# Patient Record
Sex: MALE | Race: BLACK OR AFRICAN AMERICAN | Employment: OTHER | URBAN - METROPOLITAN AREA
[De-identification: names, ages, dates, MRNs, and addresses within clinical notes are randomized per-mention and may not be internally consistent; named-entity substitution may affect disease eponyms.]

---

## 2017-04-24 ENCOUNTER — HOSPITAL ENCOUNTER (OUTPATIENT)
Dept: NON INVASIVE DIAGNOSTICS | Age: 77
Discharge: HOME | End: 2017-04-24
Attending: INTERNAL MEDICINE | Admitting: INTERNAL MEDICINE

## 2017-04-24 DIAGNOSIS — R00.0 TACHYCARDIA: ICD-10-CM

## 2017-04-26 LAB
ACQUISITION DURATION: NORMAL S
AVERAGE HEART RATE: 75 BPM
EKG DIAGNOSIS: NORMAL
FASTEST SUPRAVENTRICULAR RATE: 135 BPM
HOLTER MAX HEART RATE: 122 BPM
HOOKUP DATE: NORMAL
HOOKUP TIME: NORMAL
LONGEST SUPRAVENTRICULAR RATE: 75 BPM
Lab: NORMAL
MAX HEART RATE TIME/DATE: NORMAL
MIN HEART RATE TIME/DATE: NORMAL
MIN HEART RATE: 46 BPM
NUMBER OF FASTEST SUPRAVENTRICULAR BEATS: 3
NUMBER OF LONGEST SUPRAVENTRICULAR BEATS: 19
NUMBER OF QRS COMPLEXES: NORMAL
NUMBER OF SUPRAVENTRICULAR BEATS IN RUNS: 244
NUMBER OF SUPRAVENTRICULAR COUPLETS: 80
NUMBER OF SUPRAVENTRICULAR ECTOPICS: 2868
NUMBER OF SUPRAVENTRICULAR ISOLATED BEATS: 2464
NUMBER OF SUPRAVENTRICULAR RUNS: 51
NUMBER OF VENTRICULAR BEATS IN RUNS: 0
NUMBER OF VENTRICULAR BIGEMINAL CYCLES: 0
NUMBER OF VENTRICULAR COUPLETS: 2
NUMBER OF VENTRICULAR ECTOPICS: 40
NUMBER OF VENTRICULAR ISOLATED BEATS: 36
NUMBER OF VENTRICULAR RUNS: 0

## 2019-10-11 ENCOUNTER — TELEPHONE (OUTPATIENT)
Dept: WOUND CARE | Age: 79
End: 2019-10-11

## 2019-10-17 ENCOUNTER — HOSPITAL ENCOUNTER (OUTPATIENT)
Dept: WOUND CARE | Age: 79
Discharge: HOME | End: 2019-10-17
Payer: MEDICARE

## 2019-10-17 VITALS
DIASTOLIC BLOOD PRESSURE: 69 MMHG | HEART RATE: 71 BPM | SYSTOLIC BLOOD PRESSURE: 105 MMHG | TEMPERATURE: 97.7 F | RESPIRATION RATE: 16 BRPM

## 2019-10-17 DIAGNOSIS — I87.2 VENOUS ULCER OF RIGHT LOWER EXTREMITY WITHOUT VARICOSE VEINS (HCC): ICD-10-CM

## 2019-10-17 DIAGNOSIS — L97.919 VENOUS ULCER OF RIGHT LOWER EXTREMITY WITHOUT VARICOSE VEINS (HCC): ICD-10-CM

## 2019-10-17 DIAGNOSIS — I87.2 VENOUS INSUFFICIENCY OF BOTH LOWER EXTREMITIES: ICD-10-CM

## 2019-10-17 PROCEDURE — 11042 DBRDMT SUBQ TIS 1ST 20SQCM/<: CPT

## 2019-10-17 PROCEDURE — 99203 OFFICE O/P NEW LOW 30 MIN: CPT | Performed by: INTERNAL MEDICINE

## 2019-10-17 PROCEDURE — 99212 OFFICE O/P EST SF 10 MIN: CPT

## 2019-10-17 PROCEDURE — 11042 DBRDMT SUBQ TIS 1ST 20SQCM/<: CPT | Performed by: INTERNAL MEDICINE

## 2019-10-17 RX ORDER — LIDOCAINE 40 MG/G
CREAM TOPICAL ONCE
Status: DISCONTINUED | OUTPATIENT
Start: 2019-10-17 | End: 2019-10-18 | Stop reason: HOSPADM

## 2019-10-24 ENCOUNTER — HOSPITAL ENCOUNTER (OUTPATIENT)
Dept: WOUND CARE | Age: 79
Discharge: HOME | End: 2019-10-24
Payer: MEDICARE

## 2019-10-24 VITALS
SYSTOLIC BLOOD PRESSURE: 98 MMHG | RESPIRATION RATE: 18 BRPM | HEART RATE: 69 BPM | TEMPERATURE: 98 F | DIASTOLIC BLOOD PRESSURE: 67 MMHG

## 2019-10-24 PROCEDURE — 29580 STRAPPING UNNA BOOT: CPT

## 2019-10-24 PROCEDURE — 99213 OFFICE O/P EST LOW 20 MIN: CPT | Performed by: INTERNAL MEDICINE

## 2019-10-24 RX ORDER — LIDOCAINE 40 MG/G
CREAM TOPICAL ONCE
Status: DISCONTINUED | OUTPATIENT
Start: 2019-10-24 | End: 2019-10-25 | Stop reason: HOSPADM

## 2019-10-31 ENCOUNTER — HOSPITAL ENCOUNTER (OUTPATIENT)
Dept: WOUND CARE | Age: 79
Discharge: HOME | End: 2019-10-31
Payer: MEDICARE

## 2019-10-31 VITALS
RESPIRATION RATE: 18 BRPM | SYSTOLIC BLOOD PRESSURE: 108 MMHG | HEART RATE: 76 BPM | TEMPERATURE: 97.3 F | DIASTOLIC BLOOD PRESSURE: 70 MMHG

## 2019-10-31 DIAGNOSIS — I87.2 VENOUS INSUFFICIENCY OF BOTH LOWER EXTREMITIES: Primary | ICD-10-CM

## 2019-10-31 DIAGNOSIS — L97.911 NON-PRESSURE CHRONIC ULCER OF LOWER LEG, LIMITED TO BREAKDOWN OF SKIN, RIGHT (HCC): ICD-10-CM

## 2019-10-31 PROCEDURE — 29580 STRAPPING UNNA BOOT: CPT

## 2019-10-31 PROCEDURE — 99212 OFFICE O/P EST SF 10 MIN: CPT | Performed by: NURSE PRACTITIONER

## 2019-11-07 ENCOUNTER — HOSPITAL ENCOUNTER (OUTPATIENT)
Dept: WOUND CARE | Age: 79
Discharge: HOME | End: 2019-11-07
Payer: MEDICARE

## 2019-11-07 VITALS
RESPIRATION RATE: 18 BRPM | HEART RATE: 71 BPM | TEMPERATURE: 97.3 F | SYSTOLIC BLOOD PRESSURE: 111 MMHG | DIASTOLIC BLOOD PRESSURE: 66 MMHG

## 2019-11-07 PROCEDURE — 99212 OFFICE O/P EST SF 10 MIN: CPT

## 2019-11-07 PROCEDURE — 99212 OFFICE O/P EST SF 10 MIN: CPT | Performed by: INTERNAL MEDICINE

## 2021-10-27 ENCOUNTER — HOSPITAL ENCOUNTER (OUTPATIENT)
Dept: MAMMOGRAPHY | Age: 81
Discharge: HOME | End: 2021-10-27
Payer: MEDICARE

## 2021-10-27 ENCOUNTER — HOSPITAL ENCOUNTER (OUTPATIENT)
Dept: ULTRASOUND IMAGING | Age: 81
Discharge: HOME | End: 2021-10-27
Payer: MEDICARE

## 2021-10-27 DIAGNOSIS — N64.4 MASTODYNIA: ICD-10-CM

## 2021-10-27 DIAGNOSIS — R92.8 ABNORMAL MAMMOGRAM: ICD-10-CM

## 2021-10-27 PROCEDURE — 76642 ULTRASOUND BREAST LIMITED: CPT

## 2021-10-27 PROCEDURE — 77066 DX MAMMO INCL CAD BI: CPT

## 2022-06-23 ENCOUNTER — HOSPITAL ENCOUNTER (OUTPATIENT)
Dept: WOUND CARE | Age: 82
Discharge: HOME | End: 2022-06-23
Payer: MEDICARE

## 2022-06-23 VITALS
DIASTOLIC BLOOD PRESSURE: 67 MMHG | HEART RATE: 67 BPM | SYSTOLIC BLOOD PRESSURE: 98 MMHG | RESPIRATION RATE: 16 BRPM | TEMPERATURE: 96.8 F

## 2022-06-23 DIAGNOSIS — L97.911 NON-PRESSURE CHRONIC ULCER OF LOWER LEG, LIMITED TO BREAKDOWN OF SKIN, RIGHT (HCC): ICD-10-CM

## 2022-06-23 DIAGNOSIS — S81.811A LACERATION OF SKIN OF LOWER LEG, RIGHT, INITIAL ENCOUNTER: ICD-10-CM

## 2022-06-23 DIAGNOSIS — S81.812A: ICD-10-CM

## 2022-06-23 DIAGNOSIS — I87.2 VENOUS INSUFFICIENCY OF BOTH LOWER EXTREMITIES: Primary | ICD-10-CM

## 2022-06-23 PROCEDURE — 11042 DBRDMT SUBQ TIS 1ST 20SQCM/<: CPT | Performed by: NURSE PRACTITIONER

## 2022-06-23 PROCEDURE — 99213 OFFICE O/P EST LOW 20 MIN: CPT

## 2022-06-23 PROCEDURE — 99212 OFFICE O/P EST SF 10 MIN: CPT | Performed by: NURSE PRACTITIONER

## 2022-06-23 PROCEDURE — 11042 DBRDMT SUBQ TIS 1ST 20SQCM/<: CPT

## 2022-06-23 RX ORDER — GINSENG 100 MG
CAPSULE ORAL ONCE
Status: CANCELLED | OUTPATIENT
Start: 2022-06-23 | End: 2022-06-23

## 2022-06-23 RX ORDER — ALLOPURINOL 100 MG/1
100 TABLET ORAL DAILY
COMMUNITY

## 2022-06-23 RX ORDER — LIDOCAINE HYDROCHLORIDE 20 MG/ML
JELLY TOPICAL ONCE
Status: CANCELLED | OUTPATIENT
Start: 2022-06-23 | End: 2022-06-23

## 2022-06-23 RX ORDER — LEVOTHYROXINE SODIUM 0.12 MG/1
125 TABLET ORAL DAILY
COMMUNITY

## 2022-06-23 RX ORDER — BACITRACIN, NEOMYCIN, POLYMYXIN B 400; 3.5; 5 [USP'U]/G; MG/G; [USP'U]/G
OINTMENT TOPICAL ONCE
Status: CANCELLED | OUTPATIENT
Start: 2022-06-23 | End: 2022-06-23

## 2022-06-23 RX ORDER — CLOBETASOL PROPIONATE 0.5 MG/G
OINTMENT TOPICAL ONCE
Status: CANCELLED | OUTPATIENT
Start: 2022-06-23 | End: 2022-06-23

## 2022-06-23 RX ORDER — LIDOCAINE HYDROCHLORIDE 40 MG/ML
SOLUTION TOPICAL ONCE
Status: CANCELLED | OUTPATIENT
Start: 2022-06-23 | End: 2022-06-23

## 2022-06-23 RX ORDER — MIDODRINE HYDROCHLORIDE 2.5 MG/1
2.5 TABLET ORAL 3 TIMES DAILY
COMMUNITY

## 2022-06-23 RX ORDER — CHOLECALCIFEROL (VITAMIN D3) 10 MCG
1 TABLET ORAL DAILY
COMMUNITY

## 2022-06-23 RX ORDER — BETAMETHASONE DIPROPIONATE 0.05 %
OINTMENT (GRAM) TOPICAL ONCE
Status: CANCELLED | OUTPATIENT
Start: 2022-06-23 | End: 2022-06-23

## 2022-06-23 RX ORDER — GENTAMICIN SULFATE 1 MG/G
OINTMENT TOPICAL ONCE
Status: CANCELLED | OUTPATIENT
Start: 2022-06-23 | End: 2022-06-23

## 2022-06-23 RX ORDER — LIDOCAINE 40 MG/G
CREAM TOPICAL ONCE
Status: CANCELLED | OUTPATIENT
Start: 2022-06-23 | End: 2022-06-23

## 2022-06-23 RX ORDER — BACITRACIN ZINC AND POLYMYXIN B SULFATE 500; 1000 [USP'U]/G; [USP'U]/G
OINTMENT TOPICAL ONCE
Status: CANCELLED | OUTPATIENT
Start: 2022-06-23 | End: 2022-06-23

## 2022-06-23 RX ORDER — LIDOCAINE 50 MG/G
OINTMENT TOPICAL ONCE
Status: CANCELLED | OUTPATIENT
Start: 2022-06-23 | End: 2022-06-23

## 2022-06-23 NOTE — PLAN OF CARE
Discharge instructions given. Patient verbalized understanding. Return to [address] [place] [address],[address] in 1 week(s).   Called/faxed orders to  [facility]

## 2022-06-23 NOTE — PROGRESS NOTES
[address] [facility],[address]:      [address], [address] [place] [address] f: [phone] f: [phone]: [phone] [email or web address]     Ordering Center: Juan Antonio Kirby [address] [facility] [address]  [phone]  Dept: [phone]   Fax# [phone]    Patient Information:       Minor  [address] [facility]  [place] [address] [facility]   [phone]   : 1940  AGE: 79 y.o. GENDER: male   TODAYS DATE:  2022    Insurance:      PRIMARY INSURANCE:  Plan: MEDICARE PART A AND B  Coverage: MEDICARE  Effective Date: 2015  Group Number: [unfilled]  Subscriber Number: [id number] - (Medicare)    Payor/Plan Subscr  Sex Relation Sub. Ins. ID Effective Group Num   1. MEDICARE - MEMariella Suazo 1940 Male Self [id number] 1/1/15                                    [address] [address]   2.   - TRIMariella Suazo 1940 Male Self [id number] 1/1/15                                    [address]         Patient Wound Information:     Additional ICD-10 Codes: I87.2, Venous ulcers bilateral legs    Patient Active Problem List   Diagnosis Code    Hyperlipidemia E78.5    Hypertension I10    Type II or unspecified type diabetes mellitus without mention of complication, not stated as uncontrolled E11.9    Fracture of sternum S22.20XA    Fractured rib S22.39XA    Prostate cancer ([facility] [address] C61    Thrombus I82.90    Chronic renal disease N18.9    Trigger finger M65.30    CTS (carpal tunnel syndrome) G56.00    Venous insufficiency of both lower extremities I87.2    Venous ulcer of right lower extremity without varicose veins (HCC) I87.2, L97.919    Non-pressure chronic ulcer of lower leg, limited to breakdown of skin, right ([facility] [address] L97.911       WOUNDS REQUIRING DRESSING SUPPLIES:     Wound 22 Leg Left;Lateral #2 (Active)   Wound Image   22 1523   Wound Etiology Traumatic 22 1523   Wound Cleansed Cleansed with saline 22 4014 Wound Length (cm) 0.7 cm 06/23/22 1523   Wound Width (cm) 1.9 cm 06/23/22 1523   Wound Depth (cm) 0.1 cm 06/23/22 1523   Wound Surface Area (cm^2) 1.33 cm^2 06/23/22 1523   Wound Volume (cm^3) 0.133 cm^3 06/23/22 1523   Post-Procedure Length (cm) 0.8 cm 06/23/22 1602   Post-Procedure Width (cm) 2 cm 06/23/22 1602   Post-Procedure Depth (cm) 0.15 cm 06/23/22 1602   Post-Procedure Surface Area (cm^2) 1.6 cm^2 06/23/22 1602   Post-Procedure Volume (cm^3) 0.24 cm^3 06/23/22 1602   Wound Assessment Bleeding 06/23/22 1602   Drainage Amount Moderate 06/23/22 1602   Drainage Description Serosanguinous 06/23/22 1602   Odor None 06/23/22 1523   Angela-wound Assessment Intact 06/23/22 1523   Margins Attached edges; Defined edges 06/23/22 1523   Number of days: 0       Wound 06/23/22 Leg Lateral;Right #1 (Active)   Wound Image   06/23/22 1523   Wound Etiology Traumatic 06/23/22 1523   Wound Cleansed Cleansed with saline 06/23/22 1523   Wound Length (cm) 0.9 cm 06/23/22 1523   Wound Width (cm) 3 cm 06/23/22 1523   Wound Depth (cm) 0.1 cm 06/23/22 1523   Wound Surface Area (cm^2) 2.7 cm^2 06/23/22 1523   Wound Volume (cm^3) 0.27 cm^3 06/23/22 1523   Post-Procedure Length (cm) 1 cm 06/23/22 1602   Post-Procedure Width (cm) 3.1 cm 06/23/22 1602   Post-Procedure Depth (cm) 0.15 cm 06/23/22 1602   Post-Procedure Surface Area (cm^2) 3.1 cm^2 06/23/22 1602   Post-Procedure Volume (cm^3) 0.465 cm^3 06/23/22 1602   Wound Assessment Bleeding 06/23/22 1602   Drainage Amount Moderate 06/23/22 1602   Drainage Description Serosanguinous 06/23/22 1602   Odor None 06/23/22 1523   Angela-wound Assessment Intact 06/23/22 1523   Margins Attached edges; Defined edges 06/23/22 1523   Number of days: 0          Supplies Requested :      WOUND #: 1 and 2   PRIMARY DRESSING:    Other: Medi-Honey Gel   Cover and Secure with: 4X4 gauze pad  Bulky roll gauze     FREQUENCY OF DRESSING CHANGES:  Daily    Wound Thickness [x] Full   []Partial Patient Wound(s) Debrided: [x] Yes   [] No    Debridement Date: 6/23/2022    Debribement Type: Excisional/Sharp    ADDITIONAL ITEMS:  [] Gloves Small  [] Gloves Medium [x] Gloves Large [] Gloves Nichelle Swanson  [] Paper Tape 1\" [] Paper Tape 2\" [] Paper Tape 3\"  [x] Medipore Tape 3\"  [x] Saline  [] Skin Prep   [] Adhesive Remover   [] Cotton Tip Applicators  [] Tubular Stocking   [] Size E  [] Size G  [] Other:    Patient currently being seen by Home Health: [] Yes   [x] No    Duration for needed supplies:  [x]15  []30  []60  []90 Days    Provider Information:      PROVIDER'S NAME/NPI  Stalin Smart  [id number]    I give permission to coordinate the care for this patient
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
## 2022-07-01 ENCOUNTER — HOSPITAL ENCOUNTER (OUTPATIENT)
Dept: WOUND CARE | Age: 82
Discharge: HOME | End: 2022-07-01
Payer: MEDICARE

## 2022-07-01 DIAGNOSIS — S81.812A: ICD-10-CM

## 2022-07-01 DIAGNOSIS — S81.811A LACERATION OF SKIN OF LOWER LEG, RIGHT, INITIAL ENCOUNTER: ICD-10-CM

## 2022-07-01 DIAGNOSIS — L97.911 NON-PRESSURE CHRONIC ULCER OF LOWER LEG, LIMITED TO BREAKDOWN OF SKIN, RIGHT (HCC): ICD-10-CM

## 2022-07-01 PROCEDURE — 11042 DBRDMT SUBQ TIS 1ST 20SQCM/<: CPT

## 2022-07-01 PROCEDURE — 29581 APPL MULTLAYER CMPRN SYS LEG: CPT

## 2022-07-01 PROCEDURE — 97597 DBRDMT OPN WND 1ST 20 CM/<: CPT

## 2022-07-01 NOTE — PLAN OF CARE
Discharge instructions given. Patient verbalized understanding. Return to [facility] in 1 week(s).
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
## 2022-07-06 ENCOUNTER — HOSPITAL ENCOUNTER (OUTPATIENT)
Dept: WOUND CARE | Age: 82
Discharge: HOME | End: 2022-07-06

## 2022-07-08 ENCOUNTER — HOSPITAL ENCOUNTER (OUTPATIENT)
Dept: WOUND CARE | Age: 82
Discharge: HOME | End: 2022-07-08
Payer: MEDICARE

## 2022-07-08 VITALS — SYSTOLIC BLOOD PRESSURE: 106 MMHG | TEMPERATURE: 96.8 F | DIASTOLIC BLOOD PRESSURE: 73 MMHG | HEART RATE: 67 BPM

## 2022-07-08 DIAGNOSIS — I73.9 PAD (PERIPHERAL ARTERY DISEASE) (HCC): ICD-10-CM

## 2022-07-08 DIAGNOSIS — S81.811A LACERATION OF SKIN OF LOWER LEG, RIGHT, INITIAL ENCOUNTER: ICD-10-CM

## 2022-07-08 DIAGNOSIS — L97.911 NON-PRESSURE CHRONIC ULCER OF LOWER LEG, LIMITED TO BREAKDOWN OF SKIN, RIGHT (HCC): ICD-10-CM

## 2022-07-08 DIAGNOSIS — S81.812A: Primary | ICD-10-CM

## 2022-07-08 LAB
GLUCOSE BLD-MCNC: 113 MG/DL (ref 70–99)
PERFORMED ON: ABNORMAL

## 2022-07-08 PROCEDURE — 29581 APPL MULTLAYER CMPRN SYS LEG: CPT

## 2022-07-08 PROCEDURE — 97597 DBRDMT OPN WND 1ST 20 CM/<: CPT

## 2022-07-08 PROCEDURE — 11042 DBRDMT SUBQ TIS 1ST 20SQCM/<: CPT

## 2022-07-08 RX ORDER — BACITRACIN ZINC AND POLYMYXIN B SULFATE 500; 1000 [USP'U]/G; [USP'U]/G
OINTMENT TOPICAL ONCE
Status: CANCELLED | OUTPATIENT
Start: 2022-07-08 | End: 2022-07-08

## 2022-07-08 RX ORDER — GINSENG 100 MG
CAPSULE ORAL ONCE
Status: CANCELLED | OUTPATIENT
Start: 2022-07-08 | End: 2022-07-08

## 2022-07-08 RX ORDER — LIDOCAINE 40 MG/G
CREAM TOPICAL ONCE
Status: DISCONTINUED | OUTPATIENT
Start: 2022-07-08 | End: 2022-07-09 | Stop reason: HOSPADM

## 2022-07-08 RX ORDER — BETAMETHASONE DIPROPIONATE 0.05 %
OINTMENT (GRAM) TOPICAL ONCE
Status: CANCELLED | OUTPATIENT
Start: 2022-07-08 | End: 2022-07-08

## 2022-07-08 RX ORDER — LIDOCAINE 40 MG/G
CREAM TOPICAL ONCE
Status: CANCELLED | OUTPATIENT
Start: 2022-07-08 | End: 2022-07-08

## 2022-07-08 RX ORDER — BACITRACIN, NEOMYCIN, POLYMYXIN B 400; 3.5; 5 [USP'U]/G; MG/G; [USP'U]/G
OINTMENT TOPICAL ONCE
Status: CANCELLED | OUTPATIENT
Start: 2022-07-08 | End: 2022-07-08

## 2022-07-08 RX ORDER — LIDOCAINE 50 MG/G
OINTMENT TOPICAL ONCE
Status: CANCELLED | OUTPATIENT
Start: 2022-07-08 | End: 2022-07-08

## 2022-07-08 RX ORDER — CLOBETASOL PROPIONATE 0.5 MG/G
OINTMENT TOPICAL ONCE
Status: CANCELLED | OUTPATIENT
Start: 2022-07-08 | End: 2022-07-08

## 2022-07-08 RX ORDER — LIDOCAINE HYDROCHLORIDE 20 MG/ML
JELLY TOPICAL ONCE
Status: CANCELLED | OUTPATIENT
Start: 2022-07-08 | End: 2022-07-08

## 2022-07-08 RX ORDER — LIDOCAINE HYDROCHLORIDE 40 MG/ML
SOLUTION TOPICAL ONCE
Status: CANCELLED | OUTPATIENT
Start: 2022-07-08 | End: 2022-07-08

## 2022-07-08 RX ORDER — GENTAMICIN SULFATE 1 MG/G
OINTMENT TOPICAL ONCE
Status: CANCELLED | OUTPATIENT
Start: 2022-07-08 | End: 2022-07-08

## 2022-07-08 NOTE — PLAN OF CARE
Discharge instructions given. Patient verbalized understanding. Return to [address] [place] [address],[address] in 1 week(s).

## 2022-07-08 NOTE — PROGRESS NOTES
Multilayer Compression Wrap   Below the Knee    NAME:  Sean Gan  YOB: 1940  MEDICAL RECORD NUMBER:  [id number]  DATE:  7/8/2022    Multilayer compression wrap: Applied primary and secondary dressing as ordered. Applied multilayered dressing below the knee to right lower leg. Applied multilayered dressing below the knee to left lower leg. Instructed patient/caregiver not to remove dressing and to keep it clean and dry. Instructed patient/caregiver on complications to report to provider, such as pain, numbness in toes, heavy drainage, and slippage of dressing. Instructed patient on purpose of compression dressing and on activity and exercise recommendations.      Applied  3 layer wrap from toes to knee overlapping each time    Electronically signed by Rangel Ramirez RN on 7/8/2022 at 12:21 PM
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
## 2022-07-13 ENCOUNTER — HOSPITAL ENCOUNTER (OUTPATIENT)
Dept: WOUND CARE | Age: 82
Discharge: HOME | End: 2022-07-13
Payer: MEDICARE

## 2022-07-13 VITALS
TEMPERATURE: 96.6 F | HEART RATE: 73 BPM | DIASTOLIC BLOOD PRESSURE: 66 MMHG | SYSTOLIC BLOOD PRESSURE: 100 MMHG | RESPIRATION RATE: 16 BRPM

## 2022-07-13 DIAGNOSIS — L97.911 NON-PRESSURE CHRONIC ULCER OF LOWER LEG, LIMITED TO BREAKDOWN OF SKIN, RIGHT (HCC): ICD-10-CM

## 2022-07-13 DIAGNOSIS — S81.812A: Primary | ICD-10-CM

## 2022-07-13 DIAGNOSIS — S81.811A LACERATION OF SKIN OF LOWER LEG, RIGHT, INITIAL ENCOUNTER: ICD-10-CM

## 2022-07-13 PROCEDURE — 29581 APPL MULTLAYER CMPRN SYS LEG: CPT

## 2022-07-13 PROCEDURE — 11042 DBRDMT SUBQ TIS 1ST 20SQCM/<: CPT

## 2022-07-13 PROCEDURE — 97597 DBRDMT OPN WND 1ST 20 CM/<: CPT

## 2022-07-13 RX ORDER — LIDOCAINE 40 MG/G
CREAM TOPICAL ONCE
Status: CANCELLED | OUTPATIENT
Start: 2022-07-13 | End: 2022-07-13

## 2022-07-13 RX ORDER — LIDOCAINE 50 MG/G
OINTMENT TOPICAL ONCE
Status: CANCELLED | OUTPATIENT
Start: 2022-07-13 | End: 2022-07-13

## 2022-07-13 RX ORDER — LIDOCAINE HYDROCHLORIDE 20 MG/ML
JELLY TOPICAL ONCE
Status: CANCELLED | OUTPATIENT
Start: 2022-07-13 | End: 2022-07-13

## 2022-07-13 RX ORDER — BACITRACIN, NEOMYCIN, POLYMYXIN B 400; 3.5; 5 [USP'U]/G; MG/G; [USP'U]/G
OINTMENT TOPICAL ONCE
Status: CANCELLED | OUTPATIENT
Start: 2022-07-13 | End: 2022-07-13

## 2022-07-13 RX ORDER — GINSENG 100 MG
CAPSULE ORAL ONCE
Status: CANCELLED | OUTPATIENT
Start: 2022-07-13 | End: 2022-07-13

## 2022-07-13 RX ORDER — CLOBETASOL PROPIONATE 0.5 MG/G
OINTMENT TOPICAL ONCE
Status: CANCELLED | OUTPATIENT
Start: 2022-07-13 | End: 2022-07-13

## 2022-07-13 RX ORDER — LIDOCAINE 40 MG/G
CREAM TOPICAL ONCE
Status: DISCONTINUED | OUTPATIENT
Start: 2022-07-13 | End: 2022-07-14 | Stop reason: HOSPADM

## 2022-07-13 RX ORDER — LIDOCAINE HYDROCHLORIDE 40 MG/ML
SOLUTION TOPICAL ONCE
Status: CANCELLED | OUTPATIENT
Start: 2022-07-13 | End: 2022-07-13

## 2022-07-13 RX ORDER — BACITRACIN ZINC AND POLYMYXIN B SULFATE 500; 1000 [USP'U]/G; [USP'U]/G
OINTMENT TOPICAL ONCE
Status: CANCELLED | OUTPATIENT
Start: 2022-07-13 | End: 2022-07-13

## 2022-07-13 RX ORDER — BETAMETHASONE DIPROPIONATE 0.05 %
OINTMENT (GRAM) TOPICAL ONCE
Status: CANCELLED | OUTPATIENT
Start: 2022-07-13 | End: 2022-07-13

## 2022-07-13 RX ORDER — GENTAMICIN SULFATE 1 MG/G
OINTMENT TOPICAL ONCE
Status: CANCELLED | OUTPATIENT
Start: 2022-07-13 | End: 2022-07-13

## 2022-07-13 NOTE — PROGRESS NOTES
Multilayer Compression Wrap   Below the Knee    NAME:  Maryann Hutson  YOB: 1940  MEDICAL RECORD NUMBER:  [id number]  DATE:  7/13/2022    Multilayer compression wrap: Removed old Multilayer wrap if indicated and wash leg with mild soap/water. Applied moisturizing agent to dry skin as needed. Applied primary and secondary dressing as ordered. Applied multilayered dressing below the knee to right lower leg. Applied multilayered dressing below the knee to left lower leg. Instructed patient/caregiver not to remove dressing and to keep it clean and dry. Instructed patient/caregiver on complications to report to provider, such as pain, numbness in toes, heavy drainage, and slippage of dressing. Instructed patient on purpose of compression dressing and on activity and exercise recommendations.      Applied  3 layer wrap from toes to knee overlapping each time  Right leg- Gentamicin and collagen to wound, Left Leg- Triad and Gentamicin to wound  Electronically signed by Daniel Reveles RN on 7/13/2022 at 5:02 PM

## 2022-07-13 NOTE — DISCHARGE INSTRUCTIONS
[address], [address]  Telephone: [phone]    Discharge Instructions    Important reminders:    **If you have any signs and symptoms of illness (Cough, fever, congestion, nausea, vomiting, diarrhea, etc.) please call the wound care center prior to your appointment. 1. Increase Protein intake for optimal wound healing  2. No added salt to reduce any swelling  3. If diabetic, maintain good glucose control  4. If you smoke, smoking prohibits wound healing, we ask that you refrain from smoking. 5. When taking antibiotics take the entire prescription as ordered. Do not stop taking until medication is all gone unless otherwise instructed. 6. Exercise as tolerated. 7. Keep weight off wounds and reposition every 2 hours if applicable. 8. If wound(s) is on your lower extremity, elevate legs to the level of the heart or above for 30 minutes 4-5 times a day and/or when sitting. Avoid standing for long periods of time. 9. Do not get wounds wet in bath or shower unless otherwise instructed by your physician. If your wound is on your foot or leg, you may purchase a cast bag. Please ask at the pharmacy. If Vascular testing is ordered, please call [address] [place] [address] [phone]) to schedule. Vascular tests ordered by Wound Care Physicians may take up to 2 hours to complete. Please keep that in mind when scheduling. If Vascular testing is scheduled, please bring supplies to replace your dressing after testing is done. The vascular department does not stock supplies. Wound: Bilateral Legs    With each dressing change, rinse wounds with 0.9% Saline. (May use wound wash or soft contact solution. Both can be purchased at a local drug store). If unable to obtain saline, may use a gentle soap and water. Dressing - .  Keep legs elevated when sitting. Gentamicin cream, Triad, Mepitel, 4x4, 3 layer wraps. Please leave these on for the week.  If companies have financial hardship programs for patients who qualify, so please ask about that if you might need a hand. If you have any questions about your supplies or your potential out-of-pocket costs, or if you need to place an order for a refill of supplies (typically monthly), please call the company directly. Your  is Jeyson Hernandez    Follow up with Verito Parmar or Dr Asha Glass In 1 week(s) in the wound care center. Wound Care Center Information: Should you experience any significant changes in your wound(s) or have questions about your wound care, please contact the [place] [address] at [phone] Monday  - Thursday 8:00 am - 4:00 pm and Friday 8:00 am - 1:00pm. If you need help with your wound outside these hours and cannot wait until we are again available, contact your PCP or go to the hospital emergency room. PLEASE NOTE: IF YOU ARE UNABLE TO OBTAIN WOUND SUPPLIES, CONTINUE TO USE THE SUPPLIES YOU HAVE AVAILABLE UNTIL YOU ARE ABLE TO REACH US. IT IS MOST IMPORTANT TO KEEP THE WOUND COVERED AT ALL TIMES. Patient Experience    Thank you for trusting us with your care. You may receive a survey from a company called [employer] asking for your feedback. We would appreciate it if you took a few minutes to share your experience. Your input is very valuable to us.

## 2022-07-13 NOTE — PLAN OF CARE
Discharge instructions given. Patient verbalized understanding. Return to [facility] in 1 week(s).
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
## 2022-07-18 NOTE — DISCHARGE INSTRUCTIONS
[address], [address]  Telephone: [phone]    Discharge Instructions    Important reminders:    **If you have any signs and symptoms of illness (Cough, fever, congestion, nausea, vomiting, diarrhea, etc.) please call the wound care center prior to your appointment. 1. Increase Protein intake for optimal wound healing  2. No added salt to reduce any swelling  3. If diabetic, maintain good glucose control  4. If you smoke, smoking prohibits wound healing, we ask that you refrain from smoking. 5. When taking antibiotics take the entire prescription as ordered. Do not stop taking until medication is all gone unless otherwise instructed. 6. Exercise as tolerated. 7. Keep weight off wounds and reposition every 2 hours if applicable. 8. If wound(s) is on your lower extremity, elevate legs to the level of the heart or above for 30 minutes 4-5 times a day and/or when sitting. Avoid standing for long periods of time. 9. Do not get wounds wet in bath or shower unless otherwise instructed by your physician. If your wound is on your foot or leg, you may purchase a cast bag. Please ask at the pharmacy. If Vascular testing is ordered, please call [employer] ([phone]) to schedule. Vascular tests ordered by Wound Care Physicians may take up to 2 hours to complete. Please keep that in mind when scheduling. If Vascular testing is scheduled, please bring supplies to replace your dressing after testing is done. The vascular department does not stock supplies. Wound: Bilateral Legs    With each dressing change, rinse wounds with 0.9% Saline. (May use wound wash or soft contact solution. Both can be purchased at a local drug store). If unable to obtain saline, may use a gentle soap and water. Dressing - .  Keep legs elevated when sitting. Gentamicin cream, Triad, Mepitel, 4x4, 3 layer wraps. Please leave these on for the week.  If they become uncomfortable or slide down, Remove and call the wound center    Call to have vascular studies scheduled. Try [facility]    Compression Wraps  Location: Both legs  Type: 3 layer    Your doctor has ordered compression therapy for your wound. Compression bandages reduce the swelling, or edema, in your legs and prevent it from returning. The wound care staff will apply your compression wrap. It must be removed and re-applied at least weekly. As the swelling decreases, the boot no longer provides adequate compression and you need a new one. Once applied, you need to know how to take care of your compression wrap. The boot must stay dry. Do not get it wet in the shower or tub. You may do a partial bath, or you can cover the boot with a large plastic bag, secured at the top, so that no water can get in. Avoid standing in one place for long periods of time. If you must  one place, shift your weight and change positions often. If you have CHF, consult your doctor before following the next two recommendations for leg elevation. When sitting, elevate your legs on pillows, or put blocks under the foot of your bed. Your legs should be higher than your heart. If your boot becomes painful, or you notice an increase in swelling in your toes, numbness or tingling, or purple color to your toes, remove the wrap and call the [facility] [address]. If it is after hours, call your doctor for instructions or go to the nearest emergency room. Home Care Agency/Facility:     Your wound-care supplies will be provided by: [employer] -   phone #: [phone]      Please note, depending on your insurance coverage, you may have out-of-pocket expenses for these supplies. Someone from the company should call you to confirm your order and discuss those potential costs before they ship your products -- please anticipate that call.  If your out-of-pocket cost could be substantial, Many companies have financial hardship programs for patients who qualify, so please ask about that if you might need a hand. If you have any questions about your supplies or your potential out-of-pocket costs, or if you need to place an order for a refill of supplies (typically monthly), please call the company directly. Your  is Sonny Wilder    Follow up with Angus Yo or Dr Rosalia Malik In 1 week(s) in the wound care center. Wound Care Center Information: Should you experience any significant changes in your wound(s) or have questions about your wound care, please contact the [place] [address] at [phone] Monday  - Thursday 8:00 am - 4:00 pm and Friday 8:00 am - 1:00pm. If you need help with your wound outside these hours and cannot wait until we are again available, contact your PCP or go to the hospital emergency room. PLEASE NOTE: IF YOU ARE UNABLE TO OBTAIN WOUND SUPPLIES, CONTINUE TO USE THE SUPPLIES YOU HAVE AVAILABLE UNTIL YOU ARE ABLE TO REACH US. IT IS MOST IMPORTANT TO KEEP THE WOUND COVERED AT ALL TIMES. Patient Experience    Thank you for trusting us with your care. You may receive a survey from a company called [employer] asking for your feedback. We would appreciate it if you took a few minutes to share your experience. Your input is very valuable to us.

## 2022-07-20 ENCOUNTER — HOSPITAL ENCOUNTER (OUTPATIENT)
Dept: WOUND CARE | Age: 82
Discharge: HOME | End: 2022-07-20

## 2022-08-23 ENCOUNTER — HOSPITAL ENCOUNTER (OUTPATIENT)
Dept: WOUND CARE | Age: 82
Discharge: HOME | End: 2022-08-23
Payer: MEDICARE

## 2022-08-23 DIAGNOSIS — I87.2 VENOUS ULCER OF RIGHT LOWER EXTREMITY WITHOUT VARICOSE VEINS (HCC): ICD-10-CM

## 2022-08-23 DIAGNOSIS — S81.811A LACERATION OF SKIN OF LOWER LEG, RIGHT, INITIAL ENCOUNTER: ICD-10-CM

## 2022-08-23 DIAGNOSIS — L97.912 NON-PRESSURE CHRONIC ULCER OF RIGHT LOWER LEG WITH FAT LAYER EXPOSED (HCC): ICD-10-CM

## 2022-08-23 DIAGNOSIS — I87.2 VENOUS INSUFFICIENCY OF BOTH LOWER EXTREMITIES: ICD-10-CM

## 2022-08-23 DIAGNOSIS — L97.919 VENOUS ULCER OF RIGHT LOWER EXTREMITY WITHOUT VARICOSE VEINS (HCC): ICD-10-CM

## 2022-08-23 DIAGNOSIS — L97.922 NON-PRESSURE CHRONIC ULCER OF LEFT LOWER LEG WITH FAT LAYER EXPOSED (HCC): ICD-10-CM

## 2022-08-23 DIAGNOSIS — L97.911 NON-PRESSURE CHRONIC ULCER OF LOWER LEG, LIMITED TO BREAKDOWN OF SKIN, RIGHT (HCC): ICD-10-CM

## 2022-08-23 DIAGNOSIS — S81.812A: Primary | ICD-10-CM

## 2022-08-23 PROCEDURE — 11042 DBRDMT SUBQ TIS 1ST 20SQCM/<: CPT

## 2022-08-23 PROCEDURE — 99213 OFFICE O/P EST LOW 20 MIN: CPT | Performed by: EMERGENCY MEDICINE

## 2022-08-23 PROCEDURE — 11042 DBRDMT SUBQ TIS 1ST 20SQCM/<: CPT | Performed by: EMERGENCY MEDICINE

## 2022-08-23 PROCEDURE — 11045 DBRDMT SUBQ TISS EACH ADDL: CPT | Performed by: EMERGENCY MEDICINE

## 2022-08-23 PROCEDURE — 11045 DBRDMT SUBQ TISS EACH ADDL: CPT

## 2022-08-23 PROCEDURE — 99214 OFFICE O/P EST MOD 30 MIN: CPT

## 2022-08-23 RX ORDER — CLOBETASOL PROPIONATE 0.5 MG/G
OINTMENT TOPICAL ONCE
Status: CANCELLED | OUTPATIENT
Start: 2022-08-23 | End: 2022-08-23

## 2022-08-23 RX ORDER — LIDOCAINE HYDROCHLORIDE 20 MG/ML
JELLY TOPICAL ONCE
Status: CANCELLED | OUTPATIENT
Start: 2022-08-23 | End: 2022-08-23

## 2022-08-23 RX ORDER — GENTAMICIN SULFATE 1 MG/G
OINTMENT TOPICAL ONCE
Status: CANCELLED | OUTPATIENT
Start: 2022-08-23 | End: 2022-08-23

## 2022-08-23 RX ORDER — GINSENG 100 MG
CAPSULE ORAL ONCE
Status: CANCELLED | OUTPATIENT
Start: 2022-08-23 | End: 2022-08-23

## 2022-08-23 RX ORDER — LIDOCAINE HYDROCHLORIDE 40 MG/ML
SOLUTION TOPICAL ONCE
Status: CANCELLED | OUTPATIENT
Start: 2022-08-23 | End: 2022-08-23

## 2022-08-23 RX ORDER — LIDOCAINE 40 MG/G
CREAM TOPICAL ONCE
Status: CANCELLED | OUTPATIENT
Start: 2022-08-23 | End: 2022-08-23

## 2022-08-23 RX ORDER — LIDOCAINE 40 MG/G
CREAM TOPICAL ONCE
Status: DISCONTINUED | OUTPATIENT
Start: 2022-08-23 | End: 2022-08-24 | Stop reason: HOSPADM

## 2022-08-23 RX ORDER — BACITRACIN ZINC AND POLYMYXIN B SULFATE 500; 1000 [USP'U]/G; [USP'U]/G
OINTMENT TOPICAL ONCE
Status: CANCELLED | OUTPATIENT
Start: 2022-08-23 | End: 2022-08-23

## 2022-08-23 RX ORDER — BETAMETHASONE DIPROPIONATE 0.05 %
OINTMENT (GRAM) TOPICAL ONCE
Status: CANCELLED | OUTPATIENT
Start: 2022-08-23 | End: 2022-08-23

## 2022-08-23 RX ORDER — BACITRACIN, NEOMYCIN, POLYMYXIN B 400; 3.5; 5 [USP'U]/G; MG/G; [USP'U]/G
OINTMENT TOPICAL ONCE
Status: CANCELLED | OUTPATIENT
Start: 2022-08-23 | End: 2022-08-23

## 2022-08-23 RX ORDER — LIDOCAINE 50 MG/G
OINTMENT TOPICAL ONCE
Status: CANCELLED | OUTPATIENT
Start: 2022-08-23 | End: 2022-08-23

## 2022-08-23 NOTE — DISCHARGE INSTRUCTIONS
[address], [address] [place] [address]  Telephone: [phone]    Discharge Instructions    Important reminders:    **If you have any signs and symptoms of illness (Cough, fever, congestion, nausea, vomiting, diarrhea, etc.) please call the wound care center prior to your appointment. 1. Increase Protein intake for optimal wound healing  2. No added salt to reduce any swelling  3. If diabetic, maintain good glucose control  4. If you smoke, smoking prohibits wound healing, we ask that you refrain from smoking. 5. When taking antibiotics take the entire prescription as ordered. Do not stop taking until medication is all gone unless otherwise instructed. 6. Exercise as tolerated. 7. Keep weight off wounds and reposition every 2 hours if applicable. 8. If wound(s) is on your lower extremity, elevate legs to the level of the heart or above for 30 minutes 4-5 times a day and/or when sitting. Avoid standing for long periods of time. 9. Do not get wounds wet in bath or shower unless otherwise instructed by your physician. If your wound is on your foot or leg, you may purchase a cast bag. Please ask at the pharmacy. If Vascular testing is ordered, please call [address] [place] [address] ([phone]) to schedule. Vascular tests ordered by Wound Care Physicians may take up to 2 hours to complete. Please keep that in mind when scheduling. If Vascular testing is scheduled, please bring supplies to replace your dressing after testing is done. The vascular department does not stock supplies. Wound: Legs     With each dressing change, rinse wounds with 0.9% Saline. (May use wound wash or soft contact solution. Both can be purchased at a local drug store). If unable to obtain saline, may use a gentle soap and water. Dressing care:    Keep legs elevated when sitting. Silver alginate to all wounds to right leg daily and as needed with dry dressing.  Bilateral legs- 6\" ace wrap from toes to below the knees. Call to have Arterial and Venous Vascular studies scheduled. Home Care Agency/Facility: [facility]    Your wound-care supplies will be provided by: [employer] -   phone #: [phone]    Please note, depending on your insurance coverage, you may have out-of-pocket expenses for these supplies. Someone from the company should call you to confirm your order and discuss those potential costs before they ship your products -- please anticipate that call. If your out-of-pocket cost could be substantial, Many companies have financial hardship programs for patients who qualify, so please ask about that if you might need a hand. If you have any questions about your supplies or your potential out-of-pocket costs, or if you need to place an order for a refill of supplies (typically monthly), please call the company directly. Your  is Fiona Reina up with Dr Nicky Rhodes In 1 week(s) in the wound care center. Wound Care Center Information: Should you experience any significant changes in your wound(s) or have questions about your wound care, please [facility] at [phone] Monday  - Thursday 8:00 am - 4:00 pm and Friday 8:00 am - 1:00pm. If you need help with your wound outside these hours and cannot wait until we are again available, contact your PCP or go to the hospital emergency room. PLEASE NOTE: IF YOU ARE UNABLE TO OBTAIN WOUND SUPPLIES, CONTINUE TO USE THE SUPPLIES YOU HAVE AVAILABLE UNTIL YOU ARE ABLE TO REACH US. IT IS MOST IMPORTANT TO KEEP THE WOUND COVERED AT ALL TIMES. Patient Experience    Thank you for trusting us with your care. You may receive a survey from a company called [employer] asking for your feedback. We would appreciate it if you took a few minutes to share your experience. Your input is very valuable to us.
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
## 2022-08-24 NOTE — PLAN OF CARE
[facility]  [address], [address]  Telephone: [phone]        [facility] Fax # [phone]        Discharge Instructions           [facility]  [address], [address]  Telephone: [phone]    Discharge Instructions    Important reminders:    **If you have any signs and symptoms of illness (Cough, fever, congestion, nausea, vomiting, diarrhea, etc.) please call the wound care center prior to your appointment. 1. Increase Protein intake for optimal wound healing  2. No added salt to reduce any swelling  3. If diabetic, maintain good glucose control  4. If you smoke, smoking prohibits wound healing, we ask that you refrain from smoking. 5. When taking antibiotics take the entire prescription as ordered. Do not stop taking until medication is all gone unless otherwise instructed. 6. Exercise as tolerated. 7. Keep weight off wounds and reposition every 2 hours if applicable. 8. If wound(s) is on your lower extremity, elevate legs to the level of the heart or above for 30 minutes 4-5 times a day and/or when sitting. Avoid standing for long periods of time. 9. Do not get wounds wet in bath or shower unless otherwise instructed by your physician. If your wound is on your foot or leg, you may purchase a cast bag. Please ask at the pharmacy. If Vascular testing is ordered, please call [address] [place] [address] [phone]) to schedule. Vascular tests ordered by Wound Care Physicians may take up to 2 hours to complete. Please keep that in mind when scheduling. If Vascular testing is scheduled, please bring supplies to replace your dressing after testing is done. The vascular department does not stock supplies. Wound: Legs     With each dressing change, rinse wounds with 0.9% Saline. (May use wound wash or soft contact solution. Both can be purchased at a local drug store).  If unable to obtain saline, may use a gentle soap and water. Dressing care:    Keep legs elevated when sitting. Silver alginate to all wounds to right leg daily and as needed with dry dressing. Bilateral legs- 6\" ace wrap from toes to below the knees. Call to have Arterial and Venous Vascular studies scheduled. Home Care Agency/Facility: [facility]    Your wound-care supplies will be provided by: [employer] -   phone #: [phone]    Please note, depending on your insurance coverage, you may have out-of-pocket expenses for these supplies. Someone from the company should call you to confirm your order and discuss those potential costs before they ship your products -- please anticipate that call. If your out-of-pocket cost could be substantial, Many companies have financial hardship programs for patients who qualify, so please ask about that if you might need a hand. If you have any questions about your supplies or your potential out-of-pocket costs, or if you need to place an order for a refill of supplies (typically monthly), please call the company directly. Your  is Fiona Reina up with Dr Erich Agrawal In 1 week(s) in the wound care center. Wound Care Center Information: Should you experience any significant changes in your wound(s) or have questions about your wound care, please [facility]  at [phone] Monday  - Thursday 8:00 am - 4:00 pm and Friday 8:00 am - 1:00pm. If you need help with your wound outside these hours and cannot wait until we are again available, contact your PCP or go to the hospital emergency room. PLEASE NOTE: IF YOU ARE UNABLE TO OBTAIN WOUND SUPPLIES, CONTINUE TO USE THE SUPPLIES YOU HAVE AVAILABLE UNTIL YOU ARE ABLE TO REACH US. IT IS MOST IMPORTANT TO KEEP THE WOUND COVERED AT ALL TIMES. Patient Experience    Thank you for trusting us with your care.   You may receive a survey from a company called [employer] asking for your feedback. We would appreciate it if you took a few minutes to share your experience. Your input is very valuable to us. Skilled nurse to evaluate and treat for wound care. Change dressing as ordered  once a day on Monday, Tuesday, Wednesday, Thursday, Friday, Saturday, and Sunday using clean technique. Patient/Family/caregiver may change dressings as needed as instructed when Home Care unavailable. WOUNDS REQUIRING DRESSING Changes:     Wound 08/23/22 Leg Right; Lower; Anterior;Proximal #1 (Active)   Wound Image   08/23/22 1544   Wound Etiology Venous 08/23/22 1544   Wound Cleansed Cleansed with saline 08/23/22 1544   Dressing/Treatment Alginate with Ag; Ace wrap;Dry dressing 08/23/22 1633   Wound Length (cm) 6.2 cm 08/23/22 1544   Wound Width (cm) 3.2 cm 08/23/22 1544   Wound Depth (cm) 0.1 cm 08/23/22 1544   Wound Surface Area (cm^2) 19.84 cm^2 08/23/22 1544   Wound Volume (cm^3) 1.984 cm^3 08/23/22 1544   Post-Procedure Length (cm) 6.2 cm 08/23/22 1606   Post-Procedure Width (cm) 3.2 cm 08/23/22 1606   Post-Procedure Depth (cm) 0.1 cm 08/23/22 1606   Post-Procedure Surface Area (cm^2) 19.84 cm^2 08/23/22 1606   Post-Procedure Volume (cm^3) 1.984 cm^3 08/23/22 1606   Wound Assessment Bleeding 08/23/22 1606   Drainage Amount Moderate 08/23/22 1544   Drainage Description Serous 08/23/22 1544   Odor None 08/23/22 1544   Angela-wound Assessment Fragile 08/23/22 1544   Margins Defined edges 08/23/22 1544   Number of days: 0       Wound 08/23/22 Leg Right; Lower; Anterior;Distal #2 (Active)   Wound Image   08/23/22 1544   Wound Etiology Venous 08/23/22 1544   Wound Cleansed Cleansed with saline 08/23/22 1544   Dressing/Treatment Ace wrap;Alginate with Ag;Dry dressing 08/23/22 1633   Wound Length (cm) 4.2 cm 08/23/22 1544   Wound Width (cm) 3.7 cm 08/23/22 1544   Wound Depth (cm) 0.1 cm 08/23/22 1544   Wound Surface Area (cm^2) 15.54 cm^2 08/23/22 1544   Wound Volume (cm^3) 1.554 cm^3 08/23/22 1544   Post-Procedure Length (cm) 4.3 cm 08/23/22 1606   Post-Procedure Width (cm) 3.8 cm 08/23/22 1606   Post-Procedure Depth (cm) 0.1 cm 08/23/22 1606   Post-Procedure Surface Area (cm^2) 16.34 cm^2 08/23/22 1606   Post-Procedure Volume (cm^3) 1.634 cm^3 08/23/22 1606   Wound Assessment Bleeding 08/23/22 1606   Drainage Amount Moderate 08/23/22 1544   Drainage Description Serous 08/23/22 1544   Odor None 08/23/22 1544   Angela-wound Assessment Fragile 08/23/22 1544   Margins Defined edges 08/23/22 1544   Number of days: 0       Wound 08/23/22 Knee Right #3 (Active)   Wound Image   08/23/22 1544   Wound Etiology Venous 08/23/22 1544   Wound Cleansed Cleansed with saline 08/23/22 1544   Dressing/Treatment Alginate with Ag; Ace wrap;Dry dressing 08/23/22 1633   Wound Length (cm) 0.5 cm 08/23/22 1544   Wound Width (cm) 0.5 cm 08/23/22 1544   Wound Depth (cm) 0.1 cm 08/23/22 1544   Wound Surface Area (cm^2) 0.25 cm^2 08/23/22 1544   Wound Volume (cm^3) 0.025 cm^3 08/23/22 1544   Post-Procedure Length (cm) 0.5 cm 08/23/22 1606   Post-Procedure Width (cm) 0.5 cm 08/23/22 1606   Post-Procedure Depth (cm) 0.1 cm 08/23/22 1606   Post-Procedure Surface Area (cm^2) 0.25 cm^2 08/23/22 1606   Post-Procedure Volume (cm^3) 0.025 cm^3 08/23/22 1606   Wound Assessment Bleeding 08/23/22 1606   Drainage Amount Moderate 08/23/22 1544   Drainage Description Serous 08/23/22 1544   Odor None 08/23/22 1544   Angela-wound Assessment Fragile 08/23/22 1544   Margins Defined edges 08/23/22 1544   Number of days: 0       Wound 08/23/22 Toe (Comment  which one) Right Great #4 (Active)   Wound Image   08/23/22 1544   Wound Etiology Diabetic 08/23/22 1544   Wound Cleansed Cleansed with saline 08/23/22 1544   Dressing/Treatment Ace wrap;Alginate with Ag;Dry dressing 08/23/22 1633   Offloading for Diabetic Foot Ulcers Offloading not required 08/23/22 1544   Wound Length (cm) 0.2 cm 08/23/22 1544   Wound Width (cm) 0.4 cm 08/23/22 1544   Wound Depth (cm) 0.1 cm 08/23/22 1544   Wound Surface Area (cm^2) 0.08 cm^2 08/23/22 1544   Wound Volume (cm^3) 0.008 cm^3 08/23/22 1544   Wound Assessment Devitalized tissue 08/23/22 1544   Drainage Amount Moderate 08/23/22 1544   Drainage Description Serous 08/23/22 1544   Odor None 08/23/22 1544   Angela-wound Assessment Fragile 08/23/22 1544   Margins Defined edges 08/23/22 1544   Number of days: 0       Wound 08/23/22 Leg Right; Lower; Lateral #5 (Active)   Wound Image   08/23/22 1544   Wound Etiology Venous 08/23/22 1544   Wound Cleansed Cleansed with saline 08/23/22 1544   Dressing/Treatment Ace wrap;Alginate with Ag;Dry dressing 08/23/22 1633   Wound Length (cm) 6.7 cm 08/23/22 1544   Wound Width (cm) 3.6 cm 08/23/22 1544   Wound Depth (cm) 0.1 cm 08/23/22 1544   Wound Surface Area (cm^2) 24.12 cm^2 08/23/22 1544   Wound Volume (cm^3) 2.412 cm^3 08/23/22 1544   Post-Procedure Length (cm) 6.7 cm 08/23/22 1606   Post-Procedure Width (cm) 3.6 cm 08/23/22 1606   Post-Procedure Depth (cm) 0.1 cm 08/23/22 1606   Post-Procedure Surface Area (cm^2) 24.12 cm^2 08/23/22 1606   Post-Procedure Volume (cm^3) 2.412 cm^3 08/23/22 1606   Wound Assessment Bleeding 08/23/22 1606   Drainage Amount Moderate 08/23/22 1544   Drainage Description Serous 08/23/22 1544   Odor None 08/23/22 1544   Angela-wound Assessment Fragile 08/23/22 1544   Number of days: 0       Wound 08/23/22 Toe (Comment  which one) Left Great #6 (Active)   Wound Image   08/23/22 1544   Wound Etiology Diabetic 08/23/22 1544   Wound Cleansed Cleansed with saline 08/23/22 1544   Dressing/Treatment Alginate with Ag; Ace wrap;Dry dressing 08/23/22 1633   Wound Length (cm) 0.8 cm 08/23/22 1544   Wound Width (cm) 1.1 cm 08/23/22 1544   Wound Depth (cm) 0.1 cm 08/23/22 1544   Wound Surface Area (cm^2) 0.88 cm^2 08/23/22 1544   Wound Volume (cm^3) 0.088 cm^3 08/23/22 1544   Wound Assessment Pink/red 08/23/22 1544   Drainage Amount Small 08/23/22 1544   Drainage Description Serous 08/23/22 1544   Odor None 08/23/22 1544   Angela-wound Assessment Intact 08/23/22 1544   Margins Defined edges 08/23/22 1544   Number of days: 0          Patient seen and treated on 8/24/2022    By Dr Christine Chaves, NPI: [id number]   (provider/NPI)

## 2022-08-24 NOTE — PLAN OF CARE
Discharge instructions given. Patient verbalized understanding. Patient to schedule vascular studies. Return to [address] [place] [address],[address] in 1 week(s). Called/faxed orders to [facility].

## 2022-08-26 NOTE — PLAN OF CARE
[address] [facility],[address] Floor:      [address], [address] [place] [address] f: [phone] f: [phone] p: [phone] [email or web address]     Ordering Center: [facility] [address] [place] [address]  [phone]  Dept: [phone]   Fax# [phone]    Patient Information:      Monster Manjarrez  [address] [facility]  [address]   [phone]   : 1940  AGE: 79 y.o. GENDER: male   TODAYS DATE:  2022    Insurance:      PRIMARY INSURANCE:  Plan: MEDICARE PART A AND B  Coverage: MEDICARE  Effective Date: 2015  Group Number: [unfilled]  Subscriber Number: [id number] - (Medicare)    Payer/Plan Subscr  Sex Relation Sub. Ins. ID Effective Group Num   1. MEDICARE - MEFinas Harsh 1940 Male Self [id number] 1/1/15                                    [address] [address]   2.   - TRIFinas Harsh 1940 Male Self [id number] 1/1/15                                    [address]         Patient Wound Information:     Additional ICD-10 Codes:     Patient Active Problem List   Diagnosis Code    Hyperlipidemia E78.5    Hypertension I10    Type II or unspecified type diabetes mellitus without mention of complication, not stated as uncontrolled E11.9    Fracture of sternum S22.20XA    Fractured rib S22.39XA    Prostate cancer ([address] C61    Thrombus I82.90    Chronic renal disease N18.9    Trigger finger M65.30    CTS (carpal tunnel syndrome) G56.00    Venous insufficiency of both lower extremities I87.2    Venous ulcer of right lower extremity without varicose veins (HCC) I87.2, L97.919    Non-pressure chronic ulcer of lower leg, limited to breakdown of skin, right ([address] L97.911    Laceration of skin of lower leg, left, initial encounter P02.935D    Laceration of skin of lower leg, right, initial encounter S81.811A    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 with fat layer exposed ([address] L97.912    Non-pressure chronic ulcer of left lower leg with [facility] layer [facility] L97.922       WOUNDS REQUIRING DRESSING SUPPLIES:     Wound 08/23/22 Leg Right; Lower; Anterior;Proximal #1 (Active)   Wound Image   08/23/22 1544   Wound Etiology Venous 08/23/22 1544   Wound Cleansed Cleansed with saline 08/23/22 1544   Dressing/Treatment Alginate with Ag; Ace wrap;Dry dressing 08/23/22 1633   Wound Length (cm) 6.2 cm 08/23/22 1544   Wound Width (cm) 3.2 cm 08/23/22 1544   Wound Depth (cm) 0.1 cm 08/23/22 1544   Wound Surface Area (cm^2) 19.84 cm^2 08/23/22 1544   Wound Volume (cm^3) 1.984 cm^3 08/23/22 1544   Post-Procedure Length (cm) 6.2 cm 08/23/22 1606   Post-Procedure Width (cm) 3.2 cm 08/23/22 1606   Post-Procedure Depth (cm) 0.1 cm 08/23/22 1606   Post-Procedure Surface Area (cm^2) 19.84 cm^2 08/23/22 1606   Post-Procedure Volume (cm^3) 1.984 cm^3 08/23/22 1606   Wound Assessment Bleeding 08/23/22 1606   Drainage Amount Moderate 08/23/22 1544   Drainage Description Serous 08/23/22 1544   Odor None 08/23/22 1544   Angela-wound Assessment Fragile 08/23/22 1544   Margins Defined edges 08/23/22 1544   Number of days: 2       Wound 08/23/22 Leg Right; Lower; Anterior;Distal #2 (Active)   Wound Image   08/23/22 1544   Wound Etiology Venous 08/23/22 1544   Wound Cleansed Cleansed with saline 08/23/22 1544   Dressing/Treatment Ace wrap;Alginate with Ag;Dry dressing 08/23/22 1633   Wound Length (cm) 4.2 cm 08/23/22 1544   Wound Width (cm) 3.7 cm 08/23/22 1544   Wound Depth (cm) 0.1 cm 08/23/22 1544   Wound Surface Area (cm^2) 15.54 cm^2 08/23/22 1544   Wound Volume (cm^3) 1.554 cm^3 08/23/22 1544   Post-Procedure Length (cm) 4.3 cm 08/23/22 1606   Post-Procedure Width (cm) 3.8 cm 08/23/22 1606   Post-Procedure Depth (cm) 0.1 cm 08/23/22 1606   Post-Procedure Surface Area (cm^2) 16.34 cm^2 08/23/22 1606   Post-Procedure Volume (cm^3) 1.634 cm^3 08/23/22 1606   Wound Assessment Bleeding 08/23/22 1606   Drainage Amount Moderate 08/23/22 1544   Drainage Description Serous 08/23/22 1544   Odor None 08/23/22 1544   Angela-wound Assessment Fragile 08/23/22 1544   Margins Defined edges 08/23/22 1544   Number of days: 2       Wound 08/23/22 Knee Right #3 (Active)   Wound Image   08/23/22 1544   Wound Etiology Venous 08/23/22 1544   Wound Cleansed Cleansed with saline 08/23/22 1544   Dressing/Treatment Alginate with Ag; Ace wrap;Dry dressing 08/23/22 1633   Wound Length (cm) 0.5 cm 08/23/22 1544   Wound Width (cm) 0.5 cm 08/23/22 1544   Wound Depth (cm) 0.1 cm 08/23/22 1544   Wound Surface Area (cm^2) 0.25 cm^2 08/23/22 1544   Wound Volume (cm^3) 0.025 cm^3 08/23/22 1544   Post-Procedure Length (cm) 0.5 cm 08/23/22 1606   Post-Procedure Width (cm) 0.5 cm 08/23/22 1606   Post-Procedure Depth (cm) 0.1 cm 08/23/22 1606   Post-Procedure Surface Area (cm^2) 0.25 cm^2 08/23/22 1606   Post-Procedure Volume (cm^3) 0.025 cm^3 08/23/22 1606   Wound Assessment Bleeding 08/23/22 1606   Drainage Amount Moderate 08/23/22 1544   Drainage Description Serous 08/23/22 1544   Odor None 08/23/22 1544   Angela-wound Assessment Fragile 08/23/22 1544   Margins Defined edges 08/23/22 1544   Number of days: 2       Wound 08/23/22 Toe (Comment  which one) Right Great #4 (Active)   Wound Image   08/23/22 1544   Wound Etiology Diabetic 08/23/22 1544   Wound Cleansed Cleansed with saline 08/23/22 1544   Dressing/Treatment Ace wrap;Alginate with Ag;Dry dressing 08/23/22 1633   Offloading for Diabetic Foot Ulcers Offloading not required 08/23/22 1544   Wound Length (cm) 0.2 cm 08/23/22 1544   Wound Width (cm) 0.4 cm 08/23/22 1544   Wound Depth (cm) 0.1 cm 08/23/22 1544   Wound Surface Area (cm^2) 0.08 cm^2 08/23/22 1544   Wound Volume (cm^3) 0.008 cm^3 08/23/22 1544   Wound Assessment Devitalized tissue 08/23/22 1544   Drainage Amount Moderate 08/23/22 1544   Drainage Description Serous 08/23/22 1544   Odor None 08/23/22 1544   Angela-wound Assessment Fragile 08/23/22 1544   Margins Defined edges 08/23/22 1544   Number of days: 2       Wound 08/23/22 Leg Right; Lower; Lateral #5 (Active)   Wound Image   08/23/22 1544   Wound Etiology Venous 08/23/22 1544   Wound Cleansed Cleansed with saline 08/23/22 1544   Dressing/Treatment Ace wrap;Alginate with Ag;Dry dressing 08/23/22 1633   Wound Length (cm) 6.7 cm 08/23/22 1544   Wound Width (cm) 3.6 cm 08/23/22 1544   Wound Depth (cm) 0.1 cm 08/23/22 1544   Wound Surface Area (cm^2) 24.12 cm^2 08/23/22 1544   Wound Volume (cm^3) 2.412 cm^3 08/23/22 1544   Post-Procedure Length (cm) 6.7 cm 08/23/22 1606   Post-Procedure Width (cm) 3.6 cm 08/23/22 1606   Post-Procedure Depth (cm) 0.1 cm 08/23/22 1606   Post-Procedure Surface Area (cm^2) 24.12 cm^2 08/23/22 1606   Post-Procedure Volume (cm^3) 2.412 cm^3 08/23/22 1606   Wound Assessment Bleeding 08/23/22 1606   Drainage Amount Moderate 08/23/22 1544   Drainage Description Serous 08/23/22 1544   Odor None 08/23/22 1544   Angela-wound Assessment Fragile 08/23/22 1544   Number of days: 2       Wound 08/23/22 Toe (Comment  which one) Left Great #6 (Active)   Wound Image   08/23/22 1544   Wound Etiology Diabetic 08/23/22 1544   Wound Cleansed Cleansed with saline 08/23/22 1544   Dressing/Treatment Alginate with Ag; Ace wrap;Dry dressing 08/23/22 1633   Wound Length (cm) 0.8 cm 08/23/22 1544   Wound Width (cm) 1.1 cm 08/23/22 1544   Wound Depth (cm) 0.1 cm 08/23/22 1544   Wound Surface Area (cm^2) 0.88 cm^2 08/23/22 1544   Wound Volume (cm^3) 0.088 cm^3 08/23/22 1544   Wound Assessment Pink/red 08/23/22 1544   Drainage Amount Small 08/23/22 1544   Drainage Description Serous 08/23/22 1544   Odor None 08/23/22 1544   Angela-wound Assessment Intact 08/23/22 1544   Margins Defined edges 08/23/22 1544   Number of days: 2          Supplies Requested :      WOUND #: 1, 2, 3, 4, 5, and 6   PRIMARY DRESSING:    Alginate with silver pad   Cover and Secure with: 4X4 gauze pad  Bulky roll gauze     FREQUENCY OF DRESSING CHANGES:  Daily    Wound Thickness [x] Full

## 2022-08-29 NOTE — DISCHARGE INSTRUCTIONS
[facility], [address] [place] [address] Road  Telephone: [phone]     Discharge Instructions     Important reminders:     **If you have any signs and symptoms of illness (Cough, fever, congestion, nausea, vomiting, diarrhea, etc.) please call the wound care center prior to your appointment. 1. Increase Protein intake for optimal wound healing  2. No added salt to reduce any swelling  3. If diabetic, maintain good glucose control  4. If you smoke, smoking prohibits wound healing, we ask that you refrain from smoking. 5. When taking antibiotics take the entire prescription as ordered. Do not stop taking until medication is all gone unless otherwise instructed. 6. Exercise as tolerated. 7. Keep weight off wounds and reposition every 2 hours if applicable. 8. If wound(s) is on your lower extremity, elevate legs to the level of the heart or above for 30 minutes 4-5 times a day and/or when sitting. Avoid standing for long periods of time. 9. Do not get wounds wet in bath or shower unless otherwise instructed by your physician. If your wound is on your foot or leg, you may purchase a cast bag. Please ask at the pharmacy. If Vascular testing is ordered, please call [address] [place] [address] [phone]) to schedule. Vascular tests ordered by Wound Care Physicians may take up to 2 hours to complete. Please keep that in mind when scheduling. If Vascular testing is scheduled, please bring supplies to replace your dressing after testing is done. The vascular department does not stock supplies. Wound: Legs      With each dressing change, rinse wounds with 0.9% Saline. (May use wound wash or soft contact solution. Both can be purchased at a local drug store). If unable to obtain saline, may use a gentle soap and water. Dressing care:    Keep legs elevated when sitting. Silver alginate to all wounds to right leg daily and as needed with dry dressing. Bilateral legs- 6\" ace wrap from toes to below the knees. Call to have Arterial and Venous Vascular studies scheduled. Request Home nursing care to start asap for wound care at home. Home Care Agency/Facility: [facility]     Your wound-care supplies will be provided by: [employer] -   phone #: [phone]     Please note, depending on your insurance coverage, you may have out-of-pocket expenses for these supplies. Someone from the company should call you to confirm your order and discuss those potential costs before they ship your products -- please anticipate that call. If your out-of-pocket cost could be substantial, Many companies have financial hardship programs for patients who qualify, so please ask about that if you might need a hand. If you have any questions about your supplies or your potential out-of-pocket costs, or if you need to place an order for a refill of supplies (typically monthly), please call the company directly. Your  is Fiona Reina up with Dr Cesario Rivera In 1 week(s) in the wound care center. Wound Care Center Information: Should you experience any significant changes in your wound(s) or have questions about your wound care, [facility] at [phone] Monday  - Thursday 8:00 am - 4:00 pm and Friday 8:00 am - 1:00pm. If you need help with your wound outside these hours and cannot wait until we are again available, contact your PCP or go to the hospital emergency room. PLEASE NOTE: IF YOU ARE UNABLE TO OBTAIN WOUND SUPPLIES, CONTINUE TO USE THE SUPPLIES YOU HAVE AVAILABLE UNTIL YOU ARE ABLE TO REACH US. IT IS MOST IMPORTANT TO KEEP THE WOUND COVERED AT ALL TIMES. Patient Experience     Thank you for trusting us with your care. You may receive a survey from a company called [employer] asking for your feedback. We would appreciate it if you took a few minutes to share your experience.   Your input is very valuable to us.

## 2022-08-30 ENCOUNTER — HOSPITAL ENCOUNTER (OUTPATIENT)
Dept: WOUND CARE | Age: 82
Discharge: HOME | End: 2022-08-30
Payer: MEDICARE

## 2022-08-30 VITALS
DIASTOLIC BLOOD PRESSURE: 57 MMHG | RESPIRATION RATE: 17 BRPM | SYSTOLIC BLOOD PRESSURE: 93 MMHG | TEMPERATURE: 96.2 F | HEART RATE: 82 BPM

## 2022-08-30 DIAGNOSIS — L97.922 NON-PRESSURE CHRONIC ULCER OF LEFT LOWER LEG WITH FAT LAYER EXPOSED (HCC): ICD-10-CM

## 2022-08-30 DIAGNOSIS — L97.911 NON-PRESSURE CHRONIC ULCER OF LOWER LEG, LIMITED TO BREAKDOWN OF SKIN, RIGHT (HCC): ICD-10-CM

## 2022-08-30 DIAGNOSIS — L97.919 VENOUS ULCER OF RIGHT LOWER EXTREMITY WITHOUT VARICOSE VEINS (HCC): ICD-10-CM

## 2022-08-30 DIAGNOSIS — L97.912 NON-PRESSURE CHRONIC ULCER OF RIGHT LOWER LEG WITH FAT LAYER EXPOSED (HCC): ICD-10-CM

## 2022-08-30 DIAGNOSIS — I87.2 VENOUS ULCER OF RIGHT LOWER EXTREMITY WITHOUT VARICOSE VEINS (HCC): ICD-10-CM

## 2022-08-30 DIAGNOSIS — S81.812A: Primary | ICD-10-CM

## 2022-08-30 DIAGNOSIS — S81.811A LACERATION OF SKIN OF LOWER LEG, RIGHT, INITIAL ENCOUNTER: ICD-10-CM

## 2022-08-30 DIAGNOSIS — I87.2 VENOUS INSUFFICIENCY OF BOTH LOWER EXTREMITIES: ICD-10-CM

## 2022-08-30 PROCEDURE — 11045 DBRDMT SUBQ TISS EACH ADDL: CPT

## 2022-08-30 PROCEDURE — 11045 DBRDMT SUBQ TISS EACH ADDL: CPT | Performed by: EMERGENCY MEDICINE

## 2022-08-30 PROCEDURE — 11042 DBRDMT SUBQ TIS 1ST 20SQCM/<: CPT | Performed by: EMERGENCY MEDICINE

## 2022-08-30 PROCEDURE — 11042 DBRDMT SUBQ TIS 1ST 20SQCM/<: CPT

## 2022-08-30 RX ORDER — LIDOCAINE 50 MG/G
OINTMENT TOPICAL ONCE
Status: CANCELLED | OUTPATIENT
Start: 2022-08-30 | End: 2022-08-30

## 2022-08-30 RX ORDER — LIDOCAINE HYDROCHLORIDE 20 MG/ML
JELLY TOPICAL ONCE
Status: CANCELLED | OUTPATIENT
Start: 2022-08-30 | End: 2022-08-30

## 2022-08-30 RX ORDER — BACITRACIN, NEOMYCIN, POLYMYXIN B 400; 3.5; 5 [USP'U]/G; MG/G; [USP'U]/G
OINTMENT TOPICAL ONCE
Status: CANCELLED | OUTPATIENT
Start: 2022-08-30 | End: 2022-08-30

## 2022-08-30 RX ORDER — LIDOCAINE 40 MG/G
CREAM TOPICAL ONCE
Status: CANCELLED | OUTPATIENT
Start: 2022-08-30 | End: 2022-08-30

## 2022-08-30 RX ORDER — GINSENG 100 MG
CAPSULE ORAL ONCE
Status: CANCELLED | OUTPATIENT
Start: 2022-08-30 | End: 2022-08-30

## 2022-08-30 RX ORDER — BACITRACIN ZINC AND POLYMYXIN B SULFATE 500; 1000 [USP'U]/G; [USP'U]/G
OINTMENT TOPICAL ONCE
Status: CANCELLED | OUTPATIENT
Start: 2022-08-30 | End: 2022-08-30

## 2022-08-30 RX ORDER — BETAMETHASONE DIPROPIONATE 0.05 %
OINTMENT (GRAM) TOPICAL ONCE
Status: CANCELLED | OUTPATIENT
Start: 2022-08-30 | End: 2022-08-30

## 2022-08-30 RX ORDER — GENTAMICIN SULFATE 1 MG/G
OINTMENT TOPICAL ONCE
Status: CANCELLED | OUTPATIENT
Start: 2022-08-30 | End: 2022-08-30

## 2022-08-30 RX ORDER — LIDOCAINE 40 MG/G
CREAM TOPICAL ONCE
Status: DISCONTINUED | OUTPATIENT
Start: 2022-08-30 | End: 2022-08-31 | Stop reason: HOSPADM

## 2022-08-30 RX ORDER — LIDOCAINE HYDROCHLORIDE 40 MG/ML
SOLUTION TOPICAL ONCE
Status: CANCELLED | OUTPATIENT
Start: 2022-08-30 | End: 2022-08-30

## 2022-08-30 RX ORDER — CLOBETASOL PROPIONATE 0.5 MG/G
OINTMENT TOPICAL ONCE
Status: CANCELLED | OUTPATIENT
Start: 2022-08-30 | End: 2022-08-30

## 2022-08-30 NOTE — PROGRESS NOTES
[facility]   Medical Staff Progress Note     Marcus Palacio  MEDICAL RECORD NUMBER:  [id number]  AGE: 79 y.o. GENDER: male  : 1940  EPISODE DATE:  2022    Chief complaint and reason for visit:     Chief Complaint   Patient presents with    Wound Check     Right lower leg, Left lower leg      Patient presenting for follow up evaluation of wound(s) per chief complaint. Subjective and ROS: Symptoms, wound related issues, or other pertinent wound history since last visit: Multiple new wounds noted after recent fall on 2022. No systemic complaints above baseline. No supplies until today so dressings were not being done correctly    History of Wound Context: Per original history and physical on this patient. Changes in history since last evaluation: Since last office visit with Dr. Serenity Sharpe, the patient has been admitted to the hospital and required a stay at [facility] rehabilitation. He is now returning after sustaining another fall on 2022 with multiple new wounds to bilateral lower extremity. Has underlying known venous stasis.     Medical Decision Making:     Problem List Items Addressed This Visit          Circulatory    Venous insufficiency of both lower extremities       Other    Laceration of skin of lower leg, left, initial encounter - Primary               Relevant Medications    lidocaine (LMX) 4 % cream    Other Relevant Orders    Initiate Outpatient Wound Care Protocol    Laceration of skin of lower leg, right, initial encounter    Relevant Medications    lidocaine (LMX) 4 % cream    Other Relevant Orders    Initiate Outpatient Wound Care Protocol    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 with fat layer exposed ([address]    Non-pressure chronic ulcer of left 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 Ulcers with more necrotic nonviable tissue. Patient had refused home health agency and has not really been doing dressings correctly at home. Nonpressure ulcers right lower extremity. Severity of fat layers exposed as well as skin layers involved. Some with overlying necrotic tissue nonviable tissue. Debridement done revealing granular tissue. Silver alginate, gauze, Kerlix, Ace  Nonpressure ulcers left lower extremity. Severity of fat layers exposed. Fairly clean with pink granular tissue. Silver alginate, gauze, Kerlix and Ace    Other associated diagnoses or problems addressed:  Venous hypertension, insufficiency. Venous reflux study ordered. Gentle compression initiated. Decreased pulses. This is especially true on the right lower extremity. Patient has arterial studies ordered but still pending. Encouraged him to get these done. Impaired mobility. Elevation stressed. Gentle compression. Stressed patient to have assistance whenever he is transferring or moving about in his home since he does have a high risk for falls. Nutritional support. Education provided extensively. Encourage protein emphasis with meals, low-sodium diet. Pertinent imaging reviewed including independent interpretation include:  None    Pertinent labs reviewed. Medical records and review of external note (s) from other providers done as well. New lab or imaging orders placed:  VL arterial doppler  VL venous reflux ultrasound    Prescription drug management: N/A     Discussion of management or test interpretation with other qualified health care professional and other external source. Comorbid conditions affecting wound healing:  As per PMH which was reviewed. Risk of complications and/or mortality of patient management: This patient has a high risk of morbidity and mortality from additional diagnostic testing or treatment. This is due to the above conditions affecting wound healing as well as patient and procedure risk factors. Education and discussion held with patient regarding these disease processes pertinent to wound(s). Other pertinent decisions include: minor surgery or procedures as below. The patient's diagnosis or treatment is not significantly limited by social determinants of health as noted by: N/A . Wound 08/23/22 Leg Right; Lower; Anterior;Proximal #1 (Active)   Wound Image   08/23/22 1544   Wound Etiology Venous 08/30/22 1543   Wound Cleansed Cleansed with saline 08/30/22 1543   Dressing/Treatment Alginate with Ag; Ace wrap;Dry dressing 08/23/22 1633   Wound Length (cm) 3 cm 08/30/22 1543   Wound Width (cm) 1 cm 08/30/22 1543   Wound Depth (cm) 0.1 cm 08/30/22 1543   Wound Surface Area (cm^2) 3 cm^2 08/30/22 1543   Change in Wound Size % (l*w) 84.88 08/30/22 1543   Wound Volume (cm^3) 0.3 cm^3 08/30/22 1543   Wound Healing % 85 08/30/22 1543   Post-Procedure Length (cm) 6.2 cm 08/23/22 1606   Post-Procedure Width (cm) 3.2 cm 08/23/22 1606   Post-Procedure Depth (cm) 0.1 cm 08/23/22 1606   Post-Procedure Surface Area (cm^2) 19.84 cm^2 08/23/22 1606   Post-Procedure Volume (cm^3) 1.984 cm^3 08/23/22 1606   Wound Assessment Slough 08/30/22 1543   Drainage Amount Moderate 08/30/22 1543   Drainage Description Yellow 08/30/22 1543   Odor None 08/30/22 1543   Angela-wound Assessment Edematous 08/30/22 1543   Margins Defined edges 08/30/22 1543   Number of days: 7       Wound 08/23/22 Leg Right; Lower; Anterior;Distal #2 (Active)   Wound Image   08/23/22 1544   Wound Etiology Venous 08/30/22 1543   Wound Cleansed Cleansed with saline 08/30/22 1543   Dressing/Treatment Ace wrap;Alginate with Ag;Dry dressing 08/23/22 1633   Wound Length (cm) 4.7 cm 08/30/22 1543   Wound Width (cm) 8.5 cm 08/30/22 1543   Wound Depth (cm) 0.1 cm 08/30/22 1543   Wound Surface Area (cm^2) 39.95 cm^2 08/30/22 1543   Change in Wound Size % (l*w) -157.08 08/30/22 1543   Wound Volume (cm^3) 3.995 cm^3 08/30/22 1543   Wound Healing % -157 08/30/22 1543   Post-Procedure Length (cm) 4.3 cm 08/23/22 1606   Post-Procedure Width (cm) 3.8 cm 08/23/22 1606   Post-Procedure Depth (cm) 0.1 cm 08/23/22 1606   Post-Procedure Surface Area (cm^2) 16.34 cm^2 08/23/22 1606   Post-Procedure Volume (cm^3) 1.634 cm^3 08/23/22 1606   Wound Assessment Slough 08/30/22 1543   Drainage Amount Moderate 08/30/22 1543   Drainage Description Yellow 08/30/22 1543   Odor None 08/30/22 1543   Angela-wound Assessment Edematous 08/30/22 1543   Margins Defined edges 08/30/22 1543   Number of days: 7       Wound 08/23/22 Knee Right #3 (Active)   Wound Image   08/23/22 1544   Wound Etiology Venous 08/30/22 1543   Wound Cleansed Cleansed with saline 08/30/22 1543   Dressing/Treatment Alginate with Ag; Ace wrap;Dry dressing 08/23/22 1633   Wound Length (cm) 0 cm 08/30/22 1543   Wound Width (cm) 0 cm 08/30/22 1543   Wound Depth (cm) 0.1 cm 08/23/22 1544   Wound Surface Area (cm^2) 0 cm^2 08/30/22 1543   Change in Wound Size % (l*w) 100 08/30/22 1543   Wound Volume (cm^3) 0.025 cm^3 08/23/22 1544   Post-Procedure Length (cm) 0.5 cm 08/23/22 1606   Post-Procedure Width (cm) 0.5 cm 08/23/22 1606   Post-Procedure Depth (cm) 0.1 cm 08/23/22 1606   Post-Procedure Surface Area (cm^2) 0.25 cm^2 08/23/22 1606   Post-Procedure Volume (cm^3) 0.025 cm^3 08/23/22 1606   Wound Assessment Dry; Other (Comment) 08/30/22 1543   Drainage Amount None 08/30/22 1543   Drainage Description Serous 08/23/22 1544   Odor None 08/30/22 1543   Angela-wound Assessment Intact 08/30/22 1543   Margins Attached edges 08/30/22 1543   Number of days: 7       Wound 08/23/22 Toe (Comment  which one) Right Great #4 (Active)   Wound Image   08/23/22 1544 Wound Etiology Diabetic 08/30/22 1543   Wound Cleansed Cleansed with saline 08/30/22 1543   Dressing/Treatment Ace wrap;Alginate with Ag;Dry dressing 08/23/22 1633   Offloading for Diabetic Foot Ulcers Offloading not required 08/23/22 1544   Wound Length (cm) 0 cm 08/30/22 1543   Wound Width (cm) 0 cm 08/30/22 1543   Wound Depth (cm) 0 cm 08/30/22 1543   Wound Surface Area (cm^2) 0 cm^2 08/30/22 1543   Change in Wound Size % (l*w) 100 08/30/22 1543   Wound Volume (cm^3) 0 cm^3 08/30/22 1543   Wound Healing % 100 08/30/22 1543   Wound Assessment Epithelialization 08/30/22 1543   Drainage Amount None 08/30/22 1543   Drainage Description Serous 08/23/22 1544   Odor None 08/30/22 1543   Angela-wound Assessment Intact 08/30/22 1543   Margins Attached edges 08/30/22 1543   Number of days: 7       Wound 08/23/22 Leg Right; Lower; Lateral #5 (Active)   Wound Image   08/23/22 1544   Wound Etiology Venous 08/30/22 1543   Wound Cleansed Cleansed with saline 08/30/22 1543   Dressing/Treatment Ace wrap;Alginate with Ag;Dry dressing 08/23/22 1633   Wound Length (cm) 7 cm 08/30/22 1543   Wound Width (cm) 4.5 cm 08/30/22 1543   Wound Depth (cm) 0.1 cm 08/30/22 1543   Wound Surface Area (cm^2) 31.5 cm^2 08/30/22 1543   Change in Wound Size % (l*w) -30.6 08/30/22 1543   Wound Volume (cm^3) 3.15 cm^3 08/30/22 1543   Wound Healing % -31 08/30/22 1543   Post-Procedure Length (cm) 6.7 cm 08/23/22 1606   Post-Procedure Width (cm) 3.6 cm 08/23/22 1606   Post-Procedure Depth (cm) 0.1 cm 08/23/22 1606   Post-Procedure Surface Area (cm^2) 24.12 cm^2 08/23/22 1606   Post-Procedure Volume (cm^3) 2.412 cm^3 08/23/22 1606   Wound Assessment Slough 08/30/22 1543   Drainage Amount Moderate 08/30/22 1543   Drainage Description Yellow 08/30/22 1543   Odor None 08/30/22 1543   Angela-wound Assessment Edematous 08/30/22 1543   Margins Defined edges 08/30/22 1543   Number of days: 7       Wound 08/23/22 Toe (Comment  which one) Left Great #6 (Active) Wound Image   08/23/22 1544   Wound Etiology Diabetic 08/30/22 1543   Wound Cleansed Cleansed with saline 08/30/22 1543   Dressing/Treatment Alginate with Ag; Ace wrap;Dry dressing 08/23/22 1633   Wound Length (cm) 0.5 cm 08/30/22 1543   Wound Width (cm) 0.8 cm 08/30/22 1543   Wound Depth (cm) 0.1 cm 08/30/22 1543   Wound Surface Area (cm^2) 0.4 cm^2 08/30/22 1543   Change in Wound Size % (l*w) 54.55 08/30/22 1543   Wound Volume (cm^3) 0.04 cm^3 08/30/22 1543   Wound Healing % 55 08/30/22 1543   Wound Assessment Slough;[place]/red 08/30/22 1543   Drainage Amount Small 08/30/22 1543   Drainage Description Serous 08/30/22 1543   Odor None 08/30/22 1543   Angela-wound Assessment Intact 08/30/22 1543   Margins Defined edges 08/30/22 1543   Number of days: 7       Wound 08/30/22 Foot Right;Medial #7 (Active)   Wound Etiology Venous 08/30/22 1543   Wound Cleansed Cleansed with saline 08/30/22 1543   Wound Length (cm) 3.7 cm 08/30/22 1543   Wound Width (cm) 4.8 cm 08/30/22 1543   Wound Depth (cm) 0.1 cm 08/30/22 1543   Wound Surface Area (cm^2) 17.76 cm^2 08/30/22 1543   Wound Volume (cm^3) 1.776 cm^3 08/30/22 1543   Wound Assessment Devitalized tissue;Pink/red 08/30/22 1543   Drainage Amount Moderate 08/30/22 1543   Drainage Description Serous 08/30/22 1543   Odor None 08/30/22 1543   Angela-wound Assessment Blisters;Edematous 08/30/22 1543   Margins Attached edges 08/30/22 1543   Number of days: 0          Procedures done during this encounter:   Debridement: Excisional Debridement  Indications:  Based on my examination of this patient's wound(s)/ulcer(s) today, debridement is required to promote healing and evaluate the wound base. Risks and benefits discussed with patient who has agreed to proceed.    Performed by: Cabrera Christie MD  Consent obtained:  Yes  Time out taken:  Yes  Pain Control: Anesthetic  Anesthetic: 4% Lidocaine Cream   Using curette the wound(s)/ulcer(s) was/were debrided down through and including the removal (SYNTHROID) 125 MCG tablet Take 125 mcg by mouth Daily      warfarin (COUMADIN) 5 MG tablet Take one tab by mouth once daily. 30 tablet 0    ezetimibe (ZETIA) 10 MG tablet Take 1 tablet by mouth daily. 90 tablet 3    ONE TOUCH ULTRASOFT LANCETS MISC TEST once daily 100 each PRN    Blood Glucose Monitoring Suppl (ONE TOUCH ULTRA SYSTEM KIT) W/DEVICE KIT TEST TWICE daily BLOOD SUGAR 1 kit 0     No current facility-administered medications on file prior to encounter. Written patient dismissal instructions given to patient and signed by patient or POA.          Electronically signed by Kamila Callahan MD on 8/30/2022 at 4:00 PM

## 2022-08-31 NOTE — PLAN OF CARE
Discharge instructions given. Patient verbalized understanding. Agreed to start home care for wound management. Return to [facility] in 1 week(s). Called/faxed orders to [facility].

## 2022-09-01 NOTE — DISCHARGE INSTRUCTIONS
[facility], [address] [place] [address] Road  Telephone: [phone]     Discharge Instructions     Important reminders:     **If you have any signs and symptoms of illness (Cough, fever, congestion, nausea, vomiting, diarrhea, etc.) please call the wound care center prior to your appointment. 1. Increase Protein intake for optimal wound healing  2. No added salt to reduce any swelling  3. If diabetic, maintain good glucose control  4. If you smoke, smoking prohibits wound healing, we ask that you refrain from smoking. 5. When taking antibiotics take the entire prescription as ordered. Do not stop taking until medication is all gone unless otherwise instructed. 6. Exercise as tolerated. 7. Keep weight off wounds and reposition every 2 hours if applicable. 8. If wound(s) is on your lower extremity, elevate legs to the level of the heart or above for 30 minutes 4-5 times a day and/or when sitting. Avoid standing for long periods of time. 9. Do not get wounds wet in bath or shower unless otherwise instructed by your physician. If your wound is on your foot or leg, you may purchase a cast bag. Please ask at the pharmacy. If Vascular testing is ordered, please call [address] [place] [address] [phone]) to schedule. Vascular tests ordered by Wound Care Physicians may take up to 2 hours to complete. Please keep that in mind when scheduling. If Vascular testing is scheduled, please bring supplies to replace your dressing after testing is done. The vascular department does not stock supplies. Wound: Legs      With each dressing change, rinse wounds with 0.9% Saline. (May use wound wash or soft contact solution. Both can be purchased at a local drug store). If unable to obtain saline, may use a gentle soap and water. Dressing care:    Keep legs elevated when sitting. Silver alginate to all wounds to right leg daily and as needed with dry dressing. Bilateral legs- 6\" ace wrap from toes to below the knees. Call to have Arterial and Venous Vascular studies scheduled. Request Home nursing care to start asap for wound care at home. Home Care Agency/Facility: [facility]     Your wound-care supplies will be provided by: [employer] -   phone #: [phone]     Please note, depending on your insurance coverage, you may have out-of-pocket expenses for these supplies. Someone from the company should call you to confirm your order and discuss those potential costs before they ship your products -- please anticipate that call. If your out-of-pocket cost could be substantial, Many companies have financial hardship programs for patients who qualify, so please ask about that if you might need a hand. If you have any questions about your supplies or your potential out-of-pocket costs, or if you need to place an order for a refill of supplies (typically monthly), please call the company directly. Your  is Fiona Reina up with Dr Maykel Putnam In 1 week(s) in the wound care center. Wound Care Center Information: Should you experience any significant changes in your wound(s) or have questions about your wound care, please contact the [facility] at [phone] Monday  - Thursday 8:00 am - 4:00 pm and Friday 8:00 am - 1:00pm. If you need help with your wound outside these hours and cannot wait until we are again available, contact your PCP or go to the hospital emergency room. PLEASE NOTE: IF YOU ARE UNABLE TO OBTAIN WOUND SUPPLIES, CONTINUE TO USE THE SUPPLIES YOU HAVE AVAILABLE UNTIL YOU ARE ABLE TO REACH US. IT IS MOST IMPORTANT TO KEEP THE WOUND COVERED AT ALL TIMES. Patient Experience     Thank you for trusting us with your care. You may receive a survey from a company called [employer] asking for your feedback. We would appreciate it if you took a few minutes to share your experience.   Your input is very valuable to us.

## 2022-09-06 ENCOUNTER — HOSPITAL ENCOUNTER (OUTPATIENT)
Dept: WOUND CARE | Age: 82
Discharge: HOME | End: 2022-09-06

## 2022-10-10 NOTE — DISCHARGE INSTRUCTIONS
[facility], [address] [place] [address] Road  Telephone: [phone]     Discharge Instructions     Important reminders:     **If you have any signs and symptoms of illness (Cough, fever, congestion, nausea, vomiting, diarrhea, etc.) please call the wound care center prior to your appointment. 1. Increase Protein intake for optimal wound healing  2. No added salt to reduce any swelling  3. If diabetic, maintain good glucose control  4. If you smoke, smoking prohibits wound healing, we ask that you refrain from smoking. 5. When taking antibiotics take the entire prescription as ordered. Do not stop taking until medication is all gone unless otherwise instructed. 6. Exercise as tolerated. 7. Keep weight off wounds and reposition every 2 hours if applicable. 8. If wound(s) is on your lower extremity, elevate legs to the level of the heart or above for 30 minutes 4-5 times a day and/or when sitting. Avoid standing for long periods of time. 9. Do not get wounds wet in bath or shower unless otherwise instructed by your physician. If your wound is on your foot or leg, you may purchase a cast bag. Please ask at the pharmacy. If Vascular testing is ordered, please call [address] [place] [address] [phone]) to schedule. Vascular tests ordered by Wound Care Physicians may take up to 2 hours to complete. Please keep that in mind when scheduling. If Vascular testing is scheduled, please bring supplies to replace your dressing after testing is done. The vascular department does not stock supplies. Wound: Right Leg      With each dressing change, rinse wounds with 0.9% Saline. (May use wound wash or soft contact solution. Both can be purchased at a local drug store). If unable to obtain saline, may use a gentle soap and water. Dressing care:  Right Leg-  Apply silvadene cream to all wounds daily and as needed with dry dressing.  Left foot anterior toes- betadine paint daily. Bilateral legs- 6\" ace wrap from toes to below the knees. Home care nurse to visit [facility]. Schedule Vascular Studies [facility] Scheduling [phone])     Home Care Agency/Facility: [facility]     Your wound-care supplies will be provided by: [facility] -   phone #: [phone]     Please note, depending on your insurance coverage, you may have out-of-pocket expenses for these supplies. Someone from the company should call you to confirm your order and discuss those potential costs before they ship your products -- please anticipate that call. If your out-of-pocket cost could be substantial, Many companies have financial hardship programs for patients who qualify, so please ask about that if you might need a hand. If you have any questions about your supplies or your potential out-of-pocket costs, or if you need to place an order for a refill of supplies (typically monthly), please call the company directly. Your  is Fiona Reina up with Dr Yola Stubbs In 1 week(s) in the wound care center. Wound Care Center Information: Should you experience any significant changes in your wound(s) or have questions about your wound care, please [facility] the [facility] at [phone] Monday  - Thursday 8:00 am - 4:00 pm and Friday 8:00 am - 1:00pm. If you need help with your wound outside these hours and cannot wait until we are again available, contact your PCP or go to the hospital emergency room. PLEASE NOTE: IF YOU ARE UNABLE TO OBTAIN WOUND SUPPLIES, CONTINUE TO USE THE SUPPLIES YOU HAVE AVAILABLE UNTIL YOU ARE ABLE TO REACH US. IT IS MOST IMPORTANT TO KEEP THE WOUND COVERED AT ALL TIMES. Patient Experience     Thank you for trusting us with your care. You may receive a survey from a company called [employer] asking for your feedback. We would appreciate it if you took a few minutes to share your experience.   Your input is very valuable to us.

## 2022-10-11 ENCOUNTER — HOSPITAL ENCOUNTER (OUTPATIENT)
Dept: WOUND CARE | Age: 82
Discharge: HOME | End: 2022-10-11
Payer: MEDICARE

## 2022-10-11 VITALS
DIASTOLIC BLOOD PRESSURE: 71 MMHG | HEART RATE: 67 BPM | SYSTOLIC BLOOD PRESSURE: 114 MMHG | RESPIRATION RATE: 18 BRPM | TEMPERATURE: 97 F

## 2022-10-11 DIAGNOSIS — S81.812A: Primary | ICD-10-CM

## 2022-10-11 DIAGNOSIS — S81.811A LACERATION OF SKIN OF LOWER LEG, RIGHT, INITIAL ENCOUNTER: ICD-10-CM

## 2022-10-11 DIAGNOSIS — L97.911 NON-PRESSURE CHRONIC ULCER OF LOWER LEG, LIMITED TO BREAKDOWN OF SKIN, RIGHT (HCC): ICD-10-CM

## 2022-10-11 PROCEDURE — 11045 DBRDMT SUBQ TISS EACH ADDL: CPT | Performed by: EMERGENCY MEDICINE

## 2022-10-11 PROCEDURE — 11042 DBRDMT SUBQ TIS 1ST 20SQCM/<: CPT | Performed by: EMERGENCY MEDICINE

## 2022-10-11 PROCEDURE — 11045 DBRDMT SUBQ TISS EACH ADDL: CPT

## 2022-10-11 PROCEDURE — 11042 DBRDMT SUBQ TIS 1ST 20SQCM/<: CPT

## 2022-10-11 RX ORDER — LIDOCAINE HYDROCHLORIDE 20 MG/ML
JELLY TOPICAL ONCE
OUTPATIENT
Start: 2022-10-11 | End: 2022-10-11

## 2022-10-11 RX ORDER — CLOBETASOL PROPIONATE 0.5 MG/G
OINTMENT TOPICAL ONCE
OUTPATIENT
Start: 2022-10-11 | End: 2022-10-11

## 2022-10-11 RX ORDER — GENTAMICIN SULFATE 1 MG/G
OINTMENT TOPICAL ONCE
OUTPATIENT
Start: 2022-10-11 | End: 2022-10-11

## 2022-10-11 RX ORDER — BETAMETHASONE DIPROPIONATE 0.05 %
OINTMENT (GRAM) TOPICAL ONCE
OUTPATIENT
Start: 2022-10-11 | End: 2022-10-11

## 2022-10-11 RX ORDER — GINSENG 100 MG
CAPSULE ORAL ONCE
OUTPATIENT
Start: 2022-10-11 | End: 2022-10-11

## 2022-10-11 RX ORDER — LIDOCAINE 40 MG/G
CREAM TOPICAL ONCE
Status: CANCELLED | OUTPATIENT
Start: 2022-10-11 | End: 2022-10-11

## 2022-10-11 RX ORDER — LIDOCAINE 40 MG/G
CREAM TOPICAL ONCE
Status: DISCONTINUED | OUTPATIENT
Start: 2022-10-11 | End: 2022-10-12 | Stop reason: HOSPADM

## 2022-10-11 RX ORDER — BACITRACIN, NEOMYCIN, POLYMYXIN B 400; 3.5; 5 [USP'U]/G; MG/G; [USP'U]/G
OINTMENT TOPICAL ONCE
OUTPATIENT
Start: 2022-10-11 | End: 2022-10-11

## 2022-10-11 RX ORDER — BACITRACIN ZINC AND POLYMYXIN B SULFATE 500; 1000 [USP'U]/G; [USP'U]/G
OINTMENT TOPICAL ONCE
OUTPATIENT
Start: 2022-10-11 | End: 2022-10-11

## 2022-10-11 RX ORDER — LIDOCAINE HYDROCHLORIDE 40 MG/ML
SOLUTION TOPICAL ONCE
OUTPATIENT
Start: 2022-10-11 | End: 2022-10-11

## 2022-10-11 RX ORDER — LIDOCAINE 50 MG/G
OINTMENT TOPICAL ONCE
OUTPATIENT
Start: 2022-10-11 | End: 2022-10-11
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
## 2022-10-11 NOTE — PLAN OF CARE
Discharge instructions given. Patient verbalized understanding. Return to [facility] in 1 week(s). Called/faxed orders to [facility].

## 2022-10-11 NOTE — PROGRESS NOTES
[address], [address] [place] [address] Road  Telephone: [phone]        [facility] Fax # [phone]        Discharge Instructions         [address], [address] [place] [address] Road  Telephone: [phone]     Discharge Instructions     Important reminders:     **If you have any signs and symptoms of illness (Cough, fever, congestion, nausea, vomiting, diarrhea, etc.) please call the wound care center prior to your appointment. 1. Increase Protein intake for optimal wound healing  2. No added salt to reduce any swelling  3. If diabetic, maintain good glucose control  4. If you smoke, smoking prohibits wound healing, we ask that you refrain from smoking. 5. When taking antibiotics take the entire prescription as ordered. Do not stop taking until medication is all gone unless otherwise instructed. 6. Exercise as tolerated. 7. Keep weight off wounds and reposition every 2 hours if applicable. 8. If wound(s) is on your lower extremity, elevate legs to the level of the heart or above for 30 minutes 4-5 times a day and/or when sitting. Avoid standing for long periods of time. 9. Do not get wounds wet in bath or shower unless otherwise instructed by your physician. If your wound is on your foot or leg, you may purchase a cast bag. Please ask at the pharmacy. If Vascular testing is ordered, please call [address] [place] [address] [phone]) to schedule. Vascular tests ordered by Wound Care Physicians may take up to 2 hours to complete. Please keep that in mind when scheduling. If Vascular testing is scheduled, please bring supplies to replace your dressing after testing is done. The vascular department does not stock supplies. Wound: Right Leg      With each dressing change, rinse wounds with 0.9% Saline. (May use wound wash or soft contact solution.  Both can be purchased at a local drug store). If unable to obtain saline, may use a gentle soap and water. Dressing care:  Right Leg-  Apply silvadene cream to all wounds daily and as needed with dry dressing. Left foot anterior toes- betadine paint daily. Bilateral legs- 6\" ace wrap from toes to below the knees. Home care nurse to visit [facility]. Schedule Vascular Studies [facility] Scheduling [phone])     Home Care Agency/Facility: [facility]     Your wound-care supplies will be provided by: [facility] -   phone #: [phone]     Please note, depending on your insurance coverage, you may have out-of-pocket expenses for these supplies. Someone from the company should call you to confirm your order and discuss those potential costs before they ship your products -- please anticipate that call. If your out-of-pocket cost could be substantial, Many companies have financial hardship programs for patients who qualify, so please ask about that if you might need a hand. If you have any questions about your supplies or your potential out-of-pocket costs, or if you need to place an order for a refill of supplies (typically monthly), please call the company directly. Your  is Fiona Reina up with Dr Ena Felix In 1 week(s) in the wound care center. Wound Care Center Information: Should you experience any significant changes in your wound(s) or have questions about your wound care, please [facility] [phone] Monday  - Thursday 8:00 am - 4:00 pm and Friday 8:00 am - 1:00pm. If you need help with your wound outside these hours and cannot wait until we are again available, contact your PCP or go to the hospital emergency room. PLEASE NOTE: IF YOU ARE UNABLE TO OBTAIN WOUND SUPPLIES, CONTINUE TO USE THE SUPPLIES YOU HAVE AVAILABLE UNTIL YOU ARE ABLE TO REACH US. IT IS MOST IMPORTANT TO KEEP THE WOUND COVERED AT ALL TIMES.      Patient Experience     Thank you for trusting us with your care. You may receive a survey from a company called [employer] asking for your feedback. We would appreciate it if you took a few minutes to share your experience. Your input is very valuable to us. Skilled nurse to evaluate and treat for wound care. Change dressing as ordered  once a day on Monday, Wednesday, and Friday using clean technique. Patient/Family/caregiver may change dressings as needed as instructed when Home Care unavailable. WOUNDS REQUIRING DRESSING Changes:     Wound 08/23/22 Leg Right; Lower; Anterior;Proximal #1 (Active)   Wound Image   10/11/22 1442   Wound Etiology Venous 10/11/22 1442   Wound Cleansed Cleansed with saline 10/11/22 1442   Dressing/Treatment Alginate with Ag; Ace wrap;Dry dressing 08/23/22 1633   Wound Length (cm) 4 cm 10/11/22 1442   Wound Width (cm) 3.5 cm 10/11/22 1442   Wound Depth (cm) 0.1 cm 10/11/22 1442   Wound Surface Area (cm^2) 14 cm^2 10/11/22 1442   Change in Wound Size % (l*w) 29.44 10/11/22 1442   Wound Volume (cm^3) 1.4 cm^3 10/11/22 1442   Wound Healing % 29 10/11/22 1442   Post-Procedure Length (cm) 4.1 cm 10/11/22 1517   Post-Procedure Width (cm) 3.6 cm 10/11/22 1517   Post-Procedure Depth (cm) 0.1 cm 10/11/22 1517   Post-Procedure Surface Area (cm^2) 14.76 cm^2 10/11/22 1517   Post-Procedure Volume (cm^3) 1.476 cm^3 10/11/22 1517   Wound Assessment Bleeding 10/11/22 1517   Drainage Amount Moderate 10/11/22 1442   Drainage Description Yellow 10/11/22 1442   Odor None 10/11/22 1442   Angela-wound Assessment Hyperpigmented 10/11/22 1442   Margins Defined edges 08/30/22 1543   Number of days: 49       Wound 08/23/22 Leg Right; Lower; Anterior;Distal #2 (Active)   Wound Image   10/11/22 1442   Wound Etiology Venous 10/11/22 1442   Wound Cleansed Cleansed with saline 10/11/22 1442   Dressing/Treatment Ace wrap;Alginate with Ag;Dry dressing 08/23/22 1633   Wound Length (cm) 0 cm 10/11/22 1442   Wound Width (cm) 0 cm 10/11/22 1442   Wound Depth (cm) 0 cm 10/11/22 1442   Wound Surface Area (cm^2) 0 cm^2 10/11/22 1442   Change in Wound Size % (l*w) 100 10/11/22 1442   Wound Volume (cm^3) 0 cm^3 10/11/22 1442   Wound Healing % 100 10/11/22 1442   Post-Procedure Length (cm) 4.8 cm 08/30/22 1603   Post-Procedure Width (cm) 8.6 cm 08/30/22 1603   Post-Procedure Depth (cm) 0.15 cm 08/30/22 1603   Post-Procedure Surface Area (cm^2) 41.28 cm^2 08/30/22 1603   Post-Procedure Volume (cm^3) 6.192 cm^3 08/30/22 1603   Wound Assessment Epithelialization 10/11/22 1442   Drainage Amount Moderate 08/30/22 1543   Drainage Description Yellow 08/30/22 1543   Odor None 08/30/22 1543   Angela-wound Assessment Edematous 08/30/22 1543   Margins Defined edges 08/30/22 1543   Number of days: 49       Wound 08/23/22 Leg Right; Lower; Lateral #5 (Active)   Wound Image   10/11/22 1442   Wound Etiology Venous 10/11/22 1442   Wound Cleansed Cleansed with saline 10/11/22 1442   Dressing/Treatment Ace wrap;Alginate with Ag;Dry dressing 08/23/22 1633   Wound Length (cm) 0 cm 10/11/22 1442   Wound Width (cm) 0 cm 10/11/22 1442   Wound Depth (cm) 0 cm 10/11/22 1442   Wound Surface Area (cm^2) 0 cm^2 10/11/22 1442   Change in Wound Size % (l*w) 100 10/11/22 1442   Wound Volume (cm^3) 0 cm^3 10/11/22 1442   Wound Healing % 100 10/11/22 1442   Post-Procedure Length (cm) 7.1 cm 08/30/22 1603   Post-Procedure Width (cm) 4.6 cm 08/30/22 1603   Post-Procedure Depth (cm) 0.15 cm 08/30/22 1603   Post-Procedure Surface Area (cm^2) 32.66 cm^2 08/30/22 1603   Post-Procedure Volume (cm^3) 4.899 cm^3 08/30/22 1603   Wound Assessment Epithelialization 10/11/22 1442   Drainage Amount Moderate 08/30/22 1543   Drainage Description Yellow 08/30/22 1543   Odor None 08/30/22 1543   Angela-wound Assessment Edematous 08/30/22 1543   Margins Defined edges 08/30/22 1543   Number of days: 49       Wound 08/23/22 Toe (Comment  which one) Left Great #6 (Active)   Wound Image   10/11/22 1442   Wound Etiology Diabetic 10/11/22 1442   Wound Cleansed Cleansed with saline 10/11/22 1442   Dressing/Treatment Alginate with Ag; Ace wrap;Dry dressing 08/23/22 1633   Wound Length (cm) 0 cm 10/11/22 1442   Wound Width (cm) 0 cm 10/11/22 1442   Wound Depth (cm) 0 cm 10/11/22 1442   Wound Surface Area (cm^2) 0 cm^2 10/11/22 1442   Change in Wound Size % (l*w) 100 10/11/22 1442   Wound Volume (cm^3) 0 cm^3 10/11/22 1442   Wound Healing % 100 10/11/22 1442   Wound Assessment Epithelialization 10/11/22 1442   Drainage Amount Small 08/30/22 1543   Drainage Description Serous 08/30/22 1543   Odor None 08/30/22 1543   Angela-wound Assessment Intact 08/30/22 1543   Margins Defined edges 08/30/22 1543   Number of days: 49       Wound 08/30/22 Foot Right;Medial #7 (Active)   Wound Image   10/11/22 1442   Wound Etiology Venous 10/11/22 1442   Wound Cleansed Cleansed with saline 10/11/22 1442   Wound Length (cm) 3 cm 10/11/22 1442   Wound Width (cm) 2.5 cm 10/11/22 1442   Wound Depth (cm) 0.1 cm 10/11/22 1442   Wound Surface Area (cm^2) 7.5 cm^2 10/11/22 1442   Change in Wound Size % (l*w) 57.77 10/11/22 1442   Wound Volume (cm^3) 0.75 cm^3 10/11/22 1442   Wound Healing % 58 10/11/22 1442   Wound Assessment Devitalized tissue 10/11/22 1442   Drainage Amount Moderate 10/11/22 1442   Drainage Description Serous 10/11/22 1442   Odor None 10/11/22 1442   Angela-wound Assessment Dry/flaky;Fragile; Cool 10/11/22 1442   Margins Attached edges 08/30/22 1543   Number of days: 42          Patient seen and treated on 10/11/2022    By Dr Bozena Trevino, NPI: [id number]   (provider/NPI)

## 2022-10-14 NOTE — DISCHARGE INSTRUCTIONS
[facility], [address] [place] [address] Road  Telephone: [phone]     Discharge Instructions     Important reminders:     **If you have any signs and symptoms of illness (Cough, fever, congestion, nausea, vomiting, diarrhea, etc.) please call the wound care center prior to your appointment. 1. Increase Protein intake for optimal wound healing  2. No added salt to reduce any swelling  3. If diabetic, maintain good glucose control  4. If you smoke, smoking prohibits wound healing, we ask that you refrain from smoking. 5. When taking antibiotics take the entire prescription as ordered. Do not stop taking until medication is all gone unless otherwise instructed. 6. Exercise as tolerated. 7. Keep weight off wounds and reposition every 2 hours if applicable. 8. If wound(s) is on your lower extremity, elevate legs to the level of the heart or above for 30 minutes 4-5 times a day and/or when sitting. Avoid standing for long periods of time. 9. Do not get wounds wet in bath or shower unless otherwise instructed by your physician. If your wound is on your foot or leg, you may purchase a cast bag. Please ask at the pharmacy. If Vascular testing is ordered, please call [address] [place] [address] [phone]) to schedule. Vascular tests ordered by Wound Care Physicians may take up to 2 hours to complete. Please keep that in mind when scheduling. If Vascular testing is scheduled, please bring supplies to replace your dressing after testing is done. The vascular department does not stock supplies. Wound: Right Leg      With each dressing change, rinse wounds with 0.9% Saline. (May use wound wash or soft contact solution. Both can be purchased at a local drug store). If unable to obtain saline, may use a gentle soap and water. Dressing care:  Right Leg-  Apply silvadene cream to all wounds daily and as needed with dry dressing.  Left foot anterior toes- betadine paint daily. Bilateral legs- 6\" ace wrap from toes to below the knees. Home care nurse to visit [id number]. Schedule Vascular Studies [facility] Scheduling [phone])     Home Care Agency/Facility: [facility]     Your wound-care supplies will be provided by: [employer] -   phone #: [phone]     Please note, depending on your insurance coverage, you may have out-of-pocket expenses for these supplies. Someone from the company should call you to confirm your order and discuss those potential costs before they ship your products -- please anticipate that call. If your out-of-pocket cost could be substantial, Many companies have financial hardship programs for patients who qualify, so please ask about that if you might need a hand. If you have any questions about your supplies or your potential out-of-pocket costs, or if you need to place an order for a refill of supplies (typically monthly), please call the company directly. Your  is Fiona     Follow up with Dr Diana Cutler In 1 week(s) in the wound care center. Wound Care Center Information: Should you experience any significant changes in your wound(s) or have questions about your wound care, please [facility] at [phone] Monday  - Thursday 8:00 am - 4:00 pm and Friday 8:00 am - 1:00pm. If you need help with your wound outside these hours and cannot wait until we are again available, contact your PCP or go to the hospital emergency room. PLEASE NOTE: IF YOU ARE UNABLE TO OBTAIN WOUND SUPPLIES, CONTINUE TO USE THE SUPPLIES YOU HAVE AVAILABLE UNTIL YOU ARE ABLE TO REACH US. IT IS MOST IMPORTANT TO KEEP THE WOUND COVERED AT ALL TIMES. Patient Experience     Thank you for trusting us with your care. You may receive a survey from a company called [employer] asking for your feedback. We would appreciate it if you took a few minutes to share your experience.   Your input is very valuable to us.

## 2022-10-18 ENCOUNTER — HOSPITAL ENCOUNTER (OUTPATIENT)
Dept: WOUND CARE | Age: 82
Discharge: HOME | End: 2022-10-18

## 2022-10-20 NOTE — DISCHARGE INSTRUCTIONS
[facility], [address] [place] [address] Road  Telephone: [phone]     Discharge Instructions     Important reminders:     **If you have any signs and symptoms of illness (Cough, fever, congestion, nausea, vomiting, diarrhea, etc.) please call the wound care center prior to your appointment. 1. Increase Protein intake for optimal wound healing  2. No added salt to reduce any swelling  3. If diabetic, maintain good glucose control  4. If you smoke, smoking prohibits wound healing, we ask that you refrain from smoking. 5. When taking antibiotics take the entire prescription as ordered. Do not stop taking until medication is all gone unless otherwise instructed. 6. Exercise as tolerated. 7. Keep weight off wounds and reposition every 2 hours if applicable. 8. If wound(s) is on your lower extremity, elevate legs to the level of the heart or above for 30 minutes 4-5 times a day and/or when sitting. Avoid standing for long periods of time. 9. Do not get wounds wet in bath or shower unless otherwise instructed by your physician. If your wound is on your foot or leg, you may purchase a cast bag. Please ask at the pharmacy. If Vascular testing is ordered, please call [address] [place] [address] [phone]) to schedule. Vascular tests ordered by Wound Care Physicians may take up to 2 hours to complete. Please keep that in mind when scheduling. If Vascular testing is scheduled, please bring supplies to replace your dressing after testing is done. The vascular department does not stock supplies. Wound: Right Leg      With each dressing change, rinse wounds with 0.9% Saline. (May use wound wash or soft contact solution. Both can be purchased at a local drug store). If unable to obtain saline, may use a gentle soap and water. Dressing care:  Right Leg-  Apply silvadene cream to all wounds daily and as needed with dry dressing.  Left foot anterior toes- betadine paint daily. Bilateral legs- 6\" ace wrap from toes to below the knees. Home care nurse to visit [id number]. Schedule Vascular Studies [facility] Scheduling [phone])     Home Care Agency/Facility: [facility]     Your wound-care supplies will be provided by: [facility] -   phone #: [phone]     Please note, depending on your insurance coverage, you may have out-of-pocket expenses for these supplies. Someone from the company should call you to confirm your order and discuss those potential costs before they ship your products -- please anticipate that call. If your out-of-pocket cost could be substantial, Many companies have financial hardship programs for patients who qualify, so please ask about that if you might need a hand. If you have any questions about your supplies or your potential out-of-pocket costs, or if you need to place an order for a refill of supplies (typically monthly), please call the company directly. Your  is Fiona Reina up with Dr Rosanne Arteaga In 1 week(s) in the wound care center. Wound Care Center Information: Should you experience any significant changes in your wound(s) or have questions about your wound care, please contact the [facility] at [phone] Monday  - Thursday 8:00 am - 4:00 pm and Friday 8:00 am - 1:00pm. If you need help with your wound outside these hours and cannot wait until we are again available, contact your PCP or go to the hospital emergency room. PLEASE NOTE: IF YOU ARE UNABLE TO OBTAIN WOUND SUPPLIES, CONTINUE TO USE THE SUPPLIES YOU HAVE AVAILABLE UNTIL YOU ARE ABLE TO REACH US. IT IS MOST IMPORTANT TO KEEP THE WOUND COVERED AT ALL TIMES. Patient Experience     Thank you for trusting us with your care. You may receive a survey from a company called [employer] asking for your feedback. We would appreciate it if you took a few minutes to share your experience.   Your input is very valuable to us.

## 2022-10-24 ENCOUNTER — HOSPITAL ENCOUNTER (OUTPATIENT)
Dept: WOUND CARE | Age: 82
Discharge: HOME | End: 2022-10-24

## 2022-10-28 NOTE — DISCHARGE INSTRUCTIONS
[facility], [address] [place] [address] Road  Telephone: [phone]     Discharge Instructions     Important reminders:     **If you have any signs and symptoms of illness (Cough, fever, congestion, nausea, vomiting, diarrhea, etc.) please call the wound care center prior to your appointment. 1. Increase Protein intake for optimal wound healing  2. No added salt to reduce any swelling  3. If diabetic, maintain good glucose control  4. If you smoke, smoking prohibits wound healing, we ask that you refrain from smoking. 5. When taking antibiotics take the entire prescription as ordered. Do not stop taking until medication is all gone unless otherwise instructed. 6. Exercise as tolerated. 7. Keep weight off wounds and reposition every 2 hours if applicable. 8. If wound(s) is on your lower extremity, elevate legs to the level of the heart or above for 30 minutes 4-5 times a day and/or when sitting. Avoid standing for long periods of time. 9. Do not get wounds wet in bath or shower unless otherwise instructed by your physician. If your wound is on your foot or leg, you may purchase a cast bag. Please ask at the pharmacy. If Vascular testing is ordered, please call [address] [place] [address] [phone]) to schedule. Vascular tests ordered by Wound Care Physicians may take up to 2 hours to complete. Please keep that in mind when scheduling. If Vascular testing is scheduled, please bring supplies to replace your dressing after testing is done. The vascular department does not stock supplies. Wound: Right Leg      With each dressing change, rinse wounds with 0.9% Saline. (May use wound wash or soft contact solution. Both can be purchased at a local drug store). If unable to obtain saline, may use a gentle soap and water. Dressing care:  Right Leg-  Apply silvadene cream to all wounds daily and as needed with dry dressing.  Left foot anterior toes- betadine paint daily. Bilateral legs- 6\" ace wrap from toes to below the knees. Home care nurse to visit [id number]. Schedule Vascular Studies [facility] Scheduling [phone])     Home Care Agency/Facility: [facility]     Your wound-care supplies will be provided by: [facility] -   phone #: [phone]     Please note, depending on your insurance coverage, you may have out-of-pocket expenses for these supplies. Someone from the company should call you to confirm your order and discuss those potential costs before they ship your products -- please anticipate that call. If your out-of-pocket cost could be substantial, Many companies have financial hardship programs for patients who qualify, so please ask about that if you might need a hand. If you have any questions about your supplies or your potential out-of-pocket costs, or if you need to place an order for a refill of supplies (typically monthly), please call the company directly. Your  is Fiona Reina up with Dr Guadalupe Borges In 1 week(s) in the wound care center. Wound Care Center Information: Should you experience any significant changes in your wound(s) or have questions about your wound care, please [facility] at [phone] Monday  - Thursday 8:00 am - 4:00 pm and Friday 8:00 am - 1:00pm. If you need help with your wound outside these hours and cannot wait until we are again available, contact your PCP or go to the hospital emergency room. PLEASE NOTE: IF YOU ARE UNABLE TO OBTAIN WOUND SUPPLIES, CONTINUE TO USE THE SUPPLIES YOU HAVE AVAILABLE UNTIL YOU ARE ABLE TO REACH US. IT IS MOST IMPORTANT TO KEEP THE WOUND COVERED AT ALL TIMES. Patient Experience     Thank you for trusting us with your care. You may receive a survey from a company called [employer] asking for your feedback. We would appreciate it if you took a few minutes to share your experience.   Your input is very valuable to us.

## 2022-11-01 ENCOUNTER — HOSPITAL ENCOUNTER (OUTPATIENT)
Dept: WOUND CARE | Age: 82
Discharge: HOME | End: 2022-11-01
Payer: MEDICARE

## 2022-11-01 VITALS
HEART RATE: 72 BPM | DIASTOLIC BLOOD PRESSURE: 74 MMHG | TEMPERATURE: 97.1 F | RESPIRATION RATE: 18 BRPM | SYSTOLIC BLOOD PRESSURE: 115 MMHG

## 2022-11-01 DIAGNOSIS — S81.812A: Primary | ICD-10-CM

## 2022-11-01 DIAGNOSIS — L97.911 NON-PRESSURE CHRONIC ULCER OF LOWER LEG, LIMITED TO BREAKDOWN OF SKIN, RIGHT (HCC): ICD-10-CM

## 2022-11-01 DIAGNOSIS — S81.811A LACERATION OF SKIN OF LOWER LEG, RIGHT, INITIAL ENCOUNTER: ICD-10-CM

## 2022-11-01 PROCEDURE — 11042 DBRDMT SUBQ TIS 1ST 20SQCM/<: CPT

## 2022-11-01 PROCEDURE — 11042 DBRDMT SUBQ TIS 1ST 20SQCM/<: CPT | Performed by: EMERGENCY MEDICINE

## 2022-11-01 RX ORDER — LIDOCAINE HYDROCHLORIDE 20 MG/ML
JELLY TOPICAL ONCE
Status: CANCELLED | OUTPATIENT
Start: 2022-11-01 | End: 2022-11-01

## 2022-11-01 RX ORDER — LIDOCAINE 40 MG/G
CREAM TOPICAL ONCE
Status: CANCELLED | OUTPATIENT
Start: 2022-11-01 | End: 2022-11-01

## 2022-11-01 RX ORDER — GENTAMICIN SULFATE 1 MG/G
OINTMENT TOPICAL ONCE
Status: CANCELLED | OUTPATIENT
Start: 2022-11-01 | End: 2022-11-01

## 2022-11-01 RX ORDER — BETAMETHASONE DIPROPIONATE 0.05 %
OINTMENT (GRAM) TOPICAL ONCE
Status: CANCELLED | OUTPATIENT
Start: 2022-11-01 | End: 2022-11-01

## 2022-11-01 RX ORDER — GINSENG 100 MG
CAPSULE ORAL ONCE
Status: CANCELLED | OUTPATIENT
Start: 2022-11-01 | End: 2022-11-01

## 2022-11-01 RX ORDER — LIDOCAINE HYDROCHLORIDE 40 MG/ML
SOLUTION TOPICAL ONCE
Status: CANCELLED | OUTPATIENT
Start: 2022-11-01 | End: 2022-11-01

## 2022-11-01 RX ORDER — LIDOCAINE 50 MG/G
OINTMENT TOPICAL ONCE
Status: CANCELLED | OUTPATIENT
Start: 2022-11-01 | End: 2022-11-01

## 2022-11-01 RX ORDER — BACITRACIN ZINC AND POLYMYXIN B SULFATE 500; 1000 [USP'U]/G; [USP'U]/G
OINTMENT TOPICAL ONCE
Status: CANCELLED | OUTPATIENT
Start: 2022-11-01 | End: 2022-11-01

## 2022-11-01 RX ORDER — BACITRACIN, NEOMYCIN, POLYMYXIN B 400; 3.5; 5 [USP'U]/G; MG/G; [USP'U]/G
OINTMENT TOPICAL ONCE
Status: CANCELLED | OUTPATIENT
Start: 2022-11-01 | End: 2022-11-01

## 2022-11-01 RX ORDER — LIDOCAINE 40 MG/G
CREAM TOPICAL ONCE
Status: DISCONTINUED | OUTPATIENT
Start: 2022-11-01 | End: 2022-11-02 | Stop reason: HOSPADM

## 2022-11-01 RX ORDER — CLOBETASOL PROPIONATE 0.5 MG/G
OINTMENT TOPICAL ONCE
Status: CANCELLED | OUTPATIENT
Start: 2022-11-01 | End: 2022-11-01
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
## 2022-11-02 NOTE — PROGRESS NOTES
Patient stated he does not want to resume home care at this time. Patient's wife is doing wound care at this time.

## 2022-11-04 NOTE — DISCHARGE INSTRUCTIONS
[facility], [address] [place] [address] Road  Telephone: [phone]     Discharge Instructions     Important reminders:     **If you have any signs and symptoms of illness (Cough, fever, congestion, nausea, vomiting, diarrhea, etc.) please call the wound care center prior to your appointment. 1. Increase Protein intake for optimal wound healing  2. No added salt to reduce any swelling  3. If diabetic, maintain good glucose control  4. If you smoke, smoking prohibits wound healing, we ask that you refrain from smoking. 5. When taking antibiotics take the entire prescription as ordered. Do not stop taking until medication is all gone unless otherwise instructed. 6. Exercise as tolerated. 7. Keep weight off wounds and reposition every 2 hours if applicable. 8. If wound(s) is on your lower extremity, elevate legs to the level of the heart or above for 30 minutes 4-5 times a day and/or when sitting. Avoid standing for long periods of time. 9. Do not get wounds wet in bath or shower unless otherwise instructed by your physician. If your wound is on your foot or leg, you may purchase a cast bag. Please ask at the pharmacy. If Vascular testing is ordered, please call [address] [place] [address] ([phone]) to schedule. Vascular tests ordered by Wound Care Physicians may take up to 2 hours to complete. Please keep that in mind when scheduling. If Vascular testing is scheduled, please bring supplies to replace your dressing after testing is done. The vascular department does not stock supplies. Wound: Right Leg      With each dressing change, rinse wounds with 0.9% Saline. (May use wound wash or soft contact solution. Both can be purchased at a local drug store). If unable to obtain saline, may use a gentle soap and water. Dressing care:  Right Leg-  Apply silvadene cream to all wounds daily and as needed with dry dressing.  Left foot anterior toes- betadine paint daily. Bilateral legs- 6\" ace wrap from toes to below the knees. Can clean with soapy warm water and apply Ceraveve cream to lower legs. Schedule Vascular Studies [facility] Scheduling [phone])     Home Care Agency/Facility: [facility]     Your wound-care supplies will be provided by: [facility] -   phone #: [phone]     Please note, depending on your insurance coverage, you may have out-of-pocket expenses for these supplies. Someone from the company should call you to confirm your order and discuss those potential costs before they ship your products -- please anticipate that call. If your out-of-pocket cost could be substantial, Many companies have financial hardship programs for patients who qualify, so please ask about that if you might need a hand. If you have any questions about your supplies or your potential out-of-pocket costs, or if you need to place an order for a refill of supplies (typically monthly), please call the company directly. Your  is Fiona Reina up with Dr Deena Vazquez In 1 week(s) in the wound care center. Wound Care Center Information: Should you experience any significant changes in your wound(s) or have questions about your wound care, please contact the [facility] at [phone] Monday  - Thursday 8:00 am - 4:00 pm and Friday 8:00 am - 1:00pm. If you need help with your wound outside these hours and cannot wait until we are again available, contact your PCP or go to the hospital emergency room. PLEASE NOTE: IF YOU ARE UNABLE TO OBTAIN WOUND SUPPLIES, CONTINUE TO USE THE SUPPLIES YOU HAVE AVAILABLE UNTIL YOU ARE ABLE TO REACH US. IT IS MOST IMPORTANT TO KEEP THE WOUND COVERED AT ALL TIMES. Patient Experience     Thank you for trusting us with your care. You may receive a survey from a company called [employer] asking for your feedback.   We would appreciate it if you took a few minutes to share your experience. Your input is very valuable to us.

## 2022-11-08 ENCOUNTER — HOSPITAL ENCOUNTER (OUTPATIENT)
Dept: WOUND CARE | Age: 82
Discharge: HOME | End: 2022-11-08
Payer: MEDICARE

## 2022-11-08 VITALS — DIASTOLIC BLOOD PRESSURE: 68 MMHG | TEMPERATURE: 96.8 F | HEART RATE: 78 BPM | SYSTOLIC BLOOD PRESSURE: 112 MMHG

## 2022-11-08 DIAGNOSIS — S81.811A LACERATION OF SKIN OF LOWER LEG, RIGHT, INITIAL ENCOUNTER: ICD-10-CM

## 2022-11-08 DIAGNOSIS — S81.812A: Primary | ICD-10-CM

## 2022-11-08 DIAGNOSIS — L97.911 NON-PRESSURE CHRONIC ULCER OF LOWER LEG, LIMITED TO BREAKDOWN OF SKIN, RIGHT (HCC): ICD-10-CM

## 2022-11-08 PROCEDURE — 11042 DBRDMT SUBQ TIS 1ST 20SQCM/<: CPT

## 2022-11-08 PROCEDURE — 11042 DBRDMT SUBQ TIS 1ST 20SQCM/<: CPT | Performed by: EMERGENCY MEDICINE

## 2022-11-08 NOTE — PLAN OF CARE
Discharge instructions given. Patient verbalized understanding. Return to [facility] in 1 week(s).
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
## 2022-11-14 NOTE — DISCHARGE INSTRUCTIONS
[facility], [address] [place] [address] Road  Telephone: [phone]     Discharge Instructions     Important reminders:     **If you have any signs and symptoms of illness (Cough, fever, congestion, nausea, vomiting, diarrhea, etc.) please call the wound care center prior to your appointment. 1. Increase Protein intake for optimal wound healing  2. No added salt to reduce any swelling  3. If diabetic, maintain good glucose control  4. If you smoke, smoking prohibits wound healing, we ask that you refrain from smoking. 5. When taking antibiotics take the entire prescription as ordered. Do not stop taking until medication is all gone unless otherwise instructed. 6. Exercise as tolerated. 7. Keep weight off wounds and reposition every 2 hours if applicable. 8. If wound(s) is on your lower extremity, elevate legs to the level of the heart or above for 30 minutes 4-5 times a day and/or when sitting. Avoid standing for long periods of time. 9. Do not get wounds wet in bath or shower unless otherwise instructed by your physician. If your wound is on your foot or leg, you may purchase a cast bag. Please ask at the pharmacy. If Vascular testing is ordered, please call [address] [place] [address] [phone]) to schedule. Vascular tests ordered by Wound Care Physicians may take up to 2 hours to complete. Please keep that in mind when scheduling. If Vascular testing is scheduled, please bring supplies to replace your dressing after testing is done. The vascular department does not stock supplies. Wound: Right Leg      With each dressing change, rinse wounds with 0.9% Saline. (May use wound wash or soft contact solution. Both can be purchased at a local drug store). If unable to obtain saline, may use a gentle soap and water.      Dressing care:  Right Leg-  (Please clean with soapy warm wash cloth prior to applying dressing) Apply silvadene cream to all wounds daily and as needed with dry dressing. Left foot anterior toes- betadine paint daily. Bilateral legs- 6\" ace wrap from toes to below the knees. Can clean with soapy warm water and apply Ceraveve cream to lower legs. Schedule Vascular Studies [facility] Scheduling [phone])     Home Care Agency/Facility: [facility]     Your wound-care supplies will be provided by: [facility] -   phone #: [phone]     Please note, depending on your insurance coverage, you may have out-of-pocket expenses for these supplies. Someone from the company should call you to confirm your order and discuss those potential costs before they ship your products -- please anticipate that call. If your out-of-pocket cost could be substantial, Many companies have financial hardship programs for patients who qualify, so please ask about that if you might need a hand. If you have any questions about your supplies or your potential out-of-pocket costs, or if you need to place an order for a refill of supplies (typically monthly), please call the company directly. Your  is Fiona Reina up with Dr Suzan Bowie In 2 week(s) in the wound care center. Wound Care Center Information: Should you experience any significant changes in your wound(s) or have questions about your wound care, please [facility]  at [phone] Monday  - Thursday 8:00 am - 4:00 pm and Friday 8:00 am - 1:00pm. If you need help with your wound outside these hours and cannot wait until we are again available, contact your PCP or go to the hospital emergency room. PLEASE NOTE: IF YOU ARE UNABLE TO OBTAIN WOUND SUPPLIES, CONTINUE TO USE THE SUPPLIES YOU HAVE AVAILABLE UNTIL YOU ARE ABLE TO REACH US. IT IS MOST IMPORTANT TO KEEP THE WOUND COVERED AT ALL TIMES. Patient Experience     Thank you for trusting us with your care.   You may receive a survey from a company called [employer] asking for your feedback. We would appreciate it if you took a few minutes to share your experience. Your input is very valuable to us.

## 2022-11-15 ENCOUNTER — HOSPITAL ENCOUNTER (OUTPATIENT)
Dept: WOUND CARE | Age: 82
Discharge: HOME | End: 2022-11-15
Payer: MEDICARE

## 2022-11-15 VITALS
SYSTOLIC BLOOD PRESSURE: 138 MMHG | TEMPERATURE: 97.1 F | HEART RATE: 83 BPM | DIASTOLIC BLOOD PRESSURE: 74 MMHG | RESPIRATION RATE: 18 BRPM

## 2022-11-15 DIAGNOSIS — S81.812A: Primary | ICD-10-CM

## 2022-11-15 DIAGNOSIS — L97.911 NON-PRESSURE CHRONIC ULCER OF LOWER LEG, LIMITED TO BREAKDOWN OF SKIN, RIGHT (HCC): ICD-10-CM

## 2022-11-15 DIAGNOSIS — I87.2 VENOUS INSUFFICIENCY OF BOTH LOWER EXTREMITIES: ICD-10-CM

## 2022-11-15 DIAGNOSIS — L97.912 NON-PRESSURE CHRONIC ULCER OF RIGHT LOWER LEG WITH FAT LAYER EXPOSED (HCC): ICD-10-CM

## 2022-11-15 DIAGNOSIS — S81.811A LACERATION OF SKIN OF LOWER LEG, RIGHT, INITIAL ENCOUNTER: ICD-10-CM

## 2022-11-15 PROCEDURE — 11042 DBRDMT SUBQ TIS 1ST 20SQCM/<: CPT | Performed by: EMERGENCY MEDICINE

## 2022-11-15 PROCEDURE — 11042 DBRDMT SUBQ TIS 1ST 20SQCM/<: CPT

## 2022-11-15 RX ORDER — CLOBETASOL PROPIONATE 0.5 MG/G
OINTMENT TOPICAL ONCE
OUTPATIENT
Start: 2022-11-15 | End: 2022-11-15

## 2022-11-15 RX ORDER — GENTAMICIN SULFATE 1 MG/G
OINTMENT TOPICAL ONCE
OUTPATIENT
Start: 2022-11-15 | End: 2022-11-15

## 2022-11-15 RX ORDER — LIDOCAINE HYDROCHLORIDE 20 MG/ML
JELLY TOPICAL ONCE
OUTPATIENT
Start: 2022-11-15 | End: 2022-11-15

## 2022-11-15 RX ORDER — BACITRACIN, NEOMYCIN, POLYMYXIN B 400; 3.5; 5 [USP'U]/G; MG/G; [USP'U]/G
OINTMENT TOPICAL ONCE
OUTPATIENT
Start: 2022-11-15 | End: 2022-11-15

## 2022-11-15 RX ORDER — BETAMETHASONE DIPROPIONATE 0.05 %
OINTMENT (GRAM) TOPICAL ONCE
OUTPATIENT
Start: 2022-11-15 | End: 2022-11-15

## 2022-11-15 RX ORDER — LIDOCAINE 50 MG/G
OINTMENT TOPICAL ONCE
OUTPATIENT
Start: 2022-11-15 | End: 2022-11-15

## 2022-11-15 RX ORDER — LIDOCAINE HYDROCHLORIDE 40 MG/ML
SOLUTION TOPICAL ONCE
OUTPATIENT
Start: 2022-11-15 | End: 2022-11-15

## 2022-11-15 RX ORDER — GINSENG 100 MG
CAPSULE ORAL ONCE
OUTPATIENT
Start: 2022-11-15 | End: 2022-11-15

## 2022-11-15 RX ORDER — LIDOCAINE 40 MG/G
CREAM TOPICAL ONCE
Status: CANCELLED | OUTPATIENT
Start: 2022-11-15 | End: 2022-11-15

## 2022-11-15 RX ORDER — LIDOCAINE 40 MG/G
CREAM TOPICAL ONCE
Status: DISCONTINUED | OUTPATIENT
Start: 2022-11-15 | End: 2022-11-16 | Stop reason: HOSPADM

## 2022-11-15 RX ORDER — BACITRACIN ZINC AND POLYMYXIN B SULFATE 500; 1000 [USP'U]/G; [USP'U]/G
OINTMENT TOPICAL ONCE
OUTPATIENT
Start: 2022-11-15 | End: 2022-11-15

## 2022-11-15 ASSESSMENT — PAIN DESCRIPTION - LOCATION: LOCATION: GENERALIZED

## 2022-11-15 ASSESSMENT — PAIN DESCRIPTION - DESCRIPTORS: DESCRIPTORS: ACHING

## 2022-11-15 ASSESSMENT — PAIN SCALES - GENERAL: PAINLEVEL_OUTOF10: 7

## 2022-11-15 ASSESSMENT — PAIN DESCRIPTION - PAIN TYPE: TYPE: CHRONIC PAIN
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
## 2022-11-15 NOTE — PLAN OF CARE
Discharge instructions given. Patient verbalized understanding. Return to [facility] in 2 week(s).

## 2022-11-23 NOTE — DISCHARGE INSTRUCTIONS
[facility], [address] [place] [address] Road  Telephone: [phone]     Discharge Instructions     Important reminders:     **If you have any signs and symptoms of illness (Cough, fever, congestion, nausea, vomiting, diarrhea, etc.) please call the wound care center prior to your appointment. 1. Increase Protein intake for optimal wound healing  2. No added salt to reduce any swelling  3. If diabetic, maintain good glucose control  4. If you smoke, smoking prohibits wound healing, we ask that you refrain from smoking. 5. When taking antibiotics take the entire prescription as ordered. Do not stop taking until medication is all gone unless otherwise instructed. 6. Exercise as tolerated. 7. Keep weight off wounds and reposition every 2 hours if applicable. 8. If wound(s) is on your lower extremity, elevate legs to the level of the heart or above for 30 minutes 4-5 times a day and/or when sitting. Avoid standing for long periods of time. 9. Do not get wounds wet in bath or shower unless otherwise instructed by your physician. If your wound is on your foot or leg, you may purchase a cast bag. Please ask at the pharmacy. If Vascular testing is ordered, please call [address] [place] [address] [phone]) to schedule. Vascular tests ordered by Wound Care Physicians may take up to 2 hours to complete. Please keep that in mind when scheduling. If Vascular testing is scheduled, please bring supplies to replace your dressing after testing is done. The vascular department does not stock supplies. Wound: Right Leg      With each dressing change, rinse wounds with 0.9% Saline. (May use wound wash or soft contact solution. Both can be purchased at a local drug store). If unable to obtain saline, may use a gentle soap and water.      Dressing care:  Right Leg & right anterior foot-  (Please clean with soapy warm wash cloth prior to applying dressing) Apply silvadene cream daily and as needed with dry dressing. Right foot 2nd toe- cutimed and dry dressing change every day. Left foot anterior toes- betadine paint daily. Bilateral legs- 6\" ace wrap from toes to below the knees. Can clean with soapy warm water and apply Ceraveve cream to lower legs. Schedule Vascular Studies [facility] Scheduling [phone])     Home Care Agency/Facility: [facility]     Your wound-care supplies will be provided by: [facility] -   phone #: [phone]     Please note, depending on your insurance coverage, you may have out-of-pocket expenses for these supplies. Someone from the company should call you to confirm your order and discuss those potential costs before they ship your products -- please anticipate that call. If your out-of-pocket cost could be substantial, Many companies have financial hardship programs for patients who qualify, so please ask about that if you might need a hand. If you have any questions about your supplies or your potential out-of-pocket costs, or if you need to place an order for a refill of supplies (typically monthly), please call the company directly. Your  is Fiona Reina up with Dr Thalia Castaneda In 2 week(s) in the wound care center. Wound Care Center Information: Should you experience any significant changes in your wound(s) or have questions about your wound care, please [facility] at [phone] Monday  - Thursday 8:00 am - 4:00 pm and Friday 8:00 am - 1:00pm. If you need help with your wound outside these hours and cannot wait until we are again available, contact your PCP or go to the hospital emergency room. PLEASE NOTE: IF YOU ARE UNABLE TO OBTAIN WOUND SUPPLIES, CONTINUE TO USE THE SUPPLIES YOU HAVE AVAILABLE UNTIL YOU ARE ABLE TO REACH US. IT IS MOST IMPORTANT TO KEEP THE WOUND COVERED AT ALL TIMES. Patient Experience     Thank you for trusting us with your care.   You may receive a survey from a company called [employer] asking for your feedback. We would appreciate it if you took a few minutes to share your experience. Your input is very valuable to us.

## 2022-11-29 ENCOUNTER — HOSPITAL ENCOUNTER (OUTPATIENT)
Dept: WOUND CARE | Age: 82
Discharge: HOME | End: 2022-11-29
Payer: MEDICARE

## 2022-11-29 VITALS
HEART RATE: 80 BPM | SYSTOLIC BLOOD PRESSURE: 118 MMHG | DIASTOLIC BLOOD PRESSURE: 78 MMHG | RESPIRATION RATE: 18 BRPM | TEMPERATURE: 97 F

## 2022-11-29 DIAGNOSIS — L97.911 NON-PRESSURE CHRONIC ULCER OF LOWER LEG, LIMITED TO BREAKDOWN OF SKIN, RIGHT (HCC): ICD-10-CM

## 2022-11-29 DIAGNOSIS — I87.2 VENOUS ULCER OF RIGHT LOWER EXTREMITY WITHOUT VARICOSE VEINS (HCC): ICD-10-CM

## 2022-11-29 DIAGNOSIS — I87.2 VENOUS INSUFFICIENCY OF BOTH LOWER EXTREMITIES: Primary | ICD-10-CM

## 2022-11-29 DIAGNOSIS — S81.811A LACERATION OF SKIN OF LOWER LEG, RIGHT, INITIAL ENCOUNTER: ICD-10-CM

## 2022-11-29 DIAGNOSIS — L97.512 RIGHT FOOT ULCER, WITH FAT LAYER EXPOSED (HCC): ICD-10-CM

## 2022-11-29 DIAGNOSIS — S81.812A: ICD-10-CM

## 2022-11-29 DIAGNOSIS — S91.201A OPEN WOUND OF RIGHT GREAT TOE WITH DAMAGE TO NAIL, INITIAL ENCOUNTER: ICD-10-CM

## 2022-11-29 DIAGNOSIS — L97.912 NON-PRESSURE CHRONIC ULCER OF RIGHT LOWER LEG WITH FAT LAYER EXPOSED (HCC): ICD-10-CM

## 2022-11-29 DIAGNOSIS — L97.922 NON-PRESSURE CHRONIC ULCER OF LEFT LOWER LEG WITH FAT LAYER EXPOSED (HCC): ICD-10-CM

## 2022-11-29 DIAGNOSIS — L97.919 VENOUS ULCER OF RIGHT LOWER EXTREMITY WITHOUT VARICOSE VEINS (HCC): ICD-10-CM

## 2022-11-29 PROCEDURE — 11042 DBRDMT SUBQ TIS 1ST 20SQCM/<: CPT | Performed by: EMERGENCY MEDICINE

## 2022-11-29 PROCEDURE — 99212 OFFICE O/P EST SF 10 MIN: CPT | Performed by: EMERGENCY MEDICINE

## 2022-11-29 PROCEDURE — 11043 DBRDMT MUSC&/FSCA 1ST 20/<: CPT

## 2022-11-29 RX ORDER — LIDOCAINE 40 MG/G
CREAM TOPICAL ONCE
OUTPATIENT
Start: 2022-11-29 | End: 2022-11-29

## 2022-11-29 RX ORDER — GENTAMICIN SULFATE 1 MG/G
OINTMENT TOPICAL ONCE
OUTPATIENT
Start: 2022-11-29 | End: 2022-11-29

## 2022-11-29 RX ORDER — BETAMETHASONE DIPROPIONATE 0.05 %
OINTMENT (GRAM) TOPICAL ONCE
OUTPATIENT
Start: 2022-11-29 | End: 2022-11-29

## 2022-11-29 RX ORDER — GINSENG 100 MG
CAPSULE ORAL ONCE
OUTPATIENT
Start: 2022-11-29 | End: 2022-11-29

## 2022-11-29 RX ORDER — CLOBETASOL PROPIONATE 0.5 MG/G
OINTMENT TOPICAL ONCE
OUTPATIENT
Start: 2022-11-29 | End: 2022-11-29

## 2022-11-29 RX ORDER — LIDOCAINE 40 MG/G
CREAM TOPICAL ONCE
Status: DISCONTINUED | OUTPATIENT
Start: 2022-11-29 | End: 2022-11-30 | Stop reason: HOSPADM

## 2022-11-29 RX ORDER — LIDOCAINE HYDROCHLORIDE 20 MG/ML
JELLY TOPICAL ONCE
OUTPATIENT
Start: 2022-11-29 | End: 2022-11-29

## 2022-11-29 RX ORDER — BACITRACIN, NEOMYCIN, POLYMYXIN B 400; 3.5; 5 [USP'U]/G; MG/G; [USP'U]/G
OINTMENT TOPICAL ONCE
OUTPATIENT
Start: 2022-11-29 | End: 2022-11-29

## 2022-11-29 RX ORDER — BACITRACIN ZINC AND POLYMYXIN B SULFATE 500; 1000 [USP'U]/G; [USP'U]/G
OINTMENT TOPICAL ONCE
OUTPATIENT
Start: 2022-11-29 | End: 2022-11-29

## 2022-11-29 RX ORDER — CEPHALEXIN 500 MG/1
500 CAPSULE ORAL 2 TIMES DAILY
COMMUNITY
Start: 2022-11-25 | End: 2022-12-09

## 2022-11-29 RX ORDER — LIDOCAINE HYDROCHLORIDE 40 MG/ML
SOLUTION TOPICAL ONCE
OUTPATIENT
Start: 2022-11-29 | End: 2022-11-29

## 2022-11-29 RX ORDER — LIDOCAINE 50 MG/G
OINTMENT TOPICAL ONCE
OUTPATIENT
Start: 2022-11-29 | End: 2022-11-29
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
## 2022-11-29 NOTE — PLAN OF CARE
Discharge instructions given. Patient verbalized understanding. Return to [address] [place] [address],[address] in 2 week(s).

## 2022-12-13 ENCOUNTER — HOSPITAL ENCOUNTER (OUTPATIENT)
Dept: WOUND CARE | Age: 82
Discharge: HOME | End: 2022-12-13
Payer: MEDICARE

## 2022-12-13 DIAGNOSIS — L97.912 NON-PRESSURE CHRONIC ULCER OF RIGHT LOWER LEG WITH FAT LAYER EXPOSED (HCC): ICD-10-CM

## 2022-12-13 DIAGNOSIS — S81.812A: ICD-10-CM

## 2022-12-13 DIAGNOSIS — L97.512 RIGHT FOOT ULCER, WITH FAT LAYER EXPOSED (HCC): ICD-10-CM

## 2022-12-13 DIAGNOSIS — S81.811A LACERATION OF SKIN OF LOWER LEG, RIGHT, INITIAL ENCOUNTER: ICD-10-CM

## 2022-12-13 DIAGNOSIS — L97.919 VENOUS ULCER OF RIGHT LOWER EXTREMITY WITHOUT VARICOSE VEINS (HCC): Primary | ICD-10-CM

## 2022-12-13 DIAGNOSIS — I87.2 VENOUS ULCER OF RIGHT LOWER EXTREMITY WITHOUT VARICOSE VEINS (HCC): Primary | ICD-10-CM

## 2022-12-13 DIAGNOSIS — I87.2 VENOUS INSUFFICIENCY OF BOTH LOWER EXTREMITIES: ICD-10-CM

## 2022-12-13 DIAGNOSIS — L97.911 NON-PRESSURE CHRONIC ULCER OF LOWER LEG, LIMITED TO BREAKDOWN OF SKIN, RIGHT (HCC): ICD-10-CM

## 2022-12-13 DIAGNOSIS — S91.201D OPEN WOUND OF RIGHT GREAT TOE WITH DAMAGE TO NAIL, SUBSEQUENT ENCOUNTER: ICD-10-CM

## 2022-12-13 DIAGNOSIS — L97.922 NON-PRESSURE CHRONIC ULCER OF LEFT LOWER LEG WITH FAT LAYER EXPOSED (HCC): ICD-10-CM

## 2022-12-13 PROCEDURE — 11042 DBRDMT SUBQ TIS 1ST 20SQCM/<: CPT

## 2022-12-13 PROCEDURE — 11042 DBRDMT SUBQ TIS 1ST 20SQCM/<: CPT | Performed by: EMERGENCY MEDICINE

## 2022-12-13 PROCEDURE — 11045 DBRDMT SUBQ TISS EACH ADDL: CPT

## 2022-12-13 PROCEDURE — 11045 DBRDMT SUBQ TISS EACH ADDL: CPT | Performed by: EMERGENCY MEDICINE

## 2022-12-13 RX ORDER — BACITRACIN, NEOMYCIN, POLYMYXIN B 400; 3.5; 5 [USP'U]/G; MG/G; [USP'U]/G
OINTMENT TOPICAL ONCE
OUTPATIENT
Start: 2022-12-13 | End: 2022-12-13

## 2022-12-13 RX ORDER — LIDOCAINE 40 MG/G
CREAM TOPICAL ONCE
OUTPATIENT
Start: 2022-12-13 | End: 2022-12-13

## 2022-12-13 RX ORDER — LIDOCAINE HYDROCHLORIDE 20 MG/ML
JELLY TOPICAL ONCE
OUTPATIENT
Start: 2022-12-13 | End: 2022-12-13

## 2022-12-13 RX ORDER — LIDOCAINE 40 MG/G
CREAM TOPICAL ONCE
Status: DISCONTINUED | OUTPATIENT
Start: 2022-12-13 | End: 2022-12-14 | Stop reason: HOSPADM

## 2022-12-13 RX ORDER — LIDOCAINE HYDROCHLORIDE 40 MG/ML
SOLUTION TOPICAL ONCE
OUTPATIENT
Start: 2022-12-13 | End: 2022-12-13

## 2022-12-13 RX ORDER — GINSENG 100 MG
CAPSULE ORAL ONCE
OUTPATIENT
Start: 2022-12-13 | End: 2022-12-13

## 2022-12-13 RX ORDER — GENTAMICIN SULFATE 1 MG/G
OINTMENT TOPICAL ONCE
OUTPATIENT
Start: 2022-12-13 | End: 2022-12-13

## 2022-12-13 RX ORDER — BETAMETHASONE DIPROPIONATE 0.05 %
OINTMENT (GRAM) TOPICAL ONCE
OUTPATIENT
Start: 2022-12-13 | End: 2022-12-13

## 2022-12-13 RX ORDER — BACITRACIN ZINC AND POLYMYXIN B SULFATE 500; 1000 [USP'U]/G; [USP'U]/G
OINTMENT TOPICAL ONCE
OUTPATIENT
Start: 2022-12-13 | End: 2022-12-13

## 2022-12-13 RX ORDER — CLOBETASOL PROPIONATE 0.5 MG/G
OINTMENT TOPICAL ONCE
OUTPATIENT
Start: 2022-12-13 | End: 2022-12-13

## 2022-12-13 RX ORDER — LIDOCAINE 50 MG/G
OINTMENT TOPICAL ONCE
OUTPATIENT
Start: 2022-12-13 | End: 2022-12-13
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
## 2022-12-13 NOTE — PLAN OF CARE
Discharge instructions given. Patient verbalized understanding. Return to [address] [place] [address],[address] in 3 week(s).

## 2022-12-13 NOTE — DISCHARGE INSTRUCTIONS
[address], [address]  Telephone: [phone]     Discharge Instructions     Important reminders:     **If you have any signs and symptoms of illness (Cough, fever, congestion, nausea, vomiting, diarrhea, etc.) please call the wound care center prior to your appointment. 1. Increase Protein intake for optimal wound healing  2. No added salt to reduce any swelling  3. If diabetic, maintain good glucose control  4. If you smoke, smoking prohibits wound healing, we ask that you refrain from smoking. 5. When taking antibiotics take the entire prescription as ordered. Do not stop taking until medication is all gone unless otherwise instructed. 6. Exercise as tolerated. 7. Keep weight off wounds and reposition every 2 hours if applicable. 8. If wound(s) is on your lower extremity, elevate legs to the level of the heart or above for 30 minutes 4-5 times a day and/or when sitting. Avoid standing for long periods of time. 9. Do not get wounds wet in bath or shower unless otherwise instructed by your physician. If your wound is on your foot or leg, you may purchase a cast bag. Please ask at the pharmacy. If Vascular testing is ordered, please call [address] [place] [address] [phone]) to schedule. Vascular tests ordered by Wound Care Physicians may take up to 2 hours to complete. Please keep that in mind when scheduling. If Vascular testing is scheduled, please bring supplies to replace your dressing after testing is done. The vascular department does not stock supplies. Wound: Right Leg      With each dressing change, rinse wounds with 0.9% Saline. (May use wound wash or soft contact solution. Both can be purchased at a local drug store). If unable to obtain saline, may use a gentle soap and water.      Dressing care:  Right Leg & right anterior foot-  (Please clean with soapy warm wash cloth prior to applying dressing) Apply Silver Alginate with dry dressing. Right foot 2nd toe- silver alginate and dry dressing change every day. Left foot anterior toes- betadine paint daily. Bilateral legs- 6\" ace wrap from toes to below the knees. Can clean with soapy warm water and apply Ceraveve cream to lower legs. Schedule Vascular Studies [facility] Scheduling [phone])     Home Care Agency/Facility: [facility]     Your wound-care supplies will be provided by: [facility] -   phone #: [phone]     Please note, depending on your insurance coverage, you may have out-of-pocket expenses for these supplies. Someone from the company should call you to confirm your order and discuss those potential costs before they ship your products -- please anticipate that call. If your out-of-pocket cost could be substantial, Many companies have financial hardship programs for patients who qualify, so please ask about that if you might need a hand. If you have any questions about your supplies or your potential out-of-pocket costs, or if you need to place an order for a refill of supplies (typically monthly), please call the company directly. Your  is Fiona Reina up with Dr Graciela Villanueva In 3 week(s) in the wound care center. Wound Care Center Information: Should you experience any significant changes in your wound(s) or have questions about your wound care, please contact the [facility] at [phone] Monday  - Thursday 8:00 am - 4:00 pm and Friday 8:00 am - 1:00pm. If you need help with your wound outside these hours and cannot wait until we are again available, contact your PCP or go to the hospital emergency room. PLEASE NOTE: IF YOU ARE UNABLE TO OBTAIN WOUND SUPPLIES, CONTINUE TO USE THE SUPPLIES YOU HAVE AVAILABLE UNTIL YOU ARE ABLE TO REACH US. IT IS MOST IMPORTANT TO KEEP THE WOUND COVERED AT ALL TIMES. Patient Experience     Thank you for trusting us with your care.   You may receive a survey from a company called [employer] asking for [employer]. We would appreciate it if you took a few minutes to share your experience. Your input is very valuable to us.

## 2023-01-05 ENCOUNTER — HOSPITAL ENCOUNTER (OUTPATIENT)
Dept: WOUND CARE | Age: 83
Discharge: HOME | End: 2023-01-05

## 2023-02-10 ENCOUNTER — HOSPITAL ENCOUNTER (OUTPATIENT)
Dept: VASCULAR LAB | Age: 83
Discharge: HOME | End: 2023-02-10
Payer: MEDICARE

## 2023-02-10 DIAGNOSIS — S91.201A OPEN WOUND OF RIGHT GREAT TOE WITH DAMAGE TO NAIL, INITIAL ENCOUNTER: Primary | ICD-10-CM

## 2023-02-10 DIAGNOSIS — I80.11 PHLEBITIS OF FEMORAL VEIN OF RIGHT LEG (HCC): ICD-10-CM

## 2023-02-10 PROCEDURE — 93971 EXTREMITY STUDY: CPT

## 2023-02-20 NOTE — DISCHARGE INSTRUCTIONS
[facility], [address] [place] [address] Road  Telephone: [phone]     Discharge Instructions     Important reminders:     **If you have any signs and symptoms of illness (Cough, fever, congestion, nausea, vomiting, diarrhea, etc.) please call the wound care center prior to your appointment. 1. Increase Protein intake for optimal wound healing  2. No added salt to reduce any swelling  3. If diabetic, maintain good glucose control  4. If you smoke, smoking prohibits wound healing, we ask that you refrain from smoking. 5. When taking antibiotics take the entire prescription as ordered. Do not stop taking until medication is all gone unless otherwise instructed. 6. Exercise as tolerated. 7. Keep weight off wounds and reposition every 2 hours if applicable. 8. If wound(s) is on your lower extremity, elevate legs to the level of the heart or above for 30 minutes 4-5 times a day and/or when sitting. Avoid standing for long periods of time. 9. Do not get wounds wet in bath or shower unless otherwise instructed by your physician. If your wound is on your foot or leg, you may purchase a cast bag. Please ask at the pharmacy. If Vascular testing is ordered, please call [address] [place] [address] [phone]) to schedule. Vascular tests ordered by Wound Care Physicians may take up to 2 hours to complete. Please keep that in mind when scheduling. If Vascular testing is scheduled, please bring supplies to replace your dressing after testing is done. The vascular department does not stock supplies. Wound: Right and left Leg, Left Hip      With each dressing change, rinse wounds with 0.9% Saline. (May use wound wash or soft contact solution. Both can be purchased at a local drug store). If unable to obtain saline, may use a gentle soap and water.      Dressing care:  Left Hip-  Apply    Right Leg & right anterior foot-  (Please clean with soapy warm wash cloth prior to applying dressing) Apply Silver Alginate with dry dressing. Right foot 2nd toe- silver alginate and dry dressing change every day. Right toe-   Left foot anterior toes- betadine paint daily. Bilateral legs- 6\" ace wrap from toes to below the knees. Can clean with soapy warm water and apply Ceraveve cream to lower legs. Schedule Vascular Studies [facility] Scheduling [phone])     Home Care Agency/Facility: [facility]     Your wound-care supplies will be provided by: [email or web address] -   phone #: [phone]     Please note, depending on your insurance coverage, you may have out-of-pocket expenses for these supplies. Someone from the company should call you to confirm your order and discuss those potential costs before they ship your products -- please anticipate that call. If your out-of-pocket cost could be substantial, Many companies have financial hardship programs for patients who qualify, so please ask about that if you might need a hand. If you have any questions about your supplies or your potential out-of-pocket costs, or if you need to place an order for a refill of supplies (typically monthly), please call the company directly. Your  is Fiona Reina up with Dr Lawyer Sanches In 3 week(s) in the wound care center. Wound Care Center Information: Should you experience any significant changes in your wound(s) or have questions about your wound care, please [facility] at [phone] Monday  - Thursday 8:00 am - 4:00 pm and Friday 8:00 am - 1:00pm. If you need help with your wound outside these hours and cannot wait until we are again available, contact your PCP or go to the hospital emergency room. PLEASE NOTE: IF YOU ARE UNABLE TO OBTAIN WOUND SUPPLIES, CONTINUE TO USE THE SUPPLIES YOU HAVE AVAILABLE UNTIL YOU ARE ABLE TO REACH US. IT IS MOST IMPORTANT TO KEEP THE WOUND COVERED AT ALL TIMES.      Patient Experience     Thank you for trusting us with your care. You may receive a survey from a company called [employer] asking for your feedback. We would appreciate it if you took a few minutes to share your experience. Your input is very valuable to us.

## 2023-02-21 ENCOUNTER — HOSPITAL ENCOUNTER (OUTPATIENT)
Dept: WOUND CARE | Age: 83
Discharge: HOME | End: 2023-02-21

## 2023-02-27 NOTE — DISCHARGE INSTRUCTIONS
[facility], [address] [place] [address] Road  Telephone: [phone]     Discharge Instructions     Important reminders:     **If you have any signs and symptoms of illness (Cough, fever, congestion, nausea, vomiting, diarrhea, etc.) please call the wound care center prior to your appointment. 1. Increase Protein intake for optimal wound healing  2. No added salt to reduce any swelling  3. If diabetic, maintain good glucose control  4. If you smoke, smoking prohibits wound healing, we ask that you refrain from smoking. 5. When taking antibiotics take the entire prescription as ordered. Do not stop taking until medication is all gone unless otherwise instructed. 6. Exercise as tolerated. 7. Keep weight off wounds and reposition every 2 hours if applicable. 8. If wound(s) is on your lower extremity, elevate legs to the level of the heart or above for 30 minutes 4-5 times a day and/or when sitting. Avoid standing for long periods of time. 9. Do not get wounds wet in bath or shower unless otherwise instructed by your physician. If your wound is on your foot or leg, you may purchase a cast bag. Please ask at the pharmacy. If Vascular testing is ordered, please call [address] [place] [address] [phone]) to schedule. Vascular tests ordered by Wound Care Physicians may take up to 2 hours to complete. Please keep that in mind when scheduling. If Vascular testing is scheduled, please bring supplies to replace your dressing after testing is done. The vascular department does not stock supplies. Wound: Right Leg      With each dressing change, rinse wounds with 0.9% Saline. (May use wound wash or soft contact solution. Both can be purchased at a local drug store). If unable to obtain saline, may use a gentle soap and water. Dressing care:  Right Leg wounds -apply Silver Alginate with dry dressing(kerlix).  Right foot great toe and 2nd toe- betadine and dry dressing(kerlix). Right foot 3rd toe- iodoform gauze and dry dressing(kerlix). Left hip- Silvadene cream and dry dressing. Change all dressings DAILY. Schedule Vascular Studies [facility] Scheduling [phone])     Home Care Agency/Facility: [facility]     Your wound-care supplies will be provided by: [facility] -   phone #: [phone]     Please note, depending on your insurance coverage, you may have out-of-pocket expenses for these supplies. Someone from the company should call you to confirm your order and discuss those potential costs before they ship your products -- please anticipate that call. If your out-of-pocket cost could be substantial, Many companies have financial hardship programs for patients who qualify, so please ask about that if you might need a hand. If you have any questions about your supplies or your potential out-of-pocket costs, or if you need to place an order for a refill of supplies (typically monthly), please call the company directly. Your  is Fiona Reina up with Dr Arnel James In 3 week(s) in the wound care center. Wound Care Center Information: Should you experience any significant changes in your wound(s) or have questions about your wound care, please contact the [employer] [facility] at [phone] Monday  - Thursday 8:00 am - 4:00 pm and Friday 8:00 am - 1:00pm. If you need help with your wound outside these hours and cannot wait until we are again available, contact your PCP or go to the hospital emergency room. PLEASE NOTE: IF YOU ARE UNABLE TO OBTAIN WOUND SUPPLIES, CONTINUE TO USE THE SUPPLIES YOU HAVE AVAILABLE UNTIL YOU ARE ABLE TO REACH US. IT IS MOST IMPORTANT TO KEEP THE WOUND COVERED AT ALL TIMES. Patient Experience     Thank you for trusting us with your care. You may receive a survey from a company called [employer] asking for your feedback.   We would appreciate it if you took a few minutes to share your experience. Your input is very valuable to us.

## 2023-02-28 ENCOUNTER — HOSPITAL ENCOUNTER (OUTPATIENT)
Dept: WOUND CARE | Age: 83
Discharge: HOME | End: 2023-02-28
Payer: MEDICARE

## 2023-02-28 VITALS — SYSTOLIC BLOOD PRESSURE: 121 MMHG | HEART RATE: 67 BPM | DIASTOLIC BLOOD PRESSURE: 75 MMHG | TEMPERATURE: 96.6 F

## 2023-02-28 DIAGNOSIS — I87.2 VENOUS INSUFFICIENCY OF BOTH LOWER EXTREMITIES: ICD-10-CM

## 2023-02-28 DIAGNOSIS — S81.811A LACERATION OF SKIN OF LOWER LEG, RIGHT, INITIAL ENCOUNTER: ICD-10-CM

## 2023-02-28 DIAGNOSIS — L89.220 PRESSURE ULCER OF LEFT HIP, UNSTAGEABLE (HCC): ICD-10-CM

## 2023-02-28 DIAGNOSIS — L97.919 VENOUS ULCER OF RIGHT LOWER EXTREMITY WITHOUT VARICOSE VEINS (HCC): ICD-10-CM

## 2023-02-28 DIAGNOSIS — L97.912 NON-PRESSURE CHRONIC ULCER OF RIGHT LOWER LEG WITH FAT LAYER EXPOSED (HCC): ICD-10-CM

## 2023-02-28 DIAGNOSIS — I87.2 VENOUS ULCER OF RIGHT LOWER EXTREMITY WITHOUT VARICOSE VEINS (HCC): ICD-10-CM

## 2023-02-28 DIAGNOSIS — L97.512 RIGHT FOOT ULCER, WITH FAT LAYER EXPOSED (HCC): ICD-10-CM

## 2023-02-28 DIAGNOSIS — S81.812A: Primary | ICD-10-CM

## 2023-02-28 DIAGNOSIS — L97.911 NON-PRESSURE CHRONIC ULCER OF LOWER LEG, LIMITED TO BREAKDOWN OF SKIN, RIGHT (HCC): ICD-10-CM

## 2023-02-28 DIAGNOSIS — L97.922 NON-PRESSURE CHRONIC ULCER OF LEFT LOWER LEG WITH FAT LAYER EXPOSED (HCC): ICD-10-CM

## 2023-02-28 DIAGNOSIS — L97.514 ULCER OF TOE OF RIGHT FOOT, WITH NECROSIS OF BONE (HCC): ICD-10-CM

## 2023-02-28 PROCEDURE — 87070 CULTURE OTHR SPECIMN AEROBIC: CPT

## 2023-02-28 PROCEDURE — 11045 DBRDMT SUBQ TISS EACH ADDL: CPT

## 2023-02-28 PROCEDURE — 11044 DBRDMT BONE 1ST 20 SQ CM/<: CPT

## 2023-02-28 PROCEDURE — 11045 DBRDMT SUBQ TISS EACH ADDL: CPT | Performed by: EMERGENCY MEDICINE

## 2023-02-28 PROCEDURE — 11042 DBRDMT SUBQ TIS 1ST 20SQCM/<: CPT

## 2023-02-28 PROCEDURE — 11042 DBRDMT SUBQ TIS 1ST 20SQCM/<: CPT | Performed by: EMERGENCY MEDICINE

## 2023-02-28 PROCEDURE — 87205 SMEAR GRAM STAIN: CPT

## 2023-02-28 PROCEDURE — 11044 DBRDMT BONE 1ST 20 SQ CM/<: CPT | Performed by: EMERGENCY MEDICINE

## 2023-02-28 PROCEDURE — 87077 CULTURE AEROBIC IDENTIFY: CPT

## 2023-02-28 PROCEDURE — 99214 OFFICE O/P EST MOD 30 MIN: CPT | Performed by: EMERGENCY MEDICINE

## 2023-02-28 PROCEDURE — 87186 SC STD MICRODIL/AGAR DIL: CPT

## 2023-02-28 RX ORDER — LIDOCAINE 50 MG/G
OINTMENT TOPICAL ONCE
OUTPATIENT
Start: 2023-02-28 | End: 2023-02-28

## 2023-02-28 RX ORDER — BETAMETHASONE DIPROPIONATE 0.05 %
OINTMENT (GRAM) TOPICAL ONCE
OUTPATIENT
Start: 2023-02-28 | End: 2023-02-28

## 2023-02-28 RX ORDER — LIDOCAINE HYDROCHLORIDE 40 MG/ML
SOLUTION TOPICAL ONCE
OUTPATIENT
Start: 2023-02-28 | End: 2023-02-28

## 2023-02-28 RX ORDER — BACITRACIN ZINC AND POLYMYXIN B SULFATE 500; 1000 [USP'U]/G; [USP'U]/G
OINTMENT TOPICAL ONCE
OUTPATIENT
Start: 2023-02-28 | End: 2023-02-28

## 2023-02-28 RX ORDER — GINSENG 100 MG
CAPSULE ORAL ONCE
OUTPATIENT
Start: 2023-02-28 | End: 2023-02-28

## 2023-02-28 RX ORDER — LIDOCAINE HYDROCHLORIDE 20 MG/ML
JELLY TOPICAL ONCE
OUTPATIENT
Start: 2023-02-28 | End: 2023-02-28

## 2023-02-28 RX ORDER — LIDOCAINE 40 MG/G
CREAM TOPICAL ONCE
OUTPATIENT
Start: 2023-02-28 | End: 2023-02-28

## 2023-02-28 RX ORDER — BACITRACIN, NEOMYCIN, POLYMYXIN B 400; 3.5; 5 [USP'U]/G; MG/G; [USP'U]/G
OINTMENT TOPICAL ONCE
OUTPATIENT
Start: 2023-02-28 | End: 2023-02-28

## 2023-02-28 RX ORDER — LIDOCAINE 40 MG/G
CREAM TOPICAL ONCE
Status: DISCONTINUED | OUTPATIENT
Start: 2023-02-28 | End: 2023-03-01 | Stop reason: HOSPADM

## 2023-02-28 RX ORDER — GENTAMICIN SULFATE 1 MG/G
OINTMENT TOPICAL ONCE
OUTPATIENT
Start: 2023-02-28 | End: 2023-02-28

## 2023-02-28 RX ORDER — CLOBETASOL PROPIONATE 0.5 MG/G
OINTMENT TOPICAL ONCE
OUTPATIENT
Start: 2023-02-28 | End: 2023-02-28
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
## 2023-02-28 NOTE — PROGRESS NOTES
[address] [facility],[address] Floor:      [address] f: [phone] f: [phone] p: [phone] [email or web address]     Ordering Center: [address]  [place] [address]  [phone]  Dept: [phone]   Fax# [phone]    Patient Information:      Kika Umaña  [address] [facility]  [place]   [phone]   : 1940  AGE: 79 y.o. GENDER: male   TODAYS DATE:  2023    Insurance:      PRIMARY INSURANCE:  Plan: MEDICARE PART A AND B  Coverage: MEDICARE  Effective Date: 2004  Group Number: [unfilled]  Subscriber Number: [id number] - (Medicare)    Payer/Plan Subscr  Sex Relation Sub. Ins. ID Effective Group Num   1. MEDICARE - Ryleel Donohue 1940 Male Self [id number]                                    [address]   - Clay Lopez W 1940 Male Self [id number] 1/1/15                                    [address]         Patient Wound Information:     Additional ICD-10 Codes: I87.2, I78.949, L97.514, L89.220    Patient Active Problem List   Diagnosis Code    Hyperlipidemia E78.5    Hypertension I10    Localized edema R60.0    Fracture of sternum S22.20XA    Fractured rib S22.39XA    Prostate cancer ([facility] C61    Thrombus I82.90    Chronic renal disease N18.9    Trigger finger M65.30    CTS (carpal tunnel syndrome) G56.00    Venous insufficiency of both lower extremities I87.2    Venous ulcer of right lower extremity without varicose veins (HCC) I87.2, L97.919    Non-pressure chronic ulcer of lower leg, limited to breakdown of skin, right ([facility] L97.911    Laceration of skin of lower leg, left, initial encounter D83.499Z    Laceration of skin of lower leg, right, initial encounter S81.811A    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 with fat layer exposed ([facility] L97.912    Non-pressure chronic ulcer of left lower leg with fat layer exposed ([address] R25.252    Open wound of right great toe with damage to nail S91.201A    Right foot ulcer, with fat layer exposed (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       WOUNDS REQUIRING DRESSING SUPPLIES:     Wound 02/28/23 Leg Left; Anterior;Proximal #1 (Active)   Wound Length (cm) 0.8 cm 02/28/23 1518   Wound Width (cm) 0.7 cm 02/28/23 1518   Wound Depth (cm) 0.1 cm 02/28/23 1518   Wound Surface Area (cm^2) 0.56 cm^2 02/28/23 1518   Wound Volume (cm^3) 0.056 cm^3 02/28/23 1518   Number of days: 0       Wound 02/28/23 Leg Left;Distal 2nd #2 (Active)   Number of days: 0       Wound 02/28/23 Toe (Comment  which one) Right #3 (3rd) (Active)   Wound Etiology Diabetic 02/28/23 1518   Wound Cleansed Cleansed with saline 02/28/23 1518   Wound Length (cm) 1.1 cm 02/28/23 1518   Wound Width (cm) 1 cm 02/28/23 1518   Wound Depth (cm) 1 cm 02/28/23 1518   Wound Surface Area (cm^2) 1.1 cm^2 02/28/23 1518   Wound Volume (cm^3) 1.1 cm^3 02/28/23 1518   Post-Procedure Length (cm) 1.2 cm 02/28/23 1541   Post-Procedure Width (cm) 1.1 cm 02/28/23 1541   Post-Procedure Depth (cm) 1 cm 02/28/23 1541   Post-Procedure Surface Area (cm^2) 1.32 cm^2 02/28/23 1541   Post-Procedure Volume (cm^3) 1.32 cm^3 02/28/23 1541   Wound Assessment Exposed structure bone 02/28/23 1541   Drainage Amount Small 02/28/23 1518   Drainage Description Brown 02/28/23 1518   Odor None 02/28/23 1518   Angela-wound Assessment Erosion 02/28/23 1518   Margins Undefined edges 02/28/23 1518   Number of days: 0       Wound 02/28/23 Toe (Comment  which one) Right #4 (2nd) (Active)   Wound Etiology Diabetic 02/28/23 1518   Wound Cleansed Cleansed with saline 02/28/23 1518   Wound Length (cm) 1.2 cm 02/28/23 1518   Wound Width (cm) 1.3 cm 02/28/23 1518   Wound Depth (cm) 0.1 cm 02/28/23 1518   Wound Surface Area (cm^2) 1.56 cm^2 02/28/23 1518   Wound Volume (cm^3) 0.156 cm^3 02/28/23 1518   Post-Procedure Length (cm) 1.2 cm 02/28/23 1541   Post-Procedure Width (cm) 1.3 cm 02/28/23 1541   Post-Procedure Depth (cm) 0.15 cm 02/28/23 1541   Post-Procedure Surface Area (cm^2) 1.56 cm^2 02/28/23 1541   Post-Procedure Volume (cm^3) 0.234 cm^3 02/28/23 1541   Wound Assessment Bleeding 02/28/23 1541   Drainage Amount None 02/28/23 1518   Odor None 02/28/23 1518   Angela-wound Assessment Dry/flaky 02/28/23 1518   Margins Attached edges 02/28/23 1518   Number of days: 0       Wound 02/28/23 Toe (Comment  which one) Right #5 (Great) (Active)   Wound Etiology Pressure Unstageable 02/28/23 1518   Wound Cleansed Cleansed with saline 02/28/23 1518   Wound Length (cm) 1.5 cm 02/28/23 1518   Wound Width (cm) 2.5 cm 02/28/23 1518   Wound Depth (cm) 0.1 cm 02/28/23 1518   Wound Surface Area (cm^2) 3.75 cm^2 02/28/23 1518   Wound Volume (cm^3) 0.375 cm^3 02/28/23 1518   Wound Assessment Eschar dry 02/28/23 1518   Drainage Amount None 02/28/23 1518   Odor None 02/28/23 1518   Angela-wound Assessment Dry/flaky;Fragile 02/28/23 1518   Number of days: 0       Wound 02/28/23 Leg Right;Medial #6 (Active)   Wound Etiology Venous 02/28/23 1518   Wound Cleansed Cleansed with saline 02/28/23 1518   Wound Length (cm) 2.8 cm 02/28/23 1518   Wound Width (cm) 2.5 cm 02/28/23 1518   Wound Depth (cm) 0.1 cm 02/28/23 1518   Wound Surface Area (cm^2) 7 cm^2 02/28/23 1518   Wound Volume (cm^3) 0.7 cm^3 02/28/23 1518   Wound Assessment Granulation tissue 02/28/23 1518   Drainage Amount Moderate 02/28/23 1518   Drainage Description Serosanguinous;Brown 02/28/23 1518   Odor None 02/28/23 1518   Angela-wound Assessment Dry/flaky;Fragile 02/28/23 1518   Margins Attached edges 02/28/23 1518   Number of days: 0       Wound 02/28/23 Leg Right;Lateral #7 Proximal (Active)   Wound Etiology Venous 02/28/23 1518   Wound Cleansed Cleansed with saline 02/28/23 1518   Post-Procedure Length (cm) 2.5 cm 02/28/23 1518   Post-Procedure Width (cm) 2 cm 02/28/23 1518 Post-Procedure Depth (cm) 0.1 cm 02/28/23 1518   Post-Procedure Surface Area (cm^2) 5 cm^2 02/28/23 1518   Post-Procedure Volume (cm^3) 0.5 cm^3 02/28/23 1518   Wound Assessment Pink/red 02/28/23 1518   Drainage Amount Small 02/28/23 1518   Drainage Description Serosanguinous 02/28/23 1518   Odor None 02/28/23 1518   Angela-wound Assessment Fragile 02/28/23 1518   Margins Defined edges 02/28/23 1518   Number of days: 0       Wound 02/28/23 Leg Posterior #8 (Active)   Wound Etiology Venous 02/28/23 1518   Wound Cleansed Cleansed with saline 02/28/23 1518   Wound Length (cm) 3.5 cm 02/28/23 1518   Wound Width (cm) 3 cm 02/28/23 1518   Wound Depth (cm) 0.1 cm 02/28/23 1518   Wound Surface Area (cm^2) 10.5 cm^2 02/28/23 1518   Wound Volume (cm^3) 1.05 cm^3 02/28/23 1518   Wound Assessment Pink/red 02/28/23 1518   Drainage Amount Small 02/28/23 1518   Drainage Description Serosanguinous 02/28/23 1518   Odor None 02/28/23 1518   Margins Defined edges 02/28/23 1518   Number of days: 0       Wound 02/28/23 Hip Left #9 (Active)   Wound Etiology Traumatic 02/28/23 1518   Wound Cleansed Cleansed with saline 02/28/23 1518   Wound Length (cm) 3 cm 02/28/23 1518   Wound Width (cm) 9 cm 02/28/23 1518   Wound Depth (cm) 0.1 cm 02/28/23 1518   Wound Surface Area (cm^2) 27 cm^2 02/28/23 1518   Wound Volume (cm^3) 2.7 cm^3 02/28/23 1518   Post-Procedure Length (cm) 3 cm 02/28/23 1541   Post-Procedure Width (cm) 9 cm 02/28/23 1541   Post-Procedure Depth (cm) 0.1 cm 02/28/23 1541   Post-Procedure Surface Area (cm^2) 27 cm^2 02/28/23 1541   Post-Procedure Volume (cm^3) 2.7 cm^3 02/28/23 1541   Wound Assessment Bleeding 02/28/23 1541   Drainage Amount Moderate 02/28/23 1518   Drainage Description Serosanguinous 02/28/23 1518   Odor None 02/28/23 1518   Angela-wound Assessment Edematous 02/28/23 1518   Margins Defined edges 02/28/23 1518   Number of days: 0          Supplies Requested :      DISPENSE AS WRITTEN        WOUND #: 1   PRIMARY DRESSING:    Iodoform packing strip    Cover and Secure with: 4X4 gauze pad  ABD pad  Conforming roll gauze     FREQUENCY OF DRESSING CHANGES:  Daily    Wound Thickness [x] Full   []Partial       WOUND #: 2   PRIMARY DRESSING:    Alginate with silver pad   Cover and Secure with: 4X4 gauze pad  Conforming roll gauze     FREQUENCY OF DRESSING CHANGES:  Daily    Wound Thickness [x] Full   []Partial     Patient Wound(s) Debrided: [x] Yes   [] No    Debridement Date: 2/28/2023    Debribement Type: Excisional/Sharp    ADDITIONAL ITEMS:  [] Gloves Small  [] Gloves Medium [x] Gloves Large [] Gloves XLarge [] Paper Tape 2\" [] Paper Tape 3\"  [x] Medipore Tape 3\" [] Medipore Tape 4\"    [] Hypofix skin sensitive tape 2\"  [] Hypofix skin sensitive tape 4\"  [x] Saline  [] Skin Prep   [] Adhesive Remover   [] Cotton Tip Applicators  [] Tubular Stocking   [] Size E  [] Size G  [x] Other: ABD pads extra    Patient currently being seen by Home Health: [] Yes   [x] No    Quantity of days dispensed:  []15  [x]30  []60  []90 Days    Order valid for 90 days    Provider Information:      PROVIDER'S NAME/NPI  Dr Shayne Menon, NPI: [id number]    I give permission to coordinate the care for this patient

## 2023-03-02 LAB
GRAM STAIN RESULT: ABNORMAL
ORGANISM: ABNORMAL
WOUND/ABSCESS: ABNORMAL

## 2023-03-04 ENCOUNTER — APPOINTMENT (OUTPATIENT)
Dept: GENERAL RADIOLOGY | Age: 83
DRG: 592 | End: 2023-03-04
Payer: MEDICARE

## 2023-03-04 ENCOUNTER — HOSPITAL ENCOUNTER (INPATIENT)
Age: 83
LOS: 8 days | Discharge: NURSING, REHAB OR LONG-TERM CARE | DRG: 592 | End: 2023-03-12
Attending: FAMILY MEDICINE | Admitting: FAMILY MEDICINE
Payer: MEDICARE

## 2023-03-04 DIAGNOSIS — N18.6 ESRD ON HEMODIALYSIS (HCC): ICD-10-CM

## 2023-03-04 DIAGNOSIS — R53.1 GENERAL WEAKNESS: ICD-10-CM

## 2023-03-04 DIAGNOSIS — I87.2 VENOUS INSUFFICIENCY OF BOTH LOWER EXTREMITIES: ICD-10-CM

## 2023-03-04 DIAGNOSIS — N50.819 CHRONIC PAIN IN TESTICLE: ICD-10-CM

## 2023-03-04 DIAGNOSIS — G89.29 CHRONIC PAIN IN TESTICLE: ICD-10-CM

## 2023-03-04 DIAGNOSIS — R29.6 FREQUENT FALLS: ICD-10-CM

## 2023-03-04 DIAGNOSIS — R10.30 INGUINAL PAIN, UNSPECIFIED LATERALITY: Primary | ICD-10-CM

## 2023-03-04 DIAGNOSIS — Z99.2 ESRD ON HEMODIALYSIS (HCC): ICD-10-CM

## 2023-03-04 DIAGNOSIS — R26.2 UNABLE TO AMBULATE: ICD-10-CM

## 2023-03-04 DIAGNOSIS — L98.499 SKIN ULCER OF MULTIPLE SITES, UNSPECIFIED ULCER STAGE (HCC): ICD-10-CM

## 2023-03-04 DIAGNOSIS — L89.229 PRESSURE INJURY OF SKIN OF LEFT HIP, UNSPECIFIED INJURY STAGE: ICD-10-CM

## 2023-03-04 LAB
A/G RATIO: 1.1 (ref 1.1–2.2)
ALBUMIN SERPL-MCNC: 3.9 G/DL (ref 3.4–5)
ALP BLD-CCNC: 68 U/L (ref 40–129)
ALT SERPL-CCNC: 12 U/L (ref 10–40)
ANION GAP SERPL CALCULATED.3IONS-SCNC: 14 MMOL/L (ref 3–16)
AST SERPL-CCNC: 17 U/L (ref 15–37)
BASOPHILS ABSOLUTE: 0.1 K/UL (ref 0–0.2)
BASOPHILS RELATIVE PERCENT: 0.7 %
BILIRUB SERPL-MCNC: 0.3 MG/DL (ref 0–1)
BUN BLDV-MCNC: 38 MG/DL (ref 7–20)
CALCIUM SERPL-MCNC: 10.8 MG/DL (ref 8.3–10.6)
CHLORIDE BLD-SCNC: 97 MMOL/L (ref 99–110)
CO2: 29 MMOL/L (ref 21–32)
CREAT SERPL-MCNC: 7.6 MG/DL (ref 0.8–1.3)
EOSINOPHILS ABSOLUTE: 0.1 K/UL (ref 0–0.6)
EOSINOPHILS RELATIVE PERCENT: 1.7 %
GFR SERPL CREATININE-BSD FRML MDRD: 7 ML/MIN/{1.73_M2}
GLUCOSE BLD-MCNC: 140 MG/DL (ref 70–99)
HCT VFR BLD CALC: 37.5 % (ref 40.5–52.5)
HEMOGLOBIN: 11.8 G/DL (ref 13.5–17.5)
LACTIC ACID, SEPSIS: 1.9 MMOL/L (ref 0.4–1.9)
LYMPHOCYTES ABSOLUTE: 1.5 K/UL (ref 1–5.1)
LYMPHOCYTES RELATIVE PERCENT: 17 %
MAGNESIUM: 2.7 MG/DL (ref 1.8–2.4)
MCH RBC QN AUTO: 33.3 PG (ref 26–34)
MCHC RBC AUTO-ENTMCNC: 31.4 G/DL (ref 31–36)
MCV RBC AUTO: 106 FL (ref 80–100)
MONOCYTES ABSOLUTE: 0.8 K/UL (ref 0–1.3)
MONOCYTES RELATIVE PERCENT: 9 %
NEUTROPHILS ABSOLUTE: 6.3 K/UL (ref 1.7–7.7)
NEUTROPHILS RELATIVE PERCENT: 71.6 %
PDW BLD-RTO: 16.8 % (ref 12.4–15.4)
PLATELET # BLD: 176 K/UL (ref 135–450)
PMV BLD AUTO: 8.2 FL (ref 5–10.5)
POTASSIUM SERPL-SCNC: 4.6 MMOL/L (ref 3.5–5.1)
PRO-BNP: ABNORMAL PG/ML (ref 0–449)
RBC # BLD: 3.54 M/UL (ref 4.2–5.9)
SODIUM BLD-SCNC: 140 MMOL/L (ref 136–145)
TOTAL PROTEIN: 7.4 G/DL (ref 6.4–8.2)
TROPONIN: 0.39 NG/ML
TROPONIN: 0.4 NG/ML
TROPONIN: 0.44 NG/ML
WBC # BLD: 8.8 K/UL (ref 4–11)

## 2023-03-04 PROCEDURE — 6370000000 HC RX 637 (ALT 250 FOR IP): Performed by: FAMILY MEDICINE

## 2023-03-04 PROCEDURE — 84443 ASSAY THYROID STIM HORMONE: CPT

## 2023-03-04 PROCEDURE — 93005 ELECTROCARDIOGRAM TRACING: CPT | Performed by: INTERNAL MEDICINE

## 2023-03-04 PROCEDURE — 87070 CULTURE OTHR SPECIMN AEROBIC: CPT

## 2023-03-04 PROCEDURE — 83735 ASSAY OF MAGNESIUM: CPT

## 2023-03-04 PROCEDURE — 87205 SMEAR GRAM STAIN: CPT

## 2023-03-04 PROCEDURE — 93005 ELECTROCARDIOGRAM TRACING: CPT | Performed by: PHYSICIAN ASSISTANT

## 2023-03-04 PROCEDURE — 6360000002 HC RX W HCPCS: Performed by: PHYSICIAN ASSISTANT

## 2023-03-04 PROCEDURE — 2580000003 HC RX 258: Performed by: FAMILY MEDICINE

## 2023-03-04 PROCEDURE — 84484 ASSAY OF TROPONIN QUANT: CPT

## 2023-03-04 PROCEDURE — 85025 COMPLETE CBC W/AUTO DIFF WBC: CPT

## 2023-03-04 PROCEDURE — 87077 CULTURE AEROBIC IDENTIFY: CPT

## 2023-03-04 PROCEDURE — 72170 X-RAY EXAM OF PELVIS: CPT

## 2023-03-04 PROCEDURE — 99285 EMERGENCY DEPT VISIT HI MDM: CPT

## 2023-03-04 PROCEDURE — 1200000000 HC SEMI PRIVATE

## 2023-03-04 PROCEDURE — 82306 VITAMIN D 25 HYDROXY: CPT

## 2023-03-04 PROCEDURE — 87040 BLOOD CULTURE FOR BACTERIA: CPT

## 2023-03-04 PROCEDURE — 82746 ASSAY OF FOLIC ACID SERUM: CPT

## 2023-03-04 PROCEDURE — 80053 COMPREHEN METABOLIC PANEL: CPT

## 2023-03-04 PROCEDURE — 83605 ASSAY OF LACTIC ACID: CPT

## 2023-03-04 PROCEDURE — 36415 COLL VENOUS BLD VENIPUNCTURE: CPT

## 2023-03-04 PROCEDURE — 82607 VITAMIN B-12: CPT

## 2023-03-04 PROCEDURE — 71045 X-RAY EXAM CHEST 1 VIEW: CPT

## 2023-03-04 PROCEDURE — 2500000003 HC RX 250 WO HCPCS: Performed by: FAMILY MEDICINE

## 2023-03-04 PROCEDURE — 83880 ASSAY OF NATRIURETIC PEPTIDE: CPT

## 2023-03-04 RX ORDER — POLYETHYLENE GLYCOL 3350 17 G/17G
17 POWDER, FOR SOLUTION ORAL DAILY PRN
Status: DISCONTINUED | OUTPATIENT
Start: 2023-03-04 | End: 2023-03-12 | Stop reason: HOSPADM

## 2023-03-04 RX ORDER — ACETAMINOPHEN 650 MG/1
650 SUPPOSITORY RECTAL EVERY 6 HOURS PRN
Status: DISCONTINUED | OUTPATIENT
Start: 2023-03-04 | End: 2023-03-12 | Stop reason: HOSPADM

## 2023-03-04 RX ORDER — SODIUM CHLORIDE 0.9 % (FLUSH) 0.9 %
5-40 SYRINGE (ML) INJECTION PRN
Status: DISCONTINUED | OUTPATIENT
Start: 2023-03-04 | End: 2023-03-12 | Stop reason: HOSPADM

## 2023-03-04 RX ORDER — SODIUM CHLORIDE 0.9 % (FLUSH) 0.9 %
5-40 SYRINGE (ML) INJECTION EVERY 12 HOURS SCHEDULED
Status: DISCONTINUED | OUTPATIENT
Start: 2023-03-04 | End: 2023-03-12 | Stop reason: HOSPADM

## 2023-03-04 RX ORDER — ONDANSETRON 4 MG/1
4 TABLET, ORALLY DISINTEGRATING ORAL EVERY 8 HOURS PRN
Status: DISCONTINUED | OUTPATIENT
Start: 2023-03-04 | End: 2023-03-12 | Stop reason: HOSPADM

## 2023-03-04 RX ORDER — FENTANYL CITRATE 50 UG/ML
25 INJECTION, SOLUTION INTRAMUSCULAR; INTRAVENOUS ONCE
Status: COMPLETED | OUTPATIENT
Start: 2023-03-04 | End: 2023-03-04

## 2023-03-04 RX ORDER — SODIUM CHLORIDE 9 MG/ML
INJECTION, SOLUTION INTRAVENOUS PRN
Status: DISCONTINUED | OUTPATIENT
Start: 2023-03-04 | End: 2023-03-12 | Stop reason: HOSPADM

## 2023-03-04 RX ORDER — TRAMADOL HYDROCHLORIDE 50 MG/1
50 TABLET ORAL EVERY 6 HOURS PRN
Status: DISCONTINUED | OUTPATIENT
Start: 2023-03-04 | End: 2023-03-12 | Stop reason: HOSPADM

## 2023-03-04 RX ORDER — LEVOTHYROXINE SODIUM 0.03 MG/1
50 TABLET ORAL
Status: DISCONTINUED | OUTPATIENT
Start: 2023-03-05 | End: 2023-03-12 | Stop reason: HOSPADM

## 2023-03-04 RX ORDER — HEPARIN SODIUM 5000 [USP'U]/ML
5000 INJECTION, SOLUTION INTRAVENOUS; SUBCUTANEOUS EVERY 8 HOURS SCHEDULED
Status: DISCONTINUED | OUTPATIENT
Start: 2023-03-04 | End: 2023-03-12 | Stop reason: HOSPADM

## 2023-03-04 RX ORDER — ACETAMINOPHEN 325 MG/1
650 TABLET ORAL EVERY 6 HOURS PRN
Status: DISCONTINUED | OUTPATIENT
Start: 2023-03-04 | End: 2023-03-12 | Stop reason: HOSPADM

## 2023-03-04 RX ORDER — EZETIMIBE 10 MG/1
10 TABLET ORAL DAILY
Status: DISCONTINUED | OUTPATIENT
Start: 2023-03-04 | End: 2023-03-04 | Stop reason: RX

## 2023-03-04 RX ORDER — ONDANSETRON 2 MG/ML
4 INJECTION INTRAMUSCULAR; INTRAVENOUS EVERY 6 HOURS PRN
Status: DISCONTINUED | OUTPATIENT
Start: 2023-03-04 | End: 2023-03-12 | Stop reason: HOSPADM

## 2023-03-04 RX ORDER — ALLOPURINOL 100 MG/1
100 TABLET ORAL DAILY
Status: DISCONTINUED | OUTPATIENT
Start: 2023-03-04 | End: 2023-03-12 | Stop reason: HOSPADM

## 2023-03-04 RX ORDER — MIDODRINE HYDROCHLORIDE 5 MG/1
2.5 TABLET ORAL 3 TIMES DAILY
Status: DISCONTINUED | OUTPATIENT
Start: 2023-03-04 | End: 2023-03-12 | Stop reason: HOSPADM

## 2023-03-04 RX ADMIN — METOPROLOL TARTRATE 12.5 MG: 25 TABLET, FILM COATED ORAL at 23:11

## 2023-03-04 RX ADMIN — MIDODRINE HYDROCHLORIDE 2.5 MG: 5 TABLET ORAL at 19:08

## 2023-03-04 RX ADMIN — TRAMADOL HYDROCHLORIDE 50 MG: 50 TABLET, COATED ORAL at 21:26

## 2023-03-04 RX ADMIN — FENTANYL CITRATE 25 MCG: 50 INJECTION, SOLUTION INTRAMUSCULAR; INTRAVENOUS at 18:01

## 2023-03-04 RX ADMIN — SILVER SULFADIAZINE: 10 CREAM TOPICAL at 23:11

## 2023-03-04 RX ADMIN — SODIUM CHLORIDE, PRESERVATIVE FREE 10 ML: 5 INJECTION INTRAVENOUS at 21:27

## 2023-03-04 ASSESSMENT — PAIN DESCRIPTION - LOCATION
LOCATION: GROIN
LOCATION: GROIN

## 2023-03-04 ASSESSMENT — ENCOUNTER SYMPTOMS
COLOR CHANGE: 0
COUGH: 0
VOMITING: 0
SHORTNESS OF BREATH: 0
ABDOMINAL PAIN: 0
DIARRHEA: 0
WHEEZING: 0
CONSTIPATION: 0
STRIDOR: 0
NAUSEA: 0
BACK PAIN: 0

## 2023-03-04 ASSESSMENT — PAIN DESCRIPTION - DESCRIPTORS
DESCRIPTORS: CRUSHING;JABBING
DESCRIPTORS: ACHING

## 2023-03-04 ASSESSMENT — PAIN DESCRIPTION - PAIN TYPE
TYPE: ACUTE PAIN
TYPE: ACUTE PAIN

## 2023-03-04 ASSESSMENT — PAIN DESCRIPTION - ORIENTATION
ORIENTATION: RIGHT
ORIENTATION: RIGHT

## 2023-03-04 ASSESSMENT — PAIN DESCRIPTION - ONSET
ONSET: ON-GOING
ONSET: ON-GOING

## 2023-03-04 ASSESSMENT — PAIN SCALES - GENERAL
PAINLEVEL_OUTOF10: 8
PAINLEVEL_OUTOF10: 0
PAINLEVEL_OUTOF10: 6
PAINLEVEL_OUTOF10: 7

## 2023-03-04 ASSESSMENT — PAIN - FUNCTIONAL ASSESSMENT
PAIN_FUNCTIONAL_ASSESSMENT: 0-10
PAIN_FUNCTIONAL_ASSESSMENT: PREVENTS OR INTERFERES SOME ACTIVE ACTIVITIES AND ADLS

## 2023-03-04 ASSESSMENT — PAIN DESCRIPTION - FREQUENCY
FREQUENCY: INTERMITTENT
FREQUENCY: CONTINUOUS

## 2023-03-04 NOTE — ED PROVIDER NOTES
Unless otherwise noted I was not involved in this patient's care. I have personally reviewed the EKG per protocol. EKG: left axis deviation, atrial flutter with variable block - no RVR. No acute ischemic changes. No old to compare to.         Flavio Mathias 8, DO  03/04/23 141

## 2023-03-04 NOTE — PROGRESS NOTES
Pharmacy Home Medication Reconciliation Note    A medication reconciliation has been completed for Leann Seth 1940    Pharmacy: [place] [address] [facility]  Information provided by: Patient     The patient's home medication list is as follows: No current facility-administered medications on file prior to encounter. Current Outpatient Medications on File Prior to Encounter   Medication Sig Dispense Refill    silver sulfADIAZINE (SILVADENE) 1 % cream Apply topically daily. 1000 g 1    metoprolol tartrate (LOPRESSOR) 25 MG tablet Take 25 mg by mouth 2 times daily      b complex-C-folic acid (NEPHROCAPS) 1 MG capsule Take 1 capsule by mouth daily      allopurinol (ZYLOPRIM) 100 MG tablet Take 100 mg by mouth daily      midodrine (PROAMATINE) 2.5 MG tablet Take 2.5 mg by mouth 3 times daily      levothyroxine (SYNTHROID) 125 MCG tablet Take 125 mcg by mouth Daily      ezetimibe (ZETIA) 10 MG tablet Take 1 tablet by mouth daily. 90 tablet 3    ONE TOUCH ULTRASOFT LANCETS MISC TEST once daily 100 each PRN    Blood Glucose Monitoring Suppl (ONE TOUCH ULTRA SYSTEM KIT) W/DEVICE KIT TEST TWICE daily BLOOD SUGAR 1 kit 0       Timing of last doses updated.     Thank you,  Peter Cates [facility]

## 2023-03-04 NOTE — ED PROVIDER NOTES
[address] [facility]        Pt Name: Raghavendra Dowd  MRN: [id number]  [place] 1940  Date of evaluation: 3/4/2023  Provider: Kimberly Rebollar PA-C  PCP: Macy Delgado MD  Note Started: 2:16 PM EST 3/4/23      RIKKI. I have evaluated this patient. My supervising physician was available for consultation. CHIEF COMPLAINT       Chief Complaint   Patient presents with    Testicle Pain     Bilat testicular pain x1 month    Fall     Pt reports fall on L hip a while ago, fell and bruised it. Then fell again and had an open wound from it, never evaluated for fall. Then today pt fell, did not hit head or have LOC and fell onto L side and reports bleeding from site and pain. HISTORY OF PRESENT ILLNESS: 1 or more Elements     History from : Patient    Limitations to history : None    Raghavendra Dowd is a 79 y.o. male who presents to the emergency department complaining that he has fallen 6 times over the past 6 weeks. He states that he is generally weak and his wife has to help him walk around the house. He states that when he fell initially 6 weeks ago, he injured his left hip and has a wound there. A few weeks later he reinjured that same hip and the wound opened. He has fallen several times since then. He does complain of bilateral hip discomfort. He rates his pain to be an 8 out of 10. Patient also states that for several years he has had bilateral testicular pain. He has addressed this with his PCP who placed him on antibiotics without relief. He is not currently on antibiotics. He denies any swelling to the genitalia, penile pain or discharge. He states that when he fell, he did not hit his head. He denies any loss of consciousness. Nursing Notes were all reviewed and agreed with or any disagreements were addressed in the HPI.     REVIEW OF SYSTEMS :      Review of Systems   Constitutional:  Negative for chills and fever.   HENT: Negative. Eyes:  Negative for visual disturbance. Respiratory:  Negative for cough, shortness of breath, wheezing and stridor. Cardiovascular:  Negative for chest pain, palpitations and leg swelling. Gastrointestinal:  Negative for abdominal pain, constipation, diarrhea, nausea and vomiting. Genitourinary:  Positive for testicular pain. Negative for decreased urine volume, dysuria, flank pain, frequency, genital sores, hematuria, penile discharge, penile pain, penile swelling, scrotal swelling and urgency. Musculoskeletal:  Positive for myalgias. Negative for back pain, neck pain and neck stiffness. Skin:  Positive for wound. Negative for color change, pallor and rash. Neurological:  Positive for weakness. Negative for dizziness, tremors, seizures, syncope, facial asymmetry, speech difficulty, light-headedness, numbness and headaches. Psychiatric/Behavioral:  Negative for confusion. All other systems reviewed and are negative. Positives and Pertinent negatives as per HPI. SURGICAL HISTORY     Past Surgical History:   Procedure Laterality Date    TURP  9/07       CURRENTMEDICATIONS       Previous Medications    ALLOPURINOL (ZYLOPRIM) 100 MG TABLET    Take 100 mg by mouth daily    B COMPLEX-C-FOLIC ACID (NEPHROCAPS) 1 MG CAPSULE    Take 1 capsule by mouth daily    BLOOD GLUCOSE MONITORING SUPPL (ONE TOUCH ULTRA SYSTEM KIT) W/DEVICE KIT    TEST TWICE daily BLOOD SUGAR    EZETIMIBE (ZETIA) 10 MG TABLET    Take 1 tablet by mouth daily. LEVOTHYROXINE (SYNTHROID) 125 MCG TABLET    Take 125 mcg by mouth Daily    METOPROLOL TARTRATE (LOPRESSOR) 25 MG TABLET    Take 25 mg by mouth 2 times daily    MIDODRINE (PROAMATINE) 2.5 MG TABLET    Take 2.5 mg by mouth 3 times daily    ONE TOUCH ULTRASOFT LANCETS MISC    TEST once daily    SILVER SULFADIAZINE (SILVADENE) 1 % CREAM    Apply topically daily.        ALLERGIES     Oxycodone    FAMILYHISTORY       Family History   Problem Relation Age of Onset    Heart Attack Father     Cancer Mother     High Blood Pressure Other     Stroke Other         SOCIAL HISTORY       Social History     Tobacco Use    Smoking status: Never    Smokeless tobacco: Never   Vaping Use    Vaping Use: Never used   Substance Use Topics    Alcohol use: No    Drug use: No       SCREENINGS        Lewellen Coma Scale  Eye Opening: Spontaneous  Best Verbal Response: Oriented  Best Motor Response: Obeys commands  Lewellen Coma Scale Score: 15                CIWA Assessment  BP: 129/80  Heart Rate: 72           PHYSICAL EXAM  1 or more Elements     ED Triage Vitals [03/04/23 1312]   BP Temp Temp Source Heart Rate Resp SpO2 Height Weight   (!) 99/52 97.9 °F (36.6 °C) Oral 68 16 94 % 5' 10\" (1.778 m) --       Physical Exam  Vitals and nursing note reviewed. Exam conducted with a chaperone present. Constitutional:       Appearance: Normal appearance. He is well-developed. He is not toxic-appearing or diaphoretic. HENT:      Head: Normocephalic and atraumatic. Right Ear: External ear normal.      Left Ear: External ear normal.      Nose: Nose normal.      Mouth/Throat:      Mouth: Mucous membranes are moist.      Pharynx: Oropharynx is clear. Eyes:      General: No scleral icterus. Right eye: No discharge. Left eye: No discharge. Extraocular Movements: Extraocular movements intact. Conjunctiva/sclera: Conjunctivae normal.      Pupils: Pupils are equal, round, and reactive to light. Cardiovascular:      Rate and Rhythm: Normal rate. Pulmonary:      Effort: Pulmonary effort is normal.      Breath sounds: Normal breath sounds. Abdominal:      General: Bowel sounds are normal.      Palpations: Abdomen is soft. Tenderness: There is no abdominal tenderness. There is no right CVA tenderness or left CVA tenderness. Genitourinary:     Penis: Normal.       Testes: Normal. Cremasteric reflex is present. Right: Tenderness not present. Left: Tenderness not present. Epididymis:      Right: Normal.      Left: Normal.   Musculoskeletal:         General: Normal range of motion. Cervical back: Normal range of motion. Right hip: Normal.      Left hip: Tenderness present. No deformity, lacerations, bony tenderness or crepitus. Normal range of motion. Normal strength. Comments: Wound on left hip and numerous areas on lower extremities. Darkened skin discoloration to the right proximal medial thigh with central ulcer. Skin:     General: Skin is warm and dry. Capillary Refill: Capillary refill takes less than 2 seconds. Coloration: Skin is not jaundiced or pale. Findings: No lesion or rash. Neurological:      General: No focal deficit present. Mental Status: He is alert and oriented to person, place, and time. Comments: No pronator drift, facial droop or slurred speech. Normal finger to nose coordination. Symmetrical strength in all 4 extremities without focal asymmetric weakness, paresthesia or radiculopathy.      Psychiatric:         Behavior: Behavior normal.                     DIAGNOSTIC RESULTS   LABS:    Labs Reviewed   MAGNESIUM - Abnormal; Notable for the following components:       Result Value    Magnesium 2.70 (*)     All other components within normal limits    Narrative:     CALL  [facility] tel. [phone],  Chemistry results called to and read back by Twin Parada, 03/04/2023  14:16, by JERSON   CBC WITH AUTO DIFFERENTIAL - Abnormal; Notable for the following components:    RBC 3.54 (*)     Hemoglobin 11.8 (*)     Hematocrit 37.5 (*)     .0 (*)     RDW 16.8 (*)     All other components within normal limits   COMPREHENSIVE METABOLIC PANEL - Abnormal; Notable for the following components:    Chloride 97 (*)     Glucose 140 (*)     BUN 38 (*)     Creatinine 7.6 (*)     Est, Glom Filt Rate 7 (*)     Calcium 10.8 (*)     All other components within normal limits    Narrative: CALL  [facility] tel. [phone],  Chemistry results called to and read back by Kwan Alvarado, 03/04/2023  14:16, by Jil Luis   TROPONIN - Abnormal; Notable for the following components:    Troponin 0.44 (*)     All other components within normal limits    Narrative:     German Candelario  [facility] tel. [phone],  Chemistry results called to and read back by Kwan Alvarado, 03/04/2023  14:16, by [address] - Abnormal; Notable for the following components:    Pro-BNP >70,000 (*)     All other components within normal limits    Narrative:     German Candelario  [facility] tel. [phone],  Chemistry results called to and read back by Kwan Alvarado, 03/04/2023  14:16, by [address] [facility], WOUND    Narrative:     ORDER#: [id number]                          ORDERED BY: Bella Mandujano  SOURCE: Skin                               COLLECTED:  03/04/23 15:05  ANTIBIOTICS AT CRIS.:                      RECEIVED :  03/04/23 15:32   CULTURE, BLOOD 2   CULTURE, BLOOD 1   LACTATE, SEPSIS   URINALYSIS WITH REFLEX TO CULTURE       When ordered only abnormal lab results are displayed. All other labs were within normal range or not returned as of this dictation. EKG: When ordered, EKG's are interpreted by the Emergency Department Physician in the absence of a cardiologist.  Please see their note for interpretation of EKG. RADIOLOGY:   Non-plain film images such as CT, Ultrasound and MRI are read by the radiologist. Plain radiographic images are visualized and preliminarily interpreted by the ED Provider with the below findings:    No overt acute osseous abnormality of the pelvis or chest.    Interpretation per the Radiologist below, if available at the time of this note:    XR PELVIS (1-2 VIEWS)   Preliminary Result   Osteopenia. No evident fracture. Given the degree of osteopenia, nondisplaced fractures may be   radiographically occult. If pain or concern for fracture persists, consider   MR imaging.          XR CHEST PORTABLE   Final Result   No acute process. Stable cardiomegaly      Bibasilar hypoaeration             PROCEDURES   Unless otherwise noted below, none     Procedures    CRITICAL CARE TIME (.cctime)   N/a    PAST MEDICAL HISTORY         Chronic Conditions affecting Care:  has a past medical history of Blood clot (11/07), Fracture of sternum, Fracture rib (11/07), Hyperlipidemia, Hypertension, Laceration of skin of lower leg, left, initial encounter (6/23/2022), Laceration of skin of lower leg, right, initial encounter (6/23/2022), Prostate cancer ([facility] [address] and Type II or unspecified type diabetes mellitus without mention of complication, not stated as uncontrolled. EMERGENCY DEPARTMENT COURSE and DIFFERENTIAL DIAGNOSIS/MDM:   Vitals:    Vitals:    03/04/23 1431 03/04/23 1445 03/04/23 1501 03/04/23 1515   BP: (!) 156/86 (!) 146/83 (!) 154/76 129/80   Pulse: 64 69 73 72   Resp: 26 21 19 16   Temp:       TempSrc:       SpO2: 90%      Height:           Patient was given the following medications:  Medications   fentaNYL (SUBLIMAZE) injection 25 mcg (has no administration in time range)             Is this patient to be included in the SEP-1 Core Measure due to severe sepsis or septic shock? No   Exclusion criteria - the patient is NOT to be included for SEP-1 Core Measure due to:  2+ SIRS criteria are not met    CONSULTS: (Who and What was discussed)  IP CONSULT TO HOSPITALIST  Discussion with Other Profesionals : Admitting Team hospitalist paged at [phone]    Social Determinants : poor historian    Records Reviewed : Outpatient Notes recent wound care visit    CC/HPI Summary, DDx, ED Course, and Reassessment: This patient presents to the emergency department with wife at bedside. They both report that patient has fallen frequently over the past 6 weeks. There was no reported head trauma. Patient reports increasing generalized weakness over the past few weeks.   He has history of end-stage renal disease on Tuesday Thursday and Saturday. He went on Tuesday and Thursday but has not gone today. He injured his left hip several weeks ago during a fall and this caused a wound to the left hip. Pelvis x-ray shows no acute osseous abnormality. I took pictures and documented the wounds of the lower extremities in his chart. His troponin elevation is likely linked to his kidney disease. He denies chest pain or shortness of breath at this time. He is neurologically intact at his baseline without acute neuro deficits. Therefore I do not feel emergent head imaging indicated at his time. He does have intact motor function of the extremities, although just appears to be generally weak. My suspicion is low for acute surgical abdomen, obstruction, perforation, abscess, mesenteric ischemia, AAA, dissection, cholecystitis, cholangitis, pancreatitis, appendicitis, C. diff colitis, diverticulitis, volvulus, incarcerated hernia, necrotizing fasciitis, testicular torsion, epididymitis, varicocele, hydrocele, orchitis, incarcerated hernia, Tricia gangrene, pyelonephritis, perinephric abscess, kidney stone, urosepsis, fistula, intussusception, pyloric stenosis, acute stroke, acute spine fracture or dislocation, epidural abscess or hematoma, discitis, meningitis, encephalitis, transverse myelitis, cauda quina,  pyelonephritis, perinephric abscess, urosepsis, kidney stone, AAA, dissection, shingles, cellulitis, or other concerning pathology. Disposition Considerations (include 1 Tests not done, Shared Decision Making, Pt Expectation of Test or Tx.): admit         I am the Primary Clinician of Record. FINAL IMPRESSION      1. Frequent falls    2. General weakness    3. Unable to ambulate    4. Pressure injury of skin of left hip, unspecified injury stage    5. Skin ulcer of multiple sites, unspecified ulcer stage ([facility] [address]    6. 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 [facility])          DISPOSITION/PLAN     DISPOSITION Decision To Admit 03/04/2023 03:57:04 PM      PATIENT REFERRED TO:  No follow-up provider specified.     DISCHARGE MEDICATIONS:  New Prescriptions    No medications on file       DISCONTINUED MEDICATIONS:  Discontinued Medications    No medications on file              (Please note that portions of this note were completed with a voice recognition program.  Efforts were made to edit the dictations but occasionally words are mis-transcribed.)    Sarah Carmichael PA-C (electronically signed)            Sarah Carmichael PA-C  03/04/23 3403

## 2023-03-05 ENCOUNTER — APPOINTMENT (OUTPATIENT)
Dept: CT IMAGING | Age: 83
DRG: 592 | End: 2023-03-05
Payer: MEDICARE

## 2023-03-05 LAB
ANION GAP SERPL CALCULATED.3IONS-SCNC: 17 MMOL/L (ref 3–16)
BUN BLDV-MCNC: 43 MG/DL (ref 7–20)
CALCIUM SERPL-MCNC: 10.2 MG/DL (ref 8.3–10.6)
CHLORIDE BLD-SCNC: 100 MMOL/L (ref 99–110)
CO2: 22 MMOL/L (ref 21–32)
CREAT SERPL-MCNC: 8.3 MG/DL (ref 0.8–1.3)
EKG ATRIAL RATE: 388 BPM
EKG ATRIAL RATE: 59 BPM
EKG DIAGNOSIS: NORMAL
EKG DIAGNOSIS: NORMAL
EKG Q-T INTERVAL: 454 MS
EKG Q-T INTERVAL: 470 MS
EKG QRS DURATION: 102 MS
EKG QRS DURATION: 104 MS
EKG QTC CALCULATION (BAZETT): 470 MS
EKG QTC CALCULATION (BAZETT): 472 MS
EKG R AXIS: -70 DEGREES
EKG R AXIS: 252 DEGREES
EKG T AXIS: 120 DEGREES
EKG T AXIS: 33 DEGREES
EKG VENTRICULAR RATE: 60 BPM
EKG VENTRICULAR RATE: 65 BPM
FOLATE: >20 NG/ML (ref 4.78–24.2)
GFR SERPL CREATININE-BSD FRML MDRD: 6 ML/MIN/{1.73_M2}
GLUCOSE BLD-MCNC: 84 MG/DL (ref 70–99)
HCT VFR BLD CALC: 33.8 % (ref 40.5–52.5)
HEMOGLOBIN: 10.8 G/DL (ref 13.5–17.5)
MCH RBC QN AUTO: 33.9 PG (ref 26–34)
MCHC RBC AUTO-ENTMCNC: 32.1 G/DL (ref 31–36)
MCV RBC AUTO: 105.5 FL (ref 80–100)
PDW BLD-RTO: 16.1 % (ref 12.4–15.4)
PLATELET # BLD: 174 K/UL (ref 135–450)
PMV BLD AUTO: 8.5 FL (ref 5–10.5)
POTASSIUM SERPL-SCNC: 5.1 MMOL/L (ref 3.5–5.1)
RBC # BLD: 3.2 M/UL (ref 4.2–5.9)
SODIUM BLD-SCNC: 139 MMOL/L (ref 136–145)
TSH REFLEX: 3.71 UIU/ML (ref 0.27–4.2)
VITAMIN B-12: 491 PG/ML (ref 211–911)
VITAMIN D 25-HYDROXY: 23.8 NG/ML
WBC # BLD: 9.5 K/UL (ref 4–11)

## 2023-03-05 PROCEDURE — 80048 BASIC METABOLIC PNL TOTAL CA: CPT

## 2023-03-05 PROCEDURE — 74176 CT ABD & PELVIS W/O CONTRAST: CPT

## 2023-03-05 PROCEDURE — 1200000000 HC SEMI PRIVATE

## 2023-03-05 PROCEDURE — 5A1D70Z PERFORMANCE OF URINARY FILTRATION, INTERMITTENT, LESS THAN 6 HOURS PER DAY: ICD-10-PCS | Performed by: INTERNAL MEDICINE

## 2023-03-05 PROCEDURE — 6370000000 HC RX 637 (ALT 250 FOR IP): Performed by: INTERNAL MEDICINE

## 2023-03-05 PROCEDURE — 2500000003 HC RX 250 WO HCPCS: Performed by: FAMILY MEDICINE

## 2023-03-05 PROCEDURE — 6360000002 HC RX W HCPCS: Performed by: FAMILY MEDICINE

## 2023-03-05 PROCEDURE — 93010 ELECTROCARDIOGRAM REPORT: CPT | Performed by: INTERNAL MEDICINE

## 2023-03-05 PROCEDURE — 6370000000 HC RX 637 (ALT 250 FOR IP): Performed by: FAMILY MEDICINE

## 2023-03-05 PROCEDURE — 6370000000 HC RX 637 (ALT 250 FOR IP): Performed by: PHYSICIAN ASSISTANT

## 2023-03-05 PROCEDURE — 85027 COMPLETE CBC AUTOMATED: CPT

## 2023-03-05 PROCEDURE — 2580000003 HC RX 258: Performed by: FAMILY MEDICINE

## 2023-03-05 RX ORDER — METHOCARBAMOL 500 MG/1
500 TABLET, FILM COATED ORAL 2 TIMES DAILY PRN
Status: DISCONTINUED | OUTPATIENT
Start: 2023-03-05 | End: 2023-03-12 | Stop reason: HOSPADM

## 2023-03-05 RX ORDER — CLINDAMYCIN PHOSPHATE 600 MG/50ML
600 INJECTION INTRAVENOUS EVERY 8 HOURS
Status: DISCONTINUED | OUTPATIENT
Start: 2023-03-05 | End: 2023-03-05

## 2023-03-05 RX ORDER — CHOLECALCIFEROL (VITAMIN D3) 10 MCG
1 TABLET ORAL DAILY
Status: DISCONTINUED | OUTPATIENT
Start: 2023-03-05 | End: 2023-03-12 | Stop reason: HOSPADM

## 2023-03-05 RX ADMIN — METOPROLOL TARTRATE 12.5 MG: 25 TABLET, FILM COATED ORAL at 20:24

## 2023-03-05 RX ADMIN — LEVOTHYROXINE SODIUM 50 MCG: 0.03 TABLET ORAL at 06:04

## 2023-03-05 RX ADMIN — TRAMADOL HYDROCHLORIDE 50 MG: 50 TABLET, COATED ORAL at 06:04

## 2023-03-05 RX ADMIN — ALLOPURINOL 100 MG: 100 TABLET ORAL at 08:31

## 2023-03-05 RX ADMIN — SILVER SULFADIAZINE: 10 CREAM TOPICAL at 08:34

## 2023-03-05 RX ADMIN — MIDODRINE HYDROCHLORIDE 2.5 MG: 5 TABLET ORAL at 17:34

## 2023-03-05 RX ADMIN — SODIUM CHLORIDE, PRESERVATIVE FREE 10 ML: 5 INJECTION INTRAVENOUS at 08:39

## 2023-03-05 RX ADMIN — CLINDAMYCIN PHOSPHATE 600 MG: 600 INJECTION, SOLUTION INTRAVENOUS at 08:28

## 2023-03-05 RX ADMIN — MIDODRINE HYDROCHLORIDE 2.5 MG: 5 TABLET ORAL at 14:57

## 2023-03-05 RX ADMIN — MIDODRINE HYDROCHLORIDE 2.5 MG: 5 TABLET ORAL at 08:31

## 2023-03-05 RX ADMIN — CEFTRIAXONE SODIUM 1000 MG: 1 INJECTION, POWDER, FOR SOLUTION INTRAMUSCULAR; INTRAVENOUS at 07:12

## 2023-03-05 RX ADMIN — TRAMADOL HYDROCHLORIDE 50 MG: 50 TABLET, COATED ORAL at 20:24

## 2023-03-05 RX ADMIN — METHOCARBAMOL 500 MG: 500 TABLET ORAL at 06:04

## 2023-03-05 RX ADMIN — NEPHROCAP 1 MG: 1 CAP ORAL at 14:58

## 2023-03-05 RX ADMIN — TRAMADOL HYDROCHLORIDE 50 MG: 50 TABLET, COATED ORAL at 12:27

## 2023-03-05 RX ADMIN — METOPROLOL TARTRATE 12.5 MG: 25 TABLET, FILM COATED ORAL at 08:31

## 2023-03-05 RX ADMIN — METHOCARBAMOL 500 MG: 500 TABLET ORAL at 20:23

## 2023-03-05 ASSESSMENT — PAIN SCALES - GENERAL
PAINLEVEL_OUTOF10: 7
PAINLEVEL_OUTOF10: 0
PAINLEVEL_OUTOF10: 5
PAINLEVEL_OUTOF10: 6
PAINLEVEL_OUTOF10: 10

## 2023-03-05 ASSESSMENT — PAIN SCALES - WONG BAKER: WONGBAKER_NUMERICALRESPONSE: 0

## 2023-03-05 ASSESSMENT — PAIN DESCRIPTION - LOCATION: LOCATION: GROIN

## 2023-03-05 ASSESSMENT — PAIN DESCRIPTION - ONSET
ONSET: ON-GOING
ONSET: ON-GOING

## 2023-03-05 ASSESSMENT — PAIN DESCRIPTION - DESCRIPTORS
DESCRIPTORS: ACHING
DESCRIPTORS: CRUSHING
DESCRIPTORS: ACHING

## 2023-03-05 ASSESSMENT — PAIN DESCRIPTION - ORIENTATION
ORIENTATION: RIGHT

## 2023-03-05 ASSESSMENT — PAIN DESCRIPTION - FREQUENCY
FREQUENCY: CONTINUOUS
FREQUENCY: CONTINUOUS

## 2023-03-05 ASSESSMENT — PAIN DESCRIPTION - PAIN TYPE
TYPE: ACUTE PAIN
TYPE: ACUTE PAIN

## 2023-03-05 ASSESSMENT — PAIN - FUNCTIONAL ASSESSMENT: PAIN_FUNCTIONAL_ASSESSMENT: ACTIVITIES ARE NOT PREVENTED

## 2023-03-05 NOTE — PROGRESS NOTES
Morning assessment completed. Pt comfortably resting in bed. VSS. Morning meds given per MAR. The care plan and education has been reviewed and mutually agreed upon with the patient. 0900 - placed rainbow pad underneath the pt.  Transfer placed for CT of abd

## 2023-03-05 NOTE — PLAN OF CARE
Problem: Pain  Goal: Verbalizes/displays adequate comfort level or baseline comfort level  Outcome: Progressing   Pt c/o testicle pain and left hip pain, medicated per mar with prn tramadol. Pt verbalized some relief and is now resting in bed. Will continue to assess pain level. Problem: Safety - Adult  Goal: Free from fall injury  Outcome: Progressing   Pt has been free from falls this shift, bed alarm on, bed in lowest position, 2/4 side rails up, nonskid socks on, wheels locked, bedside table and call light in reach. Encouraged pt to call out if needed anything.

## 2023-03-05 NOTE — PROGRESS NOTES
Pt alert and oriented x3 and forgetful at times, VSS, IV ns locked in right arm. Lungs are clear and diminished. Pt BLE have multiple wounds SABINO. Left hip wound oozing, applied silver sulfate and placed mepilex dressing over wound. Pt attempted to go oob on own, placed avasys monitor in room for pt safety. Bed alarm on, bedside table and call light in reach.

## 2023-03-05 NOTE — H&P
HOSPITALISTS HISTORY AND PHYSICAL    3/4/2023 9:30 PM    Patient Information:  Mable Mijares is a 79 y.o. male [id number]  PCP:  Daniela Garcia MD (Tel: [phone] )    Chief complaint:    Chief Complaint   Patient presents with    Testicle Pain     Bilat testicular pain x1 month    Fall     Pt reports fall on L hip a while ago, fell and bruised it. Then fell again and had an open wound from it, never evaluated for fall. Then today pt fell, did not hit head or have LOC and fell onto L side and reports bleeding from site and pain. History of Present Illness:  Delonte Gracia is a 79 y.o. male with h/o HTN, ESRD on HD ,PVD, chronic ulcer on Lower extremity, anemia  presented with c/o recurrent falls. Today he fell on his left side and c.o left hip pain . Denies hitting his head or LOC He has multiple recurrent ulcers on his lower extremities b.l . Also has pressure ulcer on hip. He follows up with Dr. Lizzette Alvarez for wound care. Last visit was on 02/28/23 . He underwent excision and debridement during that visit. Wound cultures were sent. Doppler neg for DVT . He has not been on any antibiotics. The pt is non compliant with dialysis and treatment. He is also c/o groin pain   ED worjk up today showed   Xray of pelvis neg for fracture or hemorrhage   Chest xray showed stable cardiomegaly  Troponin elevated 0.44    REVIEW OF SYSTEMS:   Constitutional: Negative for fever,chills or night sweats  ENT: Negative for rhinorrhea, epistaxis, hoarseness, sore throat.   Respiratory: Negative for shortness of breath,wheezing  Cardiovascular: Negative for chest pain, palpitations   Gastrointestinal: Negative for nausea, vomiting, diarrhea  Genitourinary: Negative for polyuria, dysuria   Hematologic/Lymphatic: Negative for bleeding tendency, easy bruising  Musculoskeletal: Negative for myalgias and arthralgias  Neurologic: Negative for confusion,dysarthria., recurrent falls  Skin: Negative for itching,rash  Psychiatric: Negative for depression,anxiety, agitation. Endocrine: Negative for polydipsia,polyuria,heat /cold intolerance. Past Medical History:   has a past medical history of Blood clot, Fracture of sternum, Fracture rib, Hyperlipidemia, Hypertension, Laceration of skin of lower leg, left, initial encounter, Laceration of skin of lower leg, right, initial encounter, Prostate cancer ([facility] [address] and Type II or unspecified type diabetes mellitus without mention of complication, not stated as uncontrolled. Past Surgical History:   has a past surgical history that includes TURP (9/07). Medications:  No current facility-administered medications on file prior to encounter. Current Outpatient Medications on File Prior to Encounter   Medication Sig Dispense Refill    silver sulfADIAZINE (SILVADENE) 1 % cream Apply topically daily. 1000 g 1    metoprolol tartrate (LOPRESSOR) 25 MG tablet Take 25 mg by mouth 2 times daily      b complex-C-folic acid (NEPHROCAPS) 1 MG capsule Take 1 capsule by mouth daily      allopurinol (ZYLOPRIM) 100 MG tablet Take 100 mg by mouth daily      midodrine (PROAMATINE) 2.5 MG tablet Take 2.5 mg by mouth 3 times daily      levothyroxine (SYNTHROID) 125 MCG tablet Take 125 mcg by mouth Daily      ezetimibe (ZETIA) 10 MG tablet Take 1 tablet by mouth daily.  90 tablet 3    ONE TOUCH ULTRASOFT LANCETS MISC TEST once daily 100 each PRN    Blood Glucose Monitoring Suppl (ONE TOUCH ULTRA SYSTEM KIT) W/DEVICE KIT TEST TWICE daily BLOOD SUGAR 1 kit 0     Current Facility-Administered Medications   Medication Dose Route Frequency Provider Last Rate Last Admin    methocarbamol (ROBAXIN) tablet 500 mg  500 mg Oral BID PRN Tye Fernandes PA-C        traMADol (ULTRAM) tablet 50 mg  50 mg Oral Q6H PRN Mikal Livingston MD   50 mg at 03/04/23 2126    levothyroxine (SYNTHROID) tablet 50 mcg 50 mcg Oral QAM AC Yudith Burton MD        midodrine (PROAMATINE) tablet 2.5 mg  2.5 mg Oral TID Yudith Burton MD   2.5 mg at 03/04/23 1908    silver sulfADIAZINE (SILVADENE) 1 % cream   Topical Daily Yudith Burton MD   Given at 03/04/23 2311    allopurinol (ZYLOPRIM) tablet 100 mg  100 mg Oral Daily Anita Grissom MD        sodium chloride flush 0.9 % injection 5-40 mL  5-40 mL IntraVENous 2 times per day Yudith Burton MD   10 mL at 03/04/23 2127    sodium chloride flush 0.9 % injection 5-40 mL  5-40 mL IntraVENous PRN Yudith Burton MD        0.9 % sodium chloride infusion   IntraVENous PRN Yudith Burton MD        ondansetron (ZOFRAN-ODT) disintegrating tablet 4 mg  4 mg Oral Q8H PRN Yudith Burton MD        Or    ondansetron (ZOFRAN) injection 4 mg  4 mg IntraVENous Q6H PRN Yudith Burton MD        polyethylene glycol (GLYCOLAX) packet 17 g  17 g Oral Daily PRN Yudith Burton MD        acetaminophen (TYLENOL) tablet 650 mg  650 mg Oral Q6H PRN Yudith Burton MD        Or    acetaminophen (TYLENOL) suppository 650 mg  650 mg Rectal Q6H PRN Yudith Burton MD        heparin (porcine) injection 5,000 Units  5,000 Units SubCUTAneous 3 times per day Yudith Burton MD        metoprolol tartrate (LOPRESSOR) tablet 12.5 mg  12.5 mg Oral BID Yudith Burton MD   12.5 mg at 03/04/23 2311      Allergies: Allergies   Allergen Reactions    Oxycodone      Agitation         Social History:  Patient Lives    reports that he has never smoked. He has never used smokeless tobacco. He reports that he does not drink alcohol and does not use drugs. Family History:  family history includes Cancer in his mother; Heart Attack in his father; High Blood Pressure in an other family member; Stroke in an other family member. ,     Physical Exam:  /78   Pulse 70   Temp 97.8 °F (36.6 °C) (Oral)   Resp 22   Ht 5' 10\" (1.778 m)   Wt 207 lb 9.6 oz (94.2 kg)   SpO2 96%   BMI 29.79 kg/m²     General appearance:  Appears comfortable.  Well nourished  Eyes: Sclera clear, pupils equal  ENT: Moist mucus membranes, no thrush. Trachea midline. Cardiovascular: Regular rhythm, normal S1, S2. No murmur, gallop, rub. No edema in lower extremities  Respiratory: Clear to auscultation bilaterally, no wheeze, good inspiratory effort  Gastrointestinal: Abdomen soft, non-tender, not distended, normal bowel sounds  Musculoskeletal: No cyanosis in digits, neck supple  Neurology: Cranial nerves grossly intact. Alert and oriented in time, place and person. No speech or motor deficits  Psychiatry: Appropriate affect. Not agitated  Skin: multiple wounds with drainage  with no cellulitis   Brisk capillary refill, peripheral pulses palpable   Labs:  CBC:   Lab Results   Component Value Date/Time    WBC 8.8 03/04/2023 01:44 PM    RBC 3.54 03/04/2023 01:44 PM    HGB 11.8 03/04/2023 01:44 PM    HCT 37.5 03/04/2023 01:44 PM    .0 03/04/2023 01:44 PM    MCH 33.3 03/04/2023 01:44 PM    MCHC 31.4 03/04/2023 01:44 PM    RDW 16.8 03/04/2023 01:44 PM     03/04/2023 01:44 PM    MPV 8.2 03/04/2023 01:44 PM     BMP:    Lab Results   Component Value Date/Time     03/04/2023 01:44 PM    K 4.6 03/04/2023 01:44 PM    CL 97 03/04/2023 01:44 PM    CO2 29 03/04/2023 01:44 PM    BUN 38 03/04/2023 01:44 PM    CREATININE 7.6 03/04/2023 01:44 PM    CALCIUM 10.8 03/04/2023 01:44 PM    GFRAA 34 10/14/2013 08:07 PM    GFRAA 43 08/05/2010 03:36 PM    LABGLOM 7 03/04/2023 01:44 PM    GLUCOSE 140 03/04/2023 01:44 PM     XR PELVIS (1-2 VIEWS)   Preliminary Result   Osteopenia. No evident fracture. Given the degree of osteopenia, nondisplaced fractures may be   radiographically occult. If pain or concern for fracture persists, consider   MR imaging. XR CHEST PORTABLE   Final Result   No acute process.       Stable cardiomegaly      Bibasilar hypoaeration         CT ABDOMEN PELVIS WO CONTRAST Additional Contrast? None    (Results Pending)     Chest Xray:   EKG:    I visualized CXR images and EKG strips    Discussed case  with     Problem List  Principal Problem:    Generalized weakness  Resolved Problems:    * No resolved hospital problems. *        Assessment/Plan:       Multiple  wounds of lower extremity b/l  Follows up with wound care dr. Huerta  Last visit 4 days ago , S/p debridement   Wound cultures and sensitivities on 02/28 noted   Grew MSSA and Enterobacter cloacea  Started on Ceftriaxone and Clindamycin   Cont wound care and Sulfadiazine application   Follow blood and wound cultures    Recurrent falls  Will check Vitamin D, TSH and B12 levels  Check orthostatic vitals   Will also check cardiac ECHO    HTN BP is labile ranging from 99/52 - 165/89  Metoprolol dose reduced to 12.5 mg BID    Groin/ testicular pain   UA and CT pelvis ordered     ESRD on HD   Nephrology consulted             DVT prophylaxis   Code status   Diet   IV access   Timmons Catheter    Admit as inpatient. I anticipate hospitalization spanning more than two midnights for investigation and treatment of the above medically necessary diagnoses.    Please note that some part of this chart was generated using Dragon dictation software. Although every effort was made to ensure the accuracy of this automated transcription, some errors in transcription may have occurred inadvertently. If you may need any clarification, please do not hesitate to contact me through EPIC.       Anita Grissom MD    3/4/2023 9:30 PM
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
## 2023-03-05 NOTE — PROGRESS NOTES
Pt has not stood up in 5 years, RN was unable to do the standing portion of orthostatics. Sitting and lying portion done.

## 2023-03-05 NOTE — PROGRESS NOTES
Hospitalist Progress Note      PCP: Sin Alves MD    Date of Admission: 3/4/2023    Chief Complaint: Right groin testicular pain    Hospital Course  Patient is having recurrent falls  Has had ulceration on multiple areas which arterial venous had a debridement  Most recent was a new ulceration that were identified on the toes  Cultures from that noted  Complete continues to have any pain in the right groin and the right testes  No fevers no sweats        Medications:  Reviewed      Exam:    /75   Pulse 63   Temp 98.3 °F (36.8 °C) (Oral)   Resp 16   Ht 5' 10\" (1.778 m)   Wt 207 lb 9.6 oz (94.2 kg)   SpO2 93%   BMI 29.79 kg/m²     General appearance: No apparent distress, appears stated age and cooperative. HEENT: Pupils equal, round, and reactive to light. Conjunctivae/corneas clear. Neck: Supple, with full range of motion. No jugular venous distention. Trachea midline. Respiratory:  Normal respiratory effort. Clear to auscultation, bilaterally without RALES/WHEEZES/Rhonchi. Cardiovascular: Regular rate and rhythm with normal S1/S2 without MURMURS, rubs or gallops. Abdomen: Soft, non-tender, non-distended with normal bowel sounds. Musculoskeletal: No clubbing, cyanosis or EDEMA bilaterally. Full range of motion without deformity. Skin: Right inner thigh thickening of skin eschar formation with some tenderness but no surrounding erythema  Some tenderness of the right testes but no obvious swelling erythema  Multiple superficial ulcerations on the right lower extremity and the right toes    Neurologic:  Neurovascularly intact without any focal sensory/motor deficits.  Cranial nerves: II-XII intact, grossly non-focal.      Labs:   Recent Labs     03/04/23  1344 03/05/23  0528   WBC 8.8 9.5   HGB 11.8* 10.8*   HCT 37.5* 33.8*    174     Recent Labs     03/04/23  1344 03/05/23  0528    139   K 4.6 5.1   CL 97* 100   CO2 29 22   BUN 38* 43*   CREATININE 7.6* 8.3*   CALCIUM 10.8* 10.2     Recent Labs     03/04/23  1344   AST 17   ALT 12   BILITOT 0.3   ALKPHOS 68     No results for input(s): INR in the last 72 hours. Recent Labs     03/04/23  1344 03/04/23  1916 03/04/23  2213   TROPONINI 0.44* 0.39* 0.40*       Urinalysis:    No results found for: Lynne Divine, BACTERIA, RBCUA, BLOODU, SPECGRAV, GLUCOSEU    Radiology:  CT ABDOMEN PELVIS WO CONTRAST Additional Contrast? None   Final Result   Large hiatal hernia with small right pleural effusion. XR PELVIS (1-2 VIEWS)   Preliminary Result   Osteopenia. No evident fracture. Given the degree of osteopenia, nondisplaced fractures may be   radiographically occult. If pain or concern for fracture persists, consider   MR imaging. XR CHEST PORTABLE   Final Result   No acute process.       Stable cardiomegaly      Bibasilar hypoaeration             Assessment/Plan:    Active Hospital Problems    Diagnosis Date Noted    Generalized weakness [R53.1] 03/04/2023     Priority: Medium       Acute Medical Issues Being Addressed:    79-year-old admitted to the hospital with multiple ulcerations and fall    Multiple lower extremity wounds s/p debridement  Wound cultures grew out MSSA and Enterobacter but currently bacterium  We will continue IV ceftriaxone although I am not clear that there is an infection  We will get ID to help with antibiotics  Continue local wound care    Stage II decubitus ulcer present on admission  Noted on recent wound care visit    Recurrent falls  No loss of consciousness  Overall debility    Subacute/chronic right groin testicular pain  He seems to have some skin eschar formation and thickening I am not sure if this is related to his underlying renal disease nothing to suggest an abscess or cellulitis  I suspect that is what is causing radiation of the pain to the testicles  CT abdomen and pelvis was essentially negative  I do not see any obvious testicular swelling  Urology have been consulted  Discussed with nephrology as well    End-stage renal disease on hemodialysis    Hypertension  On metoprolol 12.5 twice daily      DVT Prophylaxis: Subcu heparin  Diet: ADULT DIET;  Regular  Code Status: Full Code      Dispo - once acute medical processes have resolved    Kassidy Szymanski MD

## 2023-03-06 ENCOUNTER — APPOINTMENT (OUTPATIENT)
Dept: ULTRASOUND IMAGING | Age: 83
DRG: 592 | End: 2023-03-06
Payer: MEDICARE

## 2023-03-06 PROBLEM — L98.499 SKIN ULCER OF MULTIPLE SITES (HCC): Status: ACTIVE | Noted: 2023-03-06

## 2023-03-06 PROBLEM — E66.3 OVERWEIGHT (BMI 25.0-29.9): Status: ACTIVE | Noted: 2023-03-06

## 2023-03-06 PROBLEM — Z99.2 ESRD ON HEMODIALYSIS (HCC): Status: ACTIVE | Noted: 2023-03-06

## 2023-03-06 PROBLEM — Z85.46 HISTORY OF PROSTATE CANCER: Status: ACTIVE | Noted: 2023-03-06

## 2023-03-06 PROBLEM — G89.29 CHRONIC PAIN IN TESTICLE: Status: ACTIVE | Noted: 2023-03-06

## 2023-03-06 PROBLEM — R29.6 FREQUENT FALLS: Status: ACTIVE | Noted: 2023-03-06

## 2023-03-06 PROBLEM — R26.2 UNABLE TO AMBULATE: Status: ACTIVE | Noted: 2023-03-06

## 2023-03-06 PROBLEM — N18.6 ESRD ON HEMODIALYSIS (HCC): Status: ACTIVE | Noted: 2023-03-06

## 2023-03-06 PROBLEM — N50.819 CHRONIC PAIN IN TESTICLE: Status: ACTIVE | Noted: 2023-03-06

## 2023-03-06 PROBLEM — L89.229 PRESSURE INJURY OF SKIN OF LEFT HIP: Status: ACTIVE | Noted: 2023-03-06

## 2023-03-06 PROBLEM — E11.69 TYPE 2 DIABETES MELLITUS WITH OTHER SPECIFIED COMPLICATION (HCC): Status: ACTIVE | Noted: 2023-03-06

## 2023-03-06 LAB
ANION GAP SERPL CALCULATED.3IONS-SCNC: 19 MMOL/L (ref 3–16)
BASOPHILS ABSOLUTE: 0.1 K/UL (ref 0–0.2)
BASOPHILS RELATIVE PERCENT: 1 %
BUN BLDV-MCNC: 53 MG/DL (ref 7–20)
CALCIUM SERPL-MCNC: 10.2 MG/DL (ref 8.3–10.6)
CHLORIDE BLD-SCNC: 98 MMOL/L (ref 99–110)
CO2: 24 MMOL/L (ref 21–32)
CREAT SERPL-MCNC: 9.8 MG/DL (ref 0.8–1.3)
EOSINOPHILS ABSOLUTE: 0.1 K/UL (ref 0–0.6)
EOSINOPHILS RELATIVE PERCENT: 1.4 %
GFR SERPL CREATININE-BSD FRML MDRD: 5 ML/MIN/{1.73_M2}
GLUCOSE BLD-MCNC: 81 MG/DL (ref 70–99)
GRAM STAIN RESULT: ABNORMAL
HCT VFR BLD CALC: 35.4 % (ref 40.5–52.5)
HEMOGLOBIN: 11.6 G/DL (ref 13.5–17.5)
LYMPHOCYTES ABSOLUTE: 1.4 K/UL (ref 1–5.1)
LYMPHOCYTES RELATIVE PERCENT: 15.2 %
MCH RBC QN AUTO: 34.3 PG (ref 26–34)
MCHC RBC AUTO-ENTMCNC: 32.8 G/DL (ref 31–36)
MCV RBC AUTO: 104.6 FL (ref 80–100)
MONOCYTES ABSOLUTE: 0.9 K/UL (ref 0–1.3)
MONOCYTES RELATIVE PERCENT: 10.2 %
NEUTROPHILS ABSOLUTE: 6.6 K/UL (ref 1.7–7.7)
NEUTROPHILS RELATIVE PERCENT: 72.2 %
ORGANISM: ABNORMAL
PARATHYROID HORMONE INTACT: 71.6 PG/ML (ref 14–72)
PDW BLD-RTO: 16.1 % (ref 12.4–15.4)
PHOSPHORUS: 6.5 MG/DL (ref 2.5–4.9)
PLATELET # BLD: 212 K/UL (ref 135–450)
PMV BLD AUTO: 8.6 FL (ref 5–10.5)
POTASSIUM SERPL-SCNC: 5.8 MMOL/L (ref 3.5–5.1)
RBC # BLD: 3.39 M/UL (ref 4.2–5.9)
SODIUM BLD-SCNC: 141 MMOL/L (ref 136–145)
WBC # BLD: 9.1 K/UL (ref 4–11)
WOUND/ABSCESS: ABNORMAL

## 2023-03-06 PROCEDURE — 80048 BASIC METABOLIC PNL TOTAL CA: CPT

## 2023-03-06 PROCEDURE — 36415 COLL VENOUS BLD VENIPUNCTURE: CPT

## 2023-03-06 PROCEDURE — 84100 ASSAY OF PHOSPHORUS: CPT

## 2023-03-06 PROCEDURE — 99223 1ST HOSP IP/OBS HIGH 75: CPT | Performed by: INTERNAL MEDICINE

## 2023-03-06 PROCEDURE — 1200000000 HC SEMI PRIVATE

## 2023-03-06 PROCEDURE — 85025 COMPLETE CBC W/AUTO DIFF WBC: CPT

## 2023-03-06 PROCEDURE — 2580000003 HC RX 258: Performed by: FAMILY MEDICINE

## 2023-03-06 PROCEDURE — 76870 US EXAM SCROTUM: CPT

## 2023-03-06 PROCEDURE — 93975 VASCULAR STUDY: CPT

## 2023-03-06 PROCEDURE — 6370000000 HC RX 637 (ALT 250 FOR IP): Performed by: INTERNAL MEDICINE

## 2023-03-06 PROCEDURE — 6360000002 HC RX W HCPCS: Performed by: FAMILY MEDICINE

## 2023-03-06 PROCEDURE — 90935 HEMODIALYSIS ONE EVALUATION: CPT

## 2023-03-06 PROCEDURE — 6370000000 HC RX 637 (ALT 250 FOR IP): Performed by: FAMILY MEDICINE

## 2023-03-06 PROCEDURE — 83970 ASSAY OF PARATHORMONE: CPT

## 2023-03-06 RX ORDER — SEVELAMER CARBONATE 800 MG/1
800 TABLET, FILM COATED ORAL
Status: DISCONTINUED | OUTPATIENT
Start: 2023-03-06 | End: 2023-03-12 | Stop reason: HOSPADM

## 2023-03-06 RX ORDER — LINEZOLID 600 MG/1
600 TABLET, FILM COATED ORAL EVERY 12 HOURS SCHEDULED
Status: DISCONTINUED | OUTPATIENT
Start: 2023-03-06 | End: 2023-03-07

## 2023-03-06 RX ORDER — LIDOCAINE HYDROCHLORIDE 40 MG/ML
SOLUTION TOPICAL ONCE
Status: DISCONTINUED | OUTPATIENT
Start: 2023-03-07 | End: 2023-03-12 | Stop reason: HOSPADM

## 2023-03-06 RX ADMIN — TRAMADOL HYDROCHLORIDE 50 MG: 50 TABLET, COATED ORAL at 12:51

## 2023-03-06 RX ADMIN — MIDODRINE HYDROCHLORIDE 2.5 MG: 5 TABLET ORAL at 12:50

## 2023-03-06 RX ADMIN — SEVELAMER CARBONATE 800 MG: 800 TABLET, FILM COATED ORAL at 16:37

## 2023-03-06 RX ADMIN — METOPROLOL TARTRATE 12.5 MG: 25 TABLET, FILM COATED ORAL at 21:03

## 2023-03-06 RX ADMIN — TRAMADOL HYDROCHLORIDE 50 MG: 50 TABLET, COATED ORAL at 06:20

## 2023-03-06 RX ADMIN — SODIUM CHLORIDE, PRESERVATIVE FREE 10 ML: 5 INJECTION INTRAVENOUS at 21:03

## 2023-03-06 RX ADMIN — CEFTRIAXONE SODIUM 1000 MG: 1 INJECTION, POWDER, FOR SOLUTION INTRAMUSCULAR; INTRAVENOUS at 06:21

## 2023-03-06 RX ADMIN — LEVOTHYROXINE SODIUM 50 MCG: 0.03 TABLET ORAL at 06:20

## 2023-03-06 RX ADMIN — LINEZOLID 600 MG: 600 TABLET, FILM COATED ORAL at 21:03

## 2023-03-06 ASSESSMENT — ENCOUNTER SYMPTOMS
SORE THROAT: 0
WHEEZING: 0
DIARRHEA: 0
EYE REDNESS: 0
NAUSEA: 0
CONSTIPATION: 0
RHINORRHEA: 0
SINUS PRESSURE: 0
SHORTNESS OF BREATH: 0
BACK PAIN: 0
EYE DISCHARGE: 0
COUGH: 0
SINUS PAIN: 0
ABDOMINAL PAIN: 0

## 2023-03-06 ASSESSMENT — PAIN DESCRIPTION - PAIN TYPE
TYPE: ACUTE PAIN
TYPE: ACUTE PAIN

## 2023-03-06 ASSESSMENT — PAIN SCALES - GENERAL
PAINLEVEL_OUTOF10: 0
PAINLEVEL_OUTOF10: 3
PAINLEVEL_OUTOF10: 7
PAINLEVEL_OUTOF10: 6

## 2023-03-06 ASSESSMENT — PAIN DESCRIPTION - ORIENTATION
ORIENTATION: RIGHT
ORIENTATION: RIGHT

## 2023-03-06 ASSESSMENT — PAIN DESCRIPTION - LOCATION: LOCATION: SCROTUM

## 2023-03-06 ASSESSMENT — PAIN DESCRIPTION - ONSET
ONSET: ON-GOING
ONSET: ON-GOING

## 2023-03-06 ASSESSMENT — PAIN DESCRIPTION - DESCRIPTORS
DESCRIPTORS: ACHING
DESCRIPTORS: ACHING

## 2023-03-06 ASSESSMENT — PAIN DESCRIPTION - FREQUENCY
FREQUENCY: CONTINUOUS
FREQUENCY: CONTINUOUS

## 2023-03-06 NOTE — PROGRESS NOTES
Physician Progress Note      PATIENT:               Erik Marte  CSN #:                  [id number]  :                       1940  ADMIT DATE:       3/4/2023 1:06 PM  [address] [place] DATE:  RESPONDING  PROVIDER #:        Elton Larson MD          QUERY TEXT:    Pt admitted with falls and noted overall debility in 3/5 PN. If possible,   please document in the progress notes and discharge summary if you are   evaluating and / or treating any of the following: The medical record reflects the following:  Risk Factors: 79year old male, ESRD  Clinical Indicators: Per ED note-  has fallen 6 times over the past 6 weeks. He states that he is generally weak and his wife has to help him walk around   the house. Per 3/5 PN- Recurrent falls  No loss of consciousness  Overall debility  Per 3/5 nurses notes- Pt has not stood up in 5 years  Treatment: Labs, Imaging, [employer]  Options provided:  -- Age Related Physical Debility  -- Frailty  -- Weakness due to other, Please document other cause. -- Other - I will add my own diagnosis  -- Disagree - Not applicable / Not valid  -- Disagree - Clinically unable to determine / Unknown  -- Refer to Clinical Documentation Reviewer    PROVIDER RESPONSE TEXT:    This patient has age related physical debility.     Query created by: Meghan Chandler on 3/6/2023 10:19 AM      Electronically signed by:  Elton Larson MD 3/6/2023 11:02 AM

## 2023-03-06 NOTE — PLAN OF CARE
Problem: Pain  Goal: Verbalizes/displays adequate comfort level or baseline comfort level  Outcome: Progressing   Pt c/o right testicle and left hip pain, medicated per mar with prn tramadol and robaxin. Pt verbalized relief and is now resting in bed. Will continue to assess pain level. Problem: Safety - Adult  Goal: Free from fall injury  Outcome: Progressing   Pt has been free from falls this shift, bed alarm on, bed in lowest position, 2/4 side rails up, nonskid socks on, wheels locked, bedside table and call light in reach. Encouraged pt to call out if needed anything.

## 2023-03-06 NOTE — PROGRESS NOTES
Hospitalist Progress Note      PCP: Layla Alford MD    Date of Admission: 3/4/2023    Chief Complaint: Right groin testicular pain    Hospital Course  Patient is having recurrent falls  Still complaining of right inner thigh right groin and right testicular pain  No change in the pain  No fevers no sweats      Medications:  Reviewed      Exam:    BP (!) 142/75   Pulse 70   Temp 97.6 °F (36.4 °C)   Resp 16   Ht 5' 10\" (1.778 m)   Wt 203 lb 4.2 oz (92.2 kg)   SpO2 95%   BMI 29.17 kg/m²     General appearance: No apparent distress, appears stated age and cooperative. HEENT: Pupils equal, round, and reactive to light. Conjunctivae/corneas clear. Neck: Supple, with full range of motion. No jugular venous distention. Trachea midline. Respiratory:  Normal respiratory effort. Clear to auscultation, bilaterally without RALES/WHEEZES/Rhonchi. Cardiovascular: Regular rate and rhythm with normal S1/S2 without MURMURS, rubs or gallops. Abdomen: Soft, non-tender, non-distended with normal bowel sounds. Musculoskeletal: No clubbing, cyanosis or EDEMA bilaterally. Full range of motion without deformity. Skin: Right inner thigh thickening of skin eschar formation with some tenderness but no surrounding erythema  Some tenderness of the right testes but no obvious swelling erythema  Multiple superficial ulcerations on the right lower extremity and the right toes  Neurologic:  Neurovascularly intact without any focal sensory/motor deficits.  Cranial nerves: II-XII intact, grossly non-focal.  Physical exam remains unchanged from 3/5    Labs:   Recent Labs     03/04/23  1344 03/05/23  0528 03/06/23  0448   WBC 8.8 9.5 9.1   HGB 11.8* 10.8* 11.6*   HCT 37.5* 33.8* 35.4*    174 212       Recent Labs     03/04/23  1344 03/05/23  0528 03/06/23  0448    139 141   K 4.6 5.1 5.8*   CL 97* 100 98*   CO2 29 22 24   BUN 38* 43* 53*   CREATININE 7.6* 8.3* 9.8*   CALCIUM 10.8* 10.2 10.2   PHOS  --   --  6.5* Recent Labs     03/04/23  1344   AST 17   ALT 12   BILITOT 0.3   ALKPHOS 68       No results for input(s): INR in the last 72 hours. Recent Labs     03/04/23  1344 03/04/23  1916 03/04/23  2213   TROPONINI 0.44* 0.39* 0.40*         Urinalysis:    No results found for: Teola Farrell, BACTERIA, RBCUA, BLOODU, SPECGRAV, GLUCOSEU    Radiology:  CT ABDOMEN PELVIS WO CONTRAST Additional Contrast? None   Final Result   Large hiatal hernia with small right pleural effusion. XR PELVIS (1-2 VIEWS)   Final Result   Osteopenia. No evident fracture. Given the degree of osteopenia, nondisplaced fractures may be   radiographically occult. If pain or concern for fracture persists, consider   MR imaging. XR CHEST PORTABLE   Final Result   No acute process. Stable cardiomegaly      Bibasilar hypoaeration         US DUP ABD PEL RETRO SCROT COMPLETE    (Results Pending)   US SCROTUM AND TESTICLES    (Results Pending)   CT PELVIS WO CONTRAST Additional Contrast? None    (Results Pending)   CT FEMUR RIGHT W CONTRAST    (Results Pending)       Assessment/Plan:    Active Hospital Problems    Diagnosis Date Noted    Frequent falls [R29.6] 03/06/2023     Priority: Medium    Chronic pain in testicle [N50.819, G89.29] 03/06/2023     Priority: Medium    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 ([facility] [address] [N18.6, Z99.2] 03/06/2023     Priority: Medium    Pressure injury of skin of left hip [L89.229] 03/06/2023     Priority: Medium    Skin ulcer of multiple sites [facility]) [L98.499] 03/06/2023     Priority: Medium    Unable to ambulate [R26.2] 03/06/2023     Priority: Medium    Type 2 diabetes mellitus with other specified complication ([facility] [address] [W14.08] 03/06/2023     Priority: Medium    History of prostate cancer [Z85.46] 03/06/2023     Priority: Medium    Overweight (BMI 25.0-29. 9) [E66.3] 03/06/2023     Priority: Medium    Generalized weakness [R53.1] 03/04/2023     Priority: Medium    Essential hypertension [I10]        Acute Medical Issues Being Addressed:    65-year-old admitted to the hospital with multiple ulcerations and fall    Multiple lower extremity wounds s/p debridement  Wound cultures grew out MSSA and Enterobacter but currently bacterium  We will continue IV ceftriaxone although I am not clear that there is an infection  We will get ID to help with antibiotics  Continue local wound care    Stage II decubitus ulcer present on admission  Noted on recent wound care visit    Recurrent falls  No loss of consciousness  Overall debility    Subacute/chronic right groin testicular pain  He seems to have some skin eschar formation and thickening I am not sure if this is related to his underlying renal disease nothing to suggest an abscess or cellulitis  I suspect that is what is causing radiation of the pain to the testicles  CT abdomen and pelvis was essentially negative  I do not see any obvious testicular swelling  Urology have been consulted  Ultrasound of the right testicle ordered  Will get a CT of the pelvis and will get CT of the right femur and will get general surgery consult to see if there are any other etiologies    End-stage renal disease on hemodialysis    Hypertension  On metoprolol 12.5 twice daily      DVT Prophylaxis: Subcu heparin  Diet: ADULT DIET;  Regular  Code Status: Full Code      Dispo - once acute medical processes have resolved    Dariusz Wolfe MD

## 2023-03-06 NOTE — PROGRESS NOTES
Pt alert and oriented x4, VSS, IV ns locked in right arm. Left hip dressing intact. Lungs are clear. BLE have multiple ulcers. Bed alarm on, bedside table and call light in reach.
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
## 2023-03-06 NOTE — PROGRESS NOTES
[facility]   Nephrology progress Note  [phone]   [facility] [place]           Reason for Consult:  ESRD on HD   The patient is a 79 y.o. male with significant past medical history of 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, hypertension, peripheral vascular disease with a chronic ulcer of the lower leg, anemia, renal osteodystrophy, has had history of recurrent falls. 1 date of admission 3/4/2023 he fell on his left side and complained of left hip pain. There was no history of trauma to the head. X-ray pelvis did not show any fracture. For his lower extremity wounds he follows up with wound care and had undergone excision and debridement at that visit. Wound culture was sent a Doppler done was negative for DVT. He says he came to the hospital because he was having lower abdominal pain which is radiating to his left testicle  There was no fever or chills    He does dialysis at [facility] fair Tuesday Thursday Saturday. However he missed HD 3/4/23. Has been on dialysis for about 5 years and he says his kidneys failed because of diabetes mellitus        Interval  History:    3/6/23: pain back of Rt thigh today, got HD earlier     PHYSICAL EXAM:    Vitals:    /67   Pulse 62   Temp 97.6 °F (36.4 °C)   Resp 16   Ht 5' 10\" (1.778 m)   Wt 203 lb 4.2 oz (92.2 kg)   SpO2 93%   BMI 29.17 kg/m²   I/O last 3 completed shifts: In: 120 [P.O.:120]  Out: 0   I/O this shift: In: 400   Out: 2400     Physical Exam:  Gen:  alert, oriented x 3  PERRL , EOM +  Pallor +, No icterus  JVP not raised   CV: RRR no murmur or rub . Lungs:B/ L air entry, Normal breath sounds   Abd: soft, bowel sounds + , No organomegaly , Divarication of recti   Swelling left upper thigh, Umbilical hernia, tender epigatrium   Ext: No edema, no cyanosis, tender area Rt inner  thigh plaque like lesion , multiple erosions/ ulcers   Skin: Warm.   No rash  Neuro: nonfocal.  Rt IJ TDC      DATA:    CBC with Differential: Lab Results   Component Value Date/Time    WBC 9.1 03/06/2023 04:48 AM    RBC 3.39 03/06/2023 04:48 AM    HGB 11.6 03/06/2023 04:48 AM    HCT 35.4 03/06/2023 04:48 AM     03/06/2023 04:48 AM    .6 03/06/2023 04:48 AM    MCH 34.3 03/06/2023 04:48 AM    MCHC 32.8 03/06/2023 04:48 AM    RDW 16.1 03/06/2023 04:48 AM    SEGSPCT 64.9 08/05/2010 03:36 PM    LYMPHOPCT 15.2 03/06/2023 04:48 AM    MONOPCT 10.2 03/06/2023 04:48 AM    EOSPCT 0.9 08/05/2010 03:36 PM    BASOPCT 1.0 03/06/2023 04:48 AM    MONOSABS 0.9 03/06/2023 04:48 AM    LYMPHSABS 1.4 03/06/2023 04:48 AM    EOSABS 0.1 03/06/2023 04:48 AM    BASOSABS 0.1 03/06/2023 04:48 AM    DIFFTYPE Auto-K 08/05/2010 03:36 PM     CMP:    Lab Results   Component Value Date/Time     03/06/2023 04:48 AM    K 5.8 03/06/2023 04:48 AM    CL 98 03/06/2023 04:48 AM    CO2 24 03/06/2023 04:48 AM    BUN 53 03/06/2023 04:48 AM    CREATININE 9.8 03/06/2023 04:48 AM    GFRAA 34 10/14/2013 08:07 PM    GFRAA 43 08/05/2010 03:36 PM    AGRATIO 1.1 03/04/2023 01:44 PM    LABGLOM 5 03/06/2023 04:48 AM    GLUCOSE 81 03/06/2023 04:48 AM    PROT 7.4 03/04/2023 01:44 PM    PROT 7.6 08/05/2010 03:36 PM    LABALBU 3.9 03/04/2023 01:44 PM    CALCIUM 10.2 03/06/2023 04:48 AM    BILITOT 0.3 03/04/2023 01:44 PM    ALKPHOS 68 03/04/2023 01:44 PM    AST 17 03/04/2023 01:44 PM    ALT 12 03/04/2023 01:44 PM     Phosphorus:    Lab Results   Component Value Date/Time    PHOS 6.5 03/06/2023 04:48 AM     Uric Acid:  No results found for: LABURIC, URICACID  Last 3 Troponin:    Lab Results   Component Value Date/Time    TROPONINI 0.40 03/04/2023 10:13 PM    TROPONINI 0.39 03/04/2023 07:16 PM    TROPONINI 0.44 03/04/2023 01:44 PM     U/A:  No results found for: NITRITE, COLORU, PROTEINU, PHUR, LABCAST, [address], RBCUA, MUCUS, TRICHOMONAS, YEAST, BACTERIA, CLARITYU, SPECGRAV, LEUKOCYTESUR, UROBILINOGEN, BILIRUBINUR, BLOODU, GLUCOSEU, AMORPHOUS        IMPRESSION/RECOMMENDATIONS:      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:  [facility]  TTS. Make up HD today , then back on TTS    Anemia of chronic kidney disease  Target hemoglobin 9-11    Renal osteodystrophy  Monitor calcium phosphorus(10.2/6.5), iPTH -p  binder    Recurrent falls  Abdominal pain : CT scan abdomen showed a large hiatal hernia with a small right pleural effusion. .  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 I will continue to follow along. Please call with questions.     Radha Felix MD, MD  3/6/2023  [facility]

## 2023-03-07 ENCOUNTER — HOSPITAL ENCOUNTER (OUTPATIENT)
Dept: WOUND CARE | Age: 83
Discharge: HOME | End: 2023-03-07

## 2023-03-07 ENCOUNTER — APPOINTMENT (OUTPATIENT)
Dept: CT IMAGING | Age: 83
DRG: 592 | End: 2023-03-07
Payer: MEDICARE

## 2023-03-07 LAB
A/G RATIO: 1 (ref 1.1–2.2)
ALBUMIN SERPL-MCNC: 3.2 G/DL (ref 3.4–5)
ALP BLD-CCNC: 53 U/L (ref 40–129)
ALT SERPL-CCNC: 7 U/L (ref 10–40)
ANION GAP SERPL CALCULATED.3IONS-SCNC: 17 MMOL/L (ref 3–16)
APTT: 32.4 SEC (ref 23–34.3)
AST SERPL-CCNC: 14 U/L (ref 15–37)
BASOPHILS ABSOLUTE: 0.1 K/UL (ref 0–0.2)
BASOPHILS RELATIVE PERCENT: 0.7 %
BILIRUB SERPL-MCNC: 0.3 MG/DL (ref 0–1)
BUN BLDV-MCNC: 40 MG/DL (ref 7–20)
CALCIUM SERPL-MCNC: 9.6 MG/DL (ref 8.3–10.6)
CHLORIDE BLD-SCNC: 95 MMOL/L (ref 99–110)
CO2: 24 MMOL/L (ref 21–32)
CREAT SERPL-MCNC: 7.8 MG/DL (ref 0.8–1.3)
EOSINOPHILS ABSOLUTE: 0.1 K/UL (ref 0–0.6)
EOSINOPHILS RELATIVE PERCENT: 1.1 %
GFR SERPL CREATININE-BSD FRML MDRD: 6 ML/MIN/{1.73_M2}
GLUCOSE BLD-MCNC: 88 MG/DL (ref 70–99)
HCT VFR BLD CALC: 38.9 % (ref 40.5–52.5)
HEMOGLOBIN: 11.9 G/DL (ref 13.5–17.5)
INR BLD: 1.15 (ref 0.87–1.14)
LACTIC ACID: 2.1 MMOL/L (ref 0.4–2)
LV EF: 23 %
LVEF MODALITY: NORMAL
LYMPHOCYTES ABSOLUTE: 1.5 K/UL (ref 1–5.1)
LYMPHOCYTES RELATIVE PERCENT: 19.8 %
MAGNESIUM: 2.5 MG/DL (ref 1.8–2.4)
MCH RBC QN AUTO: 33.6 PG (ref 26–34)
MCHC RBC AUTO-ENTMCNC: 30.7 G/DL (ref 31–36)
MCV RBC AUTO: 109.2 FL (ref 80–100)
MONOCYTES ABSOLUTE: 1.1 K/UL (ref 0–1.3)
MONOCYTES RELATIVE PERCENT: 14.7 %
NEUTROPHILS ABSOLUTE: 4.9 K/UL (ref 1.7–7.7)
NEUTROPHILS RELATIVE PERCENT: 63.7 %
PDW BLD-RTO: 16.3 % (ref 12.4–15.4)
PHOSPHORUS: 6.3 MG/DL (ref 2.5–4.9)
PLATELET # BLD: 135 K/UL (ref 135–450)
PMV BLD AUTO: 8.1 FL (ref 5–10.5)
POTASSIUM SERPL-SCNC: 4.7 MMOL/L (ref 3.5–5.1)
PREALBUMIN: 12.6 MG/DL (ref 20–40)
PROTHROMBIN TIME: 14.6 SEC (ref 11.7–14.5)
RBC # BLD: 3.56 M/UL (ref 4.2–5.9)
REASON FOR REJECTION: NORMAL
REJECTED TEST: NORMAL
SODIUM BLD-SCNC: 136 MMOL/L (ref 136–145)
TOTAL CK: 74 U/L (ref 39–308)
TOTAL PROTEIN: 6.5 G/DL (ref 6.4–8.2)
TRANSFERRIN: 126 MG/DL (ref 200–360)
WBC # BLD: 7.7 K/UL (ref 4–11)

## 2023-03-07 PROCEDURE — 84100 ASSAY OF PHOSPHORUS: CPT

## 2023-03-07 PROCEDURE — 85025 COMPLETE CBC W/AUTO DIFF WBC: CPT

## 2023-03-07 PROCEDURE — 2580000003 HC RX 258: Performed by: FAMILY MEDICINE

## 2023-03-07 PROCEDURE — 6360000002 HC RX W HCPCS: Performed by: INTERNAL MEDICINE

## 2023-03-07 PROCEDURE — 85610 PROTHROMBIN TIME: CPT

## 2023-03-07 PROCEDURE — 83605 ASSAY OF LACTIC ACID: CPT

## 2023-03-07 PROCEDURE — 99222 1ST HOSP IP/OBS MODERATE 55: CPT | Performed by: SURGERY

## 2023-03-07 PROCEDURE — 80053 COMPREHEN METABOLIC PANEL: CPT

## 2023-03-07 PROCEDURE — 84466 ASSAY OF TRANSFERRIN: CPT

## 2023-03-07 PROCEDURE — 93306 TTE W/DOPPLER COMPLETE: CPT

## 2023-03-07 PROCEDURE — 99233 SBSQ HOSP IP/OBS HIGH 50: CPT | Performed by: INTERNAL MEDICINE

## 2023-03-07 PROCEDURE — 6370000000 HC RX 637 (ALT 250 FOR IP): Performed by: FAMILY MEDICINE

## 2023-03-07 PROCEDURE — 1200000000 HC SEMI PRIVATE

## 2023-03-07 PROCEDURE — 83735 ASSAY OF MAGNESIUM: CPT

## 2023-03-07 PROCEDURE — APPNB30 APP NON BILLABLE TIME 0-30 MINS: Performed by: NURSE PRACTITIONER

## 2023-03-07 PROCEDURE — 6360000002 HC RX W HCPCS: Performed by: FAMILY MEDICINE

## 2023-03-07 PROCEDURE — 73701 CT LOWER EXTREMITY W/DYE: CPT

## 2023-03-07 PROCEDURE — 36415 COLL VENOUS BLD VENIPUNCTURE: CPT

## 2023-03-07 PROCEDURE — 82550 ASSAY OF CK (CPK): CPT

## 2023-03-07 PROCEDURE — 6370000000 HC RX 637 (ALT 250 FOR IP): Performed by: INTERNAL MEDICINE

## 2023-03-07 PROCEDURE — 6360000004 HC RX CONTRAST MEDICATION: Performed by: INTERNAL MEDICINE

## 2023-03-07 PROCEDURE — 85730 THROMBOPLASTIN TIME PARTIAL: CPT

## 2023-03-07 PROCEDURE — 72192 CT PELVIS W/O DYE: CPT

## 2023-03-07 PROCEDURE — 2580000003 HC RX 258: Performed by: INTERNAL MEDICINE

## 2023-03-07 PROCEDURE — 84134 ASSAY OF PREALBUMIN: CPT

## 2023-03-07 RX ORDER — CIPROFLOXACIN 500 MG/1
500 TABLET, FILM COATED ORAL
Status: DISCONTINUED | OUTPATIENT
Start: 2023-03-07 | End: 2023-03-12 | Stop reason: HOSPADM

## 2023-03-07 RX ADMIN — SILVER SULFADIAZINE: 10 CREAM TOPICAL at 14:02

## 2023-03-07 RX ADMIN — ALLOPURINOL 100 MG: 100 TABLET ORAL at 08:51

## 2023-03-07 RX ADMIN — CIPROFLOXACIN 500 MG: 500 TABLET, FILM COATED ORAL at 16:11

## 2023-03-07 RX ADMIN — SEVELAMER CARBONATE 800 MG: 800 TABLET, FILM COATED ORAL at 08:48

## 2023-03-07 RX ADMIN — NEPHROCAP 1 MG: 1 CAP ORAL at 08:48

## 2023-03-07 RX ADMIN — IOPAMIDOL 75 ML: 755 INJECTION, SOLUTION INTRAVENOUS at 10:09

## 2023-03-07 RX ADMIN — MIDODRINE HYDROCHLORIDE 2.5 MG: 5 TABLET ORAL at 08:49

## 2023-03-07 RX ADMIN — SEVELAMER CARBONATE 800 MG: 800 TABLET, FILM COATED ORAL at 16:11

## 2023-03-07 RX ADMIN — METOPROLOL TARTRATE 12.5 MG: 25 TABLET, FILM COATED ORAL at 08:49

## 2023-03-07 RX ADMIN — VANCOMYCIN HYDROCHLORIDE 1750 MG: 10 INJECTION, POWDER, LYOPHILIZED, FOR SOLUTION INTRAVENOUS at 17:39

## 2023-03-07 RX ADMIN — HEPARIN SODIUM 5000 UNITS: 5000 INJECTION INTRAVENOUS; SUBCUTANEOUS at 16:11

## 2023-03-07 RX ADMIN — METOPROLOL TARTRATE 12.5 MG: 25 TABLET, FILM COATED ORAL at 21:29

## 2023-03-07 RX ADMIN — LEVOTHYROXINE SODIUM 50 MCG: 0.03 TABLET ORAL at 06:07

## 2023-03-07 RX ADMIN — LINEZOLID 600 MG: 600 TABLET, FILM COATED ORAL at 08:49

## 2023-03-07 RX ADMIN — TRAMADOL HYDROCHLORIDE 50 MG: 50 TABLET, COATED ORAL at 00:33

## 2023-03-07 RX ADMIN — MIDODRINE HYDROCHLORIDE 2.5 MG: 5 TABLET ORAL at 16:13

## 2023-03-07 RX ADMIN — CEFTRIAXONE SODIUM 1000 MG: 1 INJECTION, POWDER, FOR SOLUTION INTRAMUSCULAR; INTRAVENOUS at 06:06

## 2023-03-07 ASSESSMENT — PAIN SCALES - WONG BAKER
WONGBAKER_NUMERICALRESPONSE: 0

## 2023-03-07 ASSESSMENT — ENCOUNTER SYMPTOMS
EYE DISCHARGE: 0
SORE THROAT: 0
RHINORRHEA: 0
BACK PAIN: 0
EYE REDNESS: 0
DIARRHEA: 0
CONSTIPATION: 0
ABDOMINAL PAIN: 0
SINUS PRESSURE: 0
NAUSEA: 0
WHEEZING: 0
COUGH: 0
SINUS PAIN: 0
SHORTNESS OF BREATH: 0

## 2023-03-07 ASSESSMENT — PAIN DESCRIPTION - ORIENTATION: ORIENTATION: LEFT

## 2023-03-07 ASSESSMENT — PAIN SCALES - GENERAL
PAINLEVEL_OUTOF10: 0
PAINLEVEL_OUTOF10: 0
PAINLEVEL_OUTOF10: 7
PAINLEVEL_OUTOF10: 0

## 2023-03-07 ASSESSMENT — PAIN DESCRIPTION - DESCRIPTORS: DESCRIPTORS: ACHING;STABBING;THROBBING

## 2023-03-07 ASSESSMENT — PAIN DESCRIPTION - LOCATION: LOCATION: HIP

## 2023-03-07 NOTE — CONSULTS
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 +------------------------+----------+---------------+----------+ ! Peroneal                !Yes       ! Yes            ! None      ! +------------------------+----------+---------------+----------+ ! GSV Calf                ! Yes       ! Yes            ! None      ! +------------------------+----------+---------------+----------+ ! SSV                     ! Yes       ! Yes            ! None      ! +------------------------+----------+---------------+----------+ Right Doppler Measurements +------------------------+---------+------+------------+ ! Location                ! Signal   !Reflux! Reflux (sec)! +------------------------+---------+------+------------+ ! Sapheno Femoral Junction! Pulsatile! No    !            ! +------------------------+---------+------+------------+ ! Common Femoral          !Pulsatile! No    !            ! +------------------------+---------+------+------------+ ! Femoral                 !Pulsatile! No    !            ! +------------------------+---------+------+------------+ !Prox Femoral            !Pulsatile! No    !            ! +------------------------+---------+------+------------+ ! Mid Femoral             !Pulsatile! No    !            ! +------------------------+---------+------+------------+ ! Dist Femoral            !Pulsatile! No    !            ! +------------------------+---------+------+------------+ ! Deep Femoral            !Pulsatile! No    !            ! +------------------------+---------+------+------------+ ! Popliteal               !Pulsatile! No    !            ! +------------------------+---------+------+------------+ Left Lower Extremities DVT Study Measurements Left 2D Measurements +------------------------+----------+---------------+----------+ ! Location                ! Visualized! Compressibility! Thrombosis! +------------------------+----------+---------------+----------+ ! Sapheno Femoral Junction! Yes       ! Yes            ! None      ! +------------------------+----------+---------------+----------+ ! GSV Thigh               ! Yes       ! Yes            ! None      ! +------------------------+----------+---------------+----------+ ! Common Femoral          !Yes       ! Yes            ! None      ! +------------------------+----------+---------------+----------+ ! Femoral                 !Yes       ! Yes            ! None      ! +------------------------+----------+---------------+----------+ ! Prox Femoral            !Yes       ! Yes            ! None      ! +------------------------+----------+---------------+----------+ ! Mid Femoral             !Yes       ! Yes            ! None      ! +------------------------+----------+---------------+----------+ ! Dist Femoral            !Yes       ! Yes            ! None      ! +------------------------+----------+---------------+----------+ ! Deep Femoral            !Yes       ! Yes            ! None      ! +------------------------+----------+---------------+----------+ ! Popliteal               !Yes       ! Yes            ! None      ! +------------------------+----------+---------------+----------+ ! GSV Below Knee          ! Yes       ! Yes            ! None      ! +------------------------+----------+---------------+----------+ ! Gastroc                 ! Yes       ! Yes            ! None      ! +------------------------+----------+---------------+----------+ ! Soleal                  !Yes       ! Yes            ! None      ! +------------------------+----------+---------------+----------+ ! PTV                     ! Yes       ! Yes            ! None      ! +------------------------+----------+---------------+----------+ ! Peroneal                !Yes       ! Yes            ! None      ! +------------------------+----------+---------------+----------+ ! GSV Calf                ! Yes       ! Yes            ! None      ! +------------------------+----------+---------------+----------+ ! SSV                     ! Yes       ! Yes            ! None      ! +------------------------+----------+---------------+----------+ Left Doppler Measurements +--------------+---------+------+------------+ ! Location      ! Signal   !Reflux! Reflux (sec)! +--------------+---------+------+------------+ ! Common Femoral!Pulsatile! No    !            ! +--------------+---------+------+------------+    US DUP ABD PEL RETRO SCROT COMPLETE    Result Date: 3/6/2023  EXAMINATION: ULTRASOUND OF THE SCROTUM/TESTICLES TECHNIQUE: Duplex ultrasound using B-mode/gray scaled imaging, Doppler spectral analysis and color flow Doppler was obtained of the testicles. COMPARISON: Correlation with CT from yesterday HISTORY: Acute left scrotal pain. FINDINGS: Measurements: Right Testicle: 3.8 x 2.4 x 1.8 cm Left Testicle: 3.0 x 1.7 x 1.7 cm Right: Grey Scale: The right testicle demonstrates normal homogeneous echotexture without focal lesion. No testicular microlithiasis. Doppler Evaluation: Normal arterial and venous Doppler flow within the testicle. Scrotal Sac: No hydrocele. Epididymis: No acute abnormality.  Left: Grey Scale: The left testicle demonstrates normal homogeneous echotexture without focal lesion. No testicular microlithiasis. Doppler Evaluation: Normal arterial and venous Doppler flow within the testicle. Scrotal Sac: No hydrocele. Epididymis: No acute abnormality. Unremarkable testicular ultrasound with normal Doppler flow. Cultures:  Relevant cultures documented under results section     Assessment:  Patient Active Problem List   Diagnosis    Hyperlipidemia    Essential hypertension    Localized edema    Fracture of sternum    Fractured rib    Prostate cancer (HCC)    Thrombus    Chronic renal disease    Trigger finger    CTS (carpal tunnel syndrome)    Venous insufficiency of both lower extremities    Venous ulcer of right lower extremity without varicose veins (HCC)    Non-pressure chronic ulcer of lower leg, limited to breakdown of skin, right (HCC)    Laceration of skin of lower leg, left, initial encounter    Laceration of skin of lower leg, right, initial encounter    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 with fat layer exposed ([address]    Non-pressure chronic ulcer of left lower leg with fat layer exposed ([address]    Open wound of right great toe with damage to nail    Right foot ulcer, with fat layer exposed ([address]    Ulcer of toe of right foot, with necrosis of bone (HCC)    Pressure ulcer of left hip, unstageable (HCC)    Generalized weakness    Frequent falls    Chronic pain in testicle    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 ([address]    Pressure injury of skin of left hip    Skin ulcer of multiple sites ([address]    Unable to ambulate    Type 2 diabetes mellitus with other specified complication ([address]    History of prostate cancer    Overweight (BMI 25.0-29. 9)     Right thigh scab  Multiple lower leg wounds  Multiple recent falls  ESRD on HD  Osteoarthritis  Spinal abnormality, \"Moderate to severe disc space narrowing and vacuum disc phenomenon at L5-S1\"  Moderate acute and chronic malnutrition    Plan:  1. Right thigh does not have a wound, pain in this location is not related to the scab. Does not need intervention  2. Remainder of lower leg wounds evaluated in wound clinic are chronic, not causing symptoms and do not need acute intervention  3. Pain likely related to osteoarthritis of hip or spinal findings on recent CT, defer to ortho/spine as needed  4. Pressure relieving maneuvers, frequent turning, activity as able to minimize risk of decubitus ulcers  5. Antibiotics per ID  6. Encourage PO intake, evidence of malnutrition on labs today and nutrition is critical for wound healing  7. Defer management of remainder of medical comorbidities to primary and consulting teams    This plan was discussed at length with the patient. He was understanding and in agreement with the plan  Thank you for the consult and allowing me to participate in the care of this patient. I look forward to following him this admission    Chuy Roberson MD, FACS  3/7/2023  1:50 PM

## 2023-03-07 NOTE — CONSULTS
Clinical Pharmacy Note: Pharmacy to Dose Vancomycin    Mirlande Hamilton is a 79 y.o. male started on Vancomycin for Bone/Joint infection; consult received from Dr. Luis Lombardi to manage therapy. Also receiving the following antibiotics: cipro. Goal AUC: 400-600 mg/L*hr    Assessment/Plan:  A 1750 mg loading dose was given on 3/7 at 1700  HD patient: intermittent dosing. A vancomycin random level has been ordered for 3/8 at 0600. Changes in regimen will be determined based on culture results, renal function, and clinical response. Pharmacy will continue to monitor and adjust regimen as necessary. Allergies:  Oxycodone     Recent Labs     03/06/23  0448 03/07/23  0858   CREATININE 9.8* 7.8*       Recent Labs     03/06/23  0448 03/07/23  0551   WBC 9.1 7.7       Ht Readings from Last 1 Encounters:   03/04/23 5' 10\" (1.778 m)        Wt Readings from Last 1 Encounters:   03/06/23 203 lb 4.2 oz (92.2 kg)         Estimated Creatinine Clearance: 8 mL/min (A) (based on SCr of 7.8 mg/dL [facility])).       Thank you for the consult,    Jen Szymanski, [facility]

## 2023-03-07 NOTE — CARE COORDINATION
Discharge Planning Note:    Talked with Vazquez Bains, Spouse. Spouse requested a referral to be placed:    [address] - waiting on response    Will continue to follow.     ANGELA CejaN RN    [facility]  Phone: [phone]

## 2023-03-07 NOTE — PROGRESS NOTES
Patient refused dialysis today education provided on the effects of not going to dialysis,pt's wife notified   Dr Cecy Terrell notified via [employer].
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
## 2023-03-07 NOTE — PROGRESS NOTES
[facility]   Nephrology progress Note  [phone]   [facility]           Reason for Consult:  ESRD on HD   The patient is a 79 y.o. male with significant past medical history of 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, hypertension, peripheral vascular disease with a chronic ulcer of the lower leg, anemia, renal osteodystrophy, has had history of recurrent falls. 1 date of admission 3/4/2023 he fell on his left side and complained of left hip pain. There was no history of trauma to the head. X-ray pelvis did not show any fracture. For his lower extremity wounds he follows up with wound care and had undergone excision and debridement at that visit. Wound culture was sent a Doppler done was negative for DVT. He says he came to the hospital because he was having lower abdominal pain which is radiating to his left testicle  There was no fever or chills    He does dialysis at [facility] fair Tuesday Thursday Saturday. However he missed HD 3/4/23. Has been on dialysis for about 5 years and he says his kidneys failed because of diabetes mellitus        Interval  History:    3/6/23: pain back of Rt thigh today, got HD earlier   3/7/23: B/ L upper scrotum pain     PHYSICAL EXAM:    Vitals:    BP (!) 165/90   Pulse 75   Temp 98.7 °F (37.1 °C) (Oral)   Resp 18   Ht 5' 10\" (1.778 m)   Wt 203 lb 4.2 oz (92.2 kg)   SpO2 95%   BMI 29.17 kg/m²   I/O last 3 completed shifts: In: 1 [P.O.:420; I.V.:10]  Out: 2400   No intake/output data recorded. Physical Exam:  Gen:  alert, oriented x 3  PERRL , EOM +  Pallor +, No icterus  JVP not raised   CV: RRR no murmur or rub . Lungs:B/ L air entry, Normal breath sounds   Abd: soft, bowel sounds + , No organomegaly , Divarication of recti   Swelling left upper thigh, Umbilical hernia, tender epigatrium   ?  Epididymitis ( tender B/ L )   Ext: No edema, no cyanosis, tender area Rt inner  thigh plaque like lesion , multiple erosions/ ulcers Skin: Warm.   No rash  Neuro: nonfocal.  Rt IJ TDC      DATA:    CBC with Differential:    Lab Results   Component Value Date/Time    WBC 7.7 03/07/2023 05:51 AM    RBC 3.56 03/07/2023 05:51 AM    HGB 11.9 03/07/2023 05:51 AM    HCT 38.9 03/07/2023 05:51 AM     03/07/2023 05:51 AM    .2 03/07/2023 05:51 AM    MCH 33.6 03/07/2023 05:51 AM    MCHC 30.7 03/07/2023 05:51 AM    RDW 16.3 03/07/2023 05:51 AM    SEGSPCT 64.9 08/05/2010 03:36 PM    LYMPHOPCT 19.8 03/07/2023 05:51 AM    MONOPCT 14.7 03/07/2023 05:51 AM    EOSPCT 0.9 08/05/2010 03:36 PM    BASOPCT 0.7 03/07/2023 05:51 AM    MONOSABS 1.1 03/07/2023 05:51 AM    LYMPHSABS 1.5 03/07/2023 05:51 AM    EOSABS 0.1 03/07/2023 05:51 AM    BASOSABS 0.1 03/07/2023 05:51 AM    DIFFTYPE Auto-K 08/05/2010 03:36 PM     CMP:    Lab Results   Component Value Date/Time     03/07/2023 08:58 AM    K 4.7 03/07/2023 08:58 AM    CL 95 03/07/2023 08:58 AM    CO2 24 03/07/2023 08:58 AM    BUN 40 03/07/2023 08:58 AM    CREATININE 7.8 03/07/2023 08:58 AM    GFRAA 34 10/14/2013 08:07 PM    GFRAA 43 08/05/2010 03:36 PM    AGRATIO 1.0 03/07/2023 08:58 AM    LABGLOM 6 03/07/2023 08:58 AM    GLUCOSE 88 03/07/2023 08:58 AM    PROT 6.5 03/07/2023 08:58 AM    PROT 7.6 08/05/2010 03:36 PM    LABALBU 3.2 03/07/2023 08:58 AM    CALCIUM 9.6 03/07/2023 08:58 AM    BILITOT 0.3 03/07/2023 08:58 AM    ALKPHOS 53 03/07/2023 08:58 AM    AST 14 03/07/2023 08:58 AM    ALT 7 03/07/2023 08:58 AM     Phosphorus:    Lab Results   Component Value Date/Time    PHOS 6.3 03/07/2023 08:58 AM     Uric Acid:  No results found for: LABURIC, URICACID  Last 3 Troponin:    Lab Results   Component Value Date/Time    TROPONINI 0.40 03/04/2023 10:13 PM    TROPONINI 0.39 03/04/2023 07:16 PM    TROPONINI 0.44 03/04/2023 01:44 PM     U/A:  No results found for: NITRITE, COLORU, PROTEINU, PHUR, LABCAST, WBCUA, RBCUA, MUCUS, TRICHOMONAS, YEAST, BACTERIA, CLARITYU, SPECGRAV, LEUKOCYTESUR, UROBILINOGEN, Dianna Ontiveros GLUCOSEU, AMORPHOUS        IMPRESSION/RECOMMENDATIONS:      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:  [facility]  TTS    Anemia of chronic kidney disease  Target hemoglobin 9-11  On retacrit     Renal osteodystrophy  Monitor calcium phosphorus(9.66.3), iPTH -72  binder    Recurrent falls  Abdominal pain : CT scan abdomen showed a large hiatal hernia with a small right pleural effusion. .  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 I will continue to follow along. Please call with questions.     Daily Kaye MD, MD  3/7/2023  [facility]
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
## 2023-03-07 NOTE — PROGRESS NOTES
Hospitalist Progress Note      PCP: Ishaan Quigley MD    Date of Admission: 3/4/2023    Chief Complaint: Right groin testicular pain    Hospital Course  Still having some right groin pain no pain on movement of the hip bursa  No chest pain no fever    Medications:  Reviewed      Exam:    BP (!) 160/87   Pulse 78   Temp 98.7 °F (37.1 °C) (Oral)   Resp 18   Ht 5' 10\" (1.778 m)   Wt 203 lb 4.2 oz (92.2 kg)   SpO2 95%   BMI 29.17 kg/m²     General appearance: No apparent distress, appears stated age and cooperative. HEENT: Pupils equal, round, and reactive to light. Conjunctivae/corneas clear. Neck: Supple, with full range of motion. No jugular venous distention. Trachea midline. Respiratory:  Normal respiratory effort. Clear to auscultation, bilaterally without RALES/WHEEZES/Rhonchi. Cardiovascular: Regular rate and rhythm with normal S1/S2 without MURMURS, rubs or gallops. Abdomen: Soft, non-tender, non-distended with normal bowel sounds. Musculoskeletal: No clubbing, cyanosis or EDEMA bilaterally. Full range of motion without deformity. Skin: Right inner thigh thickening of skin eschar formation with some tenderness but no surrounding erythema  Some tenderness of the right testes but no obvious swelling erythema  Multiple superficial ulcerations on the right lower extremity and the right toes  Neurologic:  Neurovascularly intact without any focal sensory/motor deficits.  Cranial nerves: II-XII intact, grossly non-focal.  Physical exam remains unchanged from 3/6    Labs:   Recent Labs     03/05/23 0528 03/06/23 0448 03/07/23  0551   WBC 9.5 9.1 7.7   HGB 10.8* 11.6* 11.9*   HCT 33.8* 35.4* 38.9*    212 135       Recent Labs     03/05/23  0528 03/06/23  0448 03/07/23  0858    141 136   K 5.1 5.8* 4.7    98* 95*   CO2 22 24 24   BUN 43* 53* 40*   CREATININE 8.3* 9.8* 7.8*   CALCIUM 10.2 10.2 9.6   PHOS  --  6.5* 6.3*       Recent Labs     03/07/23  0858   AST 14*   ALT 7*   BILITOT 0.3   ALKPHOS 53       Recent Labs     03/07/23  0551   INR 1.15*     Recent Labs     03/04/23  1916 03/04/23  2213 03/07/23  0858   CKTOTAL  --   --  74   TROPONINI 0.39* 0.40*  --          Urinalysis:    No results found for: NITRU, WBCUA, BACTERIA, RBCUA, BLOODU, SPECGRAV, GLUCOSEU    Radiology:  CT FEMUR RIGHT W CONTRAST   Final Result   CT pelvis:      1. Wall thickening of the rectum and sigmoid colon which could represent   proctitis or colitis or sequela of radiation.  Underlying malignancy not   excluded.   2. Mild bilateral hip osteoarthrosis.   3. No acute osseous abnormality.   If pain persists, consider further evaluation with MRI assuming there are no   contraindications to MRI.   4. Fat stranding and irregularity of the collapsed urinary bladder.  This can   be seen with a cystitis.   5. Small fat containing bilateral inguinal hernias.   6. Radiation seeds in the prostate.   7. Atherosclerotic calcification of the aorta and iliac branch vasculature.   8. Moderate to severe disc space narrowing and vacuum disc phenomenon at   L5-S1.   CT right femur:      1. Wall thickening of the rectum and sigmoid colon which could represent a   proctitis/colitis or sequela of radiation.  Underlying malignancy not   excluded.   2. Fat stranding and irregularity of the urinary bladder which can be seen   with cystitis.   3. Mild edema in the subcutaneous fat about the right thigh.   4. No acute osseous abnormality.  If pain persists, consider further   evaluation with MRI assuming there are no contraindications to MRI.   5. Mild right hip osteoarthrosis.   6. No organized fluid collection.         CT PELVIS WO CONTRAST Additional Contrast? None   Final Result   CT pelvis:      1. Wall thickening of the rectum and sigmoid colon which could represent   proctitis or colitis or sequela of radiation.  Underlying malignancy not   excluded.   2. Mild bilateral hip osteoarthrosis.   3. No acute osseous abnormality.  If pain persists, consider further evaluation with MRI assuming there are no   contraindications to MRI. 4. Fat stranding and irregularity of the collapsed urinary bladder. This can   be seen with a cystitis. 5. Small fat containing bilateral inguinal hernias. 6. Radiation seeds in the prostate. 7. Atherosclerotic calcification of the aorta and iliac branch vasculature. 8. Moderate to severe disc space narrowing and vacuum disc phenomenon at   L5-S1. CT right femur:      1. Wall thickening of the rectum and sigmoid colon which could represent a   proctitis/colitis or sequela of radiation. Underlying malignancy not   excluded. 2. Fat stranding and irregularity of the urinary bladder which can be seen   with cystitis. 3. Mild edema in the subcutaneous fat about the right thigh. 4. No acute osseous abnormality. If pain persists, consider further   evaluation with MRI assuming there are no contraindications to MRI. 5. Mild right hip osteoarthrosis. 6. No organized fluid collection. US DUP ABD PEL RETRO SCROT COMPLETE   Final Result   Unremarkable testicular ultrasound with normal Doppler flow. US SCROTUM AND TESTICLES   Final Result   Unremarkable testicular ultrasound with normal Doppler flow. CT ABDOMEN PELVIS WO CONTRAST Additional Contrast? None   Final Result   Large hiatal hernia with small right pleural effusion. XR PELVIS (1-2 VIEWS)   Final Result   Osteopenia. No evident fracture. Given the degree of osteopenia, nondisplaced fractures may be   radiographically occult. If pain or concern for fracture persists, consider   MR imaging. XR CHEST PORTABLE   Final Result   No acute process.       Stable cardiomegaly      Bibasilar hypoaeration             Assessment/Plan:    Active Hospital Problems    Diagnosis Date Noted    Frequent falls [R29.6] 03/06/2023     Priority: Medium    Chronic pain in testicle [N50.819, G89.29] 03/06/2023 Priority: Medium    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 ([facility] [N18.6, Z99.2] 03/06/2023     Priority: Medium    Pressure injury of skin of left hip [L89.229] 03/06/2023     Priority: Medium    Skin ulcer of multiple sites [facility]) [L98.499] 03/06/2023     Priority: Medium    Unable to ambulate [R26.2] 03/06/2023     Priority: Medium    Type 2 diabetes mellitus with other specified complication ([facility] [Y86.04] 03/06/2023     Priority: Medium    History of prostate cancer [Z85.46] 03/06/2023     Priority: Medium    Overweight (BMI 25.0-29. 9) [E66.3] 03/06/2023     Priority: Medium    General weakness [R53.1] 03/04/2023     Priority: Medium    Essential hypertension [I10]        Acute Medical Issues Being Addressed:    79year-old admitted to the hospital with multiple ulcerations and fall    Multiple lower extremity wounds s/p debridement  Wound cultures grew out MSSA and Enterobacter but currently bacterium  We will continue IV ceftriaxone although I am not clear that there is an infection  We will get ID to help with antibiotics  Continue local wound care    Stage II decubitus ulcer present on admission  Noted on recent wound care visit    Recurrent falls  No loss of consciousness  Overall debility    Subacute/chronic right groin testicular pain  Unclear etiology of the pain  May be from the right hip or the back  So far urological work-up including ultrasound is all negative  CT of the pelvis CT of the right femur was also negative  Seen by general surgery and urology    6 some right other etiologies    End-stage renal disease on hemodialysis    Hypertension  On metoprolol 12.5 twice daily    Abnormal CT abdomen pelvis with thickening of the rectum and sigmoid  Seen by GI  C. difficile if that is negative then will need colonoscopy    Chronic severe systolic heart failure  EF around 25%  Last EF 2022 was around 35%  Currently seems euvolemic    DVT Prophylaxis: Subcu heparin  Diet: ADULT DIET;  Regular  Code Status: Full Code      Dispo - once acute medical processes have resolved    Raven Kendall MD

## 2023-03-07 NOTE — PROGRESS NOTES
Urology Progress Note  [facility]    Provider: Wesley Santa APRN - CNP  Patient ID:  Admission Date: 3/4/2023 Name: Nadir Brenner Date: 3/4/2023 MRN: [id number]   Patient Location: [id number] : [id number]  Attending: Radha Sequeira MD Date of Service: 3/7/2023  PCP: Crystal Johnson MD     Diagnoses:  1. Frequent falls    2. General weakness    3. Unable to ambulate    4. Pressure injury of skin of left hip, unspecified injury stage    5. Skin ulcer of multiple sites, unspecified ulcer stage ([facility] [address]    6. 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 (HCC)         Assessment/Plan:  Left>right  groin/testicle pain, stated left sided pain seems to be radiating from LEFT  shotty lymph nodes noted on LEFT thigh. Seen in clinic by Dr. Giulia Alfaro for LEFT testi pain 2023, unremarkable exam and workup. He does have RIGHT eschar and induration of the skin but no erythema, abscess,  or signs of infections. Point tenderness is noted at this location, non-radiating. Bilateral testicles are exquisitely tender without signs of infection. Scrotal US was negative. No signs of cellulitis or abscess   Would need treated with abx if this were to develop  Monitor for now, follows with Dr. Giulia Alfaro       The patient had a chance to ask questions which were answered. he understands the above plan. Subjective:   Tom Ugalde is a 79 y.o. male. He was seen and examined this morning. Today we discussed bl testicle pain. Objective:   Vitals:  Vitals:    23 0830   BP: (!) 165/90   Pulse: 75   Resp: 18   Temp: 98.7 °F (37.1 °C)   SpO2:        Intake/Output Summary (Last 24 hours) at 3/7/2023 0935  Last data filed at 3/7/2023 0602  Gross per 24 hour   Intake 770 ml   Output 2400 ml   Net -1630 ml     Physical Exam:  Gen: Alert and oriented x3, no acute distress  : Left>right  groin/testicle pain, stated left sided pain seems to be radiating from LEFT  shotty lymph nodes noted on thigh. Seen in clinic by Dr. Natalia Rendon for LEFT testi pain January 2023, unremarkable exam and workup. He does have RIGHT eschar and induration of the skin but no erythema, abscess,  or signs of infections. Point tenderness is noted at this location, non-radiating. Bilateral testicles are exquisitely tender without signs of infection. Scrotal US was negative. Skin: warmand well perfused, no rashes noted on the face, or arms.      Labs:  Lab Results   Component Value Date    WBC 7.7 03/07/2023    HGB 11.9 (L) 03/07/2023    HCT 38.9 (L) 03/07/2023    .2 (H) 03/07/2023     03/07/2023     Lab Results   Component Value Date    CREATININE 9.8 (HH) 03/06/2023    BUN 53 (H) 03/06/2023     03/06/2023    K 5.8 (H) 03/06/2023    CL 98 (L) 03/06/2023    CO2 24 03/06/2023       Ivan Nagel, APRN - CNP   3/7/2023

## 2023-03-08 PROBLEM — Z79.2 RECEIVING INTRAVENOUS ANTIBIOTIC TREATMENT AS OUTPATIENT: Status: ACTIVE | Noted: 2023-03-08

## 2023-03-08 PROBLEM — Z71.89 COMPLEX CARE COORDINATION: Status: ACTIVE | Noted: 2023-03-08

## 2023-03-08 LAB
ANION GAP SERPL CALCULATED.3IONS-SCNC: 22 MMOL/L (ref 3–16)
BASOPHILS ABSOLUTE: 0 K/UL (ref 0–0.2)
BASOPHILS RELATIVE PERCENT: 0.6 %
BLOOD CULTURE, ROUTINE: NORMAL
BUN BLDV-MCNC: 47 MG/DL (ref 7–20)
C DIFF TOXIN/ANTIGEN: NORMAL
CALCIUM SERPL-MCNC: 9.4 MG/DL (ref 8.3–10.6)
CHLORIDE BLD-SCNC: 94 MMOL/L (ref 99–110)
CO2: 17 MMOL/L (ref 21–32)
CREAT SERPL-MCNC: 9.7 MG/DL (ref 0.8–1.3)
CULTURE, BLOOD 2: NORMAL
EOSINOPHILS ABSOLUTE: 0.1 K/UL (ref 0–0.6)
EOSINOPHILS RELATIVE PERCENT: 1.1 %
GFR SERPL CREATININE-BSD FRML MDRD: 5 ML/MIN/{1.73_M2}
GLUCOSE BLD-MCNC: 83 MG/DL (ref 70–99)
HBV SURFACE AB TITR SER: 110.5 MIU/ML
HCT VFR BLD CALC: 37.8 % (ref 40.5–52.5)
HEMOGLOBIN: 11.9 G/DL (ref 13.5–17.5)
HEPATITIS B SURFACE ANTIGEN INTERPRETATION: NORMAL
LYMPHOCYTES ABSOLUTE: 1.2 K/UL (ref 1–5.1)
LYMPHOCYTES RELATIVE PERCENT: 15.3 %
MCH RBC QN AUTO: 33.2 PG (ref 26–34)
MCHC RBC AUTO-ENTMCNC: 31.5 G/DL (ref 31–36)
MCV RBC AUTO: 105.4 FL (ref 80–100)
MONOCYTES ABSOLUTE: 1 K/UL (ref 0–1.3)
MONOCYTES RELATIVE PERCENT: 13 %
NEUTROPHILS ABSOLUTE: 5.4 K/UL (ref 1.7–7.7)
NEUTROPHILS RELATIVE PERCENT: 70 %
PDW BLD-RTO: 16.1 % (ref 12.4–15.4)
PLATELET # BLD: 169 K/UL (ref 135–450)
PMV BLD AUTO: 8.5 FL (ref 5–10.5)
POTASSIUM SERPL-SCNC: 4.8 MMOL/L (ref 3.5–5.1)
RBC # BLD: 3.58 M/UL (ref 4.2–5.9)
SODIUM BLD-SCNC: 133 MMOL/L (ref 136–145)
VANCOMYCIN RANDOM: 14.7 UG/ML
WBC # BLD: 7.7 K/UL (ref 4–11)

## 2023-03-08 PROCEDURE — APPNB30 APP NON BILLABLE TIME 0-30 MINS: Performed by: NURSE PRACTITIONER

## 2023-03-08 PROCEDURE — 87328 CRYPTOSPORIDIUM AG IA: CPT

## 2023-03-08 PROCEDURE — 86704 HEP B CORE ANTIBODY TOTAL: CPT

## 2023-03-08 PROCEDURE — 90935 HEMODIALYSIS ONE EVALUATION: CPT

## 2023-03-08 PROCEDURE — 87324 CLOSTRIDIUM AG IA: CPT

## 2023-03-08 PROCEDURE — 2580000003 HC RX 258: Performed by: INTERNAL MEDICINE

## 2023-03-08 PROCEDURE — 87340 HEPATITIS B SURFACE AG IA: CPT

## 2023-03-08 PROCEDURE — 86706 HEP B SURFACE ANTIBODY: CPT

## 2023-03-08 PROCEDURE — 99231 SBSQ HOSP IP/OBS SF/LOW 25: CPT | Performed by: SURGERY

## 2023-03-08 PROCEDURE — 1200000000 HC SEMI PRIVATE

## 2023-03-08 PROCEDURE — 85025 COMPLETE CBC W/AUTO DIFF WBC: CPT

## 2023-03-08 PROCEDURE — APPSS15 APP SPLIT SHARED TIME 0-15 MINUTES: Performed by: NURSE PRACTITIONER

## 2023-03-08 PROCEDURE — 6370000000 HC RX 637 (ALT 250 FOR IP): Performed by: INTERNAL MEDICINE

## 2023-03-08 PROCEDURE — 6360000002 HC RX W HCPCS: Performed by: PHYSICIAN ASSISTANT

## 2023-03-08 PROCEDURE — 6360000002 HC RX W HCPCS: Performed by: INTERNAL MEDICINE

## 2023-03-08 PROCEDURE — 80202 ASSAY OF VANCOMYCIN: CPT

## 2023-03-08 PROCEDURE — 6370000000 HC RX 637 (ALT 250 FOR IP): Performed by: FAMILY MEDICINE

## 2023-03-08 PROCEDURE — 87336 ENTAMOEB HIST DISPR AG IA: CPT

## 2023-03-08 PROCEDURE — 80048 BASIC METABOLIC PNL TOTAL CA: CPT

## 2023-03-08 PROCEDURE — 36415 COLL VENOUS BLD VENIPUNCTURE: CPT

## 2023-03-08 PROCEDURE — 6360000002 HC RX W HCPCS: Performed by: FAMILY MEDICINE

## 2023-03-08 PROCEDURE — 2580000003 HC RX 258: Performed by: FAMILY MEDICINE

## 2023-03-08 PROCEDURE — 99233 SBSQ HOSP IP/OBS HIGH 50: CPT | Performed by: INTERNAL MEDICINE

## 2023-03-08 PROCEDURE — 87506 IADNA-DNA/RNA PROBE TQ 6-11: CPT

## 2023-03-08 RX ORDER — HYDRALAZINE HYDROCHLORIDE 20 MG/ML
10 INJECTION INTRAMUSCULAR; INTRAVENOUS ONCE
Status: COMPLETED | OUTPATIENT
Start: 2023-03-08 | End: 2023-03-08

## 2023-03-08 RX ADMIN — SEVELAMER CARBONATE 800 MG: 800 TABLET, FILM COATED ORAL at 19:02

## 2023-03-08 RX ADMIN — METOPROLOL TARTRATE 12.5 MG: 25 TABLET, FILM COATED ORAL at 19:04

## 2023-03-08 RX ADMIN — TRAMADOL HYDROCHLORIDE 50 MG: 50 TABLET, COATED ORAL at 19:01

## 2023-03-08 RX ADMIN — SODIUM CHLORIDE, PRESERVATIVE FREE 10 ML: 5 INJECTION INTRAVENOUS at 00:51

## 2023-03-08 RX ADMIN — CIPROFLOXACIN 500 MG: 500 TABLET, FILM COATED ORAL at 13:12

## 2023-03-08 RX ADMIN — SODIUM CHLORIDE, PRESERVATIVE FREE 10 ML: 5 INJECTION INTRAVENOUS at 21:51

## 2023-03-08 RX ADMIN — NEPHROCAP 1 MG: 1 CAP ORAL at 13:11

## 2023-03-08 RX ADMIN — SEVELAMER CARBONATE 800 MG: 800 TABLET, FILM COATED ORAL at 13:19

## 2023-03-08 RX ADMIN — SODIUM CHLORIDE: 9 INJECTION, SOLUTION INTRAVENOUS at 21:49

## 2023-03-08 RX ADMIN — MIDODRINE HYDROCHLORIDE 2.5 MG: 5 TABLET ORAL at 13:11

## 2023-03-08 RX ADMIN — LEVOTHYROXINE SODIUM 50 MCG: 0.03 TABLET ORAL at 05:01

## 2023-03-08 RX ADMIN — SODIUM CHLORIDE, PRESERVATIVE FREE 10 ML: 5 INJECTION INTRAVENOUS at 13:12

## 2023-03-08 RX ADMIN — METOPROLOL TARTRATE 12.5 MG: 25 TABLET, FILM COATED ORAL at 13:12

## 2023-03-08 RX ADMIN — ALLOPURINOL 100 MG: 100 TABLET ORAL at 13:11

## 2023-03-08 RX ADMIN — HEPARIN SODIUM 5000 UNITS: 5000 INJECTION INTRAVENOUS; SUBCUTANEOUS at 21:32

## 2023-03-08 RX ADMIN — SILVER SULFADIAZINE: 10 CREAM TOPICAL at 13:14

## 2023-03-08 RX ADMIN — VANCOMYCIN HYDROCHLORIDE 1250 MG: 10 INJECTION, POWDER, LYOPHILIZED, FOR SOLUTION INTRAVENOUS at 21:51

## 2023-03-08 RX ADMIN — HYDRALAZINE HYDROCHLORIDE 10 MG: 20 INJECTION INTRAMUSCULAR; INTRAVENOUS at 00:49

## 2023-03-08 ASSESSMENT — PAIN SCALES - WONG BAKER
WONGBAKER_NUMERICALRESPONSE: 0

## 2023-03-08 ASSESSMENT — PAIN DESCRIPTION - ONSET
ONSET: ON-GOING
ONSET: ON-GOING

## 2023-03-08 ASSESSMENT — ENCOUNTER SYMPTOMS
SINUS PAIN: 0
SHORTNESS OF BREATH: 0
COUGH: 0
EYE REDNESS: 0
NAUSEA: 0
EYE DISCHARGE: 0
BACK PAIN: 0
SORE THROAT: 0
DIARRHEA: 0
ABDOMINAL PAIN: 0
WHEEZING: 0
CONSTIPATION: 0
RHINORRHEA: 0
SINUS PRESSURE: 0

## 2023-03-08 ASSESSMENT — PAIN SCALES - GENERAL
PAINLEVEL_OUTOF10: 0
PAINLEVEL_OUTOF10: 0
PAINLEVEL_OUTOF10: 6

## 2023-03-08 ASSESSMENT — PAIN DESCRIPTION - FREQUENCY
FREQUENCY: CONTINUOUS
FREQUENCY: CONTINUOUS

## 2023-03-08 ASSESSMENT — PAIN DESCRIPTION - LOCATION
LOCATION: HIP

## 2023-03-08 ASSESSMENT — PAIN - FUNCTIONAL ASSESSMENT
PAIN_FUNCTIONAL_ASSESSMENT: ACTIVITIES ARE NOT PREVENTED

## 2023-03-08 ASSESSMENT — PAIN DESCRIPTION - ORIENTATION
ORIENTATION: LEFT
ORIENTATION: LEFT;POSTERIOR
ORIENTATION: LEFT

## 2023-03-08 ASSESSMENT — PAIN DESCRIPTION - DESCRIPTORS
DESCRIPTORS: ACHING
DESCRIPTORS: ACHING;STABBING
DESCRIPTORS: ACHING;STABBING

## 2023-03-08 ASSESSMENT — PAIN DESCRIPTION - PAIN TYPE
TYPE: ACUTE PAIN
TYPE: ACUTE PAIN

## 2023-03-08 NOTE — PROGRESS NOTES
Gastroenterology Progress Note      gD Cronin is a 79 y.o. male patient. 1. Frequent falls    2. General weakness    3. Unable to ambulate    4. Pressure injury of skin of left hip, unspecified injury stage    5. Skin ulcer of multiple sites, unspecified ulcer stage ([facility] [address]    6. 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 ([facility] [address]        SUBJECTIVE:  Per notes, he had a formed BM last night so was sent for GI pathogens but not C.diff since it wasn't diarrhea. Per RN report, he had a loose BM that was soaked into brief so couldn't collect to send for stool tests. Less groin pain. Physical    VITALS:  BP (!) 141/86   Pulse 65   Temp 97.4 °F (36.3 °C) (Oral)   Resp 18   Ht 5' 10\" (1.778 m)   Wt 190 lb 4.1 oz (86.3 kg)   SpO2 94%   BMI 27.30 kg/m²   TEMPERATURE:  Current - Temp: 97.4 °F (36.3 °C); Max - Temp  Av.4 °F (36.3 °C)  Min: 97 °F (36.1 °C)  Max: 97.7 °F (36.5 °C)    NAD, A&Ox3  RRR   Abdomen soft, ND, NT, no HSM, Bowel sounds normal  Anicteric, no jaundice    Data    Data Review:    Recent Labs     238 23  0551 23  0507   WBC 9.1 7.7 7.7   HGB 11.6* 11.9* 11.9*   HCT 35.4* 38.9* 37.8*   .6* 109.2* 105.4*    135 169     Recent Labs     23  0448 23  0858 23  0507    136 133*   K 5.8* 4.7 4.8   CL 98* 95* 94*   CO2 24 24 17*   PHOS 6.5* 6.3*  --    BUN 53* 40* 47*   CREATININE 9.8* 7.8* 9.7*     Recent Labs     23  0858   AST 14*   ALT 7*   BILITOT 0.3   ALKPHOS 53     No results for input(s): LIPASE, AMYLASE in the last 72 hours. Recent Labs     23  0551   PROTIME 14.6*   INR 1.15*     No results for input(s): PTT in the last 72 hours. ASSESSMENT / PLAN      Abnormal CT abdomen pelvis. -Does report intermittent diarrhea. Initial CT here with normal colon. Repeat CT with wall thickening of the rectum and sigmoid which could indicate proctitis or sequela from radiation.   Had a formed stool here last night then diarrhea this am per report. F/u stool for C.diff (send if has diarrhea), GI bacterial pathogens PCR, and EIA for parasites   Clear liquids today  Tap water enema tomorrow am  Npo midnight  Plan flex sig tomorrow  Groin pain -testicular ultrasound unrevealing. Discussed with Dr. Charis Rose, [facility]  [facility]      I have personally performed a face to face diagnostic evaluation on this patient. I have interviewed and examined the patient and I agree with the findings and recommended plan of care. In summary, my findings and plan are the following:   pt having some formed stool and some loose stool. Ab nt. Will plan for flex sig tomorrow.        Oleg Quinones MD  [address]

## 2023-03-08 NOTE — PROGRESS NOTES
[facility]   Nephrology progress Note  [phone]  [phone]   [email or web address]           Reason for Consult:  ESRD on HD   The patient is a 82 y.o. male with significant past medical history of 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, hypertension, peripheral vascular disease with a chronic ulcer of the lower leg, anemia, renal osteodystrophy, has had history of recurrent falls.  1 date of admission 3/4/2023 he fell on his left side and complained of left hip pain.  There was no history of trauma to the head.  X-ray pelvis did not show any fracture.  For his lower extremity wounds he follows up with wound care and had undergone excision and debridement at that visit.  Wound culture was sent a Doppler done was negative for DVT.    He says he came to the hospital because he was having lower abdominal pain which is radiating to his left testicle  There was no fever or chills    He does dialysis at [facility] Tuesday Thursday Saturday.  However he missed HD 3/4/23.  Has been on dialysis for about 5 years and he says his kidneys failed because of diabetes mellitus        Interval  History:    3/6/23: pain back of Rt thigh today, got HD earlier   3/7/23: B/ L upper scrotum pain     -pt seen and examined  -PMSHx and meds reviewed  -No family at bedside    Agreed to HD today  BP is stable  Feeling fatigued      ROS: neg chest pain/SOB    PHYSICAL EXAM:    Vitals:    BP (!) 165/96   Pulse 65   Temp 97.3 °F (36.3 °C) (Oral)   Resp 18   Ht 5' 10\" (1.778 m)   Wt 204 lb 9.4 oz (92.8 kg)   SpO2 93%   BMI 29.36 kg/m²   I/O last 3 completed shifts:  In: 1360 [P.O.:700; I.V.:10; IV Piggyback:650]  Out: 0   No intake/output data recorded.    Physical Exam:  Gen:  alert, oriented x 3  HEENT: anicteric, NC/AT  CV: RRR, +S1/S2  Lungs:B/ L air entry, Normal breath sounds   Abd: soft, NT  Swelling left upper thigh, Umbilical hernia,  ? Epididymitis ( tender B/ L )   Ext: No edema, no cyanosis, tender area Rt inner   thigh plaque like lesion , multiple erosions/ ulcers   Skin: Warm.   No rash  Neuro: nonfocal.  Rt IJ TDC      DATA:    CBC with Differential:    Lab Results   Component Value Date/Time    WBC 7.7 03/08/2023 05:07 AM    RBC 3.58 03/08/2023 05:07 AM    HGB 11.9 03/08/2023 05:07 AM    HCT 37.8 03/08/2023 05:07 AM     03/08/2023 05:07 AM    .4 03/08/2023 05:07 AM    MCH 33.2 03/08/2023 05:07 AM    MCHC 31.5 03/08/2023 05:07 AM    RDW 16.1 03/08/2023 05:07 AM    SEGSPCT 64.9 08/05/2010 03:36 PM    LYMPHOPCT 15.3 03/08/2023 05:07 AM    MONOPCT 13.0 03/08/2023 05:07 AM    EOSPCT 0.9 08/05/2010 03:36 PM    BASOPCT 0.6 03/08/2023 05:07 AM    MONOSABS 1.0 03/08/2023 05:07 AM    LYMPHSABS 1.2 03/08/2023 05:07 AM    EOSABS 0.1 03/08/2023 05:07 AM    BASOSABS 0.0 03/08/2023 05:07 AM    DIFFTYPE Auto-K 08/05/2010 03:36 PM     CMP:    Lab Results   Component Value Date/Time     03/08/2023 05:07 AM    K 4.8 03/08/2023 05:07 AM    CL 94 03/08/2023 05:07 AM    CO2 17 03/08/2023 05:07 AM    BUN 47 03/08/2023 05:07 AM    CREATININE 9.7 03/08/2023 05:07 AM    GFRAA 34 10/14/2013 08:07 PM    GFRAA 43 08/05/2010 03:36 PM    AGRATIO 1.0 03/07/2023 08:58 AM    LABGLOM 5 03/08/2023 05:07 AM    GLUCOSE 83 03/08/2023 05:07 AM    PROT 6.5 03/07/2023 08:58 AM    PROT 7.6 08/05/2010 03:36 PM    LABALBU 3.2 03/07/2023 08:58 AM    CALCIUM 9.4 03/08/2023 05:07 AM    BILITOT 0.3 03/07/2023 08:58 AM    ALKPHOS 53 03/07/2023 08:58 AM    AST 14 03/07/2023 08:58 AM    ALT 7 03/07/2023 08:58 AM     Phosphorus:    Lab Results   Component Value Date/Time    PHOS 6.3 03/07/2023 08:58 AM     Uric Acid:  No results found for: LABURIC, URICACID  Last 3 Troponin:    Lab Results   Component Value Date/Time    TROPONINI 0.40 03/04/2023 10:13 PM    TROPONINI 0.39 03/04/2023 07:16 PM    TROPONINI 0.44 03/04/2023 01:44 PM     U/A:  No results found for: NITRITE, COLORU, PROTEINU, PHUR, LABCAST, WBCUA, RBCUA, MUCUS, TRICHOMONAS, YEAST, BACTERIA, CLARITYU, SPECGRAV, LEUKOCYTESUR, UROBILINOGEN, BILIRUBINUR, BLOODU, GLUCOSEU, AMORPHOUS          1. Wall thickening of the rectum and sigmoid colon which could represent a   proctitis/colitis or sequela of radiation. Underlying malignancy not   excluded. 2. Fat stranding and irregularity of the urinary bladder which can be seen   with cystitis. 3. Mild edema in the subcutaneous fat about the right thigh. 4. No acute osseous abnormality. If pain persists, consider further   evaluation with MRI assuming there are no contraindications to MRI. 5. Mild right hip osteoarthrosis. 6. No organized fluid collection. IMPRESSION/RECOMMENDATIONS:      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:  [facility] fair-TTS  Refused HD yesterday, but agreeable for HD today  Plan:  -HD today then resume TTS schedule in am    Gap Metabolic acidosis:  -due to missed HD yesterday-agreed today  -modulate with dialysis today     Anemia of chronic kidney disease  Target hemoglobin 9-11  -no JOS needed    Renal osteodystrophy  -continue binders    Groin pain: US negative  -CT of A/P noted, \"      1. Wall thickening of the rectum and sigmoid colon which could represent a   proctitis/colitis or sequela of radiation. Underlying malignancy not   excluded. 2. Fat stranding and irregularity of the urinary bladder which can be seen   with cystitis. 3. Mild edema in the subcutaneous fat about the right thigh. 4. No acute osseous abnormality. If pain persists, consider further   evaluation with MRI assuming there are no contraindications to MRI. 5. Mild right hip osteoarthrosis. 6. No organized fluid collection. 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 I will continue to follow along. Please call with questions.     Tess Tovar MD, MD  3/8/2023  [facility]

## 2023-03-08 NOTE — PROGRESS NOTES
Hospitalist Progress Note      PCP: Spencer Newton MD    Date of Admission: 3/4/2023    Chief Complaint: Right groin testicular pain    Hospital Course  Seen on dialysis  Denies any pain in the groin today  No chest pain no shortness of breath  Has not had a bowel movement    Medications:  Reviewed      Exam:    BP (!) 141/86   Pulse 65   Temp 97.4 °F (36.3 °C) (Oral)   Resp 18   Ht 5' 10\" (1.778 m)   Wt 190 lb 4.1 oz (86.3 kg)   SpO2 94%   BMI 27.30 kg/m²     General appearance: No apparent distress, appears stated age and cooperative. HEENT: Pupils equal, round, and reactive to light. Conjunctivae/corneas clear. Neck: Supple, with full range of motion. No jugular venous distention. Trachea midline. Respiratory:  Normal respiratory effort. Clear to auscultation, bilaterally without RALES/WHEEZES/Rhonchi. Cardiovascular: Regular rate and rhythm with normal S1/S2 without MURMURS, rubs or gallops. Abdomen: Soft, non-tender, non-distended with normal bowel sounds. Musculoskeletal: No clubbing, cyanosis or EDEMA bilaterally. Full range of motion without deformity. Skin: Right inner thigh thickening of skin eschar formation with some tenderness but no surrounding erythema  Some tenderness of the right testes but no obvious swelling erythema  Multiple superficial ulcerations on the right lower extremity and the right toes  Neurologic:  Neurovascularly intact without any focal sensory/motor deficits.  Cranial nerves: II-XII intact, grossly non-focal.  Physical exam remains unchanged from 3/7    Labs:   Recent Labs     03/06/23  0448 03/07/23  0551 03/08/23  0507   WBC 9.1 7.7 7.7   HGB 11.6* 11.9* 11.9*   HCT 35.4* 38.9* 37.8*    135 169       Recent Labs     03/06/23  0448 03/07/23  0858 03/08/23  0507    136 133*   K 5.8* 4.7 4.8   CL 98* 95* 94*   CO2 24 24 17*   BUN 53* 40* 47*   CREATININE 9.8* 7.8* 9.7*   CALCIUM 10.2 9.6 9.4   PHOS 6.5* 6.3*  --        Recent Labs 03/07/23  0858   AST 14*   ALT 7*   BILITOT 0.3   ALKPHOS 53       Recent Labs     03/07/23  0551   INR 1.15*       Recent Labs     03/07/23  0858   CKTOTAL 74         Urinalysis:    No results found for: Munirat Em, BACTERIA, RBCUA, BLOODU, SPECGRAV, GLUCOSEU    Radiology:  CT FEMUR RIGHT W CONTRAST   Final Result   CT pelvis:      1. Wall thickening of the rectum and sigmoid colon which could represent   proctitis or colitis or sequela of radiation. Underlying malignancy not   excluded. 2. Mild bilateral hip osteoarthrosis. 3. No acute osseous abnormality. If pain persists, consider further evaluation with MRI assuming there are no   contraindications to MRI. 4. Fat stranding and irregularity of the collapsed urinary bladder. This can   be seen with a cystitis. 5. Small fat containing bilateral inguinal hernias. 6. Radiation seeds in the prostate. 7. Atherosclerotic calcification of the aorta and iliac branch vasculature. 8. Moderate to severe disc space narrowing and vacuum disc phenomenon at   L5-S1. CT right femur:      1. Wall thickening of the rectum and sigmoid colon which could represent a   proctitis/colitis or sequela of radiation. Underlying malignancy not   excluded. 2. Fat stranding and irregularity of the urinary bladder which can be seen   with cystitis. 3. Mild edema in the subcutaneous fat about the right thigh. 4. No acute osseous abnormality. If pain persists, consider further   evaluation with MRI assuming there are no contraindications to MRI. 5. Mild right hip osteoarthrosis. 6. No organized fluid collection. CT PELVIS WO CONTRAST Additional Contrast? None   Final Result   CT pelvis:      1. Wall thickening of the rectum and sigmoid colon which could represent   proctitis or colitis or sequela of radiation. Underlying malignancy not   excluded. 2. Mild bilateral hip osteoarthrosis. 3. No acute osseous abnormality.    If pain persists, consider further evaluation with MRI assuming there are no   contraindications to MRI. 4. Fat stranding and irregularity of the collapsed urinary bladder. This can   be seen with a cystitis. 5. Small fat containing bilateral inguinal hernias. 6. Radiation seeds in the prostate. 7. Atherosclerotic calcification of the aorta and iliac branch vasculature. 8. Moderate to severe disc space narrowing and vacuum disc phenomenon at   L5-S1. CT right femur:      1. Wall thickening of the rectum and sigmoid colon which could represent a   proctitis/colitis or sequela of radiation. Underlying malignancy not   excluded. 2. Fat stranding and irregularity of the urinary bladder which can be seen   with cystitis. 3. Mild edema in the subcutaneous fat about the right thigh. 4. No acute osseous abnormality. If pain persists, consider further   evaluation with MRI assuming there are no contraindications to MRI. 5. Mild right hip osteoarthrosis. 6. No organized fluid collection. US DUP ABD PEL RETRO SCROT COMPLETE   Final Result   Unremarkable testicular ultrasound with normal Doppler flow. US SCROTUM AND TESTICLES   Final Result   Unremarkable testicular ultrasound with normal Doppler flow. CT ABDOMEN PELVIS WO CONTRAST Additional Contrast? None   Final Result   Large hiatal hernia with small right pleural effusion. XR PELVIS (1-2 VIEWS)   Final Result   Osteopenia. No evident fracture. Given the degree of osteopenia, nondisplaced fractures may be   radiographically occult. If pain or concern for fracture persists, consider   MR imaging. XR CHEST PORTABLE   Final Result   No acute process.       Stable cardiomegaly      Bibasilar hypoaeration             Assessment/Plan:    Active Hospital Problems    Diagnosis Date Noted    Frequent falls [R29.6] 03/06/2023     Priority: Medium    Chronic pain in testicle [N50.819, G89.29] 03/06/2023     Priority: Medium    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 ([facility] [N18.6, Z99.2] 03/06/2023     Priority: Medium    Pressure injury of skin of left hip [L89.229] 03/06/2023     Priority: Medium    Skin ulcer of multiple sites [facility]) [L98.499] 03/06/2023     Priority: Medium    Unable to ambulate [R26.2] 03/06/2023     Priority: Medium    Type 2 diabetes mellitus with other specified complication ([facility] [P04.01] 03/06/2023     Priority: Medium    History of prostate cancer [Z85.46] 03/06/2023     Priority: Medium    Overweight (BMI 25.0-29. 9) [E66.3] 03/06/2023     Priority: Medium    General weakness [R53.1] 03/04/2023     Priority: Medium    Essential hypertension [I10]        Acute Medical Issues Being Addressed:    67-year-old admitted to the hospital with multiple ulcerations and fall    Multiple lower extremity wounds s/p debridement  Wound cultures grew out MSSA and Enterobacter but currently bacterium  We will continue IV ceftriaxone although I am not clear that there is an infection  We will get ID to help with antibiotics  Continue local wound care  Will await ID plan for antibiotics duration and type    Stage II decubitus ulcer present on admission  Noted on recent wound care visit    Recurrent falls  No loss of consciousness  Overall debility    Subacute/chronic right groin testicular pain  Unclear etiology of the pain  May be from the right hip or the back  So far urological work-up including ultrasound is all negative  CT of the pelvis CT of the right femur was also negative  Seen by general surgery and urology  Seems to have improved somewhat  I do not think his CT abdomen findings of rectum and sigmoid colon thickening are the source of his pain      6 some right other etiologies    End-stage renal disease on hemodialysis    Hypertension  On metoprolol 12.5 twice daily    Abnormal CT abdomen pelvis with thickening of the rectum and sigmoid  Seen by GI  C. difficile if that is negative then will need colonoscopy    Chronic severe systolic heart failure  EF around 25%  Last EF 2022 was around 35%  Currently seems euvolemic      DC tomm after flexi sigmoidoscopy   DVT Prophylaxis: Subcu heparin  Diet: ADULT DIET;  Regular  Code Status: Full Code      Dispo - once acute medical processes have resolved    Tania Mancini MD

## 2023-03-08 NOTE — PROGRESS NOTES
Assessment complete and charted earlier in shift. Pt refused PM and AM heparin d/t his \"PCP not wanting him on heparin since he takes pills for blood clots at home. \" Writer educated pt multiple times that his PCP is not aware that he is on heparin this admission as he is under hospitalist care and importance of taking heparin given DVT hx. Pt made multiple statements of understanding education but then would go on to make comments demonstrating a lack of understanding of education just provided. Pt also states that he had two episodes of extreme diarrhea overnight- pt noted to have one small formed BM- did not meet c diff collection criteria by GI pathogens sent. Pt also noted to be quite hypertensive despite PM metoprolol- PM provider notified with IV hydralazine given x1 with slight improvement noted. Call light within reach, will continue to monitor.

## 2023-03-08 NOTE — FLOWSHEET NOTE
Treatment time: 3 HRS     Net UF: 2 LITERS     Pre weight: 88.3 KG   Post weight: 86.3 KG   EDW: TBD     Access used: R TDDC   Access function: GOOD     Medications or blood products given: NONE     Regular outpatient schedule: MWF     Summary of response to treatment: PT tolerated tx well. No meds given.  Post VSS    Copy of dialysis treatment record placed in chart, to be scanned into EMR.      03/08/23 0914 03/08/23 1218   Vital Signs   /76 127/64   Temp 97.5 °F (36.4 °C) 97 °F (36.1 °C)   Heart Rate 57 58   Weight 194 lb 10.7 oz (88.3 kg) 190 lb 4.1 oz (86.3 kg)   Weight Method Bed scale Bed scale   Post-Hemodialysis Assessment   Hemodialysis Intake (ml)  --  350 ml   Hemodialysis Output (ml)  --  2350 ml   NET Removed (ml)  --  2000   Tolerated Treatment  --  Good

## 2023-03-08 NOTE — PROGRESS NOTES
Occupational Therapy/ Physical Therapy  [facility]    Patient currently off floor in dialysis. Will attempt PT and OT evaluations when schedule allows and as patient can tolerate. Thank you,  Lynn Muñoz.  [address]
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
## 2023-03-08 NOTE — PROGRESS NOTES
Clinical Pharmacy Note: Pharmacy to Dose Vancomycin    Vancomycin Day: 2  Indication: Bone/Joint infection  Current Dose: intermittent \"pulse\" dosing  Dosing Method: Dosing by random levels    Random: 14.7 mg/L    Recent Labs     03/07/23  0858 03/08/23  0507   BUN 40* 47*       Recent Labs     03/07/23  0858 03/08/23  0507   CREATININE 7.8* 9.7*       Recent Labs     03/07/23  0551 03/08/23  0507   WBC 7.7 7.7         Intake/Output Summary (Last 24 hours) at 3/8/2023 0728  Last data filed at 3/8/2023 0501  Gross per 24 hour   Intake 990 ml   Output --   Net 990 ml         Ht Readings from Last 1 Encounters:   03/04/23 5' 10\" (1.778 m)        Wt Readings from Last 1 Encounters:   03/08/23 204 lb 9.4 oz (92.8 kg)         Body mass index is 29.36 kg/m². Estimated Creatinine Clearance: 7 mL/min (A) (based on SCr of 9.7 mg/dL [facility])). Assessment/Plan:  Vancomycin level is therapeutic. Will dose 1250 mg after HD session. HD patient: Patient gets HD Deshaun Mohamud. A vancomycin random level will be ordered prior to next HD session for follow-up. Changes in regimen will be determined based on culture results, renal function, and clinical response. Pharmacy will continue to monitor and adjust regimen as necessary. Thank you for the consult,    Esther Valentine, PharmD.   PGY-1 Resident  [id number]  03/08/23 7:30 AM

## 2023-03-08 NOTE — PLAN OF CARE
Problem: Discharge Planning  Goal: Discharge to home or other facility with appropriate resources  Outcome: Progressing     Problem: Pain  Goal: Verbalizes/displays adequate comfort level or baseline comfort level  3/8/2023 1055 by Isa Church RN  Outcome: Progressing  Flowsheets (Taken 3/8/2023 0845)  Verbalizes/displays adequate comfort level or baseline comfort level: Assess pain using appropriate pain scale  3/8/2023 0427 by Isa Church RN  Outcome: Progressing     Problem: Skin/Tissue Integrity  Goal: Absence of new skin breakdown  Description: 1. Monitor for areas of redness and/or skin breakdown  2. Assess vascular access sites hourly  3. Every 4-6 hours minimum:  Change oxygen saturation probe site  4. Every 4-6 hours:  If on nasal continuous positive airway pressure, respiratory therapy assess nares and determine need for appliance change or resting period.   3/8/2023 1055 by Isa Church RN  Outcome: Progressing  3/8/2023 0427 by Isa Church RN  Outcome: Progressing     Problem: Safety - Adult  Goal: Free from fall injury  3/8/2023 1055 by Isa Church RN  Outcome: Progressing  3/8/2023 0427 by Isa Church RN  Outcome: Progressing     Problem: ABCDS Injury Assessment  Goal: Absence of physical injury  Outcome: Progressing     Problem: Chronic Conditions and Co-morbidities  Goal: Patient's chronic conditions and co-morbidity symptoms are monitored and maintained or improved  Outcome: Progressing

## 2023-03-08 NOTE — PROGRESS NOTES
[facility]        Progress Note    Patient Name: Dg Cronin  MRN: [id number]  YOB: 1940  Date of Evaluation: 3/8/2023    Chief Complaint: Lower extremity wounds    Subjective:  No acute events overnight  Continues to have pain associated with lower extremity wounds  No new issues with wound--reports occasional bleeding from right calf and right toe wound, none currently  Resting in bed at this time, seen in dialysis      Vital Signs:  Patient Vitals for the past 24 hrs:   BP Temp Temp src Pulse Resp SpO2 Weight   23 1300 (!) 141/86 97.4 °F (36.3 °C) Oral 65 18 94 % --   23 1218 127/64 97 °F (36.1 °C) -- 58 -- -- 190 lb 4.1 oz (86.3 kg)   23 0914 137/76 97.5 °F (36.4 °C) -- 57 18 -- 194 lb 10.7 oz (88.3 kg)   23 0845 138/86 97.7 °F (36.5 °C) Oral 63 18 -- --   23 0502 -- -- -- -- -- -- 204 lb 9.4 oz (92.8 kg)   23 0501 (!) 165/96 97.3 °F (36.3 °C) Oral 65 18 93 % --   23 0051 (!) 161/98 -- -- -- -- -- --   23 0049 (!) 174/97 -- -- -- -- -- --   23 2319 (!) 181/105 97.2 °F (36.2 °C) Axillary 64 20 92 % --   23 2118 (!) 163/103 97.5 °F (36.4 °C) Axillary 68 16 92 % --      TEMPERATURE HISTORY 24H: Temp (24hrs), Av.4 °F (36.3 °C), Min:97 °F (36.1 °C), Max:97.7 °F (36.5 °C)    BLOOD PRESSURE HISTORY: Systolic (39USS), BZ , Min:127 , XIW:892    Diastolic (83GBM), DXM:46, Min:64, Max:105      Intake/Output:  I/O last 3 completed shifts: In: 1360 [P.O.:700;  I.V.:10; IV Piggyback:650]  Out: 0   I/O this shift:  In: 350   Out: 2350   Drain/tube Output:       Physical Exam:  General: awake, alert, oriented to  person, place, time  Lungs: unlabored respirations  Skin/Wound: right medial thigh with scab that was the location the patient points to as causing pain, no wounds that would benefit from incision drainage or debridement; multiple lower extremity wounds/scabs--none of which appear to be infected; left hip wound with eschar--no evidence of infection, wounds tender to even light touch    Labs:  CBC:    Recent Labs     03/06/23  0448 03/07/23  0551 03/08/23  0507   WBC 9.1 7.7 7.7   HGB 11.6* 11.9* 11.9*   HCT 35.4* 38.9* 37.8*    135 169     BMP:    Recent Labs     03/06/23  0448 03/07/23  0858 03/08/23  0507    136 133*   K 5.8* 4.7 4.8   CL 98* 95* 94*   CO2 24 24 17*   BUN 53* 40* 47*   CREATININE 9.8* 7.8* 9.7*   GLUCOSE 81 88 83     Hepatic:    Recent Labs     03/07/23 0858   AST 14*   ALT 7*   BILITOT 0.3   ALKPHOS 53     Amylase:  No results found for: AMYLASE  Lipase:  No results found for: LIPASE   Mag:    Lab Results   Component Value Date/Time    MG 2.50 03/07/2023 08:58 AM    MG 2.70 03/04/2023 01:44 PM     Phos:     Lab Results   Component Value Date/Time    PHOS 6.3 03/07/2023 08:58 AM    PHOS 6.5 03/06/2023 04:48 AM      Coags:   Lab Results   Component Value Date/Time    PROTIME 14.6 03/07/2023 05:51 AM    INR 1.15 03/07/2023 05:51 AM    APTT 32.4 03/07/2023 05:51 AM       Cultures:  Anaerobic culture  No results found for: VICE  Fungus stain  No results found for requested labs within last 30 days. Gram stain  Results in Past 30 Days  Result Component Current Result Ref Range Previous Result Ref Range   Gram Stain Result 4+ Gram positive rods  1+ Epithelial Cells  2+ WBC's (Polymorphonuclear)   (3/4/2023)  2+ WBC's (Polymorphonuclear)  3+ Gram positive cocci  2+ Gram negative rods   (A) (2/28/2023)      Organism  Lab Results   Component Value Date/Time    ORG Corynebacterium striatum (A) 03/04/2023 03:05 PM     Surgical culture  No results found for: CXSURG  Blood culture 1  Results in Past 30 Days  Result Component Current Result Ref Range Previous Result Ref Range   Blood Culture, Routine No Growth to date.   Any change in status will be called. (3/4/2023)  Not in Time Range      Blood culture 2  Results in Past 30 Days  Result Component Current Result Ref Range Previous Result Ref Range   Culture, Blood 2 No Growth to date. Any change in status will be called. (3/4/2023)  Not in Time Range      Fecal occult  No results found for requested labs within last 30 days. GI bacterial pathogens by PCR  No results found for requested labs within last 30 days. C. difficile  No results found for requested labs within last 30 days. Urine culture  No results found for: LABURIN    Pathology:  No relevant pathology     Imaging:  I have personally reviewed the following films:    CT PELVIS WO CONTRAST Additional Contrast? None    Result Date: 3/7/2023  EXAMINATION: CT OF THE PELVIS WITHOUT CONTRAST; CT OF THE RIGHT FEMUR WITH CONTRAST 3/7/2023 10:03 am TECHNIQUE: CT of the pelvis was performed without the administration of intravenous contrast.  Multiplanar reformatted images are provided for review. Adjustment of mA and/or kV according to patient size was utilized. Automated exposure control, iterative reconstruction, and/or weight based adjustment of the mA/kV was utilized to reduce the radiation dose to as low as reasonably achievable.; CT of the right femur was performed with the administration of intravenous contrast.  Multiplanar reformatted images are provided for review. Automated exposure control, iterative reconstruction, and/or weight based adjustment of the mA/kV was utilized to reduce the radiation dose to as low as reasonably achievable. COMPARISON: Pelvis plain radiographs from 03/04/2023, CT abdomen and pelvis from 03/05/2023. HISTORY: ORDERING SYSTEM PROVIDED HISTORY: rt groin pain, r/o fractures TECHNOLOGIST PROVIDED HISTORY: Reason for exam:->rt groin pain, r/o fractures Additional Contrast?->None Reason for Exam: rt groin pain, r/o fractures 79-year-old male with right groin pain; rule out fractures. FINDINGS: CT pelvis: Atherosclerotic calcification of the abdominal aorta and iliac branch vasculature.  Fat stranding and irregularity of the collapsed urinary bladder. Radiation seeds in the prostate. Small fat containing bilateral inguinal hernias. No free fluid in the pelvis. Wall thickening of the rectum and rectosigmoid colon. Mild colonic diverticulosis. Mild stool burden. Mild osteophyte spurring at the margins of the bilateral hip joint spaces. Mild degenerative changes of the pubic symphysis. Both femoral heads properly located in the bilateral acetabula without clear evidence for acute fracture, dislocation or femoral head flattening. Visualized iliac wings, acetabula, and pubic rami grossly unremarkable. Moderate to severe disc space narrowing and vacuum disc phenomenon at L5-S1. CT right femur: Right femur appears intact. Mild right hip osteoarthrosis. Right femoral head properly located in the right acetabulum without clear evidence for acute fracture, dislocation or femoral head flattening. Mild edema in the subcutaneous fat about the right thigh/hip. Atherosclerotic calcification of the vasculature. Radiation seeds in the prostate. No acute fracture or dislocation. Fat stranding and irregularity of the urinary bladder. No organized fluid collection identified. Wall thickening of the rectum and rectosigmoid colon. CT pelvis: 1. Wall thickening of the rectum and sigmoid colon which could represent proctitis or colitis or sequela of radiation. Underlying malignancy not excluded. 2. Mild bilateral hip osteoarthrosis. 3. No acute osseous abnormality. If pain persists, consider further evaluation with MRI assuming there are no contraindications to MRI. 4. Fat stranding and irregularity of the collapsed urinary bladder. This can be seen with a cystitis. 5. Small fat containing bilateral inguinal hernias. 6. Radiation seeds in the prostate. 7. Atherosclerotic calcification of the aorta and iliac branch vasculature. 8. Moderate to severe disc space narrowing and vacuum disc phenomenon at L5-S1. CT right femur: 1.  Wall thickening of the rectum and sigmoid colon which could represent a proctitis/colitis or sequela of radiation. Underlying malignancy not excluded. 2. Fat stranding and irregularity of the urinary bladder which can be seen with cystitis. 3. Mild edema in the subcutaneous fat about the right thigh. 4. No acute osseous abnormality. If pain persists, consider further evaluation with MRI assuming there are no contraindications to MRI. 5. Mild right hip osteoarthrosis. 6. No organized fluid collection. US SCROTUM AND TESTICLES    Result Date: 3/6/2023  EXAMINATION: ULTRASOUND OF THE SCROTUM/TESTICLES TECHNIQUE: Duplex ultrasound using B-mode/gray scaled imaging, Doppler spectral analysis and color flow Doppler was obtained of the testicles. COMPARISON: Correlation with CT from yesterday HISTORY: Acute left scrotal pain. FINDINGS: Measurements: Right Testicle: 3.8 x 2.4 x 1.8 cm Left Testicle: 3.0 x 1.7 x 1.7 cm Right: Grey Scale: The right testicle demonstrates normal homogeneous echotexture without focal lesion. No testicular microlithiasis. Doppler Evaluation: Normal arterial and venous Doppler flow within the testicle. Scrotal Sac: No hydrocele. Epididymis: No acute abnormality. Left: Grey Scale: The left testicle demonstrates normal homogeneous echotexture without focal lesion. No testicular microlithiasis. Doppler Evaluation: Normal arterial and venous Doppler flow within the testicle. Scrotal Sac: No hydrocele. Epididymis: No acute abnormality. Unremarkable testicular ultrasound with normal Doppler flow. CT FEMUR RIGHT W CONTRAST    Result Date: 3/7/2023  EXAMINATION: CT OF THE PELVIS WITHOUT CONTRAST; CT OF THE RIGHT FEMUR WITH CONTRAST 3/7/2023 10:03 am TECHNIQUE: CT of the pelvis was performed without the administration of intravenous contrast.  Multiplanar reformatted images are provided for review. Adjustment of mA and/or kV according to patient size was utilized.   Automated exposure control, iterative reconstruction, and/or weight based adjustment of the mA/kV was utilized to reduce the radiation dose to as low as reasonably achievable.; CT of the right femur was performed with the administration of intravenous contrast.  Multiplanar reformatted images are provided for review. Automated exposure control, iterative reconstruction, and/or weight based adjustment of the mA/kV was utilized to reduce the radiation dose to as low as reasonably achievable. COMPARISON: Pelvis plain radiographs from 03/04/2023, CT abdomen and pelvis from 03/05/2023. HISTORY: ORDERING SYSTEM PROVIDED HISTORY: rt groin pain, r/o fractures TECHNOLOGIST PROVIDED HISTORY: Reason for exam:->rt groin pain, r/o fractures Additional Contrast?->None Reason for Exam: rt groin pain, r/o fractures 79-year-old male with right groin pain; rule out fractures. FINDINGS: CT pelvis: Atherosclerotic calcification of the abdominal aorta and iliac branch vasculature. Fat stranding and irregularity of the collapsed urinary bladder. Radiation seeds in the prostate. Small fat containing bilateral inguinal hernias. No free fluid in the pelvis. Wall thickening of the rectum and rectosigmoid colon. Mild colonic diverticulosis. Mild stool burden. Mild osteophyte spurring at the margins of the bilateral hip joint spaces. Mild degenerative changes of the pubic symphysis. Both femoral heads properly located in the bilateral acetabula without clear evidence for acute fracture, dislocation or femoral head flattening. Visualized iliac wings, acetabula, and pubic rami grossly unremarkable. Moderate to severe disc space narrowing and vacuum disc phenomenon at L5-S1. CT right femur: Right femur appears intact. Mild right hip osteoarthrosis. Right femoral head properly located in the right acetabulum without clear evidence for acute fracture, dislocation or femoral head flattening. Mild edema in the subcutaneous fat about the right thigh/hip.  Atherosclerotic calcification of the vasculature. Radiation seeds in the prostate. No acute fracture or dislocation. Fat stranding and irregularity of the urinary bladder. No organized fluid collection identified. Wall thickening of the rectum and rectosigmoid colon. CT pelvis: 1. Wall thickening of the rectum and sigmoid colon which could represent proctitis or colitis or sequela of radiation. Underlying malignancy not excluded. 2. Mild bilateral hip osteoarthrosis. 3. No acute osseous abnormality. If pain persists, consider further evaluation with MRI assuming there are no contraindications to MRI. 4. Fat stranding and irregularity of the collapsed urinary bladder. This can be seen with a cystitis. 5. Small fat containing bilateral inguinal hernias. 6. Radiation seeds in the prostate. 7. Atherosclerotic calcification of the aorta and iliac branch vasculature. 8. Moderate to severe disc space narrowing and vacuum disc phenomenon at L5-S1. CT right femur: 1. Wall thickening of the rectum and sigmoid colon which could represent a proctitis/colitis or sequela of radiation. Underlying malignancy not excluded. 2. Fat stranding and irregularity of the urinary bladder which can be seen with cystitis. 3. Mild edema in the subcutaneous fat about the right thigh. 4. No acute osseous abnormality. If pain persists, consider further evaluation with MRI assuming there are no contraindications to MRI. 5. Mild right hip osteoarthrosis. 6. No organized fluid collection. US DUP ABD PEL RETRO SCROT COMPLETE    Result Date: 3/6/2023  EXAMINATION: ULTRASOUND OF THE SCROTUM/TESTICLES TECHNIQUE: Duplex ultrasound using B-mode/gray scaled imaging, Doppler spectral analysis and color flow Doppler was obtained of the testicles. COMPARISON: Correlation with CT from yesterday HISTORY: Acute left scrotal pain. FINDINGS: Measurements: Right Testicle: 3.8 x 2.4 x 1.8 cm Left Testicle: 3.0 x 1.7 x 1.7 cm Right: Grey Scale:  The right testicle demonstrates normal homogeneous echotexture without focal lesion. No testicular microlithiasis. Doppler Evaluation: Normal arterial and venous Doppler flow within the testicle. Scrotal Sac: No hydrocele. Epididymis: No acute abnormality. Left: Grey Scale: The left testicle demonstrates normal homogeneous echotexture without focal lesion. No testicular microlithiasis. Doppler Evaluation: Normal arterial and venous Doppler flow within the testicle. Scrotal Sac: No hydrocele. Epididymis: No acute abnormality. Unremarkable testicular ultrasound with normal Doppler flow. Scheduled Meds:   vancomycin  1,250 mg IntraVENous Once in dialysis    ciprofloxacin  500 mg Oral Daily    vancomycin (VANCOCIN) intermittent dosing (placeholder)   Other RX Placeholder    sevelamer  800 mg Oral TID WC    lidocaine   Topical Once    b complex-C-folic acid  1 capsule Oral Daily    levothyroxine  50 mcg Oral QAM AC    midodrine  2.5 mg Oral TID    silver sulfADIAZINE   Topical Daily    allopurinol  100 mg Oral Daily    sodium chloride flush  5-40 mL IntraVENous 2 times per day    heparin (porcine)  5,000 Units SubCUTAneous 3 times per day    metoprolol tartrate  12.5 mg Oral BID     Continuous Infusions:   sodium chloride       PRN Meds:.phenol, methocarbamol, traMADol, sodium chloride flush, sodium chloride, ondansetron **OR** ondansetron, polyethylene glycol, acetaminophen **OR** acetaminophen      Assessment:  79 y.o. male admitted with   1. Frequent falls    2. General weakness    3. Unable to ambulate    4. Pressure injury of skin of left hip, unspecified injury stage    5. Skin ulcer of multiple sites, unspecified ulcer stage ([address]    6. 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 ([address]        Right thigh scab  Left hip wound  Multiple lower leg wounds  Multiple recent falls  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  Osteoarthritis  Spinal abnormality, \"Moderate to severe disc space narrowing and vacuum disc phenomenon at L5-S1\"  Moderate acute and chronic malnutrition       Plan:  1. No plans for debridement, nor is patient interested, continued local wound care  2. Regular diet with supplements as tolerated; monitor bowel function  3. IV hydration, electrolyte correction, and hemodialysis per Nephrology  4. Antibiotics per ID  5. Activity as tolerated, mobilize as able, Q2 hour turning, pressure-alleviating measures  6. PRN analgesics and antiemetics--minimizing narcotics as tolerated  7. Management of medical comorbid etiologies per primary team and consulting services    EDUCATION:  Educated patient on plan of care and disease process--all questions answered. Plans discussed with patient and nursing. Reviewed and discussed with Dr. Dyana Rinne. Signed:  Arabella Abdi, APRN - CNP  3/8/2023 2:11 PM    I have personally performed a face to face diagnostic evaluation on this patient. I have interviewed and examined the patient and I agree with the assessment above. Time was spent including but not limited to reviewing patient chart (reviewing patient chart, current encounter and prior encounter notes procedures and interventions, labs, imaging and other testing), interviewing and counseling patient and present family members, performing physical exam, documentation of my findings and subsequent follow up of ordered medication and testing, placing referrals and communication with patient care providers, coordinating future care as well as documentation in the EHR. This encompassed more than 50% of the total encounter time.  In summary, my findings and plan are the following:   Interim History of Present Illness:  Mr. Luanna Lundborg is a 79 y.o. male right thigh pain and multiple chronic wounds on legs, none open and declines intervention on them even if open    Physical Exam:  Skin: right thigh less tender, no open wounds, no open wounds on legs noted, multiple dry eschars that do not need intervention and patient would decline even if necessary    Labs and Imaging:  WBC 7.7    Assessment:  Right thigh scab  Multiple lower leg wounds  Multiple recent falls  ESRD on HD  Osteoarthritis  Spinal abnormality, \"Moderate to severe disc space narrowing and vacuum disc phenomenon at L5-S1\"  Moderate acute and chronic malnutrition     Plan:  1. Right thigh does not have a wound, pain in this location is not related to the scab. Does not need intervention  2. Remainder of lower leg wounds evaluated in wound clinic are chronic, none open and just have eschars, do not need acute intervention and patient would decline even if needed  3. Pain likely related to osteoarthritis of hip or spinal findings on recent CT, defer to ortho/spine as needed  4. Pressure relieving maneuvers, frequent turning, activity as able to minimize risk of decubitus ulcers  5. Antibiotics per ID  6. Encourage PO intake, evidence of malnutrition on labs and nutrition is critical for wound healing  7. Defer management of remainder of medical comorbidities to primary and consulting teams  8. Will sign off, please call with questions and follow in wound clinic after discharge as scheduled    Chuy Lopez MD, FACS  3/8/2023  2:20 PM

## 2023-03-08 NOTE — DISCHARGE INSTR - COC
Continuity of Care Form    Patient Name: Antoine Fountain   :  1940  MRN:  [id number]    Admit date:  3/4/2023  Discharge date:  ***    Code Status Order: Full Code   Advance Directives:     Admitting Physician:  Jason Alarcon MD  PCP: Nichol Pettit III, MD    Discharging Nurse: [facility] Unit/Room#: [id number]  Discharging Unit Phone Number: ***    Emergency Contact:   Extended Emergency Contact Information  Primary Emergency Contact: Connie Obrien  Address: [address] [place] [address], [address] [place] [address] Phone: [phone]  Mobile Phone: [phone]  Relation: Spouse    Past Surgical History:  Past Surgical History:   Procedure Laterality Date    TURP         Immunization History:   Immunization History   Administered Date(s) Administered    COVID-19, PFIZER PURPLE top, DILUTE for use, (age 15 y+), 30mcg/0.3mL 2021, 2021, 2021    Influenza Whole 2008, 2010    Pneumococcal Polysaccharide ([id number]) 2008       Active Problems:  Patient Active Problem List   Diagnosis Code    Hyperlipidemia E78.5    Essential hypertension I10    Localized edema R60.0    Fracture of sternum S22.20XA    Fractured rib S22.39XA    Prostate cancer ([address] C61    Thrombus I82.90    Chronic renal disease N18.9    Trigger finger M65.30    CTS (carpal tunnel syndrome) G56.00    Venous insufficiency of both lower extremities I87.2    Venous ulcer of right lower extremity without varicose veins (HCC) I87.2, L97.919    Non-pressure chronic ulcer of lower leg, limited to breakdown of skin, right ([address] L97.911    Laceration of skin of lower leg, left, initial encounter U93.964F    Laceration of skin of lower leg, right, initial encounter S81.811A    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 with fat layer exposed ([address] L97.912    Non-pressure chronic ulcer of left lower leg with fat layer exposed ([address] P31.955    Open wound of right great toe with damage to nail S91.201A    Right foot ulcer, with fat layer exposed ([facility] L97.512    Ulcer of toe of right foot, with necrosis of bone ([facility]) L97.514    Pressure ulcer of left hip, unstageable ([facility]) L89.220    General weakness R53.1    Frequent falls R29.6    Chronic pain in testicle N50.819, G89.29    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 9) E66.3       Isolation/Infection:   Isolation            C Diff Contact          Patient Infection Status       Infection Onset Added Last Indicated Last Indicated By Review Planned Expiration Resolved Resolved By    C-diff Rule Out 23 Gastrointestinal Panel, Molecular (Ordered) 23              Nurse Assessment:  Last Vital Signs: BP (!) 141/86   Pulse 65   Temp 97.4 °F (36.3 °C) (Oral)   Resp 18   Ht 5' 10\" (1.778 m)   Wt 190 lb 4.1 oz (86.3 kg)   SpO2 94%   BMI 27.30 kg/m²     Last documented pain score (0-10 scale): Pain Level: 0  Last Weight:   Wt Readings from Last 1 Encounters:   23 190 lb 4.1 oz (86.3 kg)     Mental Status:  {IP PT MENTAL STATUS:}    IV Access:  { TAYLOR IV ACCESS:549289619}    Nursing Mobility/ADLs:  Walking   {[facility] DME DZES:632272163}  Transfer  {[facility] DME OAUX:772947225}  Bathing  {[facility] DME GYSP:339850534}  Dressing  {[facility] DME JUQB:375253560}  Toileting  {[facility] DME UEUX:727136758}  Feeding  {[facility] DME YQLZ:838976649}  Med Admin  {[facility] DME YPD}  Med Delivery   { TAYLOR MED Delivery:902064889}    Wound Care Documentation and Therapy:  Wound 23 Leg Left; Anterior;Proximal #1 (Active)   Wound Length (cm) 0.8 cm 23 1518   Wound Width (cm) 0.7 cm 23 1518   Wound Depth (cm) 0.1 cm 238   Wound Surface Area (cm^2) 0.56 cm^2 02/28/23 1518   Wound Volume (cm^3) 0.056 cm^3 02/28/23 1518   Number of days: 8       Wound 02/28/23 Leg Left;Distal 2nd #2 (Active)   Number of days: 8       Wound 02/28/23 Toe (Comment  which one) Right #3 (3rd) (Active)   Wound Etiology Diabetic 03/08/23 1322   Wound Cleansed Cleansed with saline 03/08/23 1322   Wound Length (cm) 1.1 cm 02/28/23 1518   Wound Width (cm) 1 cm 02/28/23 1518   Wound Depth (cm) 1 cm 02/28/23 1518   Wound Surface Area (cm^2) 1.1 cm^2 02/28/23 1518   Wound Volume (cm^3) 1.1 cm^3 02/28/23 1518   Post-Procedure Length (cm) 1.2 cm 02/28/23 1541   Post-Procedure Width (cm) 1.1 cm 02/28/23 1541   Post-Procedure Depth (cm) 1 cm 02/28/23 1541   Post-Procedure Surface Area (cm^2) 1.32 cm^2 02/28/23 1541   Post-Procedure Volume (cm^3) 1.32 cm^3 02/28/23 1541   Wound Assessment Exposed structure bone 03/08/23 1322   Drainage Amount Small 03/08/23 1322   Drainage Description Brown 03/08/23 1322   Odor None 03/08/23 1322   Angela-wound Assessment Erosion 03/08/23 1322   Margins Undefined edges 03/08/23 1322   Number of days: 8       Wound 02/28/23 Toe (Comment  which one) Right #4 (2nd) (Active)   Wound Etiology Diabetic 03/08/23 1322   Wound Cleansed Cleansed with saline 03/08/23 1322   Wound Length (cm) 1.2 cm 02/28/23 1518   Wound Width (cm) 1.3 cm 02/28/23 1518   Wound Depth (cm) 0.1 cm 02/28/23 1518   Wound Surface Area (cm^2) 1.56 cm^2 02/28/23 1518   Wound Volume (cm^3) 0.156 cm^3 02/28/23 1518   Post-Procedure Length (cm) 1.2 cm 02/28/23 1541   Post-Procedure Width (cm) 1.3 cm 02/28/23 1541   Post-Procedure Depth (cm) 0.15 cm 02/28/23 1541   Post-Procedure Surface Area (cm^2) 1.56 cm^2 02/28/23 1541   Post-Procedure Volume (cm^3) 0.234 cm^3 02/28/23 1541   Wound Assessment Bleeding 03/08/23 1322   Drainage Amount None 03/08/23 1322   Odor None 03/08/23 1322   Angela-wound Assessment Dry/flaky 03/08/23 1322   Margins Attached edges 03/08/23 1322   Number of days: 8       Wound 02/28/23 Toe (Comment  which one) Right #5 (Great) (Active)   Wound Etiology Pressure Unstageable 03/08/23 1322   Wound Cleansed Cleansed with saline 03/08/23 1322   Wound Length (cm) 1.5 cm 02/28/23 1518   Wound Width (cm) 2.5 cm 02/28/23 1518   Wound Depth (cm) 0.1 cm 02/28/23 1518   Wound Surface Area (cm^2) 3.75 cm^2 02/28/23 1518   Wound Volume (cm^3) 0.375 cm^3 02/28/23 1518   Wound Assessment Eschar dry 03/08/23 1322   Drainage Amount None 03/08/23 1322   Odor None 03/08/23 1322   Angela-wound Assessment Dry/flaky;Fragile 03/08/23 1322   Number of days: 8       Wound 02/28/23 Leg Right;Medial #6 (Active)   Wound Etiology Venous 03/08/23 1322   Wound Cleansed Cleansed with saline 03/08/23 1322   Wound Length (cm) 2.8 cm 02/28/23 1518   Wound Width (cm) 2.5 cm 02/28/23 1518   Wound Depth (cm) 0.1 cm 02/28/23 1518   Wound Surface Area (cm^2) 7 cm^2 02/28/23 1518   Wound Volume (cm^3) 0.7 cm^3 02/28/23 1518   Wound Assessment Granulation tissue 03/08/23 1322   Drainage Amount Moderate 03/08/23 1322   Drainage Description Serosanguinous;Brown 03/08/23 1322   Odor None 03/08/23 1322   Angela-wound Assessment Dry/flaky;Fragile 03/08/23 1322   Margins Attached edges 03/08/23 1322   Number of days: 8       Wound 02/28/23 Leg Right;Lateral #7 Proximal (Active)   Wound Etiology Venous 03/08/23 1322   Wound Cleansed Cleansed with saline 03/08/23 1322   Post-Procedure Length (cm) 2.5 cm 02/28/23 1518   Post-Procedure Width (cm) 2 cm 02/28/23 1518   Post-Procedure Depth (cm) 0.1 cm 02/28/23 1518   Post-Procedure Surface Area (cm^2) 5 cm^2 02/28/23 1518   Post-Procedure Volume (cm^3) 0.5 cm^3 02/28/23 1518   Wound Assessment Pink/red 03/08/23 1322   Drainage Amount Small 03/08/23 1322   Drainage Description Serosanguinous 03/08/23 1322   Odor None 03/08/23 1322   Angela-wound Assessment Fragile 03/08/23 1322   Margins Defined edges 03/08/23 1322   Number of days: 8       Wound 02/28/23 Leg Posterior #8 (Active)   Wound Etiology Venous 23 1322   Wound Cleansed Cleansed with saline 23 1322   Wound Length (cm) 3.5 cm 23 1518   Wound Width (cm) 3 cm 23 1518   Wound Depth (cm) 0.1 cm 23 1518   Wound Surface Area (cm^2) 10.5 cm^2 23 1518   Wound Volume (cm^3) 1.05 cm^3 23 1518   Wound Assessment Pink/red 23 1322   Drainage Amount Small 23 1322   Drainage Description Serosanguinous 23 1322   Odor None 23 1322   Margins Defined edges 23 1322   Number of days: 8       Wound 23 Hip Left #9 (Active)   Wound Etiology Traumatic 23 1322   Dressing Status Intact 23 1322   Wound Cleansed Cleansed with saline 23 1322   Dressing/Treatment Pharmaceutical agent (see MAR) 23 1322   Wound Length (cm) 3 cm 23 1518   Wound Width (cm) 9 cm 23 1518   Wound Depth (cm) 0.1 cm 23 1518   Wound Surface Area (cm^2) 27 cm^2 23 1518   Wound Volume (cm^3) 2.7 cm^3 23 1518   Post-Procedure Length (cm) 3 cm 23 1541   Post-Procedure Width (cm) 9 cm 23 1541   Post-Procedure Depth (cm) 0.1 cm 23 1541   Post-Procedure Surface Area (cm^2) 27 cm^2 23 1541   Post-Procedure Volume (cm^3) 2.7 cm^3 23 1541   Wound Assessment Bleeding 23 1322   Drainage Amount Small 23 1322   Drainage Description Serosanguinous 23 1322   Odor None 23 1322   Angela-wound Assessment Edematous 23 1322   Margins Defined edges 23 1322   Number of days: 8        Elimination:  Continence: Bowel: {YES / C}  Bladder: {YES / TD:67278}  Urinary Catheter: {Urinary Catheter:922053444}   Colostomy/Ileostomy/Ileal Conduit: {YES / EC:30603}       Date of Last BM: ***    Intake/Output Summary (Last 24 hours) at 3/8/2023 1406  Last data filed at 3/8/2023 1218  Gross per 24 hour   Intake 1340 ml   Output 2350 ml   Net -1010 ml     I/O last 3 completed shifts: In: 1360 [P.O.:700;  I.V.:10; IV Piggyback:650]  Out: 0 Safety Concerns:     [address] TAYLOR Safety Concerns:236783837}    Impairments/Disabilities:      [address] [facility] Impairments/Disabilities:152402139}    Nutrition Therapy:  Current Nutrition Therapy:   [address] [facility] Diet List:430528910}    Routes of Feeding: {CHP DME Other Feedings:171911241}  Liquids: {Slp liquid thickness:23053}  Daily Fluid Restriction: {CHP DME Yes amt example:539439857}  Last Modified Barium Swallow with Video (Video Swallowing Test): {Done Not Done [facility]:196519188}    Treatments at the Time of Hospital Discharge:   Respiratory Treatments: ***  Oxygen Therapy:  {Therapy; copd oxygen:17355}  Ventilator:    {[facility] Vent FOOZ:931522132}    Rehab Therapies: {THERAPEUTIC INTERVENTION:6811252320}  Weight Bearing Status/Restrictions: [address]  Weight Bearin}  Other Medical Equipment (for information only, NOT a DME order):  {EQUIPMENT:427167569}  Other Treatments: ***    Patient's personal belongings (please select all that are sent with patient):  {CHP DME Belongings:375983916}    RN SIGNATURE:  {Esignature:307801768}    CASE MANAGEMENT/SOCIAL WORK SECTION    Inpatient Status Date: ***    Readmission Risk Assessment Score:  Readmission Risk              Risk of Unplanned Readmission:  22           Discharging to Facility/ Amol Echeverria.   [address]  Phone: [phone]  Fax: [phone] Sarah Shine) [phone] Amara Kearney        Dialysis Facility (if applicable)   In house dialysis at facility    / signature: Electronically signed by Armando Robertson RN on 3/12/23 at 12:02 PM EDT    PHYSICIAN SECTION    Prognosis: {Prognosis:1835451872}    Condition at Discharge: [address] Patient Condition:632402410}    Rehab Potential (if transferring to Rehab): {Prognosis:9845635008}    Recommended Labs or Other Treatments After Discharge:                           Out-patient antibiotic therapy (OPAT)/ Antibiotic Infusion orders          Diagnosis: Bilateral leg wounds       Organism/ culture: Polymicrobial     Name and dose of Antimicrobial: IV vancomycin 1 g with each hemodialysis, p.o. Cipro 500 mg every 24 hours     Antimicrobial start date: calculated from 3/8/2023     Antimicrobial completion date planned: 3/22/2023      Lab monitoring: CBC, Chem 12, vancomycin level once a week, to be       collected every Monday or Tuesday morning until the patient is off IV  antibiotics. Fax weekly lab results to Korin Goncalves MD's office at        [phone]. Please maintain PICC line until the patient is on IV antibiotics. Ok to administer PICC line normal saline flushes as needed. The patient has given verbal informed consent for [facility] ID pharmacist, Melvin Eisenmenger to manage above antibiotic therapy for the patient after the patient's discharge from the hospital.       ** Please notify Korin Goncalves MD's office with any change in patient's        status, transfer out of facility or to hospital by calling [phone]           Outpatient Follow up: No routine outpatient ID f/u needed at this time, if the patient continues to do well. If need arises, a f/u in-person or video visit can be arranged via my office at patient's request on as-needed basis. Physician Signature:  Electronically signed by Korin Goncalves MD on                                                      3/8/23 at 2:05 PM EST          Physician Certification: I certify the above information and transfer of Vianey Doe  is necessary for the continuing treatment of the diagnosis listed and that he requires {Admit to Appropriate Level of Care:86818} for {GREATER/LESS:800190391} 30 days.      Update Admission H&P: {CHP DME Changes in ECEOU:570171718}    PHYSICIAN SIGNATURE:  {Esignature:030431205}

## 2023-03-09 ENCOUNTER — ANESTHESIA (OUTPATIENT)
Dept: ENDOSCOPY | Age: 83
End: 2023-03-09
Payer: MEDICARE

## 2023-03-09 ENCOUNTER — ANESTHESIA EVENT (OUTPATIENT)
Dept: ENDOSCOPY | Age: 83
End: 2023-03-09
Payer: MEDICARE

## 2023-03-09 LAB
ANION GAP SERPL CALCULATED.3IONS-SCNC: 17 MMOL/L (ref 3–16)
BASOPHILS ABSOLUTE: 0.1 K/UL (ref 0–0.2)
BASOPHILS RELATIVE PERCENT: 0.8 %
BUN BLDV-MCNC: 28 MG/DL (ref 7–20)
CALCIUM SERPL-MCNC: 9.3 MG/DL (ref 8.3–10.6)
CHLORIDE BLD-SCNC: 99 MMOL/L (ref 99–110)
CO2: 22 MMOL/L (ref 21–32)
CREAT SERPL-MCNC: 6.6 MG/DL (ref 0.8–1.3)
CRYPTOSPORIDIUM ANTIGEN STOOL: NORMAL
E HISTOLYTICA ANTIGEN STOOL: NORMAL
EOSINOPHILS ABSOLUTE: 0.2 K/UL (ref 0–0.6)
EOSINOPHILS RELATIVE PERCENT: 1.9 %
GFR SERPL CREATININE-BSD FRML MDRD: 8 ML/MIN/{1.73_M2}
GI BACTERIAL PATHOGENS BY PCR: NORMAL
GIARDIA ANTIGEN STOOL: NORMAL
GLUCOSE BLD-MCNC: 77 MG/DL (ref 70–99)
GLUCOSE BLD-MCNC: 78 MG/DL (ref 70–99)
GLUCOSE BLD-MCNC: 95 MG/DL (ref 70–99)
HCT VFR BLD CALC: 36.3 % (ref 40.5–52.5)
HEMOGLOBIN: 11.7 G/DL (ref 13.5–17.5)
LYMPHOCYTES ABSOLUTE: 1.2 K/UL (ref 1–5.1)
LYMPHOCYTES RELATIVE PERCENT: 14.6 %
MCH RBC QN AUTO: 34 PG (ref 26–34)
MCHC RBC AUTO-ENTMCNC: 32.3 G/DL (ref 31–36)
MCV RBC AUTO: 105.4 FL (ref 80–100)
MONOCYTES ABSOLUTE: 1.2 K/UL (ref 0–1.3)
MONOCYTES RELATIVE PERCENT: 14 %
NEUTROPHILS ABSOLUTE: 5.6 K/UL (ref 1.7–7.7)
NEUTROPHILS RELATIVE PERCENT: 68.7 %
PDW BLD-RTO: 15.7 % (ref 12.4–15.4)
PERFORMED ON: NORMAL
PERFORMED ON: NORMAL
PLATELET # BLD: 180 K/UL (ref 135–450)
PMV BLD AUTO: 8.7 FL (ref 5–10.5)
POTASSIUM SERPL-SCNC: 4.1 MMOL/L (ref 3.5–5.1)
RBC # BLD: 3.44 M/UL (ref 4.2–5.9)
SODIUM BLD-SCNC: 138 MMOL/L (ref 136–145)
VANCOMYCIN RANDOM: 25 UG/ML
WBC # BLD: 8.2 K/UL (ref 4–11)

## 2023-03-09 PROCEDURE — 99232 SBSQ HOSP IP/OBS MODERATE 35: CPT | Performed by: INTERNAL MEDICINE

## 2023-03-09 PROCEDURE — 0DJD8ZZ INSPECTION OF LOWER INTESTINAL TRACT, VIA NATURAL OR ARTIFICIAL OPENING ENDOSCOPIC: ICD-10-PCS | Performed by: INTERNAL MEDICINE

## 2023-03-09 PROCEDURE — 85025 COMPLETE CBC W/AUTO DIFF WBC: CPT

## 2023-03-09 PROCEDURE — 6360000002 HC RX W HCPCS: Performed by: FAMILY MEDICINE

## 2023-03-09 PROCEDURE — 3700000000 HC ANESTHESIA ATTENDED CARE: Performed by: INTERNAL MEDICINE

## 2023-03-09 PROCEDURE — 3609008400 HC SIGMOIDOSCOPY DIAGNOSTIC: Performed by: INTERNAL MEDICINE

## 2023-03-09 PROCEDURE — 1200000000 HC SEMI PRIVATE

## 2023-03-09 PROCEDURE — 2580000003 HC RX 258: Performed by: FAMILY MEDICINE

## 2023-03-09 PROCEDURE — 2709999900 HC NON-CHARGEABLE SUPPLY: Performed by: INTERNAL MEDICINE

## 2023-03-09 PROCEDURE — 6370000000 HC RX 637 (ALT 250 FOR IP): Performed by: PHYSICIAN ASSISTANT

## 2023-03-09 PROCEDURE — 6370000000 HC RX 637 (ALT 250 FOR IP): Performed by: FAMILY MEDICINE

## 2023-03-09 PROCEDURE — 3700000001 HC ADD 15 MINUTES (ANESTHESIA): Performed by: INTERNAL MEDICINE

## 2023-03-09 PROCEDURE — 80048 BASIC METABOLIC PNL TOTAL CA: CPT

## 2023-03-09 PROCEDURE — 36415 COLL VENOUS BLD VENIPUNCTURE: CPT

## 2023-03-09 PROCEDURE — 80202 ASSAY OF VANCOMYCIN: CPT

## 2023-03-09 PROCEDURE — 6370000000 HC RX 637 (ALT 250 FOR IP): Performed by: INTERNAL MEDICINE

## 2023-03-09 PROCEDURE — 7100000000 HC PACU RECOVERY - FIRST 15 MIN: Performed by: INTERNAL MEDICINE

## 2023-03-09 PROCEDURE — 90935 HEMODIALYSIS ONE EVALUATION: CPT

## 2023-03-09 PROCEDURE — 7100000001 HC PACU RECOVERY - ADDTL 15 MIN: Performed by: INTERNAL MEDICINE

## 2023-03-09 RX ORDER — TRAMADOL HYDROCHLORIDE 50 MG/1
50 TABLET ORAL EVERY 6 HOURS PRN
Qty: 15 TABLET | Refills: 0 | Status: SHIPPED | OUTPATIENT
Start: 2023-03-09 | End: 2023-03-12

## 2023-03-09 RX ORDER — CIPROFLOXACIN 500 MG/1
500 TABLET, FILM COATED ORAL DAILY
Qty: 19 TABLET | Refills: 0 | Status: SHIPPED | OUTPATIENT
Start: 2023-03-09 | End: 2023-03-28

## 2023-03-09 RX ADMIN — SODIUM CHLORIDE, PRESERVATIVE FREE 10 ML: 5 INJECTION INTRAVENOUS at 21:05

## 2023-03-09 RX ADMIN — METHOCARBAMOL 500 MG: 500 TABLET ORAL at 15:19

## 2023-03-09 RX ADMIN — HEPARIN SODIUM 5000 UNITS: 5000 INJECTION INTRAVENOUS; SUBCUTANEOUS at 21:01

## 2023-03-09 RX ADMIN — SEVELAMER CARBONATE 800 MG: 800 TABLET, FILM COATED ORAL at 17:00

## 2023-03-09 RX ADMIN — TRAMADOL HYDROCHLORIDE 50 MG: 50 TABLET, COATED ORAL at 15:19

## 2023-03-09 RX ADMIN — SODIUM CHLORIDE: 9 INJECTION, SOLUTION INTRAVENOUS at 12:59

## 2023-03-09 RX ADMIN — HEPARIN SODIUM 5000 UNITS: 5000 INJECTION INTRAVENOUS; SUBCUTANEOUS at 15:18

## 2023-03-09 RX ADMIN — LEVOTHYROXINE SODIUM 50 MCG: 0.03 TABLET ORAL at 06:33

## 2023-03-09 RX ADMIN — MIDODRINE HYDROCHLORIDE 2.5 MG: 5 TABLET ORAL at 15:18

## 2023-03-09 RX ADMIN — METOPROLOL TARTRATE 12.5 MG: 25 TABLET, FILM COATED ORAL at 21:02

## 2023-03-09 ASSESSMENT — ENCOUNTER SYMPTOMS
SHORTNESS OF BREATH: 1
EYE REDNESS: 0
SORE THROAT: 0
WHEEZING: 0
RHINORRHEA: 0
SINUS PRESSURE: 0
EYE DISCHARGE: 0
BACK PAIN: 0
NAUSEA: 0
SINUS PAIN: 0
ABDOMINAL PAIN: 0
CONSTIPATION: 0
DIARRHEA: 0
COUGH: 0
SHORTNESS OF BREATH: 0

## 2023-03-09 ASSESSMENT — PAIN - FUNCTIONAL ASSESSMENT: PAIN_FUNCTIONAL_ASSESSMENT: 0-10

## 2023-03-09 ASSESSMENT — PAIN SCALES - WONG BAKER
WONGBAKER_NUMERICALRESPONSE: 0

## 2023-03-09 ASSESSMENT — PAIN SCALES - GENERAL
PAINLEVEL_OUTOF10: 0
PAINLEVEL_OUTOF10: 0

## 2023-03-09 NOTE — ANESTHESIA POSTPROCEDURE EVALUATION
Department of Anesthesiology  Postprocedure Note    Patient: Arcenio Portillo  MRN: [id number]  YOB: 1940  Date of evaluation: 3/9/2023      Procedure Summary     Date: 03/09/23 Room / Location: [address] [place] [address]    Anesthesia Start: 0919 Anesthesia Stop: 8237    Procedure: SIGMOIDOSCOPY DIAGNOSTIC FLEXIBLE Diagnosis:       Abnormal CT of the abdomen      (Abnormal CT of the abdomen [R93.5])    Surgeons: Prateek Eng MD Responsible Provider: Brian Mejia MD    Anesthesia Type: MAC ASA Status: 4          Anesthesia Type: MAC    Andrzej Phase I: Andrzej Score: 10    Andrzej Phase II:        Anesthesia Post Evaluation    Patient location during evaluation: PACU  Patient participation: complete - patient participated  Level of consciousness: awake  Pain score: 0  Airway patency: patent  Nausea & Vomiting: no nausea and no vomiting  Complications: no  Cardiovascular status: hemodynamically stable  Respiratory status: acceptable  Hydration status: stable

## 2023-03-09 NOTE — CARE COORDINATION
Discharge Planning Note:    [address] [place] [address]    No pre-cert required    All need HD information has been faxed to [facility]. Will continue to follow.       ANGELA HerronN RN    [facility]  Phone: [phone]

## 2023-03-09 NOTE — PLAN OF CARE
Problem: Discharge Planning  Goal: Discharge to home or other facility with appropriate resources  3/9/2023 1129 by Mariela Wilks RN  Outcome: Progressing  3/8/2023 2356 by Ijeoma Gracia RN  Outcome: Progressing     Problem: Pain  Goal: Verbalizes/displays adequate comfort level or baseline comfort level  3/9/2023 1129 by Mariela Wilks RN  Outcome: Progressing  3/8/2023 2356 by Ijeoma Gracia RN  Outcome: Progressing  Flowsheets (Taken 3/8/2023 1300 by Chetan Rucker RN)  Verbalizes/displays adequate comfort level or baseline comfort level: Assess pain using appropriate pain scale     Problem: Skin/Tissue Integrity  Goal: Absence of new skin breakdown  Description: 1. Monitor for areas of redness and/or skin breakdown  2. Assess vascular access sites hourly  3. Every 4-6 hours minimum:  Change oxygen saturation probe site  4. Every 4-6 hours:  If on nasal continuous positive airway pressure, respiratory therapy assess nares and determine need for appliance change or resting period.   3/9/2023 1129 by Mariela Wilsk RN  Outcome: Progressing  3/8/2023 2356 by Ijeoma Gracia RN  Outcome: Progressing     Problem: Safety - Adult  Goal: Free from fall injury  3/9/2023 1129 by Mariela Wilks RN  Outcome: Progressing  3/8/2023 2356 by Ijeoma Gracia RN  Outcome: Progressing     Problem: ABCDS Injury Assessment  Goal: Absence of physical injury  3/9/2023 1129 by Mariela Wilks RN  Outcome: Progressing  3/8/2023 2356 by Ijeoma Gracia RN  Outcome: Progressing     Problem: Chronic Conditions and Co-morbidities  Goal: Patient's chronic conditions and co-morbidity symptoms are monitored and maintained or improved  3/9/2023 1129 by Mariela Wilks RN  Outcome: Progressing  3/8/2023 2356 by Ijeoma Gracia RN  Outcome: Progressing     Problem: Nutrition Deficit:  Goal: Optimize nutritional status  Outcome: Progressing

## 2023-03-09 NOTE — PROGRESS NOTES
Patient out of bed unassisted,was found on the floor in a kneeling position besides his bed,denies hitting head,reoriented and educated on the importance of calling for assistance.

## 2023-03-09 NOTE — PROGRESS NOTES
Nutrition Note    RECOMMENDATIONS  PO Diet: Resume diet per MD  ONS: Please order Nepro BID once diet is resumed     NUTRITION ASSESSMENT   Pt triggered for positive nutrition screen r/t wounds. Off unit in dialysis upon RD visit this morning. NPO today for flex sig. Documented intakes of 0% throughout admission. No recent wt hx in EMR to accurately assess for any significant wt change. RD will continue to monitor for diet to be resumed and will order ONS to offer additional protein to promote wound healing. Will continue to monitor for improved po intake. Nutrition Related Findings: If accurate, wt down 22 lb from admission. Lytes obtained today WNL. LBM 3/9, BS+. Oriented/disoriented attimes. Trace pitting BLE edema. Wounds: Multiple, Diabetic Ulcer, Pressure Injury, Unstageable, Venous Stasis (traumatic wound x1)  Nutrition Education:  Education not indicated   Nutrition Goals: PO intake 50% or greater, by next RD assessment     MALNUTRITION ASSESSMENT   Acute Illness  Malnutrition Status: Insufficient data    NUTRITION DIAGNOSIS   Increased nutrient needs related to increase demand for energy/nutrients as evidenced by wounds  Inadequate oral intake related to inadequate protein-energy intake as evidenced by intake 0-25%    CURRENT NUTRITION THERAPIES  Diet NPO Exceptions are: Sips of Water with Meds     PO Intake: NPO   PO Supplement Intake:NPO    ANTHROPOMETRICS  Current Height: 5' 10\" (177.8 cm)  Current Weight: 185 lb 13.6 oz (84.3 kg)    Admission weight: 207 lb (93.9 kg)  Ideal Body Weight (IBW): 166 lbs  (75 kg)        BMI: 26.5    COMPARATIVE STANDARDS  Energy (kcal):  9887-6887     Protein (g):         Fluid (mL/day):  5362-2862    The patient will be monitored per nutrition standards of care. Consult dietitian if additional nutrition interventions are needed prior to RD reassessment.      Judy Cormier, MS, RD, LD    Contact: [phone]

## 2023-03-09 NOTE — PROGRESS NOTES
Physical & Occupational Therapy  Patient at dialysis. Will continue to follow.   Attempted x 2 additional times, patient at HD, then to [address] PT, DPT, ATC-R [id number]  Heber Taylor, [address] [place] [address]

## 2023-03-09 NOTE — PROGRESS NOTES
Shift assessment complete, VSS, A&Ox4. Morning medications not given due to patient leaving for dialysis. Patient states no further needs at this time. Call light and personal belongings within reach.

## 2023-03-09 NOTE — PROGRESS NOTES
Physical & Occupational Therapy  Patient off floor at sigmoidoscopy.     Feli Fernandes PT, DPT, ATC-R [id number]  Christie Blair, OTR/L [id number]

## 2023-03-09 NOTE — PROGRESS NOTES
Pt meets criteria to be transferred back to -pt being transferred via stretcher with RN/transport/portable tele monitor

## 2023-03-09 NOTE — PROGRESS NOTES
Shift assessment completed, pt is alert and oriented but forgetful at time, VSS, fall precautions in place, call light within reach, denies any other needs at this time, see flowsheets and MAR, will monitor pt. The care plan and education has been reviewed and mutually agreed upon with the patient.

## 2023-03-09 NOTE — PROGRESS NOTES
Clinical Pharmacy Note: Pharmacy to Dose Vancomycin    Vancomycin Day: 3  Indication: Bone/Joint infection  Current Dose: intermittent \"pulse\" dosing  Dosing Method: Dosing by random levels    Random: 25.0 mg/L    Recent Labs     03/08/23  0507 03/09/23  0509   BUN 47* 28*       Recent Labs     03/08/23  0507 03/09/23  0509   CREATININE 9.7* 6.6*       Recent Labs     03/08/23  0507 03/09/23  0509   WBC 7.7 8.2         Intake/Output Summary (Last 24 hours) at 3/9/2023 1032  Last data filed at 3/8/2023 1218  Gross per 24 hour   Intake 350 ml   Output 2350 ml   Net -2000 ml         Ht Readings from Last 1 Encounters:   03/04/23 5' 10\" (1.778 m)        Wt Readings from Last 1 Encounters:   03/09/23 185 lb 13.6 oz (84.3 kg)         Body mass index is 26.67 kg/m². Estimated Creatinine Clearance: 9 mL/min (A) (based on SCr of 6.6 mg/dL [facility])). Assessment/Plan:  Vancomycin level is supratherapeutic. Will hold dose today, recheck level prior to next HD session  A vancomycin random level has been ordered on 3/11 at 0600 for follow-up. Changes in regimen will be determined based on culture results, renal function, and clinical response. Pharmacy will continue to monitor and adjust regimen as necessary. Thank you for the consult,    Wendy Mar, PharmD.   PGY-1 Resident  [id number]  03/09/23 10:33 AM

## 2023-03-09 NOTE — PROGRESS NOTES
Hospitalist Progress Note      PCP: Adri Cole MD    Date of Admission: 3/4/2023    Chief Complaint: Right groin testicular pain    Hospital Course  Patient is seen on dialysis, patient is comfortable, no nausea no vomiting, does complain of groin pain, looking at the chart does appear to have chronic groin pain, did had an a ultrasound done, CT scan does show fat-containing inguinal hernias,  Medications:  Reviewed      Exam:    BP (!) 155/99   Pulse 62   Temp (!) 96.4 °F (35.8 °C) (Temporal)   Resp 18   Ht 5' 10\" (1.778 m)   Wt 185 lb 13.6 oz (84.3 kg)   SpO2 100%   BMI 26.67 kg/m²     General appearance: No apparent distress, appears stated age and cooperative. HEENT: Pupils equal, round, and reactive to light. Conjunctivae/corneas clear. Neck: Supple, with full range of motion. No jugular venous distention. Trachea midline. Respiratory:  Normal respiratory effort. Clear to auscultation, bilaterally without RALES/WHEEZES/Rhonchi. Cardiovascular: Regular rate and rhythm with normal S1/S2 without MURMURS, rubs or gallops. Abdomen: Soft, non-tender, non-distended with normal bowel sounds. Musculoskeletal: No clubbing, cyanosis or EDEMA bilaterally. Full range of motion without deformity. Skin: Right inner thigh thickening of skin eschar formation with some tenderness but no surrounding erythema  Some tenderness of the right testes but no obvious swelling erythema  Multiple superficial ulcerations on the right lower extremity and the right toes  Neurologic:  Neurovascularly intact without any focal sensory/motor deficits.  Cranial nerves: II-XII intact, grossly non-focal.  Physical exam remains unchanged from 3/7    Labs:   Recent Labs     03/07/23  0551 03/08/23  0507 03/09/23  0509   WBC 7.7 7.7 8.2   HGB 11.9* 11.9* 11.7*   HCT 38.9* 37.8* 36.3*    169 180     Recent Labs     03/07/23  0858 03/08/23  0507 03/09/23  0509    133* 138   K 4.7 4.8 4.1   CL 95* 94* 99   CO2 24 17* 22   BUN 40* 47* 28*   CREATININE 7.8* 9.7* 6.6*   CALCIUM 9.6 9.4 9.3   PHOS 6.3*  --   --      Recent Labs     03/07/23  0858   AST 14*   ALT 7*   BILITOT 0.3   ALKPHOS 53     Recent Labs     03/07/23  0551   INR 1.15*     Recent Labs     03/07/23  0858   CKTOTAL 74       Urinalysis:    No results found for: Antonio Fabry, BACTERIA, RBCUA, BLOODU, SPECGRAV, GLUCOSEU    Radiology:  CT FEMUR RIGHT W CONTRAST   Final Result   CT pelvis:      1. Wall thickening of the rectum and sigmoid colon which could represent   proctitis or colitis or sequela of radiation. Underlying malignancy not   excluded. 2. Mild bilateral hip osteoarthrosis. 3. No acute osseous abnormality. If pain persists, consider further evaluation with MRI assuming there are no   contraindications to MRI. 4. Fat stranding and irregularity of the collapsed urinary bladder. This can   be seen with a cystitis. 5. Small fat containing bilateral inguinal hernias. 6. Radiation seeds in the prostate. 7. Atherosclerotic calcification of the aorta and iliac branch vasculature. 8. Moderate to severe disc space narrowing and vacuum disc phenomenon at   L5-S1. CT right femur:      1. Wall thickening of the rectum and sigmoid colon which could represent a   proctitis/colitis or sequela of radiation. Underlying malignancy not   excluded. 2. Fat stranding and irregularity of the urinary bladder which can be seen   with cystitis. 3. Mild edema in the subcutaneous fat about the right thigh. 4. No acute osseous abnormality. If pain persists, consider further   evaluation with MRI assuming there are no contraindications to MRI. 5. Mild right hip osteoarthrosis. 6. No organized fluid collection. CT PELVIS WO CONTRAST Additional Contrast? None   Final Result   CT pelvis:      1. Wall thickening of the rectum and sigmoid colon which could represent   proctitis or colitis or sequela of radiation.   Underlying malignancy not excluded. 2. Mild bilateral hip osteoarthrosis. 3. No acute osseous abnormality. If pain persists, consider further evaluation with MRI assuming there are no   contraindications to MRI. 4. Fat stranding and irregularity of the collapsed urinary bladder. This can   be seen with a cystitis. 5. Small fat containing bilateral inguinal hernias. 6. Radiation seeds in the prostate. 7. Atherosclerotic calcification of the aorta and iliac branch vasculature. 8. Moderate to severe disc space narrowing and vacuum disc phenomenon at   L5-S1. CT right femur:      1. Wall thickening of the rectum and sigmoid colon which could represent a   proctitis/colitis or sequela of radiation. Underlying malignancy not   excluded. 2. Fat stranding and irregularity of the urinary bladder which can be seen   with cystitis. 3. Mild edema in the subcutaneous fat about the right thigh. 4. No acute osseous abnormality. If pain persists, consider further   evaluation with MRI assuming there are no contraindications to MRI. 5. Mild right hip osteoarthrosis. 6. No organized fluid collection. US DUP ABD PEL RETRO SCROT COMPLETE   Final Result   Unremarkable testicular ultrasound with normal Doppler flow. US SCROTUM AND TESTICLES   Final Result   Unremarkable testicular ultrasound with normal Doppler flow. CT ABDOMEN PELVIS WO CONTRAST Additional Contrast? None   Final Result   Large hiatal hernia with small right pleural effusion. XR PELVIS (1-2 VIEWS)   Final Result   Osteopenia. No evident fracture. Given the degree of osteopenia, nondisplaced fractures may be   radiographically occult. If pain or concern for fracture persists, consider   MR imaging. XR CHEST PORTABLE   Final Result   No acute process.       Stable cardiomegaly      Bibasilar hypoaeration             Assessment/Plan:    Active Hospital Problems    Diagnosis Date Noted    Receiving intravenous antibiotic treatment as outpatient [Z79.2] 03/08/2023     Priority: Medium    Complex care coordination [Z71.89] 03/08/2023     Priority: Medium    Frequent falls [R29.6] 03/06/2023     Priority: Medium    Chronic pain in testicle [N50.819, G89.29] 03/06/2023     Priority: Medium    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 (HCC) [N18.6, Z99.2] 03/06/2023     Priority: Medium    Pressure injury of skin of left hip [L89.229] 03/06/2023     Priority: Medium    Skin ulcer of multiple sites (HCC) [L98.499] 03/06/2023     Priority: Medium    Unable to ambulate [R26.2] 03/06/2023     Priority: Medium    Type 2 diabetes mellitus with other specified complication (HCC) [E11.69] 03/06/2023     Priority: Medium    History of prostate cancer [Z85.46] 03/06/2023     Priority: Medium    Overweight (BMI 25.0-29.9) [E66.3] 03/06/2023     Priority: Medium    General weakness [R53.1] 03/04/2023     Priority: Medium    Essential hypertension [I10]        Acute Medical Issues Being Addressed:    82-year-old admitted to the hospital with multiple ulcerations and fall    Multiple lower extremity wounds s/p debridement  Wound cultures grew out MSSA and Enterobacter but currently bacterium  We will continue IV ceftriaxone although I am not clear that there is an infection  We will get ID to help with antibiotics  Continue local wound care  ID input regarding long-term antibiotics noticed put it in the med rec    Stage II decubitus ulcer present on admission  Noted on recent wound care visit    Recurrent falls  No loss of consciousness  Overall debility    Subacute/chronic right groin testicular pain  Unclear etiology of the pain  May be from the right hip or the back  So far urological work-up including ultrasound is all negative  CT of the pelvis CT of the right femur was also negative  Seen by general surgery and urology        End-stage renal disease on hemodialysis    Hypertension  On metoprolol 12.5 twice daily patient seen on dialysis,    Abnormal CT abdomen  pelvis with thickening of the rectum and sigmoid  Seen by GI  C. difficile if that is negative then will need colonoscopy    Chronic severe systolic heart failure  EF around 25%  Last EF 2022 was around 35%  Currently seems euvolemic    Hopefully discharge after flexible sigmoidoscopy if okay with GI   DVT Prophylaxis: Subcu heparin  Diet: Diet NPO Exceptions are: Sips of Water with Meds  Code Status: Full Code  Papers done for disposition    Dispo - once acute medical processes have resolved    Rey Adams MD
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
## 2023-03-09 NOTE — PROGRESS NOTES
[facility]   Nephrology progress Note  [phone]   [facility]. [employer]           Reason for Consult:  ESRD on HD   The patient is a 79 y.o. male with significant past medical history of 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, hypertension, peripheral vascular disease with a chronic ulcer of the lower leg, anemia, renal osteodystrophy, has had history of recurrent falls. 1 date of admission 3/4/2023 he fell on his left side and complained of left hip pain. There was no history of trauma to the head. X-ray pelvis did not show any fracture. For his lower extremity wounds he follows up with wound care and had undergone excision and debridement at that visit. Wound culture was sent a Doppler done was negative for DVT. He says he came to the hospital because he was having lower abdominal pain which is radiating to his left testicle  There was no fever or chills    He does dialysis at [facility] fair Tuesday Thursday Saturday. However he missed HD 3/4/23. Has been on dialysis for about 5 years and he says his kidneys failed because of diabetes mellitus        Interval  History:    3/6/23: pain back of Rt thigh today, got HD earlier   3/7/23: B/ L upper scrotum pain     -pt seen and examined  -PMSHx and meds reviewed  -No family at bedside    Seen on dialysis  C/o scrotal discomfort  C/o dry cough  Plan for flex sig today    ROS: neg chest pain/SOB    PHYSICAL EXAM:    Vitals:    BP (!) 151/87   Pulse 59   Temp 97.5 °F (36.4 °C)   Resp 18   Ht 5' 10\" (1.778 m)   Wt 185 lb 13.6 oz (84.3 kg)   SpO2 96%   BMI 26.67 kg/m²   I/O last 3 completed shifts: In: 1340 [P.O.:340; IV Piggyback:650]  Out: 2350   No intake/output data recorded.     Physical Exam:  Gen:  alert, oriented x 3  HEENT: anicteric, NC/AT  CV: RRR, +S1/S2  Lungs:B/ L air entry, Normal breath sounds   Abd: soft, NT  Ext: No edema, no cyanosis, tender area Rt inner  thigh plaque like lesion , multiple erosions/ ulcers   Skin: Warm.  No rash  Neuro: nonfocal.  Rt IJ TDC      DATA:    CBC with Differential:    Lab Results   Component Value Date/Time    WBC 8.2 03/09/2023 05:09 AM    RBC 3.44 03/09/2023 05:09 AM    HGB 11.7 03/09/2023 05:09 AM    HCT 36.3 03/09/2023 05:09 AM     03/09/2023 05:09 AM    .4 03/09/2023 05:09 AM    MCH 34.0 03/09/2023 05:09 AM    MCHC 32.3 03/09/2023 05:09 AM    RDW 15.7 03/09/2023 05:09 AM    SEGSPCT 64.9 08/05/2010 03:36 PM    LYMPHOPCT 14.6 03/09/2023 05:09 AM    MONOPCT 14.0 03/09/2023 05:09 AM    EOSPCT 0.9 08/05/2010 03:36 PM    BASOPCT 0.8 03/09/2023 05:09 AM    MONOSABS 1.2 03/09/2023 05:09 AM    LYMPHSABS 1.2 03/09/2023 05:09 AM    EOSABS 0.2 03/09/2023 05:09 AM    BASOSABS 0.1 03/09/2023 05:09 AM    DIFFTYPE Auto-K 08/05/2010 03:36 PM     CMP:    Lab Results   Component Value Date/Time     03/09/2023 05:09 AM    K 4.1 03/09/2023 05:09 AM    CL 99 03/09/2023 05:09 AM    CO2 22 03/09/2023 05:09 AM    BUN 28 03/09/2023 05:09 AM    CREATININE 6.6 03/09/2023 05:09 AM    GFRAA 34 10/14/2013 08:07 PM    GFRAA 43 08/05/2010 03:36 PM    AGRATIO 1.0 03/07/2023 08:58 AM    LABGLOM 8 03/09/2023 05:09 AM    GLUCOSE 95 03/09/2023 05:09 AM    PROT 6.5 03/07/2023 08:58 AM    PROT 7.6 08/05/2010 03:36 PM    LABALBU 3.2 03/07/2023 08:58 AM    CALCIUM 9.3 03/09/2023 05:09 AM    BILITOT 0.3 03/07/2023 08:58 AM    ALKPHOS 53 03/07/2023 08:58 AM    AST 14 03/07/2023 08:58 AM    ALT 7 03/07/2023 08:58 AM     Phosphorus:    Lab Results   Component Value Date/Time    PHOS 6.3 03/07/2023 08:58 AM     Uric Acid:  No results found for: LABURIC, URICACID  Last 3 Troponin:    Lab Results   Component Value Date/Time    TROPONINI 0.40 03/04/2023 10:13 PM    TROPONINI 0.39 03/04/2023 07:16 PM    TROPONINI 0.44 03/04/2023 01:44 PM     U/A:  No results found for: NITRITE, COLORU, PROTEINU, PHUR, LABCAST, [address], RBCUA, MUCUS, TRICHOMONAS, YEAST, BACTERIA, CLARITYU, [address], LEUKOCYTESUR, [address], [address] [place] [address], BLOODU, GLUCOSEU, AMORPHOUS          1. Wall thickening of the rectum and sigmoid colon which could represent a   proctitis/colitis or sequela of radiation. Underlying malignancy not   excluded. 2. Fat stranding and irregularity of the urinary bladder which can be seen   with cystitis. 3. Mild edema in the subcutaneous fat about the right thigh. 4. No acute osseous abnormality. If pain persists, consider further   evaluation with MRI assuming there are no contraindications to MRI. 5. Mild right hip osteoarthrosis. 6. No organized fluid collection. IMPRESSION/RECOMMENDATIONS:      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:  [facility]-TTS  -had HD yesterday-pre-wt 88.3, post 86.3kg, TW TBD  -seen on dialysis today  -UF goal is 2L  -BP stable  -lytes stable    Plan:  -continue HD TTS  -next HD on saturdy    Gap Metabolic acidosis:  -resolved with HD yesterday--CO2 is 22<--17  -seen on dialysis today    Anemia of chronic kidney disease  Target hemoglobin 9-10.5  -no JOS needed-hgb is 11.7    Renal osteodystrophy  -continue binders    Groin pain: US negative  -CT of A/P noted, \"      1. Wall thickening of the rectum and sigmoid colon which could represent a   proctitis/colitis or sequela of radiation. Underlying malignancy not   excluded. 2. Fat stranding and irregularity of the urinary bladder which can be seen   with cystitis. 3. Mild edema in the subcutaneous fat about the right thigh. 4. No acute osseous abnormality. If pain persists, consider further   evaluation with MRI assuming there are no contraindications to MRI. 5. Mild right hip osteoarthrosis. 6. No organized fluid collection.    -seen by GI  -plan for flex sig today  -on IV vancomycin and oral cipro  -plan to continue it for 2 weeks-tentative end date 3/14/23    Recurrent falls: per primary  -PT consulted  -will benefit from SNF placement with in house HD    Carcinoma prostate:seeds in the prostate      Thank you for allowing me to participate in the care of this patient. I will continue to follow along. Please call with questions.     Sergey Palomares MD, MD  3/9/2023  [facility]

## 2023-03-09 NOTE — PLAN OF CARE
Problem: Discharge Planning  Goal: Discharge to home or other facility with appropriate resources  3/8/2023 2356 by Alexandru Lane RN  Outcome: Progressing     Problem: Skin/Tissue Integrity  Goal: Absence of new skin breakdown  Description: 1. Monitor for areas of redness and/or skin breakdown  2. Assess vascular access sites hourly  3. Every 4-6 hours minimum:  Change oxygen saturation probe site  4. Every 4-6 hours:  If on nasal continuous positive airway pressure, respiratory therapy assess nares and determine need for appliance change or resting period.   3/8/2023 2356 by Alexandru Lane RN  Outcome: Progressing     Problem: Safety - Adult  Goal: Free from fall injury  3/8/2023 2356 by Alexandru Lane RN  Outcome: Progressing     Problem: ABCDS Injury Assessment  Goal: Absence of physical injury  3/8/2023 2356 by Alexandru Lane RN  Outcome: Progressing     Problem: Chronic Conditions and Co-morbidities  Goal: Patient's chronic conditions and co-morbidity symptoms are monitored and maintained or improved  3/8/2023 2356 by Alexandru Lane RN  Outcome: Progressing

## 2023-03-09 NOTE — PROGRESS NOTES
Pt arrived from ENDO to PACU bay 6. Reported received from ENDO staff. Pt arousable to voice. Pt on 6L simple mask. NSB, and VSS. Will continue to monitor.

## 2023-03-09 NOTE — PROGRESS NOTES
Scope verified using 2 person system.  Reviewed problem list, assessment, H&P, and checklist preoperatively.
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
## 2023-03-10 ENCOUNTER — CLINICAL DOCUMENTATION (OUTPATIENT)
Dept: INFECTIOUS DISEASES | Age: 83
End: 2023-03-10

## 2023-03-10 LAB — HEPATITIS B CORE TOTAL ANTIBODY: NEGATIVE

## 2023-03-10 PROCEDURE — 97163 PT EVAL HIGH COMPLEX 45 MIN: CPT

## 2023-03-10 PROCEDURE — 6370000000 HC RX 637 (ALT 250 FOR IP): Performed by: INTERNAL MEDICINE

## 2023-03-10 PROCEDURE — 6360000002 HC RX W HCPCS: Performed by: INTERNAL MEDICINE

## 2023-03-10 PROCEDURE — 2580000003 HC RX 258: Performed by: FAMILY MEDICINE

## 2023-03-10 PROCEDURE — 97535 SELF CARE MNGMENT TRAINING: CPT

## 2023-03-10 PROCEDURE — 97167 OT EVAL HIGH COMPLEX 60 MIN: CPT

## 2023-03-10 PROCEDURE — 6370000000 HC RX 637 (ALT 250 FOR IP): Performed by: PHYSICIAN ASSISTANT

## 2023-03-10 PROCEDURE — 1200000000 HC SEMI PRIVATE

## 2023-03-10 PROCEDURE — 97530 THERAPEUTIC ACTIVITIES: CPT

## 2023-03-10 PROCEDURE — 94760 N-INVAS EAR/PLS OXIMETRY 1: CPT

## 2023-03-10 PROCEDURE — 99232 SBSQ HOSP IP/OBS MODERATE 35: CPT | Performed by: INTERNAL MEDICINE

## 2023-03-10 PROCEDURE — 6360000002 HC RX W HCPCS: Performed by: FAMILY MEDICINE

## 2023-03-10 PROCEDURE — 2700000000 HC OXYGEN THERAPY PER DAY

## 2023-03-10 PROCEDURE — 6370000000 HC RX 637 (ALT 250 FOR IP): Performed by: FAMILY MEDICINE

## 2023-03-10 RX ORDER — MORPHINE SULFATE 2 MG/ML
1 INJECTION, SOLUTION INTRAMUSCULAR; INTRAVENOUS ONCE
Status: COMPLETED | OUTPATIENT
Start: 2023-03-10 | End: 2023-03-10

## 2023-03-10 RX ADMIN — HEPARIN SODIUM 5000 UNITS: 5000 INJECTION INTRAVENOUS; SUBCUTANEOUS at 13:44

## 2023-03-10 RX ADMIN — HEPARIN SODIUM 5000 UNITS: 5000 INJECTION INTRAVENOUS; SUBCUTANEOUS at 22:14

## 2023-03-10 RX ADMIN — METOPROLOL TARTRATE 12.5 MG: 25 TABLET, FILM COATED ORAL at 22:13

## 2023-03-10 RX ADMIN — METOPROLOL TARTRATE 12.5 MG: 25 TABLET, FILM COATED ORAL at 11:31

## 2023-03-10 RX ADMIN — METHOCARBAMOL 500 MG: 500 TABLET ORAL at 11:30

## 2023-03-10 RX ADMIN — TRAMADOL HYDROCHLORIDE 50 MG: 50 TABLET, COATED ORAL at 11:30

## 2023-03-10 RX ADMIN — ALLOPURINOL 100 MG: 100 TABLET ORAL at 11:31

## 2023-03-10 RX ADMIN — SEVELAMER CARBONATE 800 MG: 800 TABLET, FILM COATED ORAL at 11:31

## 2023-03-10 RX ADMIN — SODIUM CHLORIDE, PRESERVATIVE FREE 10 ML: 5 INJECTION INTRAVENOUS at 22:21

## 2023-03-10 RX ADMIN — MORPHINE SULFATE 1 MG: 2 INJECTION, SOLUTION INTRAMUSCULAR; INTRAVENOUS at 13:43

## 2023-03-10 RX ADMIN — NEPHROCAP 1 MG: 1 CAP ORAL at 11:31

## 2023-03-10 RX ADMIN — MIDODRINE HYDROCHLORIDE 2.5 MG: 5 TABLET ORAL at 11:58

## 2023-03-10 RX ADMIN — SEVELAMER CARBONATE 800 MG: 800 TABLET, FILM COATED ORAL at 17:33

## 2023-03-10 RX ADMIN — CIPROFLOXACIN 500 MG: 500 TABLET, FILM COATED ORAL at 11:31

## 2023-03-10 RX ADMIN — MIDODRINE HYDROCHLORIDE 2.5 MG: 5 TABLET ORAL at 17:33

## 2023-03-10 RX ADMIN — LEVOTHYROXINE SODIUM 50 MCG: 0.03 TABLET ORAL at 05:25

## 2023-03-10 ASSESSMENT — ENCOUNTER SYMPTOMS
ABDOMINAL PAIN: 0
RHINORRHEA: 0
DIARRHEA: 0
COUGH: 0
NAUSEA: 0
SINUS PRESSURE: 0
SINUS PAIN: 0
CONSTIPATION: 0
EYE REDNESS: 0
BACK PAIN: 0
WHEEZING: 0
EYE DISCHARGE: 0
SHORTNESS OF BREATH: 0
SORE THROAT: 0

## 2023-03-10 ASSESSMENT — PAIN DESCRIPTION - DESCRIPTORS: DESCRIPTORS: ACHING;SHARP

## 2023-03-10 ASSESSMENT — PAIN DESCRIPTION - LOCATION: LOCATION: OTHER (COMMENT)

## 2023-03-10 ASSESSMENT — PAIN SCALES - GENERAL
PAINLEVEL_OUTOF10: 6
PAINLEVEL_OUTOF10: 10

## 2023-03-10 ASSESSMENT — PAIN DESCRIPTION - ORIENTATION: ORIENTATION: RIGHT

## 2023-03-10 NOTE — PROGRESS NOTES
[facility]   Phone: [phone]    Occupational Therapy    [x] Initial Evaluation            [] Daily Treatment Note         [] Discharge Summary      Patient: Kaela Douglas   : 1940   MRN: [id number]   Date of Service:  3/10/2023    Admitting Diagnosis:  General weakness  Current Admission Summary: Kaela Douglas is a 79 y.o. male with h/o HTN, ESRD on HD ,PVD, chronic ulcer on Lower extremity, anemia  presented with c/o recurrent falls. Today he fell on his left side and c.o left hip pain . Denies hitting his head or LOC He has multiple recurrent ulcers on his lower extremities b.l . Also has pressure ulcer on hip. He follows up with Dr. Jordy Navarrete for wound care. Last visit was on 23 . He underwent excision and debridement during that visit. Wound cultures were sent. Doppler neg for DVT . He has not been on any antibiotics. The pt is non compliant with dialysis and treatment. He is also c/o groin pain   ED worjk up today showed   Xray of pelvis neg for fracture or hemorrhage   Chest xray showed stable cardiomegaly  Troponin elevated 0.44  Past Medical History:  has a past medical history of Blood clot, Fracture of sternum, Fracture rib, Hyperlipidemia, Hypertension, Laceration of skin of lower leg, left, initial encounter, Laceration of skin of lower leg, right, initial encounter, Prostate cancer ([facility] [address] and Type II or unspecified type diabetes mellitus without mention of complication, not stated as uncontrolled. Past Surgical History:  has a past surgical history that includes TURP () and sigmoidoscopy (N/A, 3/9/2023). Discharge Recommendations: Kaela Douglas scored a 13/24 on the AM-PAC ADL Inpatient form. Current research shows that an AM-PAC score of 17 or less is typically not associated with a discharge to the patient's home setting.  Based on the patient's AM-PAC score and their current ADL deficits, it is recommended that the patient have 3-5 sessions per week of Occupational Therapy at d/c to increase the patient's independence. Please see assessment section for further patient specific details. If patient discharges prior to next session this note will serve as a discharge summary. Please see below for the latest assessment towards goals.        DME Required For Discharge: DME to be determined at next level of care, DME to be determined pending patient progress    Precautions/Restrictions: no restrictions  Weight Bearing Restrictions: no restrictions  [] Right Upper Extremity  [] Left Upper Extremity [] Right Lower Extremity  [] Left Lower Extremity     Required Braces/Orthotics: no braces required   [] Right  [] Left  Positional Restrictions:no positional restrictions    Pre-Admission Information   Lives With: spouse                  Type of Home: house  Home Layout: two level, bedroom/bathroom upstairs, able to live on main level  Home Access: ramped entry  Bathroom Layout: shower is on the upper level of the home, pt has been sponge bathing  Bathroom Equipment: BS - pt uses this to toilet as his BR is on the second floor of his home  Toilet Height:   Home Equipment: rolling walker, manual wheelchair  Transfer Assistance: requires assistance  Ambulation Assistance:requires assistance, wife assists pt with walking  ADL Assistance: requires assistance with bathing, requires assistance with dressing, requires assistance with toileting  IADL Assistance: requires assistance with all homemaking tasks  Active :        [] Yes                 [x] No  Hand Dominance: [] Left                 [] Right  Current Employment: unemployed  Hobbies:   Recent Falls: 6-7 falls in the last 5 months, has not gone up to the second floor of the home in 6 weeks, pt receives transportation to/from , pt's wife is having neck surgery in the next few weeks    Examination   Vision:   Vision Gross Assessment: Impaired  Hearing:   WFL  Perception: WFL  Observation:   General Observation:  multiple lesions on BLE. Ischial wound on L buttocks. Posture: Forward head, rounded shoulders. Sensation:   reports numbness and tingling in (B) LE  Proprioception:    diminished proprioception in (B) LE  Tone:   Normotonic  Coordination Testing:   WFL    ROM:   (B) Shoulder AROM WFL  Strength:   (B) UE strength grossly -4    Therapist Clinical Decision Making (Complexity): high complexity  Clinical Presentation: evolving      Subjective  General: Pt supine in bed upon entry. He is sleeping and initially resistant to treatment; however, became more agreeable with maximum encouragement. Pain: 5/10. Location: intermittent testicular pain (unclear if neurological in nature). Pain Interventions: RN notified of patient request for pain medication, ice applied, and repositioned         Activities of Daily Living  Basic Activities of Daily Living  Feeding: setup assistance  Upper Extremity Bathing: setup assistance stand by assistance Comment: completed sitting in recliner. Lower Extremity Bathing: moderate assistance Comment: pt able to wash thighs and knees but requires Mod A   Upper Extremity Dressing: Comment: gown change completed. Lower Extremity Dressing: dependent Comment: doffing/donning socks and breifs in sitting. Comment: Pt consistently readjusting position in chair due to testicular pain/discomfort. Increased time required for all ADLs. Instrumental Activities of Daily Living  No IADL completed on this date. Functional Mobility  Bed Mobility  Supine to Sit: minimal assistance  Rolling Right: minimal assistance  Scooting: minimal assistance  Comments: HOB flat, bed rails used. Increased time. Transfers  Squat pivot transfer: moderate assistance, maximum assistance, Max a + Mod A x2 person assist for squat/pivot from EOB to recliner. Pt unable to achieve full upright stance.  Increased BLE weakness with forward flexed posture and with decreased eccentric control. Comments: Pt with testicular pain throughout but reports pain improves with upright sitting. Waffle cushion provided in recliner for comfort. Functional Mobility:  Sitting Balance: stand by assistance. Standing Balance: maximum assistance. Functional Mobility Comment: Pt unable to maintain stance. No functional mobility completed secondary to pain and LE weakness. Pt unsafe to attempt ambulation this session. Other Therapeutic Interventions    Functional Outcomes  AM-PAC Inpatient Daily Activity Raw Score: 13    Cognition  WFL  Orientation:    alert and oriented x 4  Command Following:   [facility]  Barriers To Learning: none  Patient Education: patient educated on goals, OT role and benefits, plan of care, ADL adaptive strategies, IADL safety, pressure relief, transfer training, discharge recommendations  Learning Assessment:  patient verbalizes understanding, would benefit from continued reinforcement    Assessment  Activity Tolerance: Pt limited by pain. Impairments Requiring Therapeutic Intervention: decreased functional mobility, decreased ADL status, decreased strength, decreased endurance, decreased sensation, decreased IADL, increased pain  Prognosis: good  Clinical Assessment: Pt presenting below his functional baseline with the above deficits associated with generalized weakness and increased testicular pain. At this time, origin of testicular pain unknown. Pt exhibiting significant weakness and deconditioning. He also reports going multiple days without eating which has subsequently contributed to substantial weight loss over the past several months. While spouse typcially assists pt with ADLs and mobility/transfers, pt is unable to safely participate in self care or mobility without substantial physical assist of x2 people. Continued OT indicated in order to maximize safety and independence with all occupational pursuits.    Safety Interventions: patient left in chair, chair alarm in place, call light within reach, patient at risk for falls, nurse notified, and fall mats at bedside    Plan  Frequency: 3-5 x/per week  Current Treatment Recommendations: strengthening, balance training, functional mobility training, transfer training, endurance training, neuromuscular re-education, patient/caregiver education, ADL/self-care training, IADL training, home exercise program, safety education, and positioning    Goals  Patient Goals: Return to home   Short Term Goals:  Time Frame: Discharge  Patient will complete upper body ADL at stand by assistance   Patient will complete lower body ADL at minimal assistance   Patient will complete toileting at moderate assistance, using least restrictive DME   Patient will complete grooming at stand by assistance   Patient will complete functional transfers at minimal assistance     Therapy Session Time     Individual Group Co-treatment   Time In    1042   Time Out    1152   Minutes    70       Timed Code Treatment Minutes: 55 Minutes  Total Treatment Minutes:  70 minutes       Electronically Signed By: BRYAN Toribio OTR/L  [id number]

## 2023-03-10 NOTE — PROGRESS NOTES
Patient remains very confused, agitated, impulsive, continuously stating to get out of the room, this writer and PCA tried several times to reorient patient with no success, patient started hitting at this writer and PCA, patient trying to climb out of the bed, patient was safely repositioned in the bed, bed alarm on, safety floor pads on the floor. Patient refusing all the care including meds. MD notified of the situation. Will continue to monitor.

## 2023-03-10 NOTE — PROGRESS NOTES
[facility] [address]   Phone: [phone]    Physical Therapy    [x] Initial Evaluation            [] Daily Treatment Note         [] Discharge Summary      Patient: Dg Cronin   : 1940   MRN: [id number]   Date of Service:  3/10/2023  Admitting Diagnosis: General weakness  Current Admission Summary: The pt was admitted s/p 3 falls. He has multiple BLE wounds and ESRD. He is having severe testicular pain. Past Medical History:  has a past medical history of Blood clot, Fracture of sternum, Fracture rib, Hyperlipidemia, Hypertension, Laceration of skin of lower leg, left, initial encounter, Laceration of skin of lower leg, right, initial encounter, Prostate cancer ([facility] [address] and Type II or unspecified type diabetes mellitus without mention of complication, not stated as uncontrolled. Past Surgical History:  has a past surgical history that includes TURP () and sigmoidoscopy (N/A, 3/9/2023). Discharge Recommendations: Dg Cronin scored a 10/24 on the AM-PAC short mobility form. Current research shows that an AM-PAC score of 17 or less is typically not associated with a discharge to the patient's home setting. Based on the patient's AM-PAC score and their current functional mobility deficits, it is recommended that the patient have 3-5 sessions per week of Physical Therapy at d/c to increase the patient's independence. Please see assessment section for further patient specific details. If patient discharges prior to next session this note will serve as a discharge summary. Please see below for the latest assessment towards goals.     DME Required For Discharge: DME to be determined at next level of care  Precautions/Restrictions: high fall risk  Weight Bearing Restrictions: no restrictions  [] Right Upper Extremity  [] Left Upper Extremity [] Right Lower Extremity  [] Left Lower Extremity     Required Braces/Orthotics: no braces required   [] Right  [] Left  Positional Restrictions:no positional restrictions    Pre-Admission Information   Lives With: spouse    Type of Home: house  Home Layout: two level, bedroom/bathroom upstairs, able to live on main level  Home Access: ramped entry  Bathroom Layout: shower is on the upper level of the home, pt has been sponge bathing  Bathroom Equipment: BSC - pt uses this to toilet as his BR is on the second floor of his home    Home Equipment: rolling walker, manual wheelchair  Transfer Assistance: requires assistance  Ambulation Assistance:requires assistance, wife assists pt with walking  ADL Assistance: requires assistance with bathing, requires assistance with dressing, requires assistance with toileting  IADL Assistance: requires assistance with all homemaking tasks  Active :        [] Yes  [x] No  Hand Dominance: [] Left  [] Right    Hobbies:   Recent Falls: 6-7 falls in the last 5 months, has not gone up to the second floor of the home in 6 weeks, pt receives transportation to/from , pt's wife is having neck surgery in the next few weeks, pt has unintentionally lost 100lbs in the last year, he has been declining at home for the last 4 months     Examination   Vision:   Vision Gross Assessment: Impaired -pt stated he has intermittent vertigo as well as double vision, pt stated he closes his L eye when he watches TV  Hearing:   WFL  Observation:   General Observation:  BLEs dry, flaky, multiple open wounds scattered from thighs down to toes; hard healing wound on R inner thigh  Posture:   Unable to stand fully straight, anticipate pt has decreased lumbar spine extension  Sensation:   reports numbness and tingling in (B) LE  Proprioception:    diminished proprioception in (B) LE  Tone:   Normotonic  Coordination Testing:   Unable to formally test secondary to pt's pain level today     ROM:   PROM testing limited this date due to pt's severe pain, ankle [facility]  WFL hip and knee flexion, anticipate decreased hip/knee extension  Strength:   Testing limited by severe testicular pain, anticipate global BLE strength of 3-/5  Therapist Clinical Decision Making (Complexity): high complexity  Clinical Presentation: evolving      Subjective  General: intermittent severe testicular pain causing pt to cry out, pt was supine in bed upon PT arrival, agreeable to PT after encouragement, RN called  Pain: 7/10. Location: testicles and Pain rating taken based on observed faces and behaviors intermittent testicular pain, to a severe level for the last 4- 5weeks  Pain Interventions: RN notified and repositioned , ice pack provided to pt       Functional Mobility  Bed Mobility  Supine to Sit: minimal assistance  Scooting: minimal assistance  Comments: increased time to complete  Transfers  Squat pivot transfer: 2 person assistance with max A plus mod A   Comments: posterior lean, unable to stand fully upright  Ambulation  Ambulation not tested on this date secondary to pt's inability to stand upright or for more than a few seconds. Distance:   Gait Mechanics:   Comments:    Stair Mobility  Stair mobility not completed on this date. Comments:  Wheelchair Mobility:  No w/c mobility completed on this date.   Comments:  Balance  Static Sitting Balance: poor (+): requires min (A) to maintain balance  Dynamic Sitting Balance: poor (+): requires min (A) to maintain balance  Static Standing Balance: poor (-): requires max (A) to maintain balance  Comments:    Other Therapeutic Interventions    Functional Outcomes  AM-PAC Inpatient Mobility Raw Score : 10              Cognition  WFL  -RN's note stated he was confused and agitated, pt was appropriate during PT evaluation  Orientation:    alert and oriented x 4  Command Following:   [facility]    Education  Barriers To Learning: none  Patient Education: patient educated on PT role and benefits, plan of care, general safety, transfer training, discharge recommendations  Learning Assessment:  patient verbalizes understanding, would benefit from continued reinforcement    Assessment  Activity Tolerance: O2 sat 94% on 2L/min of O2   Impairments Requiring Therapeutic Intervention: decreased functional mobility, decreased ADL status, decreased ROM, decreased strength, decreased endurance, decreased sensation, decreased balance, vestibular impairment, increased pain, decreased posture  Prognosis: fair  Clinical Assessment: The pt presents with severe testicular pain, BLE wounds, neuropathy, decreased BLE ROM and strength, decreased balance and poor activity tolerance. He was writhing in pain today. He has had many recent falls and is unsafe to DC home. He requires two person assistance to pivot to a chair at this time. He has poor sitting balance. He would benefit from further testing to evaluate his testicular pain, including MRI of lumbar spine. Safety Interventions: patient left in chair, chair alarm in place, call light within reach, telesitter in use, and nurse notified    Plan  Frequency: 3-5 x/per week  Current Treatment Recommendations: strengthening, ROM, balance training, functional mobility training, transfer training, gait training, endurance training, neuromuscular re-education, and safety education    Goals  Patient Goals: return home, be able to ambulate more independently   Short Term Goals:  Time Frame: To be met prior to Dc  Patient will complete bed mobility at stand by assistance   Patient will complete transfers at moderate assistance   Patient will ambulate 10 ft with use of rolling walker at moderate assistance  Patient will stand for 3 minutes with AAD and mod A.       Therapy Session Time      Individual Group Co-treatment   Time In     6726   Time Out     1152   Minutes     70     Timed Code Treatment Minutes:  55 Minutes  Total Treatment Minutes: 70        Electronically Signed By: Jesus Cooper PT DPT [id number]

## 2023-03-10 NOTE — PROGRESS NOTES
Hospitalist Progress Note      PCP: Spencer Newton MD    Date of Admission: 3/4/2023    Chief Complaint: Right groin testicular pain    Hospital Course  Patient is seen at bedside, patient appears slightly lethargic but was arousable, earlier patient was agitated, discussed with RN in detail, patient continues to complain of groin pain, discussed with nephrology,  Medications:  Reviewed      Exam:    /71   Pulse 78   Temp 97.5 °F (36.4 °C) (Axillary)   Resp 16   Ht 5' 10\" (1.778 m)   Wt 185 lb 13.6 oz (84.3 kg)   SpO2 96%   BMI 26.67 kg/m²     General appearance: No apparent distress, appears stated age and cooperative. HEENT: Pupils equal, round, and reactive to light. Conjunctivae/corneas clear. Neck: Supple, with full range of motion. No jugular venous distention. Trachea midline. Respiratory:  Normal respiratory effort. Clear to auscultation, bilaterally without RALES/WHEEZES/Rhonchi. Cardiovascular: Regular rate and rhythm with normal S1/S2 without MURMURS, rubs or gallops. Abdomen: Soft, non-tender, non-distended with normal bowel sounds. Musculoskeletal: No clubbing, cyanosis or EDEMA bilaterally. Full range of motion without deformity. Skin: Right inner thigh thickening of skin eschar formation with some tenderness but no surrounding erythema  Some tenderness of the right testes but no obvious swelling erythema  Multiple superficial ulcerations on the right lower extremity and the right toes  It is lethargic but arousable      Labs:   Recent Labs     03/08/23  0507 03/09/23  0509   WBC 7.7 8.2   HGB 11.9* 11.7*   HCT 37.8* 36.3*    180     Recent Labs     03/08/23  0507 03/09/23  0509   * 138   K 4.8 4.1   CL 94* 99   CO2 17* 22   BUN 47* 28*   CREATININE 9.7* 6.6*   CALCIUM 9.4 9.3     No results for input(s): AST, ALT, BILIDIR, BILITOT, ALKPHOS in the last 72 hours. No results for input(s): INR in the last 72 hours.     No results for input(s): CKTOTAL, TROPONINI in the last 72 hours. Urinalysis:    No results found for: Rory Luna, BACTERIA, RBCUA, BLOODU, SPECGRAV, [facility] [address]    Radiology:  CT FEMUR RIGHT W CONTRAST   Final Result   CT pelvis:      1. Wall thickening of the rectum and sigmoid colon which could represent   proctitis or colitis or sequela of radiation. Underlying malignancy not   excluded. 2. Mild bilateral hip osteoarthrosis. 3. No acute osseous abnormality. If pain persists, consider further evaluation with MRI assuming there are no   contraindications to MRI. 4. Fat stranding and irregularity of the collapsed urinary bladder. This can   be seen with a cystitis. 5. Small fat containing bilateral inguinal hernias. 6. Radiation seeds in the prostate. 7. Atherosclerotic calcification of the aorta and iliac branch vasculature. 8. Moderate to severe disc space narrowing and vacuum disc phenomenon at   L5-S1. CT right femur:      1. Wall thickening of the rectum and sigmoid colon which could represent a   proctitis/colitis or sequela of radiation. Underlying malignancy not   excluded. 2. Fat stranding and irregularity of the urinary bladder which can be seen   with cystitis. 3. Mild edema in the subcutaneous fat about the right thigh. 4. No acute osseous abnormality. If pain persists, consider further   evaluation with MRI assuming there are no contraindications to MRI. 5. Mild right hip osteoarthrosis. 6. No organized fluid collection. CT PELVIS WO CONTRAST Additional Contrast? None   Final Result   CT pelvis:      1. Wall thickening of the rectum and sigmoid colon which could represent   proctitis or colitis or sequela of radiation. Underlying malignancy not   excluded. 2. Mild bilateral hip osteoarthrosis. 3. No acute osseous abnormality. If pain persists, consider further evaluation with MRI assuming there are no   contraindications to MRI.    4. Fat stranding and irregularity of the collapsed urinary bladder. This can   be seen with a cystitis. 5. Small fat containing bilateral inguinal hernias. 6. Radiation seeds in the prostate. 7. Atherosclerotic calcification of the aorta and iliac branch vasculature. 8. Moderate to severe disc space narrowing and vacuum disc phenomenon at   L5-S1. CT right femur:      1. Wall thickening of the rectum and sigmoid colon which could represent a   proctitis/colitis or sequela of radiation. Underlying malignancy not   excluded. 2. Fat stranding and irregularity of the urinary bladder which can be seen   with cystitis. 3. Mild edema in the subcutaneous fat about the right thigh. 4. No acute osseous abnormality. If pain persists, consider further   evaluation with MRI assuming there are no contraindications to MRI. 5. Mild right hip osteoarthrosis. 6. No organized fluid collection. US DUP ABD PEL RETRO SCROT COMPLETE   Final Result   Unremarkable testicular ultrasound with normal Doppler flow. US SCROTUM AND TESTICLES   Final Result   Unremarkable testicular ultrasound with normal Doppler flow. CT ABDOMEN PELVIS WO CONTRAST Additional Contrast? None   Final Result   Large hiatal hernia with small right pleural effusion. XR PELVIS (1-2 VIEWS)   Final Result   Osteopenia. No evident fracture. Given the degree of osteopenia, nondisplaced fractures may be   radiographically occult. If pain or concern for fracture persists, consider   MR imaging. XR CHEST PORTABLE   Final Result   No acute process.       Stable cardiomegaly      Bibasilar hypoaeration             Assessment/Plan:    Active Hospital Problems    Diagnosis Date Noted    Receiving intravenous antibiotic treatment as outpatient [Z79.2] 03/08/2023     Priority: Medium    Complex care coordination [Z71.89] 03/08/2023     Priority: Medium    Frequent falls [R29.6] 03/06/2023     Priority: Medium    Chronic pain in testicle [N50.819, G89.29] 03/06/2023 Priority: Medium    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 [N18.6, Z99.2] 03/06/2023     Priority: Medium    Pressure injury of skin of left hip [L89.229] 03/06/2023     Priority: Medium    Skin ulcer of multiple sites [facility]) [L98.499] 03/06/2023     Priority: Medium    Unable to ambulate [R26.2] 03/06/2023     Priority: Medium    Type 2 diabetes mellitus with other specified complication [facility] [J06.89] 03/06/2023     Priority: Medium    History of prostate cancer [Z85.46] 03/06/2023     Priority: Medium    Overweight (BMI 25.0-29. 9) [E66.3] 03/06/2023     Priority: Medium    General weakness [R53.1] 03/04/2023     Priority: Medium    Essential hypertension [I10]        Acute Medical Issues Being Addressed:    76-year-old admitted to the hospital with multiple ulcerations and fall    Multiple lower extremity wounds s/p debridement  Wound cultures grew out MSSA and Enterobacter but currently bacterium  We will continue IV ceftriaxone although I am not clear that there is an infection  We will get ID to help with antibiotics  Continue local wound care  ID input regarding long-term antibiotics noticed put it in the med rec    Stage II decubitus ulcer present on admission  Noted on recent wound care visit    Recurrent falls  No loss of consciousness  Overall debility    Subacute/chronic right groin testicular pain  Unclear etiology of the pain  May be from the right hip or the back  So far urological work-up including ultrasound is all negative  CT of the pelvis CT of the right femur was also negative  Seen by general surgery and urology    End-stage renal disease on hemodialysis    Hypertension  On metoprolol 12.5 twice daily patient seen on dialysis,    Abnormal CT abdomen pelvis with thickening of the rectum and sigmoid  Seen by GI  C. difficile if that is negative then will need colonoscopy    Chronic severe systolic heart failure  EF around 25%  Last EF 2022 was around 35%  Currently seems euvolemic    Patient is s/p sigmoid colonoscopy on 9 March  IMPRESSION : left colon diverticulosis  otherwise normal flex sig to the transverse colon. PLAN : high fiber diet        DVT Prophylaxis: Subcu heparin  Diet: ADULT DIET;  Regular  ADULT ORAL NUTRITION SUPPLEMENT; Lunch, Dinner; Renal Oral Supplement  Code Status: Full Code  Papers done for disposition    Dispo -barriers trying to figure out the patient does have a spot at nursing home for dialysis, appreciate nephrology input they are trying to get it arranged at this time it was not,    Fabienne Hills MD

## 2023-03-10 NOTE — PROGRESS NOTES
OPAT Nurse Coordinator Assessment Note      Primary ID Diagnosis: Bilateral leg wounds  ID Provider: Dr. Sarina Vaughn: : IV vancomycin 1 g with each hemodialysis, p.o. Cipro 500 mg every 24 hours    IV Access: HD    Family Support: n/a (family, neighbors)     Contact information: n/a    Outpatient services (including home infusion, home health, facility referral: See recent case management/social work note for finalization of services    ID follow up appointment:  none        Patient Assessment    Patient sleeping soundly. Will f/u with  [facility] for OPAT orders    The OPAT nurse coordinator will continue to follow the patient peripherally for any changes in the discharge plan. Please direct questions to myself, another member of the OPAT team, or the ID consulting provider via [address] [place] [address] or by choosing option #2 when calling the office to speak with your MD's. This note is not the final recommendations from the infectious diseases team, please refer to the most recent note for this information.

## 2023-03-10 NOTE — PLAN OF CARE
Problem: Discharge Planning  Goal: Discharge to home or other facility with appropriate resources  3/10/2023 0742 by Gregorio Carvalho RN  Outcome: Progressing  3/9/2023 2228 by Dannie Brush RN  Outcome: Progressing     Problem: Pain  Goal: Verbalizes/displays adequate comfort level or baseline comfort level  3/10/2023 0742 by Gregorio Carvalho RN  Outcome: Progressing  3/9/2023 2228 by Dannie Brush RN  Outcome: Progressing     Problem: Skin/Tissue Integrity  Goal: Absence of new skin breakdown  Description: 1.  Monitor for areas of redness and/or skin breakdown  2.  Assess vascular access sites hourly  3.  Every 4-6 hours minimum:  Change oxygen saturation probe site  4.  Every 4-6 hours:  If on nasal continuous positive airway pressure, respiratory therapy assess nares and determine need for appliance change or resting period.  3/10/2023 0742 by Gregorio Carvalho RN  Outcome: Progressing  3/9/2023 2228 by Dannie Brush RN  Outcome: Progressing     Problem: Safety - Adult  Goal: Free from fall injury  3/10/2023 0742 by Gregorio Carvalho RN  Outcome: Progressing  3/9/2023 2228 by Dannie Brush RN  Outcome: Progressing     Problem: ABCDS Injury Assessment  Goal: Absence of physical injury  3/9/2023 2228 by Dannie Brush RN  Outcome: Progressing     Problem: Chronic Conditions and Co-morbidities  Goal: Patient's chronic conditions and co-morbidity symptoms are monitored and maintained or improved  3/10/2023 0742 by Gregorio Carvalho RN  Outcome: Progressing  3/9/2023 2228 by Dannie Brush RN  Outcome: Progressing     Problem: Nutrition Deficit:  Goal: Optimize nutritional status  3/10/2023 0742 by Gregorio Carvalho RN  Outcome: Progressing  3/9/2023 2228 by Dannie Brush RN  Outcome: Progressing

## 2023-03-10 NOTE — PROGRESS NOTES
Patient continues to complain of sharp severe 10/10 pain to right thigh, groin and right testicle, right inner thigh feels hot and hard to touch, medicated with tramadol at this time. MD notified.

## 2023-03-10 NOTE — PLAN OF CARE
Problem: Discharge Planning  Goal: Discharge to home or other facility with appropriate resources  3/9/2023 2228 by Navi Reveles RN  Outcome: Progressing     Problem: Pain  Goal: Verbalizes/displays adequate comfort level or baseline comfort level  3/9/2023 2228 by Navi Reveles RN  Outcome: Progressing     Problem: Skin/Tissue Integrity  Goal: Absence of new skin breakdown  Description: 1. Monitor for areas of redness and/or skin breakdown  2. Assess vascular access sites hourly  3. Every 4-6 hours minimum:  Change oxygen saturation probe site  4. Every 4-6 hours:  If on nasal continuous positive airway pressure, respiratory therapy assess nares and determine need for appliance change or resting period.   3/9/2023 2228 by Navi Reveles RN  Outcome: Progressing     Problem: Safety - Adult  Goal: Free from fall injury  3/9/2023 2228 by Navi Reveles RN  Outcome: Progressing     Problem: ABCDS Injury Assessment  Goal: Absence of physical injury  3/9/2023 2228 by Navi Reveles RN  Outcome: Progressing     Problem: Chronic Conditions and Co-morbidities  Goal: Patient's chronic conditions and co-morbidity symptoms are monitored and maintained or improved  3/9/2023 2228 by Navi Reveles RN  Outcome: Progressing     Problem: Nutrition Deficit:  Goal: Optimize nutritional status  3/9/2023 2228 by Navi Reveles RN  Outcome: Progressing

## 2023-03-10 NOTE — PROGRESS NOTES
[facility]   Nephrology progress Note  [phone]   [facility]. [employer] [place]           Reason for Consult:  ESRD on HD   The patient is a 79 y.o. male with significant past medical history of 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, hypertension, peripheral vascular disease with a chronic ulcer of the lower leg, anemia, renal osteodystrophy, has had history of recurrent falls. 1 date of admission 3/4/2023 he fell on his left side and complained of left hip pain. There was no history of trauma to the head. X-ray pelvis did not show any fracture. For his lower extremity wounds he follows up with wound care and had undergone excision and debridement at that visit. Wound culture was sent a Doppler done was negative for DVT. He says he came to the hospital because he was having lower abdominal pain which is radiating to his left testicle  There was no fever or chills    He does dialysis at [facility] fair Tuesday Thursday Saturday. However he missed HD 3/4/23. Has been on dialysis for about 5 years and he says his kidneys failed because of diabetes mellitus        Interval  History:    3/6/23: pain back of Rt thigh today, got HD earlier   3/7/23: B/ L upper scrotum pain     -pt seen and examined  -PMSHx and meds reviewed  -No family at bedside    S/p HD yesterday  Events ON noted  Confused/agitated   Sleepy, arousable but falls right back to sleep  sp flex sig      ROS: sleeping    PHYSICAL EXAM:    Vitals:    BP (!) 156/73   Pulse 69   Temp 97.5 °F (36.4 °C) (Oral)   Resp 18   Ht 5' 10\" (1.778 m)   Wt 185 lb 13.6 oz (84.3 kg)   SpO2 94%   BMI 26.67 kg/m²   No intake/output data recorded. No intake/output data recorded.     Physical Exam:  Gen:  alert, oriented x 2, drowsy/easily arousable  HEENT: anicteric, NC/AT  CV: RRR, +S1/S2  Lungs:B/ L air entry, Normal breath sounds   Abd: soft, NT  Ext: No edema, no cyanosis, tender area Rt inner  thigh plaque like lesion , multiple erosions/ ulcers   Skin: Warm.   No rash  Neuro: nonfocal.  Rt IJ TDC      DATA:    CBC with Differential:    Lab Results   Component Value Date/Time    WBC 8.2 03/09/2023 05:09 AM    RBC 3.44 03/09/2023 05:09 AM    HGB 11.7 03/09/2023 05:09 AM    HCT 36.3 03/09/2023 05:09 AM     03/09/2023 05:09 AM    .4 03/09/2023 05:09 AM    MCH 34.0 03/09/2023 05:09 AM    MCHC 32.3 03/09/2023 05:09 AM    RDW 15.7 03/09/2023 05:09 AM    SEGSPCT 64.9 08/05/2010 03:36 PM    LYMPHOPCT 14.6 03/09/2023 05:09 AM    MONOPCT 14.0 03/09/2023 05:09 AM    EOSPCT 0.9 08/05/2010 03:36 PM    BASOPCT 0.8 03/09/2023 05:09 AM    MONOSABS 1.2 03/09/2023 05:09 AM    LYMPHSABS 1.2 03/09/2023 05:09 AM    EOSABS 0.2 03/09/2023 05:09 AM    BASOSABS 0.1 03/09/2023 05:09 AM    DIFFTYPE Auto-K 08/05/2010 03:36 PM     CMP:    Lab Results   Component Value Date/Time     03/09/2023 05:09 AM    K 4.1 03/09/2023 05:09 AM    CL 99 03/09/2023 05:09 AM    CO2 22 03/09/2023 05:09 AM    BUN 28 03/09/2023 05:09 AM    CREATININE 6.6 03/09/2023 05:09 AM    GFRAA 34 10/14/2013 08:07 PM    GFRAA 43 08/05/2010 03:36 PM    AGRATIO 1.0 03/07/2023 08:58 AM    LABGLOM 8 03/09/2023 05:09 AM    GLUCOSE 95 03/09/2023 05:09 AM    PROT 6.5 03/07/2023 08:58 AM    PROT 7.6 08/05/2010 03:36 PM    LABALBU 3.2 03/07/2023 08:58 AM    CALCIUM 9.3 03/09/2023 05:09 AM    BILITOT 0.3 03/07/2023 08:58 AM    ALKPHOS 53 03/07/2023 08:58 AM    AST 14 03/07/2023 08:58 AM    ALT 7 03/07/2023 08:58 AM     Phosphorus:    Lab Results   Component Value Date/Time    PHOS 6.3 03/07/2023 08:58 AM     Uric Acid:  No results found for: LABURIC, URICACID  Last 3 Troponin:    Lab Results   Component Value Date/Time    TROPONINI 0.40 03/04/2023 10:13 PM    TROPONINI 0.39 03/04/2023 07:16 PM    TROPONINI 0.44 03/04/2023 01:44 PM     U/A:  No results found for: NITRITE, COLORU, PROTEINU, PHUR, LABCAST, WBCUA, RBCUA, MUCUS, TRICHOMONAS, YEAST, BACTERIA, CLARITYU, SPECGRAV, LEUKOCYTESUR, UROBILINOGEN, BILIRUBINUR, BLOODU, GLUCOSEU, AMORPHOUS          1. Wall thickening of the rectum and sigmoid colon which could represent a   proctitis/colitis or sequela of radiation. Underlying malignancy not   excluded. 2. Fat stranding and irregularity of the urinary bladder which can be seen   with cystitis. 3. Mild edema in the subcutaneous fat about the right thigh. 4. No acute osseous abnormality. If pain persists, consider further   evaluation with MRI assuming there are no contraindications to MRI. 5. Mild right hip osteoarthrosis. 6. No organized fluid collection. IMPRESSION/RECOMMENDATIONS:      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:  [facility] fair-TTS-plan noted for discharge at [facility]  -access right TDC  -TW TBD  -HD yesterday, post weight 82.3kg, pre-84.3kg  -BP stable  -lytes stable  Plan:  -plan noted for discharge to [facility]. It is unclear at this time whether patient will be returning to [address] [place] [address] for HD, or will get HD at [facility]. I called [facility] and they have not received admission paperwork. Pt cannot be discharged until either transportation to and from [facility] to [address] [place] [address] is arranged or HD at [facility] is arranged  -will plan for HD in am    Gap Metabolic acidosis:  -resolved with HD  -continue TTS HD    Anemia of chronic kidney disease  -last hgb is 11.7  -no JOS needed  -s/p Flex sig-see results below    Renal osteodystrophy  -last phos is 6.3-on binders  -recheck phos is am    Groin pain: US negative  -CT of A/P noted, \"      1. Wall thickening of the rectum and sigmoid colon which could represent a   proctitis/colitis or sequela of radiation. Underlying malignancy not   excluded. 2. Fat stranding and irregularity of the urinary bladder which can be seen   with cystitis. 3. Mild edema in the subcutaneous fat about the right thigh. 4. No acute osseous abnormality.   If pain persists, consider further   evaluation with MRI assuming there are no contraindications to MRI. 5. Mild right hip osteoarthrosis. 6. No organized fluid collection. -seen by GI  -s/p Flex Sig:\"IMPRESSION : left colon diverticulosis  otherwise normal flex sig to the transverse colon. 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 I will continue to follow along. Please call with questions.     Lester Chung MD, MD  3/10/2023  [facility]     Addendum:  -discussed with HD RN at [facility] are aware of discharge in am  -plan for HD in am then MWF at [facility]  -orders given for vancomycin 1g to monitor random vancomycin level weekly

## 2023-03-10 NOTE — PROGRESS NOTES
Treatment time: 3 hours     Net UF: 2.2 L     Pre weight: 84.3kg  Post weight: 82.3kg  EDW:     Access used: R chest TDC   Access function: well, tolerated 400 BFR     Medications or blood products given: Heparin dwells      Regular outpatient schedule: TTS     Summary of response to treatment:well, no issues     Copy of dialysis treatment record placed in chart, to be scanned into EMR.
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
## 2023-03-11 LAB
PHOSPHORUS: 6.1 MG/DL (ref 2.5–4.9)
VANCOMYCIN RANDOM: 13.6 UG/ML

## 2023-03-11 PROCEDURE — 2580000003 HC RX 258: Performed by: INTERNAL MEDICINE

## 2023-03-11 PROCEDURE — 90935 HEMODIALYSIS ONE EVALUATION: CPT

## 2023-03-11 PROCEDURE — 6370000000 HC RX 637 (ALT 250 FOR IP): Performed by: INTERNAL MEDICINE

## 2023-03-11 PROCEDURE — 6360000002 HC RX W HCPCS: Performed by: INTERNAL MEDICINE

## 2023-03-11 PROCEDURE — 6360000002 HC RX W HCPCS: Performed by: FAMILY MEDICINE

## 2023-03-11 PROCEDURE — 6370000000 HC RX 637 (ALT 250 FOR IP): Performed by: FAMILY MEDICINE

## 2023-03-11 PROCEDURE — 84100 ASSAY OF PHOSPHORUS: CPT

## 2023-03-11 PROCEDURE — 80202 ASSAY OF VANCOMYCIN: CPT

## 2023-03-11 PROCEDURE — 1200000000 HC SEMI PRIVATE

## 2023-03-11 PROCEDURE — 36415 COLL VENOUS BLD VENIPUNCTURE: CPT

## 2023-03-11 PROCEDURE — 2580000003 HC RX 258: Performed by: FAMILY MEDICINE

## 2023-03-11 RX ORDER — HYDROCODONE BITARTRATE AND ACETAMINOPHEN 5; 325 MG/1; MG/1
1 TABLET ORAL EVERY 8 HOURS PRN
Qty: 18 TABLET | Refills: 0 | Status: SHIPPED | OUTPATIENT
Start: 2023-03-11 | End: 2023-03-17

## 2023-03-11 RX ORDER — HEPARIN SODIUM 1000 [USP'U]/ML
3500 INJECTION, SOLUTION INTRAVENOUS; SUBCUTANEOUS PRN
Status: DISCONTINUED | OUTPATIENT
Start: 2023-03-11 | End: 2023-03-12 | Stop reason: HOSPADM

## 2023-03-11 RX ADMIN — LEVOTHYROXINE SODIUM 50 MCG: 0.03 TABLET ORAL at 06:43

## 2023-03-11 RX ADMIN — HEPARIN SODIUM 5000 UNITS: 5000 INJECTION INTRAVENOUS; SUBCUTANEOUS at 21:47

## 2023-03-11 RX ADMIN — TRAMADOL HYDROCHLORIDE 50 MG: 50 TABLET, COATED ORAL at 08:17

## 2023-03-11 RX ADMIN — MIDODRINE HYDROCHLORIDE 2.5 MG: 5 TABLET ORAL at 13:22

## 2023-03-11 RX ADMIN — VANCOMYCIN HYDROCHLORIDE 750 MG: 750 INJECTION, POWDER, LYOPHILIZED, FOR SOLUTION INTRAVENOUS at 11:36

## 2023-03-11 RX ADMIN — SODIUM CHLORIDE, PRESERVATIVE FREE 10 ML: 5 INJECTION INTRAVENOUS at 21:50

## 2023-03-11 RX ADMIN — SILVER SULFADIAZINE: 10 CREAM TOPICAL at 13:24

## 2023-03-11 RX ADMIN — MIDODRINE HYDROCHLORIDE 2.5 MG: 5 TABLET ORAL at 18:27

## 2023-03-11 RX ADMIN — SEVELAMER CARBONATE 800 MG: 800 TABLET, FILM COATED ORAL at 13:21

## 2023-03-11 RX ADMIN — HEPARIN SODIUM 5000 UNITS: 5000 INJECTION INTRAVENOUS; SUBCUTANEOUS at 14:06

## 2023-03-11 RX ADMIN — CIPROFLOXACIN 500 MG: 500 TABLET, FILM COATED ORAL at 13:22

## 2023-03-11 RX ADMIN — ALLOPURINOL 100 MG: 100 TABLET ORAL at 13:21

## 2023-03-11 RX ADMIN — SEVELAMER CARBONATE 800 MG: 800 TABLET, FILM COATED ORAL at 18:27

## 2023-03-11 RX ADMIN — HEPARIN SODIUM 5000 UNITS: 5000 INJECTION INTRAVENOUS; SUBCUTANEOUS at 06:44

## 2023-03-11 RX ADMIN — METOPROLOL TARTRATE 12.5 MG: 25 TABLET, FILM COATED ORAL at 13:21

## 2023-03-11 RX ADMIN — SODIUM CHLORIDE, PRESERVATIVE FREE 10 ML: 5 INJECTION INTRAVENOUS at 08:19

## 2023-03-11 RX ADMIN — METOPROLOL TARTRATE 12.5 MG: 25 TABLET, FILM COATED ORAL at 21:47

## 2023-03-11 RX ADMIN — NEPHROCAP 1 MG: 1 CAP ORAL at 13:21

## 2023-03-11 ASSESSMENT — PAIN DESCRIPTION - FREQUENCY: FREQUENCY: INTERMITTENT

## 2023-03-11 ASSESSMENT — PAIN DESCRIPTION - DESCRIPTORS: DESCRIPTORS: ACHING;SHARP

## 2023-03-11 ASSESSMENT — PAIN SCALES - GENERAL: PAINLEVEL_OUTOF10: 8

## 2023-03-11 ASSESSMENT — PAIN DESCRIPTION - ONSET: ONSET: ON-GOING

## 2023-03-11 ASSESSMENT — PAIN DESCRIPTION - ORIENTATION: ORIENTATION: RIGHT

## 2023-03-11 ASSESSMENT — PAIN DESCRIPTION - LOCATION: LOCATION: OTHER (COMMENT)

## 2023-03-11 NOTE — DIALYSIS
Treatment time: 3 hours  Net UF: 2000 ml     Pre weight: 84.3 kg  Post weight:83.3 kg        Access used: R-Chest CVC    Access function: 350 with  ml/min     Medications or blood products given: ATB/Vancomycin          Summary of response to treatment: Patient tolerated treatment well and without any complications. Patient remained stable throughout entire treatment and upon the  exiting the dialysis suite via transport. copy of dialysis treatment record placed in chart, to be scanned into EMR.

## 2023-03-11 NOTE — PROGRESS NOTES
[facility]   Nephrology progress Note  [phone]   [email or web address]           Reason for Consult:  ESRD on HD   The patient is a 79 y.o. male with significant past medical history of 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, hypertension, peripheral vascular disease with a chronic ulcer of the lower leg, anemia, renal osteodystrophy, has had history of recurrent falls. 1 date of admission 3/4/2023 he fell on his left side and complained of left hip pain. There was no history of trauma to the head. X-ray pelvis did not show any fracture. For his lower extremity wounds he follows up with wound care and had undergone excision and debridement at that visit. Wound culture was sent a Doppler done was negative for DVT. He says he came to the hospital because he was having lower abdominal pain which is radiating to his left testicle  There was no fever or chills    He does dialysis at [facility] Tuesday Thursday Saturday. However he missed HD 3/4/23. Has been on dialysis for about 5 years and he says his kidneys failed because of diabetes mellitus    -pt seen and examined  -PMSHx and meds reviewed  -No family at bedside    HD earlier today. Went well. Post HD weight 83.3 kg      ROS: sleeping    PHYSICAL EXAM:    Vitals:    /62   Pulse 60   Temp 97.6 °F (36.4 °C)   Resp 18   Ht 5' 10\" (1.778 m)   Wt 185 lb 13.6 oz (84.3 kg)   SpO2 96%   BMI 26.67 kg/m²   I/O last 3 completed shifts: In: 560 [P.O.:560]  Out: 0   No intake/output data recorded. Physical Exam:  Gen:  alert, oriented x 2, drowsy/easily arousable  HEENT: anicteric, NC/AT  CV: RRR, +S1/S2  Lungs:B/ L air entry, Normal breath sounds   Abd: soft, NT  Ext: No edema, no cyanosis, tender area Rt inner  thigh plaque like lesion , multiple erosions/ ulcers   Skin: Warm.   No rash  Neuro: nonfocal.  Rt IJ TDC      DATA:    CBC with Differential:    Lab Results   Component Value Date/Time    WBC 8.2 03/09/2023 05:09 AM RBC 3.44 03/09/2023 05:09 AM    HGB 11.7 03/09/2023 05:09 AM    HCT 36.3 03/09/2023 05:09 AM     03/09/2023 05:09 AM    .4 03/09/2023 05:09 AM    MCH 34.0 03/09/2023 05:09 AM    MCHC 32.3 03/09/2023 05:09 AM    RDW 15.7 03/09/2023 05:09 AM    SEGSPCT 64.9 08/05/2010 03:36 PM    LYMPHOPCT 14.6 03/09/2023 05:09 AM    MONOPCT 14.0 03/09/2023 05:09 AM    EOSPCT 0.9 08/05/2010 03:36 PM    BASOPCT 0.8 03/09/2023 05:09 AM    MONOSABS 1.2 03/09/2023 05:09 AM    LYMPHSABS 1.2 03/09/2023 05:09 AM    EOSABS 0.2 03/09/2023 05:09 AM    BASOSABS 0.1 03/09/2023 05:09 AM    DIFFTYPE Auto-K 08/05/2010 03:36 PM     CMP:    Lab Results   Component Value Date/Time     03/09/2023 05:09 AM    K 4.1 03/09/2023 05:09 AM    CL 99 03/09/2023 05:09 AM    CO2 22 03/09/2023 05:09 AM    BUN 28 03/09/2023 05:09 AM    CREATININE 6.6 03/09/2023 05:09 AM    GFRAA 34 10/14/2013 08:07 PM    GFRAA 43 08/05/2010 03:36 PM    AGRATIO 1.0 03/07/2023 08:58 AM    LABGLOM 8 03/09/2023 05:09 AM    GLUCOSE 95 03/09/2023 05:09 AM    PROT 6.5 03/07/2023 08:58 AM    PROT 7.6 08/05/2010 03:36 PM    LABALBU 3.2 03/07/2023 08:58 AM    CALCIUM 9.3 03/09/2023 05:09 AM    BILITOT 0.3 03/07/2023 08:58 AM    ALKPHOS 53 03/07/2023 08:58 AM    AST 14 03/07/2023 08:58 AM    ALT 7 03/07/2023 08:58 AM     Phosphorus:    Lab Results   Component Value Date/Time    PHOS 6.1 03/11/2023 06:01 AM     Uric Acid:  No results found for: LABURIC, URICACID  Last 3 Troponin:    Lab Results   Component Value Date/Time    TROPONINI 0.40 03/04/2023 10:13 PM    TROPONINI 0.39 03/04/2023 07:16 PM    TROPONINI 0.44 03/04/2023 01:44 PM     U/A:  No results found for: NITRITE, COLORU, PROTEINU, PHUR, LABCAST, WBCUA, RBCUA, MUCUS, TRICHOMONAS, YEAST, BACTERIA, CLARITYU, SPECGRAV, LEUKOCYTESUR, UROBILINOGEN, BILIRUBINUR, BLOODU, GLUCOSEU, AMORPHOUS          1. Wall thickening of the rectum and sigmoid colon which could represent a   proctitis/colitis or sequela of radiation. Underlying malignancy not   excluded. 2. Fat stranding and irregularity of the urinary bladder which can be seen   with cystitis. 3. Mild edema in the subcutaneous fat about the right thigh. 4. No acute osseous abnormality. If pain persists, consider further   evaluation with MRI assuming there are no contraindications to MRI. 5. Mild right hip osteoarthrosis. 6. No organized fluid collection. IMPRESSION/RECOMMENDATIONS:      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:  [facility] fair-TTS-plan noted for discharge at [facility]  -access right TDC  -TW TBD last weight 83.3 kg  -BP stable  -lytes stable  Plan:  -plan noted for discharge to [facility].  - HD today and transition to MWF next week as patient will likely be going to [facility]. -orders given for vancomycin 1g to monitor random vancomycin level weekly    Gap Metabolic acidosis:  -resolved with HD  -continue TTS HD    Anemia of chronic kidney disease  -last hgb is 11.7  -no JOS needed  -s/p Flex sig-see results below    Renal osteodystrophy  -last phos is 6.1-on binders    Groin pain: US negative  -s/p Flex Sig:\"IMPRESSION : left colon diverticulosis  otherwise normal flex sig to the transverse colon. 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 [address] [facility]. Okay for discharge as long as HD at [address] [facility] has been set up. Per my partner, HD has been confirmed at [facility] vancomycin as been called in ,       Thank you for allowing me to participate in the care of this patient. I will continue to follow along. Please call with questions.     Jax Chopra DO, MD  3/11/2023  [facility]

## 2023-03-11 NOTE — PROGRESS NOTES
Shift assessment complete, pt A&Ox4 in bed, VSS, c/o pain to right thigh and right testicle 8/10. Scheduled med held for HD. Prn pain med administered per [facility], POC and education reviewed with the pt. All needs met at this time, call light in reach, will continue to monitor. 1300: Pt back from dialysis, A&O, VSS.

## 2023-03-12 ENCOUNTER — APPOINTMENT (OUTPATIENT)
Dept: CT IMAGING | Age: 83
DRG: 592 | End: 2023-03-12
Payer: MEDICARE

## 2023-03-12 VITALS
WEIGHT: 185.85 LBS | DIASTOLIC BLOOD PRESSURE: 75 MMHG | TEMPERATURE: 98.1 F | RESPIRATION RATE: 14 BRPM | OXYGEN SATURATION: 94 % | HEIGHT: 70 IN | BODY MASS INDEX: 26.61 KG/M2 | HEART RATE: 64 BPM | SYSTOLIC BLOOD PRESSURE: 134 MMHG

## 2023-03-12 PROCEDURE — 6370000000 HC RX 637 (ALT 250 FOR IP): Performed by: INTERNAL MEDICINE

## 2023-03-12 PROCEDURE — 70450 CT HEAD/BRAIN W/O DYE: CPT

## 2023-03-12 PROCEDURE — 6360000002 HC RX W HCPCS: Performed by: FAMILY MEDICINE

## 2023-03-12 PROCEDURE — 6370000000 HC RX 637 (ALT 250 FOR IP): Performed by: FAMILY MEDICINE

## 2023-03-12 PROCEDURE — 2580000003 HC RX 258: Performed by: FAMILY MEDICINE

## 2023-03-12 RX ADMIN — LEVOTHYROXINE SODIUM 50 MCG: 0.03 TABLET ORAL at 05:16

## 2023-03-12 RX ADMIN — HEPARIN SODIUM 5000 UNITS: 5000 INJECTION INTRAVENOUS; SUBCUTANEOUS at 05:17

## 2023-03-12 RX ADMIN — HEPARIN SODIUM 5000 UNITS: 5000 INJECTION INTRAVENOUS; SUBCUTANEOUS at 13:43

## 2023-03-12 RX ADMIN — ALLOPURINOL 100 MG: 100 TABLET ORAL at 08:01

## 2023-03-12 RX ADMIN — SEVELAMER CARBONATE 800 MG: 800 TABLET, FILM COATED ORAL at 12:18

## 2023-03-12 RX ADMIN — METOPROLOL TARTRATE 12.5 MG: 25 TABLET, FILM COATED ORAL at 08:01

## 2023-03-12 RX ADMIN — SEVELAMER CARBONATE 800 MG: 800 TABLET, FILM COATED ORAL at 17:36

## 2023-03-12 RX ADMIN — CIPROFLOXACIN 500 MG: 500 TABLET, FILM COATED ORAL at 08:01

## 2023-03-12 RX ADMIN — NEPHROCAP 1 MG: 1 CAP ORAL at 08:01

## 2023-03-12 RX ADMIN — MIDODRINE HYDROCHLORIDE 2.5 MG: 5 TABLET ORAL at 12:18

## 2023-03-12 RX ADMIN — SILVER SULFADIAZINE: 10 CREAM TOPICAL at 08:03

## 2023-03-12 RX ADMIN — SEVELAMER CARBONATE 800 MG: 800 TABLET, FILM COATED ORAL at 08:00

## 2023-03-12 RX ADMIN — MIDODRINE HYDROCHLORIDE 2.5 MG: 5 TABLET ORAL at 08:01

## 2023-03-12 RX ADMIN — SODIUM CHLORIDE, PRESERVATIVE FREE 10 ML: 5 INJECTION INTRAVENOUS at 08:00

## 2023-03-12 NOTE — PROGRESS NOTES
Nurse notified me of pt fall earlier today. He landed on the soft mattress pad. Did not hit his head. No LOC . Pt is alert not c/o pain or headache. Was a little confused before the fall  Will order stat head CT. Will proceed with dc if CT head is neg for hemorrhage.  Hold transport till 5 pm until CT results are available  Left VM for spouse Chele Lew

## 2023-03-12 NOTE — DISCHARGE SUMMARY
[facility]    Patient Demographics    Tom Santos  Date of Birth. 1940  MRN. [id number]     Primary care provider. Sandi Hanks MD  (Tel: [phone])    Admit date: 3/4/2023    Discharge date (blank if same as Note Date): Note Date: 3/11/2023     Reason for Hospitalization. Chief Complaint   Patient presents with    Testicle Pain     Bilat testicular pain x1 month    Fall     Pt reports fall on L hip a while ago, fell and bruised it. Then fell again and had an open wound from it, never evaluated for fall. Then today pt fell, did not hit head or have LOC and fell onto L side and reports bleeding from site and pain. Significant Findings. 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 9)    Receiving intravenous antibiotic treatment as outpatient    Complex care coordination    Essential hypertension  Resolved Problems:    * No resolved hospital problems. *       Problems and results from this hospitalization that need follow up. None ***    Significant test results and incidental findings. ***    Invasive procedures and treatments. None [facility] Course. ***    Consults. IP CONSULT TO HOSPITALIST  IP CONSULT TO NEPHROLOGY  IP CONSULT TO NEPHROLOGY  IP CONSULT TO INFECTIOUS DISEASES  IP CONSULT TO UROLOGY  IP CONSULT TO GENERAL SURGERY  IP CONSULT TO GI  PHARMACY TO DOSE VANCOMYCIN    Physical examination on discharge day.    /74   Pulse 74   Temp 97.3 °F (36.3 °C) (Axillary)   Resp 16   Ht 5' 10\" (1.778 m)   Wt 185 lb 13.6 oz (84.3 kg)   SpO2 96%   BMI 26.67 kg/m²   General appearance. Alert. Looks comfortable. HEENT. Sclera clear. Moist mucus membranes. Cardiovascular. Regular rate and rhythm, normal S1, S2. No murmur. Respiratory. Not using accessory muscles. Clear to auscultation bilaterally, no wheeze. Gastrointestinal. Abdomen soft, non-tender, not distended, normal bowel sounds  Neurology. Facial symmetry. No speech deficits. Moving all extremities equally. Extremities. No edema in lower extremities. Skin. Warm, dry, normal turgor    Condition at time of discharge ***    Medication instructions provided to patient at discharge. Medication List        START taking these medications      ciprofloxacin 500 MG tablet  Commonly known as: CIPRO  Take 1 tablet by mouth daily for 19 days     HYDROcodone-acetaminophen 5-325 MG per tablet  Commonly known as: Norco  Take 1 tablet by mouth every 8 hours as needed for Pain for up to 6 days. Intended supply: 3 days. Take lowest dose possible to manage pain Max Daily Amount: 3 tablets     traMADol 50 MG tablet  Commonly known as: ULTRAM  Take 1 tablet by mouth every 6 hours as needed for Pain for up to 3 days. Max Daily Amount: 200 mg     vancomycin  infusion  Commonly known as: VANCOCIN  Infuse 1,000 mg intravenously See Admin Instructions for 19 days Compound per protocol. CONTINUE taking these medications      b complex-C-folic acid 1 MG capsule     ezetimibe 10 MG tablet  Commonly known as: Zetia  Take 1 tablet by mouth daily. levothyroxine 125 MCG tablet  Commonly known as: SYNTHROID     metoprolol tartrate 25 MG tablet  Commonly known as: LOPRESSOR     midodrine 2.5 MG tablet  Commonly known as: PROAMATINE     ONE TOUCH ULTRA SYSTEM KIT w/Device Kit  TEST TWICE daily BLOOD SUGAR     ONE TOUCH ULTRASOFT LANCETS Misc  TEST once daily     silver sulfADIAZINE 1 % cream  Commonly known as: SILVADENE  Apply topically daily.             STOP taking these medications      allopurinol 100 MG tablet  Commonly known as: ZYLOPRIM               Where to Get Your Medications        These medications were sent to [facility] [id number], [address]  [address] [place] [address], [address] [place] [address]      Phone: [phone]   ciprofloxacin 500 MG tablet       You can get these medications from any pharmacy    Bring a paper prescription for each of these medications  HYDROcodone-acetaminophen 5-325 MG per tablet  traMADol 50 MG tablet  vancomycin  infusion         Discharge recommendations given to patient. Follow Up. *** in 1 week   Disposition. {disposition:06151}  Activity. {discharge activity:69958}  Diet: ADULT DIET; Regular  ADULT ORAL NUTRITION SUPPLEMENT; Lunch, Dinner; Renal Oral Supplement      Spent *** minutes in discharge process.     Signed:  Jason Alarcon MD     3/11/2023 7:22 PM

## 2023-03-12 NOTE — PROGRESS NOTES
Shift assessment complete, VSS, pt A&Ox4 in bed, denies any pain at this time. AM med administered per [facility], POC and education reviewed with the pt. All needs met at this time, call light in reach, will continue to monitor. 1200: Pt fell out of bed, found on the soft pad mattress on the floor, assessed and no injuries noted. 1230: Called and gave report to [place] at [facility], answered all questions. 1640: Called the pt's spouse and aware her about his discharge time change to 1815 today to [facility].     1830: Pt discharged via [place] transport to [facility].

## 2023-03-12 NOTE — CARE COORDINATION
Discharge Planning Note:     CM received message from Charge Nurse that Dr. Rita Olivares requested transport be cancelled. Patient had taken a fall earlier in the day, and now physician would like to run additional tests. Transport has been cancelled and can be set for tomorrow. HEIDI placed call to nursing facility and spoke with [facility] to let her know of change of plans for today.  Patient can transport tomorrow    Electronically signed by Indu Valentin RN on 3/12/2023 at 3:03 PM

## 2023-03-12 NOTE — PLAN OF CARE
Problem: Discharge Planning  Goal: Discharge to home or other facility with appropriate resources  3/12/2023 1843 by Charis Ma RN  Outcome: Completed  3/12/2023 1147 by Charis Ma RN  Outcome: Progressing     Problem: Pain  Goal: Verbalizes/displays adequate comfort level or baseline comfort level  3/12/2023 1843 by Charis Ma RN  Outcome: Completed  3/12/2023 1147 by Charis Ma RN  Outcome: Progressing     Problem: Skin/Tissue Integrity  Goal: Absence of new skin breakdown  Description: 1. Monitor for areas of redness and/or skin breakdown  2. Assess vascular access sites hourly  3. Every 4-6 hours minimum:  Change oxygen saturation probe site  4. Every 4-6 hours:  If on nasal continuous positive airway pressure, respiratory therapy assess nares and determine need for appliance change or resting period.   3/12/2023 1843 by Charis Ma RN  Outcome: Completed  3/12/2023 1147 by Charis Ma RN  Outcome: Progressing     Problem: Safety - Adult  Goal: Free from fall injury  3/12/2023 1843 by Charis Ma RN  Outcome: Completed  3/12/2023 1147 by Charis Ma RN  Outcome: Progressing     Problem: ABCDS Injury Assessment  Goal: Absence of physical injury  3/12/2023 1843 by Charis Ma RN  Outcome: Completed  3/12/2023 1147 by Charis Ma RN  Outcome: Progressing     Problem: Chronic Conditions and Co-morbidities  Goal: Patient's chronic conditions and co-morbidity symptoms are monitored and maintained or improved  3/12/2023 1843 by Charis Ma RN  Outcome: Completed  3/12/2023 1147 by Charis Ma RN  Outcome: Progressing     Problem: Nutrition Deficit:  Goal: Optimize nutritional status  3/12/2023 1843 by Charis Ma RN  Outcome: Completed  3/12/2023 1147 by Charis Ma RN  Outcome: Progressing

## 2023-03-12 NOTE — PROGRESS NOTES
[facility]   Nephrology progress Note  [phone]   [facility]. [employer]           Reason for Consult:  ESRD on HD   The patient is a 79 y.o. male with significant past medical history of 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, hypertension, peripheral vascular disease with a chronic ulcer of the lower leg, anemia, renal osteodystrophy, has had history of recurrent falls. 1 date of admission 3/4/2023 he fell on his left side and complained of left hip pain. There was no history of trauma to the head. X-ray pelvis did not show any fracture. For his lower extremity wounds he follows up with wound care and had undergone excision and debridement at that visit. Wound culture was sent a Doppler done was negative for DVT. He says he came to the hospital because he was having lower abdominal pain which is radiating to his left testicle  There was no fever or chills    He does dialysis at [facility] fair Tuesday Thursday Saturday. However he missed HD 3/4/23. Has been on dialysis for about 5 years and he says his kidneys failed because of diabetes mellitus    -pt seen and examined  -PMSHx and meds reviewed  -No family at bedside    Plan for discharge noted. No complains. Awaiting to get to SNF.       ROS: sleeping    PHYSICAL EXAM:    Vitals:    BP (!) 162/94   Pulse 65   Temp 98 °F (36.7 °C) (Oral)   Resp 12   Ht 5' 10\" (1.778 m)   Wt 185 lb 13.6 oz (84.3 kg)   SpO2 94%   BMI 26.67 kg/m²   No intake/output data recorded. I/O this shift:  In: 61 [P.O.:60]  Out: -     Physical Exam:  Gen:  alert, oriented x 2, drowsy/easily arousable  HEENT: anicteric, NC/AT  CV: RRR, +S1/S2  Lungs:B/ L air entry, Normal breath sounds   Abd: soft, NT  Ext: No edema, no cyanosis, tender area Rt inner  thigh plaque like lesion , multiple erosions/ ulcers   Skin: Warm.   No rash  Neuro: nonfocal.  Rt IJ TDC      DATA:    CBC with Differential:    Lab Results   Component Value Date/Time    WBC 8.2 03/09/2023 05:09 AM    RBC 3.44 03/09/2023 05:09 AM    HGB 11.7 03/09/2023 05:09 AM    HCT 36.3 03/09/2023 05:09 AM     03/09/2023 05:09 AM    .4 03/09/2023 05:09 AM    MCH 34.0 03/09/2023 05:09 AM    MCHC 32.3 03/09/2023 05:09 AM    RDW 15.7 03/09/2023 05:09 AM    SEGSPCT 64.9 08/05/2010 03:36 PM    LYMPHOPCT 14.6 03/09/2023 05:09 AM    MONOPCT 14.0 03/09/2023 05:09 AM    EOSPCT 0.9 08/05/2010 03:36 PM    BASOPCT 0.8 03/09/2023 05:09 AM    MONOSABS 1.2 03/09/2023 05:09 AM    LYMPHSABS 1.2 03/09/2023 05:09 AM    EOSABS 0.2 03/09/2023 05:09 AM    BASOSABS 0.1 03/09/2023 05:09 AM    DIFFTYPE Auto-K 08/05/2010 03:36 PM     CMP:    Lab Results   Component Value Date/Time     03/09/2023 05:09 AM    K 4.1 03/09/2023 05:09 AM    CL 99 03/09/2023 05:09 AM    CO2 22 03/09/2023 05:09 AM    BUN 28 03/09/2023 05:09 AM    CREATININE 6.6 03/09/2023 05:09 AM    GFRAA 34 10/14/2013 08:07 PM    GFRAA 43 08/05/2010 03:36 PM    AGRATIO 1.0 03/07/2023 08:58 AM    LABGLOM 8 03/09/2023 05:09 AM    GLUCOSE 95 03/09/2023 05:09 AM    PROT 6.5 03/07/2023 08:58 AM    PROT 7.6 08/05/2010 03:36 PM    LABALBU 3.2 03/07/2023 08:58 AM    CALCIUM 9.3 03/09/2023 05:09 AM    BILITOT 0.3 03/07/2023 08:58 AM    ALKPHOS 53 03/07/2023 08:58 AM    AST 14 03/07/2023 08:58 AM    ALT 7 03/07/2023 08:58 AM     Phosphorus:    Lab Results   Component Value Date/Time    PHOS 6.1 03/11/2023 06:01 AM     Uric Acid:  No results found for: LABURIC, URICACID  Last 3 Troponin:    Lab Results   Component Value Date/Time    TROPONINI 0.40 03/04/2023 10:13 PM    TROPONINI 0.39 03/04/2023 07:16 PM    TROPONINI 0.44 03/04/2023 01:44 PM     U/A:  No results found for: NITRITE, COLORU, PROTEINU, PHUR, LABCAST, WBCUA, RBCUA, MUCUS, TRICHOMONAS, YEAST, BACTERIA, CLARITYU, SPECGRAV, LEUKOCYTESUR, UROBILINOGEN, BILIRUBINUR, BLOODU, GLUCOSEU, AMORPHOUS          1.  Wall thickening of the rectum and sigmoid colon which could represent a   proctitis/colitis or sequela of radiation. Underlying malignancy not   excluded. 2. Fat stranding and irregularity of the urinary bladder which can be seen   with cystitis. 3. Mild edema in the subcutaneous fat about the right thigh. 4. No acute osseous abnormality. If pain persists, consider further   evaluation with MRI assuming there are no contraindications to MRI. 5. Mild right hip osteoarthrosis. 6. No organized fluid collection. IMPRESSION/RECOMMENDATIONS:      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:  [facility] fair-TTS-plan noted for discharge at [facility]  -access right TDC  -TW TBD last weight 83.3 kg  -BP stable  -lytes stable  Plan:  -plan noted for discharge to [facility].  - HD today and transition to MWF next week as patient will likely be going to [facility]. -orders given for vancomycin 1g to monitor random vancomycin level weekly by Dr. Zohreh Meyer Metabolic acidosis:  -resolved with HD  -continue TTS HD while inpatient. Transition to MWF at [facility]    Anemia of chronic kidney disease  -last hgb is 11.7  -no JOS needed    Renal osteodystrophy  -last phos is 6.1-on binders    Groin pain: US negative  -s/p Flex Sig:\"IMPRESSION : left colon diverticulosis  otherwise normal flex sig to the transverse colon. 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 Okay for discharge. Per Dr. Courtney Polk, HD has been confirmed at [facility] vancomycin as been called in      Thank you for allowing me to participate in the care of this patient. I will continue to follow along. Please call with questions.     Rojelio Monteiro DO, MD  3/12/2023  [facility]

## 2023-03-12 NOTE — CARE COORDINATION
Discharge note:      CM/SW has been notified of discharge. Patient noted to have the following needs at discharge. CM/SW has coordinated the following services:  [address] [place] [address] Heriberto Adorno. [facility], [facility]  Phone: [phone]  Fax: [phone] Reagan Diaz          [phone] Perez Tafoya     RN to call report to: [phone]  AVS faxed to facility  HENS complete  CM placed call to [facility] in admissions at facility to make aware of transport time today    Discharge Destination: SNF  Transportation: [facility] will transport at 3 pm today        Comment:      All CM/SW needs met, will sign off.      Electronically signed by Dwayne Wray RN on 3/12/2023 at 12:17 PM

## 2023-03-13 ENCOUNTER — TELEPHONE (OUTPATIENT)
Dept: INFECTIOUS DISEASES | Age: 83
End: 2023-03-13

## 2023-03-13 ENCOUNTER — CLINICAL DOCUMENTATION (OUTPATIENT)
Dept: INFECTIOUS DISEASES | Age: 83
End: 2023-03-13

## 2023-03-13 DIAGNOSIS — L97.514 ULCER OF TOE OF RIGHT FOOT, WITH NECROSIS OF BONE (HCC): Primary | ICD-10-CM

## 2023-03-13 NOTE — TELEPHONE ENCOUNTER
Spoke with [address] [place] [address] Abdullahi who states they believe patient will be dialyzed at [facility]. Spoke with Jennifer at [facility] who states patient's nurse is currently in a meeting and will call back to clarify place of HD and OPAT orders.

## 2023-03-13 NOTE — PROGRESS NOTES
Dr. Basil Fong has placed a referral order for pharmacist to manage Outpatient Parental Antimicrobial Therapy (OPAT) pursuant the ID Collaborative Practice Agreement. Patient and/or caregiver has verbally consented to have drug therapy managed by a pharmacist. The benefits and risks of complex antimicrobial therapy including drug-specific adverse reactions and necessary follow-up were discussed with patient and/or caregiver and they are amenable to OPAT and pharmacist management. Pertinent Objective Data: Wt Readings from Last 1 Encounters:   03/11/23 185 lb 13.6 oz (84.3 kg)      BMI Readings from Last 1 Encounters:   03/11/23 26.67 kg/m²      Serum creatinine: 6.6 mg/dL (HH) 03/09/23 0509  Estimated creatinine clearance: 9 mL/min (A)  HD Tues/Thurs/Sat    Imaging:   3/7 R CT femur and pelvis:  CT pelvis:       1. Wall thickening of the rectum and sigmoid colon which could represent   proctitis or colitis or sequela of radiation. Underlying malignancy not   excluded. 2. Mild bilateral hip osteoarthrosis. 3. No acute osseous abnormality. If pain persists, consider further evaluation with MRI assuming there are no   contraindications to MRI. 4. Fat stranding and irregularity of the collapsed urinary bladder. This can   be seen with a cystitis. 5. Small fat containing bilateral inguinal hernias. 6. Radiation seeds in the prostate. 7. Atherosclerotic calcification of the aorta and iliac branch vasculature. 8. Moderate to severe disc space narrowing and vacuum disc phenomenon at   L5-S1. CT right femur:       1. Wall thickening of the rectum and sigmoid colon which could represent a   proctitis/colitis or sequela of radiation. Underlying malignancy not   excluded. 2. Fat stranding and irregularity of the urinary bladder which can be seen   with cystitis. 3. Mild edema in the subcutaneous fat about the right thigh. 4. No acute osseous abnormality.   If pain persists, consider further evaluation with MRI assuming there are no contraindications to MRI. 5. Mild right hip osteoarthrosis. 6. No organized fluid collection. 3/12 CT Head:  No acute intracranial findings. Right mastoid effusion and fluid attenuation in the right middle ear which   could reflect otitis media. 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 None   Any follow up in ID clinic? None   OPAT Access/Additional LDA HD   Disposition  [facility] with in-house HD     Lab and medication orders have been placed. Clinical Pharmacist will review patient weekly or as needed and make recommendations regarding the above therapy plan.      Thank you,  Rosaline Lantigua, PharmD, [facility] [place], [place] Infectious Disease  Office Phone: [phone] (also available on [employer])  Fax: [phone]    For Pharmacy Admin Tracking Only    Program: Medical Group  CPA in place: Yes  Time Spent (min): 20

## 2023-03-14 ENCOUNTER — TELEPHONE (OUTPATIENT)
Dept: INFECTIOUS DISEASES | Age: 83
End: 2023-03-14

## 2023-03-14 NOTE — TELEPHONE ENCOUNTER
Spoke with unit receptionist Edson Silva, she states she will give message to dialysis nurse requesting call back to this nurse for OPAT review

## 2023-03-14 NOTE — TELEPHONE ENCOUNTER
Spoke with patients nurse OmkarDelphos who states patient is having dialysis in house. Verifiedl po cipro and weekly lab orders and she verbalizes understanding. LMOM for dialysis nurse to verify vanc orders and who will manage levels.

## 2023-03-14 NOTE — TELEPHONE ENCOUNTER
Spoke with nurse Severiano Awe at [facility], she states dialysis nurses are only present M,W,F. Verified OPAT orders received and she states they are only able to draw random vanc levels in dialysis center.  She will ask about vancomycin management tomorrow and return call to this office

## 2023-03-15 ENCOUNTER — TELEPHONE (OUTPATIENT)
Dept: INFECTIOUS DISEASES | Age: 83
End: 2023-03-15

## 2023-03-15 NOTE — TELEPHONE ENCOUNTER
Received VM from Genna at [facility] stating per nephro they will manage Vanc levels and dosing

## 2023-03-21 ENCOUNTER — TELEPHONE (OUTPATIENT)
Dept: INFECTIOUS DISEASES | Age: 83
End: 2023-03-21

## 2023-03-21 NOTE — TELEPHONE ENCOUNTER
Pt due to end IV vanc and PO cipro on 3/22 for acute on chronic BL ulcerations on legs. Nephrologist has been managing vanc levels. No labs from facility. Called floor RN-    Pt doing well. No fevers. VSS. Bilateral leg wounds are scabbed over and healed. L hip is still open (known issue) but does not have drainage and \"does not look infected. \"  Pt still complaining of pain in R groin issue (known OA). Following with wound care in house and getting HD on MWF. After d/c from facility will go back to [facility] and [address] [place] [address],[address] with Dr. Severa Leber.    Gave orders to end abx as planned on 3/22.     Mecca Sage, PharmD, [address] [place], [place] Infectious Disease  Phone: [phone] (also available on [employer])  Fax: [phone]    For Pharmacy [id number]  Only    Program: [id number] [facility] in place: Yes  Intervention Detail: Discontinued Rx: 2, reason: Therapy Complete  Time Spent (min): 15

## 2023-04-06 ENCOUNTER — HOSPITAL ENCOUNTER (OUTPATIENT)
Dept: WOUND CARE | Age: 83
Discharge: HOME | End: 2023-04-06

## 2023-04-15 ENCOUNTER — HOSPITAL ENCOUNTER (INPATIENT)
Age: 83
LOS: 6 days | Discharge: NURSING, REHAB OR LONG-TERM CARE | DRG: 299 | End: 2023-04-21
Attending: EMERGENCY MEDICINE | Admitting: INTERNAL MEDICINE
Payer: MEDICARE

## 2023-04-15 ENCOUNTER — APPOINTMENT (OUTPATIENT)
Dept: CT IMAGING | Age: 83
DRG: 299 | End: 2023-04-15
Payer: MEDICARE

## 2023-04-15 ENCOUNTER — APPOINTMENT (OUTPATIENT)
Dept: GENERAL RADIOLOGY | Age: 83
DRG: 299 | End: 2023-04-15
Payer: MEDICARE

## 2023-04-15 DIAGNOSIS — S71.101A OPEN WOUND OF RIGHT THIGH, INITIAL ENCOUNTER: ICD-10-CM

## 2023-04-15 DIAGNOSIS — E83.59 CALCIPHYLAXIS CUTIS: ICD-10-CM

## 2023-04-15 DIAGNOSIS — M79.671 FOOT PAIN, BILATERAL: ICD-10-CM

## 2023-04-15 DIAGNOSIS — I73.9 PERIPHERAL VASCULAR DISEASE (HCC): Primary | ICD-10-CM

## 2023-04-15 DIAGNOSIS — M79.672 FOOT PAIN, BILATERAL: ICD-10-CM

## 2023-04-15 PROBLEM — L03.90 CELLULITIS: Status: ACTIVE | Noted: 2023-04-15

## 2023-04-15 LAB
ALBUMIN SERPL-MCNC: 3.4 G/DL (ref 3.4–5)
ALBUMIN/GLOB SERPL: 1.3 {RATIO} (ref 1.1–2.2)
ALP SERPL-CCNC: 72 U/L (ref 40–129)
ALT SERPL-CCNC: 12 U/L (ref 10–40)
ANION GAP SERPL CALCULATED.3IONS-SCNC: 14 MMOL/L (ref 3–16)
AST SERPL-CCNC: 17 U/L (ref 15–37)
BASOPHILS # BLD: 0 K/UL (ref 0–0.2)
BASOPHILS NFR BLD: 0.6 %
BILIRUB SERPL-MCNC: 0.5 MG/DL (ref 0–1)
BUN SERPL-MCNC: 32 MG/DL (ref 7–20)
CALCIUM SERPL-MCNC: 9.7 MG/DL (ref 8.3–10.6)
CHLORIDE SERPL-SCNC: 101 MMOL/L (ref 99–110)
CO2 SERPL-SCNC: 27 MMOL/L (ref 21–32)
CREAT SERPL-MCNC: 6.5 MG/DL (ref 0.8–1.3)
DEPRECATED RDW RBC AUTO: 16.2 % (ref 12.4–15.4)
EOSINOPHIL # BLD: 0.1 K/UL (ref 0–0.6)
EOSINOPHIL NFR BLD: 1.3 %
GFR SERPLBLD CREATININE-BSD FMLA CKD-EPI: 8 ML/MIN/{1.73_M2}
GLUCOSE SERPL-MCNC: 92 MG/DL (ref 70–99)
HCT VFR BLD AUTO: 36 % (ref 40.5–52.5)
HGB BLD-MCNC: 11.3 G/DL (ref 13.5–17.5)
LYMPHOCYTES # BLD: 1.4 K/UL (ref 1–5.1)
LYMPHOCYTES NFR BLD: 20.3 %
MAGNESIUM SERPL-MCNC: 2.3 MG/DL (ref 1.8–2.4)
MCH RBC QN AUTO: 32.4 PG (ref 26–34)
MCHC RBC AUTO-ENTMCNC: 31.5 G/DL (ref 31–36)
MCV RBC AUTO: 102.8 FL (ref 80–100)
MONOCYTES # BLD: 0.9 K/UL (ref 0–1.3)
MONOCYTES NFR BLD: 13.2 %
NEUTROPHILS # BLD: 4.4 K/UL (ref 1.7–7.7)
NEUTROPHILS NFR BLD: 64.6 %
PLATELET # BLD AUTO: 112 K/UL (ref 135–450)
PMV BLD AUTO: 8.8 FL (ref 5–10.5)
POTASSIUM SERPL-SCNC: 3.5 MMOL/L (ref 3.5–5.1)
PROT SERPL-MCNC: 6.1 G/DL (ref 6.4–8.2)
RBC # BLD AUTO: 3.5 M/UL (ref 4.2–5.9)
SODIUM SERPL-SCNC: 142 MMOL/L (ref 136–145)
WBC # BLD AUTO: 6.8 K/UL (ref 4–11)

## 2023-04-15 PROCEDURE — 99285 EMERGENCY DEPT VISIT HI MDM: CPT

## 2023-04-15 PROCEDURE — 85025 COMPLETE CBC W/AUTO DIFF WBC: CPT

## 2023-04-15 PROCEDURE — 36415 COLL VENOUS BLD VENIPUNCTURE: CPT

## 2023-04-15 PROCEDURE — 73630 X-RAY EXAM OF FOOT: CPT

## 2023-04-15 PROCEDURE — 6360000002 HC RX W HCPCS: Performed by: INTERNAL MEDICINE

## 2023-04-15 PROCEDURE — 2580000003 HC RX 258: Performed by: INTERNAL MEDICINE

## 2023-04-15 PROCEDURE — 6370000000 HC RX 637 (ALT 250 FOR IP): Performed by: INTERNAL MEDICINE

## 2023-04-15 PROCEDURE — 80053 COMPREHEN METABOLIC PANEL: CPT

## 2023-04-15 PROCEDURE — 1200000000 HC SEMI PRIVATE

## 2023-04-15 PROCEDURE — 96374 THER/PROPH/DIAG INJ IV PUSH: CPT

## 2023-04-15 PROCEDURE — 73700 CT LOWER EXTREMITY W/O DYE: CPT

## 2023-04-15 PROCEDURE — 83735 ASSAY OF MAGNESIUM: CPT

## 2023-04-15 PROCEDURE — 6360000002 HC RX W HCPCS: Performed by: PHYSICIAN ASSISTANT

## 2023-04-15 PROCEDURE — 73502 X-RAY EXAM HIP UNI 2-3 VIEWS: CPT

## 2023-04-15 PROCEDURE — 94760 N-INVAS EAR/PLS OXIMETRY 1: CPT

## 2023-04-15 RX ORDER — ONDANSETRON 4 MG/1
4 TABLET, ORALLY DISINTEGRATING ORAL EVERY 8 HOURS PRN
Status: DISCONTINUED | OUTPATIENT
Start: 2023-04-15 | End: 2023-04-21 | Stop reason: HOSPADM

## 2023-04-15 RX ORDER — MORPHINE SULFATE 2 MG/ML
2 INJECTION, SOLUTION INTRAMUSCULAR; INTRAVENOUS ONCE
Status: COMPLETED | OUTPATIENT
Start: 2023-04-15 | End: 2023-04-15

## 2023-04-15 RX ORDER — MIDODRINE HYDROCHLORIDE 5 MG/1
2.5 TABLET ORAL 3 TIMES DAILY
Status: DISCONTINUED | OUTPATIENT
Start: 2023-04-15 | End: 2023-04-21 | Stop reason: HOSPADM

## 2023-04-15 RX ORDER — SODIUM CHLORIDE 0.9 % (FLUSH) 0.9 %
5-40 SYRINGE (ML) INJECTION EVERY 12 HOURS SCHEDULED
Status: DISCONTINUED | OUTPATIENT
Start: 2023-04-15 | End: 2023-04-21 | Stop reason: HOSPADM

## 2023-04-15 RX ORDER — ACETAMINOPHEN 325 MG/1
650 TABLET ORAL EVERY 6 HOURS PRN
Status: DISCONTINUED | OUTPATIENT
Start: 2023-04-15 | End: 2023-04-21 | Stop reason: HOSPADM

## 2023-04-15 RX ORDER — SODIUM CHLORIDE 0.9 % (FLUSH) 0.9 %
5-40 SYRINGE (ML) INJECTION PRN
Status: DISCONTINUED | OUTPATIENT
Start: 2023-04-15 | End: 2023-04-21 | Stop reason: HOSPADM

## 2023-04-15 RX ORDER — SEVELAMER CARBONATE 800 MG/1
2 TABLET, FILM COATED ORAL
COMMUNITY

## 2023-04-15 RX ORDER — POLYETHYLENE GLYCOL 3350 17 G/17G
17 POWDER, FOR SOLUTION ORAL DAILY PRN
Status: DISCONTINUED | OUTPATIENT
Start: 2023-04-15 | End: 2023-04-21 | Stop reason: HOSPADM

## 2023-04-15 RX ORDER — HEPARIN SODIUM 5000 [USP'U]/ML
5000 INJECTION, SOLUTION INTRAVENOUS; SUBCUTANEOUS EVERY 8 HOURS SCHEDULED
Status: DISCONTINUED | OUTPATIENT
Start: 2023-04-15 | End: 2023-04-21 | Stop reason: HOSPADM

## 2023-04-15 RX ORDER — EZETIMIBE 10 MG/1
10 TABLET ORAL DAILY
Status: DISCONTINUED | OUTPATIENT
Start: 2023-04-15 | End: 2023-04-21 | Stop reason: HOSPADM

## 2023-04-15 RX ORDER — ONDANSETRON 2 MG/ML
4 INJECTION INTRAMUSCULAR; INTRAVENOUS EVERY 6 HOURS PRN
Status: DISCONTINUED | OUTPATIENT
Start: 2023-04-15 | End: 2023-04-21 | Stop reason: HOSPADM

## 2023-04-15 RX ORDER — ACETAMINOPHEN 650 MG/1
650 SUPPOSITORY RECTAL EVERY 6 HOURS PRN
Status: DISCONTINUED | OUTPATIENT
Start: 2023-04-15 | End: 2023-04-21 | Stop reason: HOSPADM

## 2023-04-15 RX ORDER — LEVOTHYROXINE SODIUM 0.12 MG/1
125 TABLET ORAL DAILY
Status: DISCONTINUED | OUTPATIENT
Start: 2023-04-16 | End: 2023-04-21 | Stop reason: HOSPADM

## 2023-04-15 RX ORDER — SODIUM CHLORIDE 9 MG/ML
INJECTION, SOLUTION INTRAVENOUS PRN
Status: DISCONTINUED | OUTPATIENT
Start: 2023-04-15 | End: 2023-04-21 | Stop reason: HOSPADM

## 2023-04-15 RX ADMIN — EZETIMIBE 10 MG: 10 TABLET ORAL at 14:35

## 2023-04-15 RX ADMIN — VANCOMYCIN HYDROCHLORIDE 1500 MG: 1.5 INJECTION, POWDER, LYOPHILIZED, FOR SOLUTION INTRAVENOUS at 20:53

## 2023-04-15 RX ADMIN — PIPERACILLIN AND TAZOBACTAM 2250 MG: 2; .25 INJECTION, POWDER, LYOPHILIZED, FOR SOLUTION INTRAVENOUS at 14:35

## 2023-04-15 RX ADMIN — MORPHINE SULFATE 2 MG: 2 INJECTION, SOLUTION INTRAMUSCULAR; INTRAVENOUS at 14:36

## 2023-04-15 RX ADMIN — HEPARIN SODIUM 5000 UNITS: 5000 INJECTION INTRAVENOUS; SUBCUTANEOUS at 20:52

## 2023-04-15 RX ADMIN — HYDROMORPHONE HYDROCHLORIDE 0.5 MG: 1 INJECTION, SOLUTION INTRAMUSCULAR; INTRAVENOUS; SUBCUTANEOUS at 16:19

## 2023-04-15 RX ADMIN — MORPHINE SULFATE 2 MG: 2 INJECTION, SOLUTION INTRAMUSCULAR; INTRAVENOUS at 09:24

## 2023-04-15 RX ADMIN — METOPROLOL TARTRATE 25 MG: 25 TABLET, FILM COATED ORAL at 14:36

## 2023-04-15 RX ADMIN — MIDODRINE HYDROCHLORIDE 2.5 MG: 5 TABLET ORAL at 14:35

## 2023-04-15 RX ADMIN — HEPARIN SODIUM 5000 UNITS: 5000 INJECTION INTRAVENOUS; SUBCUTANEOUS at 14:36

## 2023-04-15 RX ADMIN — MIDODRINE HYDROCHLORIDE 2.5 MG: 5 TABLET ORAL at 20:51

## 2023-04-15 ASSESSMENT — PAIN DESCRIPTION - LOCATION
LOCATION: TOE (COMMENT WHICH ONE)
LOCATION: LEG;HIP
LOCATION: TOE (COMMENT WHICH ONE)
LOCATION: TOE (COMMENT WHICH ONE)

## 2023-04-15 ASSESSMENT — PAIN DESCRIPTION - DESCRIPTORS
DESCRIPTORS: SHARP
DESCRIPTORS: ACHING
DESCRIPTORS: SHARP

## 2023-04-15 ASSESSMENT — ENCOUNTER SYMPTOMS
SHORTNESS OF BREATH: 0
VOMITING: 0
COUGH: 0
NAUSEA: 0

## 2023-04-15 ASSESSMENT — PAIN DESCRIPTION - ORIENTATION
ORIENTATION: RIGHT;LEFT
ORIENTATION: RIGHT;LEFT
ORIENTATION: RIGHT
ORIENTATION: RIGHT;LEFT
ORIENTATION: RIGHT;LEFT

## 2023-04-15 ASSESSMENT — PAIN DESCRIPTION - FREQUENCY
FREQUENCY: INTERMITTENT
FREQUENCY: INTERMITTENT

## 2023-04-15 ASSESSMENT — PAIN SCALES - GENERAL
PAINLEVEL_OUTOF10: 2
PAINLEVEL_OUTOF10: 9
PAINLEVEL_OUTOF10: 9
PAINLEVEL_OUTOF10: 7
PAINLEVEL_OUTOF10: 6

## 2023-04-15 ASSESSMENT — PAIN - FUNCTIONAL ASSESSMENT
PAIN_FUNCTIONAL_ASSESSMENT: 0-10
PAIN_FUNCTIONAL_ASSESSMENT: INTOLERABLE, UNABLE TO DO ANY ACTIVE OR PASSIVE ACTIVITIES
PAIN_FUNCTIONAL_ASSESSMENT: INTOLERABLE, UNABLE TO DO ANY ACTIVE OR PASSIVE ACTIVITIES

## 2023-04-15 ASSESSMENT — PAIN DESCRIPTION - PAIN TYPE
TYPE: ACUTE PAIN
TYPE: ACUTE PAIN

## 2023-04-16 LAB
ANION GAP SERPL CALCULATED.3IONS-SCNC: 16 MMOL/L (ref 3–16)
BASOPHILS # BLD: 0.1 K/UL (ref 0–0.2)
BASOPHILS NFR BLD: 1 %
BUN SERPL-MCNC: 39 MG/DL (ref 7–20)
CALCIUM SERPL-MCNC: 9.2 MG/DL (ref 8.3–10.6)
CHLORIDE SERPL-SCNC: 101 MMOL/L (ref 99–110)
CO2 SERPL-SCNC: 21 MMOL/L (ref 21–32)
CREAT SERPL-MCNC: 7.5 MG/DL (ref 0.8–1.3)
DEPRECATED RDW RBC AUTO: 16.1 % (ref 12.4–15.4)
EOSINOPHIL # BLD: 0.2 K/UL (ref 0–0.6)
EOSINOPHIL NFR BLD: 2.5 %
GFR SERPLBLD CREATININE-BSD FMLA CKD-EPI: 7 ML/MIN/{1.73_M2}
GLUCOSE SERPL-MCNC: 83 MG/DL (ref 70–99)
HCT VFR BLD AUTO: 37.6 % (ref 40.5–52.5)
HGB BLD-MCNC: 11.7 G/DL (ref 13.5–17.5)
LYMPHOCYTES # BLD: 1.9 K/UL (ref 1–5.1)
LYMPHOCYTES NFR BLD: 26.6 %
MCH RBC QN AUTO: 32.4 PG (ref 26–34)
MCHC RBC AUTO-ENTMCNC: 31.1 G/DL (ref 31–36)
MCV RBC AUTO: 104.3 FL (ref 80–100)
MONOCYTES # BLD: 1 K/UL (ref 0–1.3)
MONOCYTES NFR BLD: 14.1 %
NEUTROPHILS # BLD: 4 K/UL (ref 1.7–7.7)
NEUTROPHILS NFR BLD: 55.8 %
PLATELET # BLD AUTO: 118 K/UL (ref 135–450)
PMV BLD AUTO: 9.2 FL (ref 5–10.5)
POTASSIUM SERPL-SCNC: 4.2 MMOL/L (ref 3.5–5.1)
RBC # BLD AUTO: 3.6 M/UL (ref 4.2–5.9)
SODIUM SERPL-SCNC: 138 MMOL/L (ref 136–145)
WBC # BLD AUTO: 7.1 K/UL (ref 4–11)

## 2023-04-16 PROCEDURE — 36415 COLL VENOUS BLD VENIPUNCTURE: CPT

## 2023-04-16 PROCEDURE — 6360000002 HC RX W HCPCS: Performed by: INTERNAL MEDICINE

## 2023-04-16 PROCEDURE — 2580000003 HC RX 258: Performed by: INTERNAL MEDICINE

## 2023-04-16 PROCEDURE — 93005 ELECTROCARDIOGRAM TRACING: CPT | Performed by: INTERNAL MEDICINE

## 2023-04-16 PROCEDURE — 6370000000 HC RX 637 (ALT 250 FOR IP): Performed by: INTERNAL MEDICINE

## 2023-04-16 PROCEDURE — 85025 COMPLETE CBC W/AUTO DIFF WBC: CPT

## 2023-04-16 PROCEDURE — 1200000000 HC SEMI PRIVATE

## 2023-04-16 PROCEDURE — 80048 BASIC METABOLIC PNL TOTAL CA: CPT

## 2023-04-16 RX ADMIN — HYDROMORPHONE HYDROCHLORIDE 0.5 MG: 1 INJECTION, SOLUTION INTRAMUSCULAR; INTRAVENOUS; SUBCUTANEOUS at 09:21

## 2023-04-16 RX ADMIN — HYDROMORPHONE HYDROCHLORIDE 0.5 MG: 1 INJECTION, SOLUTION INTRAMUSCULAR; INTRAVENOUS; SUBCUTANEOUS at 06:21

## 2023-04-16 RX ADMIN — MIDODRINE HYDROCHLORIDE 2.5 MG: 5 TABLET ORAL at 12:48

## 2023-04-16 RX ADMIN — HYDROMORPHONE HYDROCHLORIDE 0.5 MG: 1 INJECTION, SOLUTION INTRAMUSCULAR; INTRAVENOUS; SUBCUTANEOUS at 12:47

## 2023-04-16 RX ADMIN — HEPARIN SODIUM 5000 UNITS: 5000 INJECTION INTRAVENOUS; SUBCUTANEOUS at 21:17

## 2023-04-16 RX ADMIN — PIPERACILLIN AND TAZOBACTAM 3375 MG: 3; .375 INJECTION, POWDER, LYOPHILIZED, FOR SOLUTION INTRAVENOUS at 16:28

## 2023-04-16 RX ADMIN — LEVOTHYROXINE SODIUM 125 MCG: 0.12 TABLET ORAL at 06:21

## 2023-04-16 RX ADMIN — METOPROLOL TARTRATE 25 MG: 25 TABLET, FILM COATED ORAL at 08:06

## 2023-04-16 RX ADMIN — PIPERACILLIN AND TAZOBACTAM 2250 MG: 2; .25 INJECTION, POWDER, LYOPHILIZED, FOR SOLUTION INTRAVENOUS at 06:23

## 2023-04-16 RX ADMIN — HYDROMORPHONE HYDROCHLORIDE 0.5 MG: 1 INJECTION, SOLUTION INTRAMUSCULAR; INTRAVENOUS; SUBCUTANEOUS at 21:17

## 2023-04-16 RX ADMIN — HEPARIN SODIUM 5000 UNITS: 5000 INJECTION INTRAVENOUS; SUBCUTANEOUS at 06:21

## 2023-04-16 RX ADMIN — MIDODRINE HYDROCHLORIDE 2.5 MG: 5 TABLET ORAL at 08:05

## 2023-04-16 RX ADMIN — METOPROLOL TARTRATE 25 MG: 25 TABLET, FILM COATED ORAL at 21:17

## 2023-04-16 RX ADMIN — HYDROMORPHONE HYDROCHLORIDE 0.5 MG: 1 INJECTION, SOLUTION INTRAMUSCULAR; INTRAVENOUS; SUBCUTANEOUS at 01:07

## 2023-04-16 RX ADMIN — PIPERACILLIN AND TAZOBACTAM 2250 MG: 2; .25 INJECTION, POWDER, LYOPHILIZED, FOR SOLUTION INTRAVENOUS at 00:14

## 2023-04-16 RX ADMIN — HYDROMORPHONE HYDROCHLORIDE 0.5 MG: 1 INJECTION, SOLUTION INTRAMUSCULAR; INTRAVENOUS; SUBCUTANEOUS at 16:27

## 2023-04-16 RX ADMIN — EZETIMIBE 10 MG: 10 TABLET ORAL at 08:06

## 2023-04-16 RX ADMIN — SODIUM CHLORIDE, PRESERVATIVE FREE 10 ML: 5 INJECTION INTRAVENOUS at 07:47

## 2023-04-16 RX ADMIN — HEPARIN SODIUM 5000 UNITS: 5000 INJECTION INTRAVENOUS; SUBCUTANEOUS at 12:55

## 2023-04-16 ASSESSMENT — PAIN DESCRIPTION - FREQUENCY
FREQUENCY: INTERMITTENT
FREQUENCY: CONTINUOUS
FREQUENCY: INTERMITTENT

## 2023-04-16 ASSESSMENT — PAIN DESCRIPTION - LOCATION
LOCATION: GENERALIZED
LOCATION: LEG
LOCATION: GENERALIZED
LOCATION: LEG
LOCATION: LEG

## 2023-04-16 ASSESSMENT — PAIN DESCRIPTION - ORIENTATION
ORIENTATION: RIGHT;LEFT
ORIENTATION: RIGHT
ORIENTATION: RIGHT;LEFT

## 2023-04-16 ASSESSMENT — PAIN DESCRIPTION - DESCRIPTORS
DESCRIPTORS: ACHING
DESCRIPTORS: DISCOMFORT;ACHING
DESCRIPTORS: ACHING
DESCRIPTORS: ACHING

## 2023-04-16 ASSESSMENT — PAIN - FUNCTIONAL ASSESSMENT: PAIN_FUNCTIONAL_ASSESSMENT: PREVENTS OR INTERFERES WITH MANY ACTIVE NOT PASSIVE ACTIVITIES

## 2023-04-16 ASSESSMENT — PAIN SCALES - GENERAL
PAINLEVEL_OUTOF10: 10
PAINLEVEL_OUTOF10: 8
PAINLEVEL_OUTOF10: 10
PAINLEVEL_OUTOF10: 7
PAINLEVEL_OUTOF10: 2
PAINLEVEL_OUTOF10: 10
PAINLEVEL_OUTOF10: 7
PAINLEVEL_OUTOF10: 8
PAINLEVEL_OUTOF10: 6

## 2023-04-16 ASSESSMENT — PAIN DESCRIPTION - PAIN TYPE
TYPE: ACUTE PAIN
TYPE: ACUTE PAIN;CHRONIC PAIN
TYPE: ACUTE PAIN

## 2023-04-16 ASSESSMENT — PAIN DESCRIPTION - ONSET: ONSET: ON-GOING

## 2023-04-17 PROBLEM — M79.651 RIGHT THIGH PAIN: Status: ACTIVE | Noted: 2023-04-17

## 2023-04-17 PROBLEM — S91.302A NON HEALING LEFT HEEL WOUND: Status: ACTIVE | Noted: 2023-04-17

## 2023-04-17 LAB
EKG DIAGNOSIS: NORMAL
EKG Q-T INTERVAL: 500 MS
EKG QRS DURATION: 94 MS
EKG QTC CALCULATION (BAZETT): 511 MS
EKG R AXIS: -65 DEGREES
EKG T AXIS: 90 DEGREES
EKG VENTRICULAR RATE: 63 BPM
VANCOMYCIN SERPL-MCNC: 17.5 UG/ML

## 2023-04-17 PROCEDURE — 93010 ELECTROCARDIOGRAM REPORT: CPT | Performed by: INTERNAL MEDICINE

## 2023-04-17 PROCEDURE — 6360000002 HC RX W HCPCS: Performed by: INTERNAL MEDICINE

## 2023-04-17 PROCEDURE — 94760 N-INVAS EAR/PLS OXIMETRY 1: CPT

## 2023-04-17 PROCEDURE — 2580000003 HC RX 258: Performed by: INTERNAL MEDICINE

## 2023-04-17 PROCEDURE — 80202 ASSAY OF VANCOMYCIN: CPT

## 2023-04-17 PROCEDURE — 1200000000 HC SEMI PRIVATE

## 2023-04-17 PROCEDURE — 90935 HEMODIALYSIS ONE EVALUATION: CPT

## 2023-04-17 PROCEDURE — 5A1D70Z PERFORMANCE OF URINARY FILTRATION, INTERMITTENT, LESS THAN 6 HOURS PER DAY: ICD-10-PCS | Performed by: INTERNAL MEDICINE

## 2023-04-17 PROCEDURE — 6370000000 HC RX 637 (ALT 250 FOR IP): Performed by: INTERNAL MEDICINE

## 2023-04-17 PROCEDURE — 99223 1ST HOSP IP/OBS HIGH 75: CPT | Performed by: INTERNAL MEDICINE

## 2023-04-17 PROCEDURE — 6370000000 HC RX 637 (ALT 250 FOR IP): Performed by: PODIATRIST

## 2023-04-17 PROCEDURE — 36415 COLL VENOUS BLD VENIPUNCTURE: CPT

## 2023-04-17 RX ORDER — ACETAMINOPHEN 500 MG
1000 TABLET ORAL EVERY 8 HOURS SCHEDULED
Status: DISPENSED | OUTPATIENT
Start: 2023-04-17 | End: 2023-04-21

## 2023-04-17 RX ORDER — TRAMADOL HYDROCHLORIDE 50 MG/1
50 TABLET ORAL EVERY 6 HOURS PRN
Status: DISCONTINUED | OUTPATIENT
Start: 2023-04-17 | End: 2023-04-21

## 2023-04-17 RX ORDER — NITROGLYCERIN 0.1MG/HR
1 PATCH, TRANSDERMAL 24 HOURS TRANSDERMAL DAILY
Status: DISCONTINUED | OUTPATIENT
Start: 2023-04-17 | End: 2023-04-21 | Stop reason: HOSPADM

## 2023-04-17 RX ADMIN — HYDROMORPHONE HYDROCHLORIDE 0.5 MG: 1 INJECTION, SOLUTION INTRAMUSCULAR; INTRAVENOUS; SUBCUTANEOUS at 04:10

## 2023-04-17 RX ADMIN — ACETAMINOPHEN 1000 MG: 500 TABLET ORAL at 20:27

## 2023-04-17 RX ADMIN — MIDODRINE HYDROCHLORIDE 2.5 MG: 5 TABLET ORAL at 08:28

## 2023-04-17 RX ADMIN — VANCOMYCIN HYDROCHLORIDE 1000 MG: 1 INJECTION, POWDER, LYOPHILIZED, FOR SOLUTION INTRAVENOUS at 11:32

## 2023-04-17 RX ADMIN — PIPERACILLIN AND TAZOBACTAM 3375 MG: 3; .375 INJECTION, POWDER, LYOPHILIZED, FOR SOLUTION INTRAVENOUS at 04:08

## 2023-04-17 RX ADMIN — LEVOTHYROXINE SODIUM 125 MCG: 0.12 TABLET ORAL at 05:31

## 2023-04-17 RX ADMIN — HEPARIN SODIUM 5000 UNITS: 5000 INJECTION INTRAVENOUS; SUBCUTANEOUS at 05:31

## 2023-04-17 RX ADMIN — ACETAMINOPHEN 1000 MG: 500 TABLET ORAL at 13:28

## 2023-04-17 RX ADMIN — HYDROMORPHONE HYDROCHLORIDE 0.5 MG: 1 INJECTION, SOLUTION INTRAMUSCULAR; INTRAVENOUS; SUBCUTANEOUS at 13:32

## 2023-04-17 RX ADMIN — HEPARIN SODIUM 5000 UNITS: 5000 INJECTION INTRAVENOUS; SUBCUTANEOUS at 20:28

## 2023-04-17 RX ADMIN — HEPARIN SODIUM 5000 UNITS: 5000 INJECTION INTRAVENOUS; SUBCUTANEOUS at 13:29

## 2023-04-17 RX ADMIN — MIDODRINE HYDROCHLORIDE 2.5 MG: 5 TABLET ORAL at 13:26

## 2023-04-17 RX ADMIN — EZETIMIBE 10 MG: 10 TABLET ORAL at 13:28

## 2023-04-17 RX ADMIN — METOPROLOL TARTRATE 25 MG: 25 TABLET, FILM COATED ORAL at 20:28

## 2023-04-17 RX ADMIN — SODIUM CHLORIDE, PRESERVATIVE FREE 10 ML: 5 INJECTION INTRAVENOUS at 08:32

## 2023-04-17 RX ADMIN — TRAMADOL HYDROCHLORIDE 50 MG: 50 TABLET, COATED ORAL at 09:34

## 2023-04-17 RX ADMIN — PIPERACILLIN AND TAZOBACTAM 3375 MG: 3; .375 INJECTION, POWDER, LYOPHILIZED, FOR SOLUTION INTRAVENOUS at 15:01

## 2023-04-17 ASSESSMENT — PAIN DESCRIPTION - DESCRIPTORS
DESCRIPTORS: DISCOMFORT

## 2023-04-17 ASSESSMENT — PAIN SCALES - GENERAL
PAINLEVEL_OUTOF10: 9
PAINLEVEL_OUTOF10: 8
PAINLEVEL_OUTOF10: 7
PAINLEVEL_OUTOF10: 2
PAINLEVEL_OUTOF10: 6
PAINLEVEL_OUTOF10: 7

## 2023-04-17 ASSESSMENT — PAIN - FUNCTIONAL ASSESSMENT
PAIN_FUNCTIONAL_ASSESSMENT: PREVENTS OR INTERFERES SOME ACTIVE ACTIVITIES AND ADLS

## 2023-04-17 ASSESSMENT — PAIN DESCRIPTION - LOCATION
LOCATION: LEG;OTHER (COMMENT)
LOCATION: LEG;FOOT
LOCATION: LEG
LOCATION: LEG

## 2023-04-17 ASSESSMENT — PAIN DESCRIPTION - ORIENTATION
ORIENTATION: LEFT
ORIENTATION: LEFT
ORIENTATION: RIGHT
ORIENTATION: LEFT

## 2023-04-17 NOTE — PROCEDURES
Treatment time: 3.5 hours  Net UF: 2000 ml     Pre weight: 92.2 kg  Post weight:90.1 kg    Access used: Right TDC    Access function: ok with  ml/min     Medications or blood products given: vancomycin      Regular outpatient schedule: MWF     Summary of response to treatment: Patient tolerated treatment well and without any complications. Patient remained stable throughout entire treatment and upon the exiting the multi suite via transport. Report given to   Anthony Flor RN     and copy of dialysis treatment record placed in chart, to be scanned into EMR.

## 2023-04-18 ENCOUNTER — APPOINTMENT (OUTPATIENT)
Dept: CARDIAC CATH/INVASIVE PROCEDURES | Age: 83
DRG: 299 | End: 2023-04-18
Payer: MEDICARE

## 2023-04-18 LAB
ANION GAP SERPL CALCULATED.3IONS-SCNC: 19 MMOL/L (ref 3–16)
BUN SERPL-MCNC: 39 MG/DL (ref 7–20)
CALCIUM SERPL-MCNC: 9.4 MG/DL (ref 8.3–10.6)
CHLORIDE SERPL-SCNC: 101 MMOL/L (ref 99–110)
CO2 SERPL-SCNC: 20 MMOL/L (ref 21–32)
CREAT SERPL-MCNC: 8.6 MG/DL (ref 0.8–1.3)
DEPRECATED RDW RBC AUTO: 15.8 % (ref 12.4–15.4)
GFR SERPLBLD CREATININE-BSD FMLA CKD-EPI: 6 ML/MIN/{1.73_M2}
GLUCOSE SERPL-MCNC: 92 MG/DL (ref 70–99)
HCT VFR BLD AUTO: 38.6 % (ref 40.5–52.5)
HGB BLD-MCNC: 12.5 G/DL (ref 13.5–17.5)
MCH RBC QN AUTO: 32.8 PG (ref 26–34)
MCHC RBC AUTO-ENTMCNC: 32.3 G/DL (ref 31–36)
MCV RBC AUTO: 101.4 FL (ref 80–100)
PLATELET # BLD AUTO: 134 K/UL (ref 135–450)
PMV BLD AUTO: 9.4 FL (ref 5–10.5)
POTASSIUM SERPL-SCNC: 4.4 MMOL/L (ref 3.5–5.1)
RBC # BLD AUTO: 3.81 M/UL (ref 4.2–5.9)
SODIUM SERPL-SCNC: 140 MMOL/L (ref 136–145)
WBC # BLD AUTO: 7.2 K/UL (ref 4–11)

## 2023-04-18 PROCEDURE — C1887 CATHETER, GUIDING: HCPCS

## 2023-04-18 PROCEDURE — 99153 MOD SED SAME PHYS/QHP EA: CPT

## 2023-04-18 PROCEDURE — 99152 MOD SED SAME PHYS/QHP 5/>YRS: CPT | Performed by: INTERNAL MEDICINE

## 2023-04-18 PROCEDURE — 97530 THERAPEUTIC ACTIVITIES: CPT

## 2023-04-18 PROCEDURE — C1769 GUIDE WIRE: HCPCS

## 2023-04-18 PROCEDURE — 6360000002 HC RX W HCPCS: Performed by: INTERNAL MEDICINE

## 2023-04-18 PROCEDURE — 36415 COLL VENOUS BLD VENIPUNCTURE: CPT

## 2023-04-18 PROCEDURE — 93925 LOWER EXTREMITY STUDY: CPT

## 2023-04-18 PROCEDURE — 97166 OT EVAL MOD COMPLEX 45 MIN: CPT

## 2023-04-18 PROCEDURE — 9990000010 HC NO CHARGE VISIT

## 2023-04-18 PROCEDURE — 2709999900 HC NON-CHARGEABLE SUPPLY

## 2023-04-18 PROCEDURE — C1894 INTRO/SHEATH, NON-LASER: HCPCS

## 2023-04-18 PROCEDURE — 36245 INS CATH ABD/L-EXT ART 1ST: CPT

## 2023-04-18 PROCEDURE — 6360000002 HC RX W HCPCS

## 2023-04-18 PROCEDURE — 1200000000 HC SEMI PRIVATE

## 2023-04-18 PROCEDURE — 6370000000 HC RX 637 (ALT 250 FOR IP): Performed by: INTERNAL MEDICINE

## 2023-04-18 PROCEDURE — 80048 BASIC METABOLIC PNL TOTAL CA: CPT

## 2023-04-18 PROCEDURE — B41D1ZZ FLUOROSCOPY OF AORTA AND BILATERAL LOWER EXTREMITY ARTERIES USING LOW OSMOLAR CONTRAST: ICD-10-PCS | Performed by: INTERNAL MEDICINE

## 2023-04-18 PROCEDURE — C1760 CLOSURE DEV, VASC: HCPCS

## 2023-04-18 PROCEDURE — 85027 COMPLETE CBC AUTOMATED: CPT

## 2023-04-18 PROCEDURE — 75716 ARTERY X-RAYS ARMS/LEGS: CPT | Performed by: INTERNAL MEDICINE

## 2023-04-18 PROCEDURE — 94760 N-INVAS EAR/PLS OXIMETRY 1: CPT

## 2023-04-18 PROCEDURE — 2500000003 HC RX 250 WO HCPCS

## 2023-04-18 PROCEDURE — 36246 INS CATH ABD/L-EXT ART 2ND: CPT

## 2023-04-18 PROCEDURE — 2580000003 HC RX 258

## 2023-04-18 PROCEDURE — G0269 OCCLUSIVE DEVICE IN VEIN ART: HCPCS

## 2023-04-18 PROCEDURE — 6360000004 HC RX CONTRAST MEDICATION: Performed by: INTERNAL MEDICINE

## 2023-04-18 PROCEDURE — 75625 CONTRAST EXAM ABDOMINL AORTA: CPT

## 2023-04-18 PROCEDURE — 75716 ARTERY X-RAYS ARMS/LEGS: CPT

## 2023-04-18 PROCEDURE — 97162 PT EVAL MOD COMPLEX 30 MIN: CPT

## 2023-04-18 PROCEDURE — 99152 MOD SED SAME PHYS/QHP 5/>YRS: CPT

## 2023-04-18 PROCEDURE — 2580000003 HC RX 258: Performed by: INTERNAL MEDICINE

## 2023-04-18 RX ORDER — SODIUM CHLORIDE 0.9 % (FLUSH) 0.9 %
5-40 SYRINGE (ML) INJECTION EVERY 12 HOURS SCHEDULED
Status: DISCONTINUED | OUTPATIENT
Start: 2023-04-18 | End: 2023-04-21 | Stop reason: SDUPTHER

## 2023-04-18 RX ORDER — SODIUM CHLORIDE 9 MG/ML
INJECTION, SOLUTION INTRAVENOUS PRN
Status: DISCONTINUED | OUTPATIENT
Start: 2023-04-18 | End: 2023-04-21 | Stop reason: HOSPADM

## 2023-04-18 RX ORDER — SODIUM CHLORIDE 0.9 % (FLUSH) 0.9 %
5-40 SYRINGE (ML) INJECTION PRN
Status: DISCONTINUED | OUTPATIENT
Start: 2023-04-18 | End: 2023-04-21 | Stop reason: SDUPTHER

## 2023-04-18 RX ORDER — SODIUM THIOSULFATE 250 MG/ML
25 INJECTION, SOLUTION INTRAVENOUS
Status: DISCONTINUED | OUTPATIENT
Start: 2023-04-19 | End: 2023-04-18 | Stop reason: SDUPTHER

## 2023-04-18 RX ORDER — ACETAMINOPHEN 325 MG/1
650 TABLET ORAL EVERY 4 HOURS PRN
Status: DISCONTINUED | OUTPATIENT
Start: 2023-04-18 | End: 2023-04-18 | Stop reason: SDUPTHER

## 2023-04-18 RX ADMIN — ACETAMINOPHEN 1000 MG: 500 TABLET ORAL at 05:37

## 2023-04-18 RX ADMIN — PIPERACILLIN AND TAZOBACTAM 3375 MG: 3; .375 INJECTION, POWDER, LYOPHILIZED, FOR SOLUTION INTRAVENOUS at 02:09

## 2023-04-18 RX ADMIN — METOPROLOL TARTRATE 25 MG: 25 TABLET, FILM COATED ORAL at 20:30

## 2023-04-18 RX ADMIN — PIPERACILLIN AND TAZOBACTAM 3375 MG: 3; .375 INJECTION, POWDER, LYOPHILIZED, FOR SOLUTION INTRAVENOUS at 17:53

## 2023-04-18 RX ADMIN — HEPARIN SODIUM 5000 UNITS: 5000 INJECTION INTRAVENOUS; SUBCUTANEOUS at 05:37

## 2023-04-18 RX ADMIN — HYDROMORPHONE HYDROCHLORIDE 0.5 MG: 1 INJECTION, SOLUTION INTRAMUSCULAR; INTRAVENOUS; SUBCUTANEOUS at 12:01

## 2023-04-18 RX ADMIN — ACETAMINOPHEN 1000 MG: 500 TABLET ORAL at 20:29

## 2023-04-18 RX ADMIN — IOPAMIDOL 170 ML: 755 INJECTION, SOLUTION INTRAVENOUS at 16:02

## 2023-04-18 RX ADMIN — LEVOTHYROXINE SODIUM 125 MCG: 0.12 TABLET ORAL at 05:37

## 2023-04-18 ASSESSMENT — PAIN DESCRIPTION - LOCATION: LOCATION: LEG

## 2023-04-18 ASSESSMENT — PAIN SCALES - GENERAL
PAINLEVEL_OUTOF10: 7
PAINLEVEL_OUTOF10: 0

## 2023-04-18 ASSESSMENT — PAIN DESCRIPTION - ORIENTATION: ORIENTATION: RIGHT;LEFT

## 2023-04-18 ASSESSMENT — PAIN DESCRIPTION - DESCRIPTORS: DESCRIPTORS: CRUSHING

## 2023-04-18 NOTE — PRE SEDATION
acetaminophen, HYDROmorphone **OR** HYDROmorphone  Home Meds:   Prior to Admission medications    Medication Sig Start Date End Date Taking? Authorizing Provider   mineral oil-hydrophilic petrolatum (AQUAPHOR) ointment Apply 1 application topically in the morning and at bedtime Apply topically to bilateral lower extremity for dry flaky skin   Yes Historical Provider, MD   sevelamer (RENVELA) 800 MG tablet Take 2 tablets by mouth 3 times daily (with meals)   Yes Historical Provider, MD   collagenase (SANTYL) 250 UNIT/GM ointment Apply 1 g topically in the morning and at bedtime Apply topically to left hip   Yes Historical Provider, MD   metoprolol tartrate (LOPRESSOR) 25 MG tablet Take 1 tablet by mouth 2 times daily    Historical Provider, MD   b complex-C-folic acid (NEPHROCAPS) 1 MG capsule Take 1 capsule by mouth daily    Historical Provider, MD   midodrine (PROAMATINE) 2.5 MG tablet Take 1 tablet by mouth 3 times daily    Historical Provider, MD   levothyroxine (SYNTHROID) 125 MCG tablet Take 1 tablet by mouth Daily    Historical Provider, MD   ezetimibe (ZETIA) 10 MG tablet Take 1 tablet by mouth daily. 12/20/10   Michelle Pendleton III, MD   ONE TOUCH ULTRASOFT LANCETS MISC TEST once daily 9/9/10   Michelle Pendleton III, MD   Blood Glucose Monitoring Suppl (ONE TOUCH ULTRA SYSTEM KIT) W/DEVICE KIT TEST TWICE daily BLOOD SUGAR 8/10/10   Michelle Pendleton III, MD     Coumadin Use Last 7 Days:  no  Antiplatelet drug therapy use last 7 days: no  Other anticoagulant use last 7 days: no  Additional Medication Information:  n/a      Pre-Sedation Documentation and Exam:   I have personally completed a history, physical exam & review of systems for this patient (see notes).     Mallampati Airway Assessment:  Mallampati Class I - (soft palate, fauces, uvula & anterior/posterior tonsillar pillars are visible)    Prior History of Anesthesia Complications:   none    ASA Classification:  Class 3 - A patient with severe systemic

## 2023-04-18 NOTE — PLAN OF CARE
Problem: Discharge Planning  Goal: Discharge to home or other facility with appropriate resources  Outcome: Progressing     Problem: Pain  Goal: Verbalizes/displays adequate comfort level or baseline comfort level  Outcome: Progressing     Problem: Skin/Tissue Integrity  Goal: Absence of new skin breakdown  Description: 1. Monitor for areas of redness and/or skin breakdown  2. Assess vascular access sites hourly  3. Every 4-6 hours minimum:  Change oxygen saturation probe site  4. Every 4-6 hours:  If on nasal continuous positive airway pressure, respiratory therapy assess nares and determine need for appliance change or resting period. 4/18/2023 1433 by Semaj Tellez RN  Outcome: Progressing  4/17/2023 2328 by Payam Tang RN  Outcome: Not Progressing     Problem: ABCDS Injury Assessment  Goal: Absence of physical injury  Outcome: Progressing     Problem: Safety - Adult  Goal: Free from fall injury  Outcome: Progressing     Problem: Chronic Conditions and Co-morbidities  Goal: Patient's chronic conditions and co-morbidity symptoms are monitored and maintained or improved  Outcome: Progressing     Problem: Nutrition Deficit:  Goal: Optimize nutritional status  Outcome: Progressing     Problem: Skin/Tissue Integrity  Goal: Absence of new skin breakdown  Description: 1. Monitor for areas of redness and/or skin breakdown  2. Assess vascular access sites hourly  3. Every 4-6 hours minimum:  Change oxygen saturation probe site  4. Every 4-6 hours:  If on nasal continuous positive airway pressure, respiratory therapy assess nares and determine need for appliance change or resting period.   4/18/2023 1994 by Semaj Tellez RN  Outcome: Progressing  4/17/2023 2328 by Payam Tang RN  Outcome: Not Progressing

## 2023-04-18 NOTE — PLAN OF CARE
Problem: Skin/Tissue Integrity  Goal: Absence of new skin breakdown  Description: 1. Monitor for areas of redness and/or skin breakdown  2. Assess vascular access sites hourly  3. Every 4-6 hours minimum:  Change oxygen saturation probe site  4. Every 4-6 hours:  If on nasal continuous positive airway pressure, respiratory therapy assess nares and determine need for appliance change or resting period.   4/17/2023 2328 by Paris Grover RN  Outcome: Not Progressing  4/17/2023 1026 by Tati Clemens RN  Outcome: Progressing

## 2023-04-18 NOTE — OP NOTE
patent  Left Common Iliac:  0% stenosis  Left Internal Iliac:  is  patent  Left External Iliac:  is  patent      Right Leg  Common Femoral:   without disease  Profunda: patent without significant disease  Superficial femoral: mild disease. Popliteal: mild disease. Anterior Tibial: is  patent with moderate disease  Posterior Tibial:  is  patent with moderate disease  Peroneal: is  patent with moderate disease      Left Leg  Common Femoral:   without disease  Profunda: patent without significant disease  Superficial femoral: mild disease. Popliteal: mild disease. 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    A 6 fr Mynx device was used with excellent hemostasis          Becky Long MD [address] [facility], Interventional Cardiology, and Peripheral Vascular [address] [facility]   (C): [phone]  (O): [phone]

## 2023-04-19 LAB
ALBUMIN SERPL-MCNC: 3.4 G/DL (ref 3.4–5)
ALBUMIN/GLOB SERPL: 1 {RATIO} (ref 1.1–2.2)
ALP SERPL-CCNC: 86 U/L (ref 40–129)
ALT SERPL-CCNC: 9 U/L (ref 10–40)
ANION GAP SERPL CALCULATED.3IONS-SCNC: 18 MMOL/L (ref 3–16)
AST SERPL-CCNC: 13 U/L (ref 15–37)
BASOPHILS # BLD: 0.1 K/UL (ref 0–0.2)
BASOPHILS NFR BLD: 1.1 %
BILIRUB SERPL-MCNC: 0.4 MG/DL (ref 0–1)
BUN SERPL-MCNC: 44 MG/DL (ref 7–20)
CALCIUM SERPL-MCNC: 9.5 MG/DL (ref 8.3–10.6)
CHLORIDE SERPL-SCNC: 101 MMOL/L (ref 99–110)
CO2 SERPL-SCNC: 22 MMOL/L (ref 21–32)
CREAT SERPL-MCNC: 10 MG/DL (ref 0.8–1.3)
DEPRECATED RDW RBC AUTO: 15.5 % (ref 12.4–15.4)
EOSINOPHIL # BLD: 0.1 K/UL (ref 0–0.6)
EOSINOPHIL NFR BLD: 2 %
GFR SERPLBLD CREATININE-BSD FMLA CKD-EPI: 5 ML/MIN/{1.73_M2}
GLUCOSE SERPL-MCNC: 85 MG/DL (ref 70–99)
HCT VFR BLD AUTO: 36.1 % (ref 40.5–52.5)
HGB BLD-MCNC: 11.8 G/DL (ref 13.5–17.5)
LYMPHOCYTES # BLD: 1.2 K/UL (ref 1–5.1)
LYMPHOCYTES NFR BLD: 15.4 %
MCH RBC QN AUTO: 33.3 PG (ref 26–34)
MCHC RBC AUTO-ENTMCNC: 32.6 G/DL (ref 31–36)
MCV RBC AUTO: 102.1 FL (ref 80–100)
MONOCYTES # BLD: 0.9 K/UL (ref 0–1.3)
MONOCYTES NFR BLD: 11.4 %
NEUTROPHILS # BLD: 5.3 K/UL (ref 1.7–7.7)
NEUTROPHILS NFR BLD: 70.1 %
PHOSPHATE SERPL-MCNC: 6.5 MG/DL (ref 2.5–4.9)
PLATELET # BLD AUTO: 131 K/UL (ref 135–450)
PMV BLD AUTO: 9 FL (ref 5–10.5)
POTASSIUM SERPL-SCNC: 4.4 MMOL/L (ref 3.5–5.1)
PROT SERPL-MCNC: 6.7 G/DL (ref 6.4–8.2)
RBC # BLD AUTO: 3.53 M/UL (ref 4.2–5.9)
SODIUM SERPL-SCNC: 141 MMOL/L (ref 136–145)
VANCOMYCIN SERPL-MCNC: 19.6 UG/ML
WBC # BLD AUTO: 7.5 K/UL (ref 4–11)

## 2023-04-19 PROCEDURE — 6370000000 HC RX 637 (ALT 250 FOR IP): Performed by: INTERNAL MEDICINE

## 2023-04-19 PROCEDURE — 90935 HEMODIALYSIS ONE EVALUATION: CPT

## 2023-04-19 PROCEDURE — 1200000000 HC SEMI PRIVATE

## 2023-04-19 PROCEDURE — 36415 COLL VENOUS BLD VENIPUNCTURE: CPT

## 2023-04-19 PROCEDURE — 6360000002 HC RX W HCPCS: Performed by: FAMILY MEDICINE

## 2023-04-19 PROCEDURE — 2580000003 HC RX 258: Performed by: INTERNAL MEDICINE

## 2023-04-19 PROCEDURE — 6360000002 HC RX W HCPCS: Performed by: INTERNAL MEDICINE

## 2023-04-19 PROCEDURE — 85025 COMPLETE CBC W/AUTO DIFF WBC: CPT

## 2023-04-19 PROCEDURE — 84100 ASSAY OF PHOSPHORUS: CPT

## 2023-04-19 PROCEDURE — 6370000000 HC RX 637 (ALT 250 FOR IP): Performed by: PODIATRIST

## 2023-04-19 PROCEDURE — 2580000003 HC RX 258: Performed by: FAMILY MEDICINE

## 2023-04-19 PROCEDURE — 80202 ASSAY OF VANCOMYCIN: CPT

## 2023-04-19 PROCEDURE — 94760 N-INVAS EAR/PLS OXIMETRY 1: CPT

## 2023-04-19 PROCEDURE — 80053 COMPREHEN METABOLIC PANEL: CPT

## 2023-04-19 RX ORDER — SEVELAMER CARBONATE 800 MG/1
800 TABLET, FILM COATED ORAL
Status: DISCONTINUED | OUTPATIENT
Start: 2023-04-19 | End: 2023-04-21 | Stop reason: HOSPADM

## 2023-04-19 RX ORDER — LIDOCAINE 4 G/G
1 PATCH TOPICAL DAILY
Status: DISCONTINUED | OUTPATIENT
Start: 2023-04-20 | End: 2023-04-21 | Stop reason: HOSPADM

## 2023-04-19 RX ADMIN — HEPARIN SODIUM 5000 UNITS: 5000 INJECTION INTRAVENOUS; SUBCUTANEOUS at 05:29

## 2023-04-19 RX ADMIN — ACETAMINOPHEN 1000 MG: 500 TABLET ORAL at 05:28

## 2023-04-19 RX ADMIN — LEVOTHYROXINE SODIUM 125 MCG: 0.12 TABLET ORAL at 05:28

## 2023-04-19 RX ADMIN — ALTEPLASE 2 MG: 2.2 INJECTION, POWDER, LYOPHILIZED, FOR SOLUTION INTRAVENOUS at 13:35

## 2023-04-19 RX ADMIN — SEVELAMER CARBONATE 800 MG: 800 TABLET, FILM COATED ORAL at 09:05

## 2023-04-19 RX ADMIN — SODIUM THIOSULFATE 25 G: 250 INJECTION, SOLUTION INTRAVENOUS at 15:14

## 2023-04-19 RX ADMIN — HYDROMORPHONE HYDROCHLORIDE 0.5 MG: 1 INJECTION, SOLUTION INTRAMUSCULAR; INTRAVENOUS; SUBCUTANEOUS at 09:07

## 2023-04-19 RX ADMIN — EZETIMIBE 10 MG: 10 TABLET ORAL at 09:06

## 2023-04-19 RX ADMIN — METOPROLOL TARTRATE 25 MG: 25 TABLET, FILM COATED ORAL at 09:09

## 2023-04-19 RX ADMIN — ALTEPLASE 2 MG: 2.2 INJECTION, POWDER, LYOPHILIZED, FOR SOLUTION INTRAVENOUS at 13:34

## 2023-04-19 RX ADMIN — HYDROMORPHONE HYDROCHLORIDE 0.5 MG: 1 INJECTION, SOLUTION INTRAMUSCULAR; INTRAVENOUS; SUBCUTANEOUS at 05:32

## 2023-04-19 RX ADMIN — PIPERACILLIN AND TAZOBACTAM 3375 MG: 3; .375 INJECTION, POWDER, LYOPHILIZED, FOR SOLUTION INTRAVENOUS at 04:31

## 2023-04-19 RX ADMIN — VANCOMYCIN HYDROCHLORIDE 750 MG: 750 INJECTION, POWDER, LYOPHILIZED, FOR SOLUTION INTRAVENOUS at 17:07

## 2023-04-19 ASSESSMENT — PAIN DESCRIPTION - LOCATION
LOCATION: HIP
LOCATION: FOOT;LEG
LOCATION: FOOT;LEG
LOCATION: HIP

## 2023-04-19 ASSESSMENT — PAIN DESCRIPTION - PAIN TYPE
TYPE: ACUTE PAIN;CHRONIC PAIN
TYPE: ACUTE PAIN;CHRONIC PAIN

## 2023-04-19 ASSESSMENT — PAIN SCALES - GENERAL
PAINLEVEL_OUTOF10: 3
PAINLEVEL_OUTOF10: 2
PAINLEVEL_OUTOF10: 7
PAINLEVEL_OUTOF10: 0
PAINLEVEL_OUTOF10: 7
PAINLEVEL_OUTOF10: 0
PAINLEVEL_OUTOF10: 3

## 2023-04-19 ASSESSMENT — PAIN DESCRIPTION - ORIENTATION
ORIENTATION: LEFT

## 2023-04-19 ASSESSMENT — PAIN DESCRIPTION - DESCRIPTORS
DESCRIPTORS: ACHING

## 2023-04-19 ASSESSMENT — PAIN DESCRIPTION - FREQUENCY
FREQUENCY: CONTINUOUS
FREQUENCY: CONTINUOUS

## 2023-04-19 ASSESSMENT — PAIN - FUNCTIONAL ASSESSMENT
PAIN_FUNCTIONAL_ASSESSMENT: PREVENTS OR INTERFERES SOME ACTIVE ACTIVITIES AND ADLS
PAIN_FUNCTIONAL_ASSESSMENT: PREVENTS OR INTERFERES SOME ACTIVE ACTIVITIES AND ADLS

## 2023-04-19 ASSESSMENT — PAIN DESCRIPTION - ONSET
ONSET: ON-GOING
ONSET: ON-GOING

## 2023-04-19 NOTE — DISCHARGE INSTR - COC
Therapy:  Wound 02/28/23 Leg Left; Anterior;Proximal #1 (Active)   Number of days: 50       Wound 02/28/23 Leg Left;Distal 2nd #2 (Active)   Number of days: 50       Wound 02/28/23 Toe (Comment  which one) Right #3 (3rd) (Active)   Number of days: 50       Wound 02/28/23 Toe (Comment  which one) Right #4 (2nd) (Active)   Number of days: 50       Wound 02/28/23 Toe (Comment  which one) Right #5 (Great) (Active)   Number of days: 50       Wound 02/28/23 Leg Right;Medial #6 (Active)   Number of days: 50       Wound 02/28/23 Leg Right;Lateral #7 Proximal (Active)   Number of days: 50       Wound 02/28/23 Leg Posterior #8 (Active)   Number of days: 50       Wound 02/28/23 Hip Left #9 (Active)   Number of days: 50       Wound 04/15/23 Femoral Anterior;Right wound with eschar (Active)   Wound Etiology Pressure Unstageable 04/17/23 0837   Dressing/Treatment Open to air 04/17/23 0837   Number of days: 3       Wound 04/15/23 Toe (Comment  which one) Anterior; Left 2nd toe (Active)   Wound Etiology Other 04/19/23 0915   Dressing/Treatment Open to air 04/19/23 0915   Number of days: 3        Elimination:  Continence: Bowel: Yes  Bladder: anuric    Urinary Catheter: None   Colostomy/Ileostomy/Ileal Conduit: No       Date of Last BM: 4/20/2023    No intake or output data in the 24 hours ending 04/19/23 1128  I/O last 3 completed shifts: In: 120 [P.O.:120]  Out: -     Safety Concerns: At Risk for Falls    Impairments/Disabilities:      None    Nutrition Therapy:  Current Nutrition Therapy:   - Oral Diet:  Renal    Routes of Feeding: Oral  Liquids: No Restrictions  Daily Fluid Restriction: no  Last Modified Barium Swallow with Video (Video Swallowing Test): not done    Treatments at the Time of Hospital Discharge:   Respiratory Treatments: n/a    Oxygen Therapy:  is not on home oxygen therapy.   Ventilator:    - No ventilator support    Rehab Therapies: Physical Therapy and Occupational Therapy  Weight Bearing

## 2023-04-19 NOTE — PLAN OF CARE
Problem: Discharge Planning  Goal: Discharge to home or other facility with appropriate resources  Outcome: Completed     Problem: Pain  Goal: Verbalizes/displays adequate comfort level or baseline comfort level  4/19/2023 1017 by Ria Baptiste RN  Outcome: Completed  4/18/2023 2144 by Sayra Brice RN  Outcome: Progressing     Problem: Skin/Tissue Integrity  Goal: Absence of new skin breakdown  Description: 1. Monitor for areas of redness and/or skin breakdown  2. Assess vascular access sites hourly  3. Every 4-6 hours minimum:  Change oxygen saturation probe site  4. Every 4-6 hours:  If on nasal continuous positive airway pressure, respiratory therapy assess nares and determine need for appliance change or resting period.   4/19/2023 1017 by Ria Baptiste RN  Outcome: Completed  4/18/2023 2144 by Sayra Brice RN  Outcome: Progressing     Problem: ABCDS Injury Assessment  Goal: Absence of physical injury  Outcome: Completed     Problem: Safety - Adult  Goal: Free from fall injury  4/19/2023 1017 by Ria Baptiste RN  Outcome: Completed  4/18/2023 2144 by Sayra Brice RN  Outcome: Progressing     Problem: Chronic Conditions and Co-morbidities  Goal: Patient's chronic conditions and co-morbidity symptoms are monitored and maintained or improved  Outcome: Completed     Problem: Nutrition Deficit:  Goal: Optimize nutritional status  Outcome: Completed

## 2023-04-19 NOTE — PLAN OF CARE
Problem: Pain  Goal: Verbalizes/displays adequate comfort level or baseline comfort level  4/18/2023 2144 by Modesto Mohs, RN  Outcome: Progressing  4/18/2023 0937 by Nalini Ward RN  Outcome: Progressing     Problem: Skin/Tissue Integrity  Goal: Absence of new skin breakdown  Description: 1. Monitor for areas of redness and/or skin breakdown  2. Assess vascular access sites hourly  3. Every 4-6 hours minimum:  Change oxygen saturation probe site  4. Every 4-6 hours:  If on nasal continuous positive airway pressure, respiratory therapy assess nares and determine need for appliance change or resting period.   4/18/2023 2144 by Modesto Mohs, RN  Outcome: Progressing  4/18/2023 0937 by Nalini Ward RN  Outcome: Progressing     Problem: ABCDS Injury Assessment  Goal: Absence of physical injury  4/18/2023 0937 by Nalini Ward RN  Outcome: Progressing     Problem: Safety - Adult  Goal: Free from fall injury  4/18/2023 2144 by Modesto Mohs, RN  Outcome: Progressing  4/18/2023 0937 by Nalini Ward RN  Outcome: Progressing

## 2023-04-20 PROBLEM — E83.59 CALCIPHYLAXIS CUTIS: Status: ACTIVE | Noted: 2023-04-20

## 2023-04-20 PROBLEM — M79.671 FOOT PAIN, BILATERAL: Status: ACTIVE | Noted: 2023-04-20

## 2023-04-20 PROBLEM — M79.672 FOOT PAIN, BILATERAL: Status: ACTIVE | Noted: 2023-04-20

## 2023-04-20 LAB
GLUCOSE BLD-MCNC: 103 MG/DL (ref 70–99)
GLUCOSE BLD-MCNC: 169 MG/DL (ref 70–99)
GLUCOSE BLD-MCNC: 96 MG/DL (ref 70–99)
PERFORMED ON: ABNORMAL
PERFORMED ON: ABNORMAL
PERFORMED ON: NORMAL

## 2023-04-20 PROCEDURE — 6370000000 HC RX 637 (ALT 250 FOR IP): Performed by: PODIATRIST

## 2023-04-20 PROCEDURE — 97530 THERAPEUTIC ACTIVITIES: CPT

## 2023-04-20 PROCEDURE — 2580000003 HC RX 258: Performed by: INTERNAL MEDICINE

## 2023-04-20 PROCEDURE — 1200000000 HC SEMI PRIVATE

## 2023-04-20 PROCEDURE — 97110 THERAPEUTIC EXERCISES: CPT

## 2023-04-20 PROCEDURE — 6370000000 HC RX 637 (ALT 250 FOR IP): Performed by: INTERNAL MEDICINE

## 2023-04-20 PROCEDURE — 6370000000 HC RX 637 (ALT 250 FOR IP): Performed by: FAMILY MEDICINE

## 2023-04-20 PROCEDURE — 6360000002 HC RX W HCPCS: Performed by: INTERNAL MEDICINE

## 2023-04-20 PROCEDURE — 99222 1ST HOSP IP/OBS MODERATE 55: CPT | Performed by: INTERNAL MEDICINE

## 2023-04-20 PROCEDURE — 94760 N-INVAS EAR/PLS OXIMETRY 1: CPT

## 2023-04-20 RX ORDER — OXYCODONE HYDROCHLORIDE AND ACETAMINOPHEN 5; 325 MG/1; MG/1
1 TABLET ORAL EVERY 4 HOURS PRN
Status: DISCONTINUED | OUTPATIENT
Start: 2023-04-20 | End: 2023-04-21

## 2023-04-20 RX ADMIN — DICLOFENAC SODIUM 2 G: 10 GEL TOPICAL at 21:31

## 2023-04-20 RX ADMIN — HEPARIN SODIUM 5000 UNITS: 5000 INJECTION INTRAVENOUS; SUBCUTANEOUS at 21:31

## 2023-04-20 RX ADMIN — DICLOFENAC SODIUM 2 G: 10 GEL TOPICAL at 08:24

## 2023-04-20 RX ADMIN — SEVELAMER CARBONATE 800 MG: 800 TABLET, FILM COATED ORAL at 08:25

## 2023-04-20 RX ADMIN — HEPARIN SODIUM 5000 UNITS: 5000 INJECTION INTRAVENOUS; SUBCUTANEOUS at 14:20

## 2023-04-20 RX ADMIN — HYDROMORPHONE HYDROCHLORIDE 0.5 MG: 1 INJECTION, SOLUTION INTRAMUSCULAR; INTRAVENOUS; SUBCUTANEOUS at 15:16

## 2023-04-20 RX ADMIN — OXYCODONE AND ACETAMINOPHEN 1 TABLET: 5; 325 TABLET ORAL at 12:06

## 2023-04-20 RX ADMIN — EZETIMIBE 10 MG: 10 TABLET ORAL at 08:25

## 2023-04-20 RX ADMIN — SEVELAMER CARBONATE 800 MG: 800 TABLET, FILM COATED ORAL at 12:06

## 2023-04-20 RX ADMIN — ACETAMINOPHEN 1000 MG: 500 TABLET ORAL at 21:31

## 2023-04-20 RX ADMIN — SEVELAMER CARBONATE 800 MG: 800 TABLET, FILM COATED ORAL at 17:40

## 2023-04-20 RX ADMIN — TRAMADOL HYDROCHLORIDE 50 MG: 50 TABLET, COATED ORAL at 08:25

## 2023-04-20 RX ADMIN — Medication 10 ML: at 21:30

## 2023-04-20 RX ADMIN — METOPROLOL TARTRATE 25 MG: 25 TABLET, FILM COATED ORAL at 08:25

## 2023-04-20 RX ADMIN — ACETAMINOPHEN 1000 MG: 500 TABLET ORAL at 14:21

## 2023-04-20 RX ADMIN — PIPERACILLIN AND TAZOBACTAM 3375 MG: 3; .375 INJECTION, POWDER, LYOPHILIZED, FOR SOLUTION INTRAVENOUS at 15:23

## 2023-04-20 RX ADMIN — METOPROLOL TARTRATE 25 MG: 25 TABLET, FILM COATED ORAL at 21:32

## 2023-04-20 ASSESSMENT — PAIN DESCRIPTION - ORIENTATION
ORIENTATION: LEFT

## 2023-04-20 ASSESSMENT — PAIN DESCRIPTION - ONSET: ONSET: GRADUAL

## 2023-04-20 ASSESSMENT — PAIN SCALES - GENERAL
PAINLEVEL_OUTOF10: 0
PAINLEVEL_OUTOF10: 5
PAINLEVEL_OUTOF10: 4
PAINLEVEL_OUTOF10: 3
PAINLEVEL_OUTOF10: 4

## 2023-04-20 ASSESSMENT — PAIN - FUNCTIONAL ASSESSMENT
PAIN_FUNCTIONAL_ASSESSMENT: PREVENTS OR INTERFERES SOME ACTIVE ACTIVITIES AND ADLS
PAIN_FUNCTIONAL_ASSESSMENT: ACTIVITIES ARE NOT PREVENTED
PAIN_FUNCTIONAL_ASSESSMENT: PREVENTS OR INTERFERES SOME ACTIVE ACTIVITIES AND ADLS

## 2023-04-20 ASSESSMENT — PAIN DESCRIPTION - LOCATION
LOCATION: ANKLE
LOCATION: FOOT
LOCATION: NECK
LOCATION: FOOT

## 2023-04-20 ASSESSMENT — PAIN DESCRIPTION - DESCRIPTORS
DESCRIPTORS: ACHING

## 2023-04-20 ASSESSMENT — PAIN DESCRIPTION - PAIN TYPE
TYPE: ACUTE PAIN
TYPE: SURGICAL PAIN

## 2023-04-20 ASSESSMENT — PAIN DESCRIPTION - FREQUENCY: FREQUENCY: CONTINUOUS

## 2023-04-21 VITALS
OXYGEN SATURATION: 94 % | TEMPERATURE: 98 F | DIASTOLIC BLOOD PRESSURE: 63 MMHG | BODY MASS INDEX: 27.27 KG/M2 | SYSTOLIC BLOOD PRESSURE: 117 MMHG | HEIGHT: 70 IN | HEART RATE: 55 BPM | WEIGHT: 190.48 LBS | RESPIRATION RATE: 18 BRPM

## 2023-04-21 LAB
ALBUMIN SERPL-MCNC: 3.2 G/DL (ref 3.4–5)
ALBUMIN/GLOB SERPL: 1 {RATIO} (ref 1.1–2.2)
ALP SERPL-CCNC: 75 U/L (ref 40–129)
ALT SERPL-CCNC: 7 U/L (ref 10–40)
ANION GAP SERPL CALCULATED.3IONS-SCNC: 20 MMOL/L (ref 3–16)
AST SERPL-CCNC: 9 U/L (ref 15–37)
BASOPHILS # BLD: 0.1 K/UL (ref 0–0.2)
BASOPHILS NFR BLD: 0.8 %
BILIRUB SERPL-MCNC: 0.3 MG/DL (ref 0–1)
BUN SERPL-MCNC: 40 MG/DL (ref 7–20)
CALCIUM SERPL-MCNC: 9.4 MG/DL (ref 8.3–10.6)
CHLORIDE SERPL-SCNC: 103 MMOL/L (ref 99–110)
CO2 SERPL-SCNC: 18 MMOL/L (ref 21–32)
CREAT SERPL-MCNC: 10.2 MG/DL (ref 0.8–1.3)
DEPRECATED RDW RBC AUTO: 16.2 % (ref 12.4–15.4)
EOSINOPHIL # BLD: 0.2 K/UL (ref 0–0.6)
EOSINOPHIL NFR BLD: 2.7 %
GFR SERPLBLD CREATININE-BSD FMLA CKD-EPI: 5 ML/MIN/{1.73_M2}
GLUCOSE SERPL-MCNC: 80 MG/DL (ref 70–99)
HCT VFR BLD AUTO: 36.1 % (ref 40.5–52.5)
HGB BLD-MCNC: 11.7 G/DL (ref 13.5–17.5)
LYMPHOCYTES # BLD: 1.7 K/UL (ref 1–5.1)
LYMPHOCYTES NFR BLD: 21.1 %
MCH RBC QN AUTO: 33.4 PG (ref 26–34)
MCHC RBC AUTO-ENTMCNC: 32.3 G/DL (ref 31–36)
MCV RBC AUTO: 103.6 FL (ref 80–100)
MONOCYTES # BLD: 1 K/UL (ref 0–1.3)
MONOCYTES NFR BLD: 11.9 %
NEUTROPHILS # BLD: 5.2 K/UL (ref 1.7–7.7)
NEUTROPHILS NFR BLD: 63.5 %
PLATELET # BLD AUTO: 140 K/UL (ref 135–450)
PMV BLD AUTO: 9.4 FL (ref 5–10.5)
POTASSIUM SERPL-SCNC: 4.2 MMOL/L (ref 3.5–5.1)
PROT SERPL-MCNC: 6.4 G/DL (ref 6.4–8.2)
RBC # BLD AUTO: 3.49 M/UL (ref 4.2–5.9)
SODIUM SERPL-SCNC: 141 MMOL/L (ref 136–145)
WBC # BLD AUTO: 8.2 K/UL (ref 4–11)

## 2023-04-21 PROCEDURE — 6370000000 HC RX 637 (ALT 250 FOR IP): Performed by: PODIATRIST

## 2023-04-21 PROCEDURE — 6370000000 HC RX 637 (ALT 250 FOR IP): Performed by: INTERNAL MEDICINE

## 2023-04-21 PROCEDURE — 80053 COMPREHEN METABOLIC PANEL: CPT

## 2023-04-21 PROCEDURE — 6360000002 HC RX W HCPCS: Performed by: INTERNAL MEDICINE

## 2023-04-21 PROCEDURE — 90935 HEMODIALYSIS ONE EVALUATION: CPT

## 2023-04-21 PROCEDURE — 94760 N-INVAS EAR/PLS OXIMETRY 1: CPT

## 2023-04-21 PROCEDURE — 2580000003 HC RX 258: Performed by: INTERNAL MEDICINE

## 2023-04-21 PROCEDURE — 36415 COLL VENOUS BLD VENIPUNCTURE: CPT

## 2023-04-21 PROCEDURE — 85025 COMPLETE CBC W/AUTO DIFF WBC: CPT

## 2023-04-21 RX ORDER — OXYCODONE HYDROCHLORIDE AND ACETAMINOPHEN 5; 325 MG/1; MG/1
1 TABLET ORAL EVERY 4 HOURS PRN
Status: DISCONTINUED | OUTPATIENT
Start: 2023-04-21 | End: 2023-04-21 | Stop reason: HOSPADM

## 2023-04-21 RX ORDER — OXYCODONE HYDROCHLORIDE AND ACETAMINOPHEN 5; 325 MG/1; MG/1
1 TABLET ORAL EVERY 4 HOURS PRN
Qty: 18 TABLET | Refills: 0 | Status: SHIPPED | OUTPATIENT
Start: 2023-04-21 | End: 2023-05-09

## 2023-04-21 RX ORDER — HEPARIN SODIUM 1000 [USP'U]/ML
3600 INJECTION, SOLUTION INTRAVENOUS; SUBCUTANEOUS ONCE
Status: COMPLETED | OUTPATIENT
Start: 2023-04-21 | End: 2023-04-21

## 2023-04-21 RX ORDER — NITROGLYCERIN 0.1MG/HR
1 PATCH, TRANSDERMAL 24 HOURS TRANSDERMAL DAILY
Qty: 30 PATCH | Refills: 3 | Status: SHIPPED | OUTPATIENT
Start: 2023-04-22

## 2023-04-21 RX ADMIN — SEVELAMER CARBONATE 800 MG: 800 TABLET, FILM COATED ORAL at 07:59

## 2023-04-21 RX ADMIN — Medication 10 ML: at 08:00

## 2023-04-21 RX ADMIN — HEPARIN SODIUM 3600 UNITS: 1000 INJECTION INTRAVENOUS; SUBCUTANEOUS at 13:53

## 2023-04-21 RX ADMIN — METOPROLOL TARTRATE 25 MG: 25 TABLET, FILM COATED ORAL at 08:00

## 2023-04-21 RX ADMIN — EZETIMIBE 10 MG: 10 TABLET ORAL at 08:00

## 2023-04-21 RX ADMIN — SODIUM THIOSULFATE 25 G: 250 INJECTION, SOLUTION INTRAVENOUS at 15:47

## 2023-04-21 RX ADMIN — HEPARIN SODIUM 5000 UNITS: 5000 INJECTION INTRAVENOUS; SUBCUTANEOUS at 06:15

## 2023-04-21 RX ADMIN — HYDROMORPHONE HYDROCHLORIDE 0.5 MG: 1 INJECTION, SOLUTION INTRAMUSCULAR; INTRAVENOUS; SUBCUTANEOUS at 07:58

## 2023-04-21 RX ADMIN — ACETAMINOPHEN 1000 MG: 500 TABLET ORAL at 06:15

## 2023-04-21 RX ADMIN — LEVOTHYROXINE SODIUM 125 MCG: 0.12 TABLET ORAL at 06:15

## 2023-04-21 RX ADMIN — DICLOFENAC SODIUM 2 G: 10 GEL TOPICAL at 08:01

## 2023-04-21 ASSESSMENT — PAIN DESCRIPTION - ORIENTATION: ORIENTATION: LEFT

## 2023-04-21 ASSESSMENT — PAIN DESCRIPTION - LOCATION: LOCATION: ANKLE

## 2023-04-21 ASSESSMENT — PAIN SCALES - GENERAL
PAINLEVEL_OUTOF10: 4
PAINLEVEL_OUTOF10: 5
PAINLEVEL_OUTOF10: 0

## 2023-04-21 ASSESSMENT — PAIN - FUNCTIONAL ASSESSMENT: PAIN_FUNCTIONAL_ASSESSMENT: ACTIVITIES ARE NOT PREVENTED

## 2023-04-21 ASSESSMENT — PAIN DESCRIPTION - DESCRIPTORS: DESCRIPTORS: ACHING

## 2023-04-21 NOTE — CARE COORDINATION
4/20 Plan: Return to [facility] w/HD on MWF. No precert or COVID nec. New ID Consult. Needs stretcher transport.  Electronically signed by Aleshia Joy RN on 4/20/2023 at 1:54 PM
Case Management Discharge Note          Date / Time of Note: 4/21/2023 8:53 AM                  Patient Name: Melvina Sheffield   YOB: 1940  Diagnosis: Cellulitis [L03.90]  Peripheral vascular disease ([facility] [address] [I73.9]  Foot pain, bilateral [M79.671, M79.672]  Open wound of right thigh, initial encounter [S71.101A]   Date / Time: 4/15/2023  8:23 AM    Financial:  Payor: Ezekiel Wallace / Plan: MEDICARE PART A AND B / Product Type: *No Product type* /      Pharmacy:    [facility] [id number] - [address] [phone] - F [phone]  [facility]  Phone: [phone] Fax: [phone]      Assistance purchasing medications?: Potential Assistance Purchasing Medications: No  Assistance provided by Case Management: None at this time    DISCHARGE Disposition: [facility] ([facility])    Nursing Facility:   Discharging to Facility/ Agency   Name: [facility]  Address:  [place] [address]   Phone:  [phone]  Fax:  [phone]      LOC at discharge: [place] Completed: Yes             NURSING REPORT NUMBER: [phone]               NURSING FAX NUMBER: [phone]    NotificatioNot Applicablen completed in [facility]/PAS?:  Not Applicable        Dialysis:  Dialysis patient: Yes  HD at [address] [place] [address].:  Transportation PLAN for discharge: EMS transportation   Mode of Transport: Ambulance stretcher - [facility]  Reason for medical transport: Bed confined: Meets the following criteria 1) unable to get out of bed without assistance or ambulate, 2) unable to safely sit up in a wheelchair, 3) unable to maintain erect seating position in a chair for time needed for transport  Name of [address]: [place] [employer] [facility]  Phone: [phone]  Time of Transport: [address] form completed: Yes    IMM Completed:   Yes, Case management has presented and reviewed IMM letter #2.    IMM
Discharge Planning:  MISSAEL spoke with Blanca Quinones at [facility] to confirm this facility will be able to accept this pt back upon d/c. Blanca Quinones confirmed a semi private bed will be available for this pt on 4/20/23. MISSAEL spoke with pts spouse who is agreeable to pt returning to this facility. Case management has presented and reviewed IMM letter #2 to [facility] .          Verbalized understanding of their medicare rights and appeal process if needed. Education provided to Favian Seen  reported no questions and verbalized understanding. [facility] made aware that he/she has 4 hours prior to discharging from hospital to decide if he/she wishes to pursue the Medicare appeal process. Plan: Return to [facility] with HD at d/c. No pre cert or COVID test needed. Will need stretcher transport.    CLEOPATRA Casillas  [phone]  Electronically signed by YOANNA Lima on 4/19/2023 at 11:26 AM
Discharge Planning:  Podiatry and ID MD are agreeable to d/c when medically stable. Awaiting recommendation from attending. Plan remains for pt to return to [address] [place] [address] with HD. Pt will require stretcher transport. No COVID tet pr pre cert required.    CLEOPATRA Garvey  [phone]  Electronically signed by YOANNA Jacques on 4/21/2023 at 8:10 AM
Pt currently being managed by podiatry for foot wounds. Pt wound to inner thigh with dry eschar, intact. No current signs of infection. Unclear causation. No wound care needs at this time. Updated Dr. Harleen Ludwig, as patient scheduled for procedure at 2pm today. Will not continue to follow. Please re-consult if needed.   Electronically signed by Jai Spain RN [address] on 4/18/2023 at 11:00AM
Wound care aware of consult. Pt currently in dialysis. Will see patient at a later time.    Electronically signed by Madhavi Chanel RN [id number] on 4/17/2023 at 1:18 PM
dialysis and SW obtained information from pts spouse.)   Cognition Alert   History Provided By Significant Other  (Pts spouse Mary Corrales) [phone])   Primary Caregiver Other (Comment)  (Pt was previously at [facility] SNF PTA.)   Support Systems Spouse/Significant Other  (Pts spouse stated she is recovering from cervical fusion surgery and is currently unable to care for this pt at home. Spouse would like pt to return to [facility] upon d/c.)   Patient's Healthcare Decision Maker is: Legal Next of Kin  (Spouse Mary Corrales))   PCP Verified by CM Yes   Last Visit to PCP Within last 3 months   Prior Functional Level Other (see comment)  ([facility] at [facility] SNF-HD pt)   Current Functional Level Other (see comment)  ([facility] at [facility]. HD at [facility])   Can patient return to prior living arrangement Unknown at present  (Pts spouse did not hold the SNF bed. Pts spouse wasnts pt to return to [facility] upon d/c.)   Ability to make needs known: Fair   Family able to assist with home care needs: No  (Spouse just had cervical surgery and is unable to assist at home.)   Would you like for me to discuss the discharge plan with any other family members/significant others, and if so, who?  Yes  (Spouse ( Connie))   Financial Resources Medicare;[employer] ([place])

## 2023-04-21 NOTE — PROGRESS NOTES
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 and left hip area. There are ulcerations with scab formation. The 1 on the right thigh is exquisitely tender. \"  Diagnosed with \"Peripheral vascular disease; Open wound of right thigh/Cellulitis ; Foot pain, bilateral ; Fall/Slide out of bed\" . Family / Caregiver Present: No  Subjective  Subjective: Pt supine in bed upon arrival. Agreeable to therapy. Pain in R inner thigh at site of wound with mobility but does not rate. Social/Functional History  Social/Functional History  Type of Home: Facility [facility])  Additional Comments: Pt recent admit at [facility] 3/2023 and d/c'd to SNF/[facility]--reported getting in/out of wide W/C / pivot transfers on his own? ??(pt limited historian).   Prior to that admit pt
created by: Cathryn Seen on 4/17/2023 9:20 AM      Electronically signed by:  Demarcus Sen MD 4/17/2023 5:39 PM
disease on hemodialysis, diabetes, hypertension, hyperlipidemia, prior DVT who presented to [facility] [place] with severe pain of legs, fall out of bed at [facility]. Pt has hx of chronic excoriations and skin issues of his lower extremities. Pt admitted with R thigh cellulitis. PTA, pt recent admit at [facility] in 3/2023 and d/c'd to [facility] SNF. Prior to that admission, pt lived with wife who assisted pt with ADLs and fxl mobility. Today-pt continues to have significant pain B LE's R >L. Pt demo'd increased bed mobility with SBA/CGA, and increased sittinig tolerance to sit EOB >15 minutes for ADLs and therex with supervision. Deferred OOB to chair d/t pain, but would require use of maximove lift. Anticipate pt would require overall max A for ADLs based on pain, ROM/strength, balance, endurance. Pt demonstrates need for ongoing skilled OT at d/c to maximize pt's safety, independence, and to decrease caregiver burden prior to return home with wife. Prognosis: Fair;Guarded  History: see above  Activity Tolerance  Activity Tolerance: Patient limited by pain        Plan   Occupational Therapy Plan  Times Per Week: 2-3  Current Treatment Recommendations: Strengthening, Balance training, Functional mobility training, Endurance training, Safety education & training, Self-Care / ADL, Cognitive reorientation, Wheelchair mobility training     Restrictions  Restrictions/Precautions  Restrictions/Precautions: Fall Risk  Position Activity Restriction  Other position/activity restrictions: LE wounds, \"Prevalon boots while in bed' per MD notes    Subjective   General  Chart Reviewed: Yes  Additional Pertinent Hx: Pt is an 79 y.o. male with a past medical history significant for end-stage renal disease on hemodialysis, diabetes, hypertension, hyperlipidemia, prior DVT, subdural hematoma d/t TBI who presented to [facility] [place] with severe pain of legs, fall out of bed at [facility].  Pt has hx of chronic excoriations and skin
tendon reflexes are 1 to include patellar and achilles Bilateral. Ellery--Janes 5.07 monofilament sensitivity is abnormal 2 to 5 spots tested Bilateral.    VASCULAR:    left Dorsalis pedis is absent. left Posterior tibial pulse is 1. Trace edema bilaterally. none Varicosities bilaterally. MUSCULOSKELETAL:  [place] is  untested due to heel pain, weakness . Muscle strength is 4/5 to all groups tested to include dorsiflexion, plantarflexion, inversion, eversion, and digital Bilateral . The ranges of motion of all joints tested from ankle distal is decreased there is no crepitus noted Bilateral.    Significant pain with palpation of the posterior lateral calcaneus. None with palpation to achilles or medial calcaneus. IMPRESSION/RECOMMENDATIONS    1. Peripheral Vascular Disease with skin changes consistent with early Dry Gangrene  Calciphylaxis ? 2. Diabetes with peripheral neuropathy and PVD  Continue conservative treatments    3. Left heel pain  Trigger point vs proximal nerve entrapment  Continue voltaren gel and lidocaine patches as he has some relief with this modality. DISPO: Ok to d/c when medically stable. Will follow.     Josy Palomino DPM  [facility]  [phone]
Priority: Medium    Right thigh pain [M79.651] 04/17/2023    Non healing left heel wound [S91.302A] 04/17/2023    Cellulitis [L03.90] 04/15/2023    Essential hypertension [I10]      Calciphylaxis  PAD  Dry Gangrene  - cont vanc, zosyn  - podiatry following for wound care vs potential surgery  - peripheral angiography 4/18 r/o'ed limb threatening ischemia due to PAD  - palliative appropriate    ESRD on HD  - nephro dialyzing   - cont midodrine    Severe cardiomyopathy  - EF < 20%    DVT Prophylaxis: heparin  Diet: ADULT DIET; Regular;  Low Sodium (2 gm)  Code Status: Full Code    PT/OT Eval Status: SNF    Dispo - MedSurg, palliative appropriate    Genny Staton MD
Gangrene  Calciphylaxis ? 2. Diabetes with peripheral neuropathy and PVD  Continue conservative treatments    3. Left heel pain  Trigger point vs proximal nerve entrapment  Ordered voltaren gel and lidocaine patches. DISPO: Ok to d/c when medically stable. Will follow.     Africa Wong DPM  [facility]  [phone]
assistance; Moderate assistance (HOB elevated, use of rail, increased time/effort d/t pain)  Sit to Supine: Moderate assistance;Minimal assistance  Scooting: Moderate assistance;2 Person assistance (to scoot up in bed, pt assisting with B UE's pulling up on bedrails)  Transfers  Sit to Stand: Unable to assess  Stand to Sit: Unable to assess  Bed to Chair: Unable to assess (wily--would likely require MaxiMove due to limited ROM of B LEs as well as pain, baseline immobility.)  Ambulation  Comments: NT-unable/unsafe and likely non-amb baseline for some time now? ?     Balance  Sitting - Static: Fair  Sitting - Dynamic: Fair  Comments: CGA sitting eob, WILY stance/transfer due to LE immobiilty and pain. AM-PAC Score  AM-PAC Inpatient Mobility Raw Score : 8 (04/18/23 1206)  AM-PAC Inpatient T-Scale Score : 28.52 (04/18/23 1206)  Mobility Inpatient CMS 0-100% Score: 86.62 (04/18/23 1206)  Mobility Inpatient CMS G-Code Modifier : CM (04/18/23 1206)     Goals  Short Term Goals  Time Frame for Short Term Goals: by acute d/c:  Short Term Goal 1: bed mobility SBA  Short Term Goal 2: assess sit<>stand with ? Stedy lift, Mod A x 2  Short Term Goal 3: assess sit/stand-pivot if/as able, Mod A x 2  Long Term Goals  Time Frame for Long Term Goals : tbd if/as needed. Patient Goals   Patient Goals : pt not able to state a therapy goal at this time.      Education  Patient Education  Education Given To: Patient  Education Provided: Role of Therapy;Plan of Care;Orientation  Education Method: Verbal  Barriers to Learning: Cognition  Education Outcome: Verbalized understanding;Continued education needed  Therapy Time   Individual Concurrent Group Co-treatment   Time In 1125         Time Out 1205         Minutes 40          1 eval, 2 act charges   Yanira Copeland PT Electronically signed by Yanira Copeland PT on 4/18/2023 at 12:20 PM
with mod A x2 using lift equipment prn. Long Term Goals  Time Frame for Long Term Goals : STGs=LTGs  Patient Goals   Patient goals : none stated.        Therapy Time   Individual Concurrent Group Co-treatment   Time In 1125         Time Out 1205         Minutes 40         Timed Code Treatment Minutes: [address] [facility], OTR/L 0749

## 2023-04-21 NOTE — CONSULTS
Clinical Pharmacy Note  Vancomycin Consult    Joselo Sims is a 79 y.o. male ordered Vancomycin for skin and soft tissue infection; consult received from Dr. Stanislaw Ag to manage therapy. Also receiving Zosyn. Allergies:  Oxycodone     Temp max:  Temp (24hrs), Av.6 °F (36.4 °C), Min:97.2 °F (36.2 °C), Max:98.1 °F (36.7 °C)      Recent Labs     04/15/23  0927 23  0509   WBC 6.8 7.1         Recent Labs     04/15/23  0927 23  0509   BUN 32* 39*   CREATININE 6.5* 7.5*           Intake/Output Summary (Last 24 hours) at 2023 0912  Last data filed at 2023 1829  Gross per 24 hour   Intake 478 ml   Output 0 ml   Net 478 ml       Culture Results:       Ht Readings from Last 1 Encounters:   23 5' 10\" (1.778 m)          Wt Readings from Last 1 Encounters:   23 205 lb 14.6 oz (93.4 kg)         Estimated Creatinine Clearance: 9 mL/min (A) (based on SCr of 7.5 mg/dL (HH)). Assessment/Plan:  Day # 3 of 7 for vancomycin. Vanc trough 17.5 ug/mL  Pt will be getting HD shortly. Re dose of Vancomcyin 1 gm today. Goal trough 10-15    Patient receives hemodialysis outpatient on MWF. Random level to be drawn Wed 23 before dialysis. Thank you for the consult.     Isidro Giron [facility]
Clinical Pharmacy Note  Vancomycin Consult    Karley Dubose is a 79 y.o. male ordered Vancomycin for skin and soft tissue infection; consult received from Dr. Ruby Miller to manage therapy. Also receiving Zosyn. Allergies:  Oxycodone     Temp max:  Temp (24hrs), Av.6 °F (36.4 °C), Min:97.3 °F (36.3 °C), Max:97.8 °F (36.6 °C)      Recent Labs     23  0550 23  0603   WBC 7.2 7.5         Recent Labs     23  0550 23  0323   BUN 39* 44*   CREATININE 8.6* 10.0*         No intake or output data in the 24 hours ending 23 0929      Culture Results:       Ht Readings from Last 1 Encounters:   23 5' 10\" (1.778 m)          Wt Readings from Last 1 Encounters:   23 187 lb 13.3 oz (85.2 kg)         Estimated Creatinine Clearance: 6 mL/min (A) (based on SCr of 10 mg/dL (HH)). Assessment/Plan:  Day # 5 of 7 for vancomycin. Vanc trough 19.6 ug/mL  Pt will be getting HD this afternoon. Vancomycin 750 gm ordered; to be given towards completion of HD session      Patient receives hemodialysis outpatient on [facility]. Random level to be drawn Wed 23 before dialysis. Thank you for the consult.     Mary Anne Xiong, [facility]
Palliative care note    I have attempted times two to see patient since getting consult have been unsuccessful.     Electronically signed by Lalo KEBEDE, RN, [address] on 4/21/2023 at 5:04 PM  Palliative Care Nurse  Phone [phone]
N/A 3/9/2023    SIGMOIDOSCOPY DIAGNOSTIC FLEXIBLE performed by Sara Alberto MD at [facility]  9/07     Family History   Problem Relation Age of Onset    Heart Attack Father     Cancer Mother     High Blood Pressure Other     Stroke Other      Social History     Tobacco Use    Smoking status: Never    Smokeless tobacco: Never   Vaping Use    Vaping Use: Never used   Substance Use Topics    Alcohol use: No    Drug use: No       Allergies   Allergen Reactions    Oxycodone      Agitation        Current Facility-Administered Medications   Medication Dose Route Frequency Provider Last Rate Last Admin    acetaminophen (TYLENOL) tablet 1,000 mg  1,000 mg Oral 3 times per day Christina Carmen MD   1,000 mg at 04/17/23 2027    traMADol (ULTRAM) tablet 50 mg  50 mg Oral Q6H PRN Christina Carmen MD   50 mg at 04/17/23 0934    nitroGLYCERIN (NITRODUR) 0.1 MG/HR 1 patch  1 patch TransDERmal Daily Malenasa Durham, DPM   1 patch at 04/17/23 1854    piperacillin-tazobactam (ZOSYN) 3,375 mg in sodium chloride 0.9 % 50 mL IVPB (mini-bag)  3,375 mg IntraVENous Q12H Malick Vera MD   Stopped at 04/17/23 1927    ezetimibe (ZETIA) tablet 10 mg  10 mg Oral Daily Malick Vera MD   10 mg at 04/17/23 1328    levothyroxine (SYNTHROID) tablet 125 mcg  125 mcg Oral Daily Malick Vera MD   125 mcg at 04/17/23 0531    metoprolol tartrate (LOPRESSOR) tablet 25 mg  25 mg Oral BID Malick Vera MD   25 mg at 04/17/23 2028    midodrine (PROAMATINE) tablet 2.5 mg  2.5 mg Oral TID Malick Vera MD   2.5 mg at 04/17/23 1326    sodium chloride flush 0.9 % injection 5-40 mL  5-40 mL IntraVENous 2 times per day Malick Vera MD   10 mL at 04/17/23 0832    sodium chloride flush 0.9 % injection 5-40 mL  5-40 mL IntraVENous PRN Malick Vera MD        0.9 % sodium chloride infusion   IntraVENous PRN Malick Vera MD        heparin (porcine) injection 5,000 Units  5,000 Units SubCUTAneous 3 times per day Malick Vera MD   5,000 Units at 04/17/23 2028
heel wound S91.302A        ICD-10-CM    1. Peripheral vascular disease (HCC)  I73.9       2. Open wound of right thigh, initial encounter  S71.101A       3. Foot pain, bilateral  M79.671     M79.672          ESRD on HD  Admitted with Left 2nd toe dry gangrene  Left upper inner thigh skin lesion concerning for Calciphylaxis  He has been on HD for x  5 yrs per patient  S/p Angiogram lower leg no PVD  Possible small vessel arterial calcification from ESRD  H/O Prostate cancer    He has no open wound and no active cellulitis noted and suspect Left 2nd toe dry gangrene and no open ulcer with drainage or secondary infection    Left medial thigh skin scabbed with developing eschar may be developing calciphylaxis -       Labs, Microbiology, Radiology and pertinent results from current hospitalization and care every where were reviewed by me as a part of the consultation. PLAN :  D/C IV Vancomycin  D/C Zosyn   Cont local care  Calciphylaxis suspected per renal ESRD is being adjusted     Will sign off  -     Discussed with patient/Family and Nursing     Thanks for allowing me to participate in your patient's care please call me with any questions or concerns.     Dr. Angel Quiñonez MD  [address] [place] [address] Physician  Phone: [phone]   Fax : [phone]

## 2023-04-28 NOTE — DISCHARGE SUMMARY
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 Hospital Course: Suspected Calciphylaxis  PAD  Dry Gangrene  - deffering on biopsy as high suspicion clinically and no other diagnosis likely at this time  - cont vanc, zosyn  - podiatry following for wound care vs potential surgery --> recommending wound care and conservative treatments at this time  - peripheral angiography 4/18 r/o'ed limb threatening ischemia due to PAD  - palliative appropriate  - lost IV access, will trial pain control with oral medications  - Nephrology started sodium thiosulfate with each HD treatment 4/19 and this will be continued as an outpatient. ESRD on HD  - nephro dialyzing   - cont midodrine     Severe cardiomyopathy  - EF < 20%      Exam:     /63   Pulse 55   Temp 98 °F (36.7 °C)   Resp 18   Ht 5' 10\" (1.778 m)   Wt 190 lb 7.6 oz (86.4 kg)   SpO2 94%   BMI 27.33 kg/m²     General appearance: No apparent distress, appears stated age and cooperative. HEENT: Pupils equal, round, and reactive to light. Conjunctivae/corneas clear. Neck: Supple, with full range of motion. No jugular venous distention. Trachea midline.   Respiratory:  Normal

## 2023-08-29 ENCOUNTER — HOSPITAL ENCOUNTER (OUTPATIENT)
Dept: WOUND CARE | Age: 83
Discharge: HOME | End: 2023-08-29
Payer: MEDICARE

## 2023-08-29 VITALS
TEMPERATURE: 97.6 F | HEART RATE: 70 BPM | SYSTOLIC BLOOD PRESSURE: 112 MMHG | RESPIRATION RATE: 18 BRPM | DIASTOLIC BLOOD PRESSURE: 73 MMHG

## 2023-08-29 DIAGNOSIS — L97.423 ULCER OF LEFT HEEL, WITH NECROSIS OF MUSCLE (HCC): ICD-10-CM

## 2023-08-29 DIAGNOSIS — L97.911 NON-PRESSURE CHRONIC ULCER OF LOWER LEG, LIMITED TO BREAKDOWN OF SKIN, RIGHT (HCC): ICD-10-CM

## 2023-08-29 DIAGNOSIS — L97.524 ULCER OF TOE OF LEFT FOOT, WITH NECROSIS OF BONE (HCC): ICD-10-CM

## 2023-08-29 DIAGNOSIS — E11.69 TYPE 2 DIABETES MELLITUS WITH OTHER SPECIFIED COMPLICATION, UNSPECIFIED WHETHER LONG TERM INSULIN USE (HCC): ICD-10-CM

## 2023-08-29 DIAGNOSIS — S81.812A: Primary | ICD-10-CM

## 2023-08-29 DIAGNOSIS — I87.2 VENOUS INSUFFICIENCY OF BOTH LOWER EXTREMITIES: ICD-10-CM

## 2023-08-29 DIAGNOSIS — E11.621 DIABETIC ULCER OF TOE OF LEFT FOOT ASSOCIATED WITH TYPE 2 DIABETES MELLITUS, WITH NECROSIS OF BONE (HCC): ICD-10-CM

## 2023-08-29 DIAGNOSIS — L97.423 DIABETIC ULCER OF LEFT HEEL ASSOCIATED WITH TYPE 1 DIABETES MELLITUS, WITH NECROSIS OF MUSCLE (HCC): ICD-10-CM

## 2023-08-29 DIAGNOSIS — E10.621 DIABETIC ULCER OF LEFT HEEL ASSOCIATED WITH TYPE 1 DIABETES MELLITUS, WITH NECROSIS OF MUSCLE (HCC): ICD-10-CM

## 2023-08-29 DIAGNOSIS — S81.811A LACERATION OF SKIN OF LOWER LEG, RIGHT, INITIAL ENCOUNTER: ICD-10-CM

## 2023-08-29 DIAGNOSIS — L97.524 DIABETIC ULCER OF TOE OF LEFT FOOT ASSOCIATED WITH TYPE 2 DIABETES MELLITUS, WITH NECROSIS OF BONE (HCC): ICD-10-CM

## 2023-08-29 DIAGNOSIS — Z71.89 COMPLEX CARE COORDINATION: ICD-10-CM

## 2023-08-29 DIAGNOSIS — I96 GANGRENE (HCC): ICD-10-CM

## 2023-08-29 DIAGNOSIS — R60.0 LOCALIZED EDEMA: ICD-10-CM

## 2023-08-29 PROCEDURE — 99214 OFFICE O/P EST MOD 30 MIN: CPT | Performed by: EMERGENCY MEDICINE

## 2023-08-29 PROCEDURE — 99214 OFFICE O/P EST MOD 30 MIN: CPT

## 2023-08-29 RX ORDER — IBUPROFEN 200 MG
TABLET ORAL ONCE
OUTPATIENT
Start: 2023-08-29 | End: 2023-08-29

## 2023-08-29 RX ORDER — BETAMETHASONE DIPROPIONATE 0.05 %
OINTMENT (GRAM) TOPICAL ONCE
OUTPATIENT
Start: 2023-08-29 | End: 2023-08-29

## 2023-08-29 RX ORDER — GINSENG 100 MG
CAPSULE ORAL ONCE
OUTPATIENT
Start: 2023-08-29 | End: 2023-08-29

## 2023-08-29 RX ORDER — TRAMADOL HYDROCHLORIDE 50 MG/1
50 TABLET ORAL EVERY 6 HOURS PRN
COMMUNITY

## 2023-08-29 RX ORDER — BACITRACIN ZINC AND POLYMYXIN B SULFATE 500; 1000 [USP'U]/G; [USP'U]/G
OINTMENT TOPICAL ONCE
OUTPATIENT
Start: 2023-08-29 | End: 2023-08-29

## 2023-08-29 RX ORDER — LIDOCAINE HYDROCHLORIDE 20 MG/ML
JELLY TOPICAL ONCE
OUTPATIENT
Start: 2023-08-29 | End: 2023-08-29

## 2023-08-29 RX ORDER — LIDOCAINE 40 MG/G
CREAM TOPICAL ONCE
Status: COMPLETED | OUTPATIENT
Start: 2023-08-29 | End: 2023-08-29

## 2023-08-29 RX ORDER — LIDOCAINE 50 MG/G
OINTMENT TOPICAL ONCE
OUTPATIENT
Start: 2023-08-29 | End: 2023-08-29

## 2023-08-29 RX ORDER — GENTAMICIN SULFATE 1 MG/G
OINTMENT TOPICAL ONCE
OUTPATIENT
Start: 2023-08-29 | End: 2023-08-29

## 2023-08-29 RX ORDER — LIDOCAINE 40 MG/G
CREAM TOPICAL ONCE
OUTPATIENT
Start: 2023-08-29 | End: 2023-08-29

## 2023-08-29 RX ORDER — SODIUM CHLOR/HYPOCHLOROUS ACID 0.033 %
SOLUTION, IRRIGATION IRRIGATION ONCE
OUTPATIENT
Start: 2023-08-29 | End: 2023-08-29

## 2023-08-29 RX ORDER — CLOBETASOL PROPIONATE 0.5 MG/G
OINTMENT TOPICAL ONCE
OUTPATIENT
Start: 2023-08-29 | End: 2023-08-29

## 2023-08-29 RX ORDER — LIDOCAINE HYDROCHLORIDE 40 MG/ML
SOLUTION TOPICAL ONCE
OUTPATIENT
Start: 2023-08-29 | End: 2023-08-29

## 2023-08-29 RX ORDER — AMLODIPINE BESYLATE 5 MG/1
5 TABLET ORAL DAILY
COMMUNITY

## 2023-08-29 RX ORDER — MIRTAZAPINE 15 MG/1
15 TABLET, FILM COATED ORAL NIGHTLY
COMMUNITY

## 2023-08-29 RX ADMIN — LIDOCAINE: 40 CREAM TOPICAL at 10:36

## 2023-08-29 ASSESSMENT — PAIN SCALES - GENERAL
PAINLEVEL_OUTOF10: 0
PAINLEVEL_OUTOF10: 0

## 2023-08-29 NOTE — DISCHARGE INSTRUCTIONS
[address] Physician Orders and Discharge [address] [place] [address]  [address], [address] [facility],[address], [address]  Telephone: [phone] [address] 8:00 am - 4:30 pm and Friday 8:00 am - 12:00 pm.        NAME:  Isra Arce  YOB: 1940  MEDICAL RECORD NUMBER:  [id number]  DATE:  8/29/2023      Return Appointment:  [x] Return Appointment: With DR. Sangita Soliz MD  in  1 Week(s)  [] Wound and dressing supply provider:   [x] ECF or Home Healthcare: [address] [place] [address]  [] Wound Assessment: [] Physician or NP scheduled for Wound Assessment:   [x] Orders placed during your visit: MAKE APPOINTMENT TO SEE A VASCULAR SURGEON AS SOON AS POSSIBLE, REFERRAL SENT TO         Important Reminders:   Please wash hands with soap and water before and after every dressing change. Do not scrub wounds. Keep wounds dry in shower unless otherwise instructed by the physician. SMOKING can slow would healing. Stop smoking as soon as possible to improve healing and prevent further complications associated with smoking. Angela-Wound Topical Treatments:  Do not apply lotions, creams, or ointments to wound bed unless directed. [] Apply moisturizing lotion to skin surrounding the wound prior to dressing change.  [] Apply antifungal ointment to skin surrounding the wound prior to dressing change.  [] Apply thin film of no sting moisture barrier ointment to skin immediately around      wound.   [] Other:       Wound Location: LEFT 2ND TOE, LEFT HEEL    Wound Cleansing: Wash Gently with Soap and Water    Primary Dressing:  [x] SWAB WITH BETADINE  [x] WEAVE GAUZE BETWEEN TOES    Secondary Dressing:  [x] GAUZE  [x] [address]      Dressing Frequency:  [x] DAILY  [] Do Not Change Dressing          Compression and Edema Control:  [] Wear Home Compression Stockings   [] Spandagrip to:   Size: []Low compression 5-10 mm/Hg      []Medium compression

## 2023-08-29 NOTE — H&P
Information: Should you experience any significant changes in your wound(s) or have questions about your wound care, please contact the [facility] at [address] [place] [address] Avenue 8:00 am - 4:30 pm and Friday 8:00 am - 12:30 pm.  If you need help with your wound outside these hours and cannot wait until we are again available, contact your PCP or go to the hospital emergency room. PLEASE NOTE: IF YOU ARE UNABLE TO OBTAIN WOUND SUPPLIES, CONTINUE TO USE THE SUPPLIES YOU HAVE AVAILABLE UNTIL YOU ARE ABLE TO REACH US. IT IS MOST IMPORTANT TO KEEP THE WOUND COVERED AT ALL TIMES.          Physician Signature:_______________________    Date: ___________ Time:  ____________          Dr Brown Or                  Electronically signed by Stephanie Licea MD on 8/29/2023 at 11:25 AM

## 2023-09-01 ENCOUNTER — TELEPHONE (OUTPATIENT)
Dept: VASCULAR SURGERY | Age: 83
End: 2023-09-01

## 2023-09-01 NOTE — TELEPHONE ENCOUNTER
I had placed Mr. Obrien on the schedule to see Dr. Eleanore Closs after receiving a call from Sandra at [facility] ([phone]). She said he had been referred to Dr. Kaila Godwin by Dr. Hakeem Muhammad.      As Dr. Kaila Godwin was not going to see him, Dr. Carol Núñez looked over his chart and found that Mr. Bindu Saldana had already had an angiogram done by Dr. Melody Muller. It was found he did not have disease, but a lot of calcium. He has been taken off the schedule for Dr. Kaila Godwin after talking with Sandra again at [facility].

## 2023-09-05 ENCOUNTER — HOSPITAL ENCOUNTER (OUTPATIENT)
Dept: WOUND CARE | Age: 83
Discharge: HOME | End: 2023-09-05
Payer: MEDICARE

## 2023-09-05 VITALS
RESPIRATION RATE: 18 BRPM | SYSTOLIC BLOOD PRESSURE: 116 MMHG | TEMPERATURE: 98.2 F | DIASTOLIC BLOOD PRESSURE: 74 MMHG | HEART RATE: 65 BPM

## 2023-09-05 DIAGNOSIS — L97.911 NON-PRESSURE CHRONIC ULCER OF LOWER LEG, LIMITED TO BREAKDOWN OF SKIN, RIGHT (HCC): Primary | ICD-10-CM

## 2023-09-05 DIAGNOSIS — Z71.89 COMPLEX CARE COORDINATION: ICD-10-CM

## 2023-09-05 DIAGNOSIS — I87.2 VENOUS INSUFFICIENCY OF BOTH LOWER EXTREMITIES: ICD-10-CM

## 2023-09-05 DIAGNOSIS — L97.524 DIABETIC ULCER OF TOE OF LEFT FOOT ASSOCIATED WITH TYPE 2 DIABETES MELLITUS, WITH NECROSIS OF BONE (HCC): ICD-10-CM

## 2023-09-05 DIAGNOSIS — I96 GANGRENE (HCC): ICD-10-CM

## 2023-09-05 DIAGNOSIS — L97.423 ULCER OF LEFT HEEL, WITH NECROSIS OF MUSCLE (HCC): ICD-10-CM

## 2023-09-05 DIAGNOSIS — L97.524 ULCER OF TOE OF LEFT FOOT, WITH NECROSIS OF BONE (HCC): ICD-10-CM

## 2023-09-05 DIAGNOSIS — E11.69 TYPE 2 DIABETES MELLITUS WITH OTHER SPECIFIED COMPLICATION, UNSPECIFIED WHETHER LONG TERM INSULIN USE (HCC): ICD-10-CM

## 2023-09-05 DIAGNOSIS — E11.621 DIABETIC ULCER OF TOE OF LEFT FOOT ASSOCIATED WITH TYPE 2 DIABETES MELLITUS, WITH NECROSIS OF BONE (HCC): ICD-10-CM

## 2023-09-05 PROCEDURE — 11042 DBRDMT SUBQ TIS 1ST 20SQCM/<: CPT

## 2023-09-05 PROCEDURE — 11042 DBRDMT SUBQ TIS 1ST 20SQCM/<: CPT | Performed by: NURSE PRACTITIONER

## 2023-09-05 RX ORDER — GENTAMICIN SULFATE 1 MG/G
OINTMENT TOPICAL ONCE
OUTPATIENT
Start: 2023-09-05 | End: 2023-09-05

## 2023-09-05 RX ORDER — GINSENG 100 MG
CAPSULE ORAL ONCE
OUTPATIENT
Start: 2023-09-05 | End: 2023-09-05

## 2023-09-05 RX ORDER — LIDOCAINE HYDROCHLORIDE 20 MG/ML
JELLY TOPICAL ONCE
OUTPATIENT
Start: 2023-09-05 | End: 2023-09-05

## 2023-09-05 RX ORDER — BETAMETHASONE DIPROPIONATE 0.05 %
OINTMENT (GRAM) TOPICAL ONCE
OUTPATIENT
Start: 2023-09-05 | End: 2023-09-05

## 2023-09-05 RX ORDER — LIDOCAINE 40 MG/G
CREAM TOPICAL ONCE
OUTPATIENT
Start: 2023-09-05 | End: 2023-09-05

## 2023-09-05 RX ORDER — CLOBETASOL PROPIONATE 0.5 MG/G
OINTMENT TOPICAL ONCE
OUTPATIENT
Start: 2023-09-05 | End: 2023-09-05

## 2023-09-05 RX ORDER — LIDOCAINE HYDROCHLORIDE 40 MG/ML
SOLUTION TOPICAL ONCE
OUTPATIENT
Start: 2023-09-05 | End: 2023-09-05

## 2023-09-05 RX ORDER — LIDOCAINE 50 MG/G
OINTMENT TOPICAL ONCE
Status: COMPLETED | OUTPATIENT
Start: 2023-09-05 | End: 2023-09-05

## 2023-09-05 RX ORDER — SODIUM CHLOR/HYPOCHLOROUS ACID 0.033 %
SOLUTION, IRRIGATION IRRIGATION ONCE
OUTPATIENT
Start: 2023-09-05 | End: 2023-09-05

## 2023-09-05 RX ORDER — IBUPROFEN 200 MG
TABLET ORAL ONCE
OUTPATIENT
Start: 2023-09-05 | End: 2023-09-05

## 2023-09-05 RX ORDER — LIDOCAINE 50 MG/G
OINTMENT TOPICAL ONCE
OUTPATIENT
Start: 2023-09-05 | End: 2023-09-05

## 2023-09-05 RX ORDER — BACITRACIN ZINC AND POLYMYXIN B SULFATE 500; 1000 [USP'U]/G; [USP'U]/G
OINTMENT TOPICAL ONCE
OUTPATIENT
Start: 2023-09-05 | End: 2023-09-05

## 2023-09-05 RX ADMIN — LIDOCAINE: 50 OINTMENT TOPICAL at 14:30

## 2023-09-05 NOTE — DISCHARGE INSTRUCTIONS
Discharge [address] [facility] Physician Orders and Discharge [address] [facility] [address]  [address], [address], [address]  Telephone: [phone] [address] 8:00 am - 4:30 pm and Friday 8:00 am - 12:00 pm.          NAME:  Rick Batista  YOB: 1940  MEDICAL RECORD NUMBER:  [id number]  DATE:  9/5/2023        Return Appointment:  [x] Return Appointment: With DR. Denis Bueno MD  in  2 Week(s)  [] Wound and dressing supply provider:   [x] ECF or Home Healthcare: [address] [place] [address] [place]  [] Wound Assessment:         [] Physician or NP scheduled for Wound Assessment:   [] Orders placed during your visit:       FOLLOW UP WITH DR Jeremiah Gunter FOR ANY VASCULAR ISSUES     Important Reminders:   Please wash hands with soap and water before and after every dressing change. Do not scrub wounds. Keep wounds dry in shower unless otherwise instructed by the physician. SMOKING can slow would healing. Stop smoking as soon as possible to improve healing and prevent further complications associated with smoking. Angela-Wound Topical Treatments:  Do not apply lotions, creams, or ointments to wound bed unless directed. [] Apply moisturizing lotion to skin surrounding the wound prior to dressing change.  [] Apply antifungal ointment to skin surrounding the wound prior to dressing change.  [] Apply thin film of no sting moisture barrier ointment to skin immediately around wound.   [] Other:         Wound Location: LEFT 2ND TOE, LEFT HEEL     Wound Cleansing: Wash Gently with Soap and Water     Primary Dressing:  [x] SWAB WITH BETADINE  [x] WEAVE GAUZE BETWEEN TOES     Secondary Dressing:  [x] GAUZE  [x] [address] [facility]        Dressing Frequency:  [x] DAILY          Pressure Relief and Off Loading:  [] Off-loading when   [] walking       [] in bed         [] sitting   Turn every 2 hours when in bed             Avoid putting

## 2023-09-07 NOTE — PROGRESS NOTES
[x] Assistive Devices   WHEELCHAIR  Use as instructed by the provider        Activity: Activity as Tolerated, AVOID PRESSURE TO WOUND AREAS        Dietary:   Continue your diet as tolerated. Protein is a key nutrient in helping to repair damaged tissue and promote new tissue growth. Good sources of protein include milk, yogurt, cheese, fish, lean meat and beans. If you are DIABETIC, having diabetes can make it hard for wounds to heal. Try to keep your blood sugar within it's target range. Limit Sodium, Alcohol and Sugar. Pain:   Please Note some pain, drainage and/or bleeding might be expected after seeing the provider. TO HELP ALLEVIATE PAIN WE RECOMMEND THE FOLLOWING  Elevate the affected limb. Use over the counter medications as permitted by your family doctor. For Persistent Pain not relieved by the above interventions, please notify your family doctor. : FABIO   Electronically signed by Aden Sow RN on 9/5/2023 at 2:48 PM               [address] [place] [address] Information: Should you experience any significant changes in your wound(s) or have questions about your wound care, please contact the [address] [place] [address] at [address] [place] [address] 8:00 am - 4:30 pm and Friday 8:00 am - 12:30 pm.  If you need help with your wound outside these hours and cannot wait until we are again available, contact your PCP or go to the hospital emergency room. PLEASE NOTE: IF YOU ARE UNABLE TO OBTAIN WOUND SUPPLIES, CONTINUE TO USE THE SUPPLIES YOU HAVE AVAILABLE UNTIL YOU ARE ABLE TO REACH US. IT IS MOST IMPORTANT TO KEEP THE WOUND COVERED AT ALL TIMES. Physician Signature:_______________________     Date: ___________ Time:  ____________                                  Dr Zahra Mcclendon CNP               There are no Patient Instructions on file for this visit.      Electronically signed by SHAHRZAD Diggs CNP on 9/6/2023 at 10:30 PM

## 2023-09-12 ENCOUNTER — HOSPITAL ENCOUNTER (OUTPATIENT)
Dept: WOUND CARE | Age: 83
Discharge: HOME | End: 2023-09-12
Payer: MEDICARE

## 2023-09-12 VITALS
HEIGHT: 70 IN | HEART RATE: 70 BPM | SYSTOLIC BLOOD PRESSURE: 122 MMHG | DIASTOLIC BLOOD PRESSURE: 72 MMHG | BODY MASS INDEX: 27.33 KG/M2 | TEMPERATURE: 97.3 F | RESPIRATION RATE: 18 BRPM

## 2023-09-12 DIAGNOSIS — L97.524 DIABETIC ULCER OF TOE OF LEFT FOOT ASSOCIATED WITH TYPE 2 DIABETES MELLITUS, WITH NECROSIS OF BONE (HCC): ICD-10-CM

## 2023-09-12 DIAGNOSIS — E11.621 DIABETIC ULCER OF TOE OF LEFT FOOT ASSOCIATED WITH TYPE 2 DIABETES MELLITUS, WITH NECROSIS OF BONE (HCC): ICD-10-CM

## 2023-09-12 DIAGNOSIS — L97.911 NON-PRESSURE CHRONIC ULCER OF LOWER LEG, LIMITED TO BREAKDOWN OF SKIN, RIGHT (HCC): Primary | ICD-10-CM

## 2023-09-12 DIAGNOSIS — L97.423 ULCER OF LEFT HEEL, WITH NECROSIS OF MUSCLE (HCC): ICD-10-CM

## 2023-09-12 DIAGNOSIS — I96 GANGRENE (HCC): ICD-10-CM

## 2023-09-12 DIAGNOSIS — L97.524 ULCER OF TOE OF LEFT FOOT, WITH NECROSIS OF BONE (HCC): ICD-10-CM

## 2023-09-12 DIAGNOSIS — E11.69 TYPE 2 DIABETES MELLITUS WITH OTHER SPECIFIED COMPLICATION, UNSPECIFIED WHETHER LONG TERM INSULIN USE (HCC): ICD-10-CM

## 2023-09-12 DIAGNOSIS — I87.2 VENOUS INSUFFICIENCY OF BOTH LOWER EXTREMITIES: ICD-10-CM

## 2023-09-12 DIAGNOSIS — Z71.89 COMPLEX CARE COORDINATION: ICD-10-CM

## 2023-09-12 PROCEDURE — 11042 DBRDMT SUBQ TIS 1ST 20SQCM/<: CPT

## 2023-09-12 RX ORDER — BETAMETHASONE DIPROPIONATE 0.05 %
OINTMENT (GRAM) TOPICAL ONCE
OUTPATIENT
Start: 2023-09-12 | End: 2023-09-12

## 2023-09-12 RX ORDER — LIDOCAINE HYDROCHLORIDE 40 MG/ML
SOLUTION TOPICAL ONCE
OUTPATIENT
Start: 2023-09-12 | End: 2023-09-12

## 2023-09-12 RX ORDER — LIDOCAINE HYDROCHLORIDE 20 MG/ML
JELLY TOPICAL ONCE
OUTPATIENT
Start: 2023-09-12 | End: 2023-09-12

## 2023-09-12 RX ORDER — LIDOCAINE 50 MG/G
OINTMENT TOPICAL ONCE
OUTPATIENT
Start: 2023-09-12 | End: 2023-09-12

## 2023-09-12 RX ORDER — LIDOCAINE 40 MG/G
CREAM TOPICAL ONCE
OUTPATIENT
Start: 2023-09-12 | End: 2023-09-12

## 2023-09-12 RX ORDER — BACITRACIN ZINC AND POLYMYXIN B SULFATE 500; 1000 [USP'U]/G; [USP'U]/G
OINTMENT TOPICAL ONCE
OUTPATIENT
Start: 2023-09-12 | End: 2023-09-12

## 2023-09-12 RX ORDER — IBUPROFEN 200 MG
TABLET ORAL ONCE
OUTPATIENT
Start: 2023-09-12 | End: 2023-09-12

## 2023-09-12 RX ORDER — GINSENG 100 MG
CAPSULE ORAL ONCE
OUTPATIENT
Start: 2023-09-12 | End: 2023-09-12

## 2023-09-12 RX ORDER — CLOBETASOL PROPIONATE 0.5 MG/G
OINTMENT TOPICAL ONCE
OUTPATIENT
Start: 2023-09-12 | End: 2023-09-12

## 2023-09-12 RX ORDER — GENTAMICIN SULFATE 1 MG/G
OINTMENT TOPICAL ONCE
OUTPATIENT
Start: 2023-09-12 | End: 2023-09-12

## 2023-09-12 RX ORDER — SODIUM CHLOR/HYPOCHLOROUS ACID 0.033 %
SOLUTION, IRRIGATION IRRIGATION ONCE
OUTPATIENT
Start: 2023-09-12 | End: 2023-09-12

## 2023-09-12 ASSESSMENT — PAIN SCALES - GENERAL: PAINLEVEL_OUTOF10: 0

## 2023-09-12 NOTE — PROGRESS NOTES
the importance of adherence to instructions is paramount to wound healing improvement or success.      Discharge Instructions                                 Electronically signed by Tee Serrano MD on 9/12/2023 at 10:35 AM

## 2023-09-12 NOTE — PATIENT INSTRUCTIONS
[address] [place] Physician Orders and Discharge [address] [place] [address]  [address], 1 [facility],[address], [address]  Telephone: [phone] [address] 8:00 am - 4:30 pm and Friday 8:00 am - 12:00 pm.          NAME:  Levy Qureshi  YOB: 1940  MEDICAL RECORD NUMBER:  [id number]  DATE:  9/12/2023        Return Appointment:  [x] Return Appointment: With DR. Donavon Ramos MD  in  2 Week(s)  [] Wound and dressing supply provider:   [x] ECF or Home Healthcare: [address] [place] [address]  [] Wound Assessment:         [] Physician or NP scheduled for Wound Assessment:   [] Orders placed during your visit:       **MAKE APPOINTMENT WITH DR Migdalia Rosas FOR FOLLOW UP**     Important Reminders:   Please wash hands with soap and water before and after every dressing change. Do not scrub wounds. Keep wounds dry in shower unless otherwise instructed by the physician. SMOKING can slow would healing. Stop smoking as soon as possible to improve healing and prevent further complications associated with smoking. Angela-Wound Topical Treatments:  Do not apply lotions, creams, or ointments to wound bed unless directed. [] Apply moisturizing lotion to skin surrounding the wound prior to dressing change.  [] Apply antifungal ointment to skin surrounding the wound prior to dressing change.  [] Apply thin film of no sting moisture barrier ointment to skin immediately around wound.   [] Other:         Wound Location: LEFT 2ND TOE, LEFT HEEL     Wound Cleansing: Wash Gently with Soap and Water     Primary Dressing:  [x] SWAB WITH BETADINE  [x] WEAVE GAUZE BETWEEN TOES     Secondary Dressing:  [x] GAUZE  [x] [address]        Dressing Frequency:  [x] DAILY            Pressure Relief and Off Loading:  [] Off-loading when   [] walking       [] in bed         [] sitting   Turn every 2 hours when in bed             Avoid putting direct pressure on the site of

## 2023-09-27 ENCOUNTER — APPOINTMENT (OUTPATIENT)
Dept: CT IMAGING | Age: 83
End: 2023-09-27
Payer: MEDICARE

## 2023-09-27 ENCOUNTER — HOSPITAL ENCOUNTER (EMERGENCY)
Age: 83
Discharge: HOME | End: 2023-09-27
Payer: MEDICARE

## 2023-09-27 VITALS
OXYGEN SATURATION: 100 % | WEIGHT: 190 LBS | RESPIRATION RATE: 14 BRPM | HEIGHT: 70 IN | SYSTOLIC BLOOD PRESSURE: 146 MMHG | DIASTOLIC BLOOD PRESSURE: 81 MMHG | BODY MASS INDEX: 27.2 KG/M2 | HEART RATE: 73 BPM | TEMPERATURE: 97.8 F

## 2023-09-27 DIAGNOSIS — N18.6 ESRD ON DIALYSIS (HCC): ICD-10-CM

## 2023-09-27 DIAGNOSIS — Z99.2 ESRD ON DIALYSIS (HCC): ICD-10-CM

## 2023-09-27 DIAGNOSIS — I71.21 ANEURYSM OF ASCENDING AORTA WITHOUT RUPTURE (HCC): ICD-10-CM

## 2023-09-27 DIAGNOSIS — R10.32 ABDOMINAL PAIN, LEFT LOWER QUADRANT: Primary | ICD-10-CM

## 2023-09-27 DIAGNOSIS — K59.00 CONSTIPATION, UNSPECIFIED CONSTIPATION TYPE: ICD-10-CM

## 2023-09-27 LAB
ALBUMIN SERPL-MCNC: 3.9 G/DL (ref 3.4–5)
ALBUMIN/GLOB SERPL: 1.1 {RATIO} (ref 1.1–2.2)
ALP SERPL-CCNC: 131 U/L (ref 40–129)
ALT SERPL-CCNC: 19 U/L (ref 10–40)
ANION GAP SERPL CALCULATED.3IONS-SCNC: 13 MMOL/L (ref 3–16)
AST SERPL-CCNC: 26 U/L (ref 15–37)
BASOPHILS # BLD: 0.1 K/UL (ref 0–0.2)
BASOPHILS NFR BLD: 1.1 %
BILIRUB SERPL-MCNC: 0.5 MG/DL (ref 0–1)
BUN SERPL-MCNC: 38 MG/DL (ref 7–20)
CALCIUM SERPL-MCNC: 10 MG/DL (ref 8.3–10.6)
CHLORIDE SERPL-SCNC: 99 MMOL/L (ref 99–110)
CO2 SERPL-SCNC: 26 MMOL/L (ref 21–32)
CREAT SERPL-MCNC: 8.4 MG/DL (ref 0.8–1.3)
DEPRECATED RDW RBC AUTO: 19 % (ref 12.4–15.4)
EOSINOPHIL # BLD: 0.1 K/UL (ref 0–0.6)
EOSINOPHIL NFR BLD: 1.8 %
GFR SERPLBLD CREATININE-BSD FMLA CKD-EPI: 6 ML/MIN/{1.73_M2}
GLUCOSE SERPL-MCNC: 109 MG/DL (ref 70–99)
HCT VFR BLD AUTO: 41.6 % (ref 40.5–52.5)
HGB BLD-MCNC: 13.4 G/DL (ref 13.5–17.5)
LIPASE SERPL-CCNC: 22 U/L (ref 13–60)
LYMPHOCYTES # BLD: 1.8 K/UL (ref 1–5.1)
LYMPHOCYTES NFR BLD: 25.7 %
MCH RBC QN AUTO: 31.7 PG (ref 26–34)
MCHC RBC AUTO-ENTMCNC: 32.2 G/DL (ref 31–36)
MCV RBC AUTO: 98.4 FL (ref 80–100)
MONOCYTES # BLD: 0.8 K/UL (ref 0–1.3)
MONOCYTES NFR BLD: 11.5 %
NEUTROPHILS # BLD: 4.1 K/UL (ref 1.7–7.7)
NEUTROPHILS NFR BLD: 59.9 %
PLATELET # BLD AUTO: 157 K/UL (ref 135–450)
PMV BLD AUTO: 8.7 FL (ref 5–10.5)
POTASSIUM SERPL-SCNC: 4.5 MMOL/L (ref 3.5–5.1)
PROT SERPL-MCNC: 7.3 G/DL (ref 6.4–8.2)
RBC # BLD AUTO: 4.23 M/UL (ref 4.2–5.9)
REASON FOR REJECTION: NORMAL
REJECTED TEST: NORMAL
SODIUM SERPL-SCNC: 138 MMOL/L (ref 136–145)
WBC # BLD AUTO: 6.9 K/UL (ref 4–11)

## 2023-09-27 PROCEDURE — 99285 EMERGENCY DEPT VISIT HI MDM: CPT

## 2023-09-27 PROCEDURE — 80053 COMPREHEN METABOLIC PANEL: CPT

## 2023-09-27 PROCEDURE — 85025 COMPLETE CBC W/AUTO DIFF WBC: CPT

## 2023-09-27 PROCEDURE — 74177 CT ABD & PELVIS W/CONTRAST: CPT

## 2023-09-27 PROCEDURE — 6360000004 HC RX CONTRAST MEDICATION: Performed by: PHYSICIAN ASSISTANT

## 2023-09-27 PROCEDURE — 83690 ASSAY OF LIPASE: CPT

## 2023-09-27 RX ORDER — DOCUSATE SODIUM 100 MG/1
100 CAPSULE, LIQUID FILLED ORAL 2 TIMES DAILY
Qty: 30 CAPSULE | Refills: 0 | Status: SHIPPED | OUTPATIENT
Start: 2023-09-27

## 2023-09-27 RX ORDER — DICYCLOMINE HYDROCHLORIDE 10 MG/ML
10 INJECTION INTRAMUSCULAR ONCE
Status: DISCONTINUED | OUTPATIENT
Start: 2023-09-27 | End: 2023-09-27 | Stop reason: HOSPADM

## 2023-09-27 RX ORDER — POLYETHYLENE GLYCOL 3350 17 G/17G
17 POWDER, FOR SOLUTION ORAL DAILY
Qty: 238 G | Refills: 0 | Status: SHIPPED | OUTPATIENT
Start: 2023-09-27 | End: 2023-10-11

## 2023-09-27 RX ADMIN — IOPAMIDOL 75 ML: 755 INJECTION, SOLUTION INTRAVENOUS at 16:51

## 2023-09-27 ASSESSMENT — ENCOUNTER SYMPTOMS
VOMITING: 1
DIARRHEA: 1
VOMITING: 0
SHORTNESS OF BREATH: 0
BACK PAIN: 0
ABDOMINAL PAIN: 1
COUGH: 0
DIARRHEA: 0
NAUSEA: 0
NAUSEA: 1
CONSTIPATION: 0
WHEEZING: 0
RHINORRHEA: 0

## 2023-09-27 ASSESSMENT — PAIN DESCRIPTION - ORIENTATION: ORIENTATION: LEFT;LOWER

## 2023-09-27 ASSESSMENT — PAIN DESCRIPTION - LOCATION: LOCATION: ABDOMEN

## 2023-09-27 ASSESSMENT — PAIN - FUNCTIONAL ASSESSMENT: PAIN_FUNCTIONAL_ASSESSMENT: 0-10

## 2023-09-27 ASSESSMENT — LIFESTYLE VARIABLES
HOW OFTEN DO YOU HAVE A DRINK CONTAINING ALCOHOL: NEVER
HOW MANY STANDARD DRINKS CONTAINING ALCOHOL DO YOU HAVE ON A TYPICAL DAY: PATIENT DOES NOT DRINK

## 2023-09-27 ASSESSMENT — PAIN SCALES - GENERAL: PAINLEVEL_OUTOF10: 6

## 2023-09-27 ASSESSMENT — PAIN DESCRIPTION - PAIN TYPE: TYPE: ACUTE PAIN

## 2023-09-27 NOTE — ED NOTES
Wife called, pt has had 2 large solid bowel movements today and yesterday     Sophie Valentine RN  09/27/23 8717

## 2023-09-27 NOTE — ED NOTES
Enema given, large amount of brown stool.   Pt states \"wow I feel better'  notified provider     Callie Castellano RN  09/27/23 7935

## 2023-09-27 NOTE — ED PROVIDER NOTES
[facility]        Pt Name: Anny Nava  MRN: [id number]  [address] 1940  Date of evaluation: 9/27/2023  Provider: Brandon Coon PA-C  PCP: Adrián Montague MD  Note Started: 3:00 PM EDT 9/27/23      RIKKI. I have evaluated this patient. CHIEF COMPLAINT       Chief Complaint   Patient presents with    Abdominal Pain     Pt coming in from home via  ems. Pt did not go to HD today, thinks went to HD on Monday. Pt c/o left lower abd pain, pt is anuric. Has been on hd for 5 years. Alert and oriented x4       HISTORY OF PRESENT ILLNESS: 1 or more Elements     History From: patient   Limitations to history : None    Anny Nava is a 79 y.o. male who presents for evaluation of abdominal pain. Patient states this has been going on for the past couple of weeks intermittently but worse today. No known aggravating or alleviating factors. States that he has diarrhea always. No bloody stools. No nausea vomiting. No fevers or chills. Patient is an uric, history of ESRD on dialysis Monday, Wednesday and Friday. Last dialyzed on Monday, did not go today. No history of abdominal surgeries. He has no other complaints or concerns at this time. Nursing Notes were all reviewed and agreed with or any disagreements were addressed in the HPI. REVIEW OF SYSTEMS :      Review of Systems   Constitutional:  Negative for appetite change, chills and fever. HENT:  Negative for congestion and rhinorrhea. Respiratory:  Negative for cough, shortness of breath and wheezing. Cardiovascular:  Negative for chest pain. Gastrointestinal:  Positive for abdominal pain and diarrhea. Negative for nausea and vomiting. Genitourinary:  Negative for difficulty urinating, dysuria and hematuria. Musculoskeletal:  Negative for neck pain and neck stiffness. Skin:  Negative for rash. Neurological:  Negative for headaches.        Positives

## 2023-09-27 NOTE — ED NOTES
Pt offered boxed lunch. Pt declined and opted for peanut butter crackers. Pt resting comfortably with no further needs.       Adrián Cruz, RN  09/27/23 1940

## 2023-09-28 NOTE — ED NOTES
[facility] ambulance at bedside to transport patient back to home. Bedside report given and all paperwork provided. Pt left in stable condition with all pt belongings.       Aj Valdez RN  09/27/23 2120
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
## 2023-09-29 ENCOUNTER — HOSPITAL ENCOUNTER (OUTPATIENT)
Age: 83
Setting detail: OBSERVATION
Discharge: HOME | End: 2023-09-30
Attending: INTERNAL MEDICINE | Admitting: INTERNAL MEDICINE
Payer: MEDICARE

## 2023-09-29 ENCOUNTER — APPOINTMENT (OUTPATIENT)
Dept: GENERAL RADIOLOGY | Age: 83
End: 2023-09-29
Payer: MEDICARE

## 2023-09-29 DIAGNOSIS — R53.1 GENERAL WEAKNESS: ICD-10-CM

## 2023-09-29 DIAGNOSIS — Z99.2 ESRD NEEDING DIALYSIS (HCC): Primary | ICD-10-CM

## 2023-09-29 DIAGNOSIS — E87.5 HYPERKALEMIA: ICD-10-CM

## 2023-09-29 DIAGNOSIS — N18.6 ESRD NEEDING DIALYSIS (HCC): Primary | ICD-10-CM

## 2023-09-29 PROBLEM — Z91.158 NONCOMPLIANCE OF PATIENT WITH RENAL DIALYSIS: Status: ACTIVE | Noted: 2023-09-29

## 2023-09-29 LAB
ALBUMIN SERPL-MCNC: 3.8 G/DL (ref 3.4–5)
ALBUMIN/GLOB SERPL: 1.2 {RATIO} (ref 1.1–2.2)
ALP SERPL-CCNC: 103 U/L (ref 40–129)
ALT SERPL-CCNC: 20 U/L (ref 10–40)
ANION GAP SERPL CALCULATED.3IONS-SCNC: 15 MMOL/L (ref 3–16)
AST SERPL-CCNC: 37 U/L (ref 15–37)
BASOPHILS # BLD: 0.1 K/UL (ref 0–0.2)
BASOPHILS NFR BLD: 0.7 %
BILIRUB SERPL-MCNC: 0.6 MG/DL (ref 0–1)
BUN SERPL-MCNC: 53 MG/DL (ref 7–20)
CALCIUM SERPL-MCNC: 9.4 MG/DL (ref 8.3–10.6)
CHLORIDE SERPL-SCNC: 100 MMOL/L (ref 99–110)
CO2 SERPL-SCNC: 21 MMOL/L (ref 21–32)
CREAT SERPL-MCNC: 10.5 MG/DL (ref 0.8–1.3)
DEPRECATED RDW RBC AUTO: 18.9 % (ref 12.4–15.4)
EKG ATRIAL RATE: 91 BPM
EKG DIAGNOSIS: NORMAL
EKG Q-T INTERVAL: 442 MS
EKG QRS DURATION: 108 MS
EKG QTC CALCULATION (BAZETT): 486 MS
EKG R AXIS: -66 DEGREES
EKG T AXIS: 63 DEGREES
EKG VENTRICULAR RATE: 73 BPM
EOSINOPHIL # BLD: 0.1 K/UL (ref 0–0.6)
EOSINOPHIL NFR BLD: 1.9 %
GFR SERPLBLD CREATININE-BSD FMLA CKD-EPI: 4 ML/MIN/{1.73_M2}
GLUCOSE SERPL-MCNC: 91 MG/DL (ref 70–99)
HCT VFR BLD AUTO: 40.8 % (ref 40.5–52.5)
HGB BLD-MCNC: 12.7 G/DL (ref 13.5–17.5)
LYMPHOCYTES # BLD: 2 K/UL (ref 1–5.1)
LYMPHOCYTES NFR BLD: 25.7 %
MCH RBC QN AUTO: 31.4 PG (ref 26–34)
MCHC RBC AUTO-ENTMCNC: 31.2 G/DL (ref 31–36)
MCV RBC AUTO: 100.4 FL (ref 80–100)
MONOCYTES # BLD: 0.9 K/UL (ref 0–1.3)
MONOCYTES NFR BLD: 11.8 %
NEUTROPHILS # BLD: 4.8 K/UL (ref 1.7–7.7)
NEUTROPHILS NFR BLD: 59.9 %
NT-PROBNP SERPL-MCNC: ABNORMAL PG/ML (ref 0–449)
PHOSPHATE SERPL-MCNC: 5.9 MG/DL (ref 2.5–4.9)
PLATELET # BLD AUTO: 179 K/UL (ref 135–450)
PMV BLD AUTO: 8.7 FL (ref 5–10.5)
POTASSIUM SERPL-SCNC: 5.8 MMOL/L (ref 3.5–5.1)
POTASSIUM SERPL-SCNC: 7.1 MMOL/L (ref 3.5–5.1)
PROT SERPL-MCNC: 7.1 G/DL (ref 6.4–8.2)
RBC # BLD AUTO: 4.06 M/UL (ref 4.2–5.9)
SODIUM SERPL-SCNC: 136 MMOL/L (ref 136–145)
TROPONIN, HIGH SENSITIVITY: 264 NG/L (ref 0–22)
WBC # BLD AUTO: 7.9 K/UL (ref 4–11)

## 2023-09-29 PROCEDURE — 84132 ASSAY OF SERUM POTASSIUM: CPT

## 2023-09-29 PROCEDURE — 85025 COMPLETE CBC W/AUTO DIFF WBC: CPT

## 2023-09-29 PROCEDURE — 90935 HEMODIALYSIS ONE EVALUATION: CPT

## 2023-09-29 PROCEDURE — 84484 ASSAY OF TROPONIN QUANT: CPT

## 2023-09-29 PROCEDURE — G0378 HOSPITAL OBSERVATION PER HR: HCPCS

## 2023-09-29 PROCEDURE — 99285 EMERGENCY DEPT VISIT HI MDM: CPT

## 2023-09-29 PROCEDURE — 93010 ELECTROCARDIOGRAM REPORT: CPT | Performed by: INTERNAL MEDICINE

## 2023-09-29 PROCEDURE — 80053 COMPREHEN METABOLIC PANEL: CPT

## 2023-09-29 PROCEDURE — 93005 ELECTROCARDIOGRAM TRACING: CPT | Performed by: PHYSICIAN ASSISTANT

## 2023-09-29 PROCEDURE — 71045 X-RAY EXAM CHEST 1 VIEW: CPT

## 2023-09-29 PROCEDURE — 84100 ASSAY OF PHOSPHORUS: CPT

## 2023-09-29 PROCEDURE — 83880 ASSAY OF NATRIURETIC PEPTIDE: CPT

## 2023-09-29 RX ORDER — LEVOTHYROXINE SODIUM 0.12 MG/1
125 TABLET ORAL
Status: DISCONTINUED | OUTPATIENT
Start: 2023-09-30 | End: 2023-09-30 | Stop reason: HOSPADM

## 2023-09-29 RX ORDER — DOCUSATE SODIUM 100 MG/1
100 CAPSULE, LIQUID FILLED ORAL 2 TIMES DAILY
Status: DISCONTINUED | OUTPATIENT
Start: 2023-09-30 | End: 2023-09-30 | Stop reason: HOSPADM

## 2023-09-29 RX ORDER — MIDODRINE HYDROCHLORIDE 5 MG/1
2.5 TABLET ORAL
Status: DISCONTINUED | OUTPATIENT
Start: 2023-09-30 | End: 2023-09-30 | Stop reason: HOSPADM

## 2023-09-29 RX ORDER — AMLODIPINE BESYLATE 5 MG/1
5 TABLET ORAL DAILY
Status: DISCONTINUED | OUTPATIENT
Start: 2023-09-30 | End: 2023-09-30 | Stop reason: HOSPADM

## 2023-09-29 RX ORDER — SEVELAMER CARBONATE 800 MG/1
800 TABLET, FILM COATED ORAL
Status: DISCONTINUED | OUTPATIENT
Start: 2023-09-30 | End: 2023-09-30 | Stop reason: HOSPADM

## 2023-09-29 RX ORDER — HEPARIN SODIUM 1000 [USP'U]/ML
1000 INJECTION, SOLUTION INTRAVENOUS; SUBCUTANEOUS ONCE
Status: DISCONTINUED | OUTPATIENT
Start: 2023-09-29 | End: 2023-09-30 | Stop reason: HOSPADM

## 2023-09-29 RX ORDER — ALLOPURINOL 100 MG/1
100 TABLET ORAL DAILY
COMMUNITY

## 2023-09-29 RX ORDER — 0.9 % SODIUM CHLORIDE 0.9 %
100 INTRAVENOUS SOLUTION INTRAVENOUS PRN
Status: DISCONTINUED | OUTPATIENT
Start: 2023-09-29 | End: 2023-09-30 | Stop reason: HOSPADM

## 2023-09-29 RX ORDER — HEPARIN SODIUM 5000 [USP'U]/ML
5000 INJECTION, SOLUTION INTRAVENOUS; SUBCUTANEOUS EVERY 8 HOURS SCHEDULED
Status: DISCONTINUED | OUTPATIENT
Start: 2023-09-29 | End: 2023-09-30 | Stop reason: HOSPADM

## 2023-09-29 RX ORDER — MIDODRINE HYDROCHLORIDE 5 MG/1
10 TABLET ORAL
Status: DISCONTINUED | OUTPATIENT
Start: 2023-09-29 | End: 2023-09-30 | Stop reason: HOSPADM

## 2023-09-29 RX ORDER — MIDODRINE HYDROCHLORIDE 5 MG/1
10 TABLET ORAL ONCE
Status: DISCONTINUED | OUTPATIENT
Start: 2023-09-29 | End: 2023-09-30 | Stop reason: HOSPADM

## 2023-09-29 RX ORDER — POLYETHYLENE GLYCOL 3350 17 G/17G
17 POWDER, FOR SOLUTION ORAL DAILY
Status: DISCONTINUED | OUTPATIENT
Start: 2023-09-30 | End: 2023-09-30 | Stop reason: HOSPADM

## 2023-09-29 RX ORDER — MIRTAZAPINE 15 MG/1
7.5 TABLET, FILM COATED ORAL NIGHTLY
Status: DISCONTINUED | OUTPATIENT
Start: 2023-09-29 | End: 2023-09-30 | Stop reason: HOSPADM

## 2023-09-29 RX ORDER — TRAMADOL HYDROCHLORIDE 50 MG/1
50 TABLET ORAL EVERY 6 HOURS PRN
Status: DISCONTINUED | OUTPATIENT
Start: 2023-09-29 | End: 2023-09-30 | Stop reason: HOSPADM

## 2023-09-29 RX ORDER — ALBUMIN (HUMAN) 12.5 G/50ML
25 SOLUTION INTRAVENOUS
Status: DISCONTINUED | OUTPATIENT
Start: 2023-09-29 | End: 2023-09-30 | Stop reason: HOSPADM

## 2023-09-29 RX ORDER — EZETIMIBE 10 MG/1
10 TABLET ORAL DAILY
Status: DISCONTINUED | OUTPATIENT
Start: 2023-09-29 | End: 2023-09-29 | Stop reason: RX

## 2023-09-29 ASSESSMENT — ENCOUNTER SYMPTOMS
DIARRHEA: 0
NAUSEA: 0
SHORTNESS OF BREATH: 0
VOMITING: 0
WHEEZING: 0
ABDOMINAL PAIN: 0
COUGH: 0
RHINORRHEA: 0

## 2023-09-29 ASSESSMENT — PAIN SCALES - GENERAL
PAINLEVEL_OUTOF10: 0
PAINLEVEL_OUTOF10: 0

## 2023-09-29 NOTE — ED NOTES
ED TO INPATIENT SBAR HANDOFF    Patient Name: Leslie Pena   :  1940  79 y.o. MRN:  [id number]  Preferred Name  Caleb Mckeon   ED Room #:  ED-0007/07  Family/Caregiver Present no   Restraints no   Sitter no   Sepsis Risk Score Sepsis Risk Score: 2.27    Situation  Code Status: Prior Limited Code details: Intubation/Re-intubation No Comment; Defibrillation/Cardioversion No Comment; Chest Compressions No Comment; Resuscitative Medications No Comment; Other No Comment. Allergies: Dilaudid [hydromorphone], Fentanyl, Morphine and related, Norco [hydrocodone-acetaminophen], and Oxycodone  Weight: No data found. Arrived from: home  Chief Complaint:   Chief Complaint   Patient presents with    Extremity Weakness     PT to ED via [facility] from home where he lives with his wife c/o generalized weakness. Patient states his wife does not drive and she is not able to get him to his dialysis appointments. PT sts he gets dialyzed M,W,F. PT sts he missed this past Wednesday and this morning. Pt Ax4      Hospital Problem/Diagnosis:  Principal Problem:    Noncompliance of patient with renal dialysis  Resolved Problems:    * No resolved hospital problems. *    Imaging:   XR CHEST PORTABLE   Final Result   Pulmonary vascular congestion/interstitial edema. Probable small bilateral   pleural effusions. Cardiomegaly.            Abnormal labs:   Abnormal Labs Reviewed   CBC WITH AUTO DIFFERENTIAL - Abnormal; Notable for the following components:       Result Value    RBC 4.06 (*)     Hemoglobin 12.7 (*)     .4 (*)     RDW 18.9 (*)     All other components within normal limits   COMPREHENSIVE METABOLIC PANEL W/ REFLEX TO MG FOR LOW K - Abnormal; Notable for the following components:    Potassium reflex Magnesium 5.8 (*)     BUN 53 (*)     Creatinine 10.5 (*)     Est, Glom Filt Rate 4 (*)     All other components within normal limits    Narrative:     Monalisa Maldonado  SFELICEOF tel. [phone],  Chemistry results called to and

## 2023-09-29 NOTE — PROGRESS NOTES
4 Eyes Skin Assessment     NAME:  Leslie Pena  YOB: 1940  MEDICAL RECORD NUMBER:  [id number]    The patient is being assessed for  Admission    I agree that at least one RN has performed a thorough Head to Toe Skin Assessment on the patient. ALL assessment sites listed below have been assessed. Areas assessed by both nurses:    Head, Face, Ears, Shoulders, Back, Chest, Arms, Elbows, Hands, Sacrum. Buttock, Coccyx, Ischium, Legs. Feet and Heels, and Under Medical Devices   Patient has healing abrasions to BLE.  2nd left toe looks like there is a cut but unable to assess due to gauze being stuck to it and if removed it will tear more. Does the Patient have a Wound? Yes wound(s) were present on assessment. LDA wound assessment was Initiated and completed by RN  Patient has old pressure areas that are hurting to the buttocks, groin and scrotum.        Hans Prevention initiated by RN: Yes  Wound Care Orders initiated by RN: Yes    Pressure Injury (Stage 3,4, Unstageable, DTI, NWPT, and Complex wounds) if present, place Wound referral order by RN under : Yes    New Ostomies, if present place, Ostomy referral order under : No     Nurse 1 eSignature: Electronically signed by Corinne Birmingham RN on 9/29/23 at 7:16 PM EDT    **SHARE this note so that the co-signing nurse can place an eSignature**    Nurse 2 eSignature: Electronically signed by Sharon Sanders RN on 9/29/23 at 7:48 PM EDT

## 2023-09-29 NOTE — ED PROVIDER NOTES
[facility]        Pt Name: Wendy Mcgarry  MRN: [id number]  [address] 1940  Date of evaluation: 9/29/2023  Provider: Jeremy Martinez PA-C  PCP: Jaime Casanova MD  Note Started: 1:15 PM EDT 9/29/23      RIKKI. I have evaluated this patient. CHIEF COMPLAINT       Chief Complaint   Patient presents with    Extremity Weakness     PT to ED via [facility] from home where he lives with his wife c/o generalized weakness. Patient states his wife does not drive and she is not able to get him to his dialysis appointments. PT sts he gets dialyzed M,W,F. PT sts he missed this past Wednesday and this morning. Pt Ax4        HISTORY OF PRESENT ILLNESS: 1 or more Elements     History From: patient   Limitations to history : Charles Mcgarry is a 79 y.o. male who presents for evaluation of generalized weakness and needing dialysis. Patient was DC'd from [facility] on Monday after receiving dialysis but is now at home with his wife who does not drive and he has patient for dialysis Wednesday or today. He was seen and evaluated in the ER Wednesday with abdominal pain and had relatively unremarkable labs and no hyperkalemia. Nephrology was consulted and states that he can be discharged home but was encouraged to get dialysis on Thursday, this was not done. He was found at that time to also be constipated and had enema with production of large bowel movement in the ED and some significant symptomatic improvement. He has no new complaints or concerns at this time. Nursing Notes were all reviewed and agreed with or any disagreements were addressed in the HPI. REVIEW OF SYSTEMS :      Review of Systems   Constitutional:  Negative for appetite change, chills and fever. HENT:  Negative for congestion and rhinorrhea. Respiratory:  Negative for cough, shortness of breath and wheezing. Cardiovascular:  Negative for chest pain.
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
## 2023-09-29 NOTE — PROGRESS NOTES
Patient has arrived to unit in stable condition. Vital signs obtained. Patient is awake, alert and oriented. Respirations are easy and unlabored. Patient does not appear to be in distress. Patient oriented to room and call light. Plan of care discussed with patient, patient agreeable. Call light within reach. Fall precautions in place.

## 2023-09-30 VITALS
WEIGHT: 157.63 LBS | DIASTOLIC BLOOD PRESSURE: 68 MMHG | TEMPERATURE: 97.4 F | SYSTOLIC BLOOD PRESSURE: 116 MMHG | HEART RATE: 62 BPM | BODY MASS INDEX: 22.62 KG/M2 | OXYGEN SATURATION: 96 % | RESPIRATION RATE: 16 BRPM

## 2023-09-30 PROBLEM — E11.8 CONTROLLED TYPE 2 DIABETES MELLITUS WITH COMPLICATION, WITH LONG-TERM CURRENT USE OF INSULIN (HCC): Status: ACTIVE | Noted: 2023-03-06

## 2023-09-30 PROBLEM — Z79.4 CONTROLLED TYPE 2 DIABETES MELLITUS WITH COMPLICATION, WITH LONG-TERM CURRENT USE OF INSULIN (HCC): Status: ACTIVE | Noted: 2023-03-06

## 2023-09-30 LAB
ANION GAP SERPL CALCULATED.3IONS-SCNC: 13 MMOL/L (ref 3–16)
BUN SERPL-MCNC: 31 MG/DL (ref 7–20)
CALCIUM SERPL-MCNC: 10 MG/DL (ref 8.3–10.6)
CHLORIDE SERPL-SCNC: 97 MMOL/L (ref 99–110)
CO2 SERPL-SCNC: 26 MMOL/L (ref 21–32)
CREAT SERPL-MCNC: 7.4 MG/DL (ref 0.8–1.3)
DEPRECATED RDW RBC AUTO: 18.9 % (ref 12.4–15.4)
GFR SERPLBLD CREATININE-BSD FMLA CKD-EPI: 7 ML/MIN/{1.73_M2}
GLUCOSE SERPL-MCNC: 90 MG/DL (ref 70–99)
HCT VFR BLD AUTO: 40.9 % (ref 40.5–52.5)
HGB BLD-MCNC: 13.3 G/DL (ref 13.5–17.5)
MCH RBC QN AUTO: 31.8 PG (ref 26–34)
MCHC RBC AUTO-ENTMCNC: 32.6 G/DL (ref 31–36)
MCV RBC AUTO: 97.6 FL (ref 80–100)
PLATELET # BLD AUTO: 154 K/UL (ref 135–450)
PMV BLD AUTO: 8.2 FL (ref 5–10.5)
POTASSIUM SERPL-SCNC: 4.7 MMOL/L (ref 3.5–5.1)
RBC # BLD AUTO: 4.19 M/UL (ref 4.2–5.9)
SODIUM SERPL-SCNC: 136 MMOL/L (ref 136–145)
WBC # BLD AUTO: 7 K/UL (ref 4–11)

## 2023-09-30 PROCEDURE — G0378 HOSPITAL OBSERVATION PER HR: HCPCS

## 2023-09-30 PROCEDURE — 96372 THER/PROPH/DIAG INJ SC/IM: CPT

## 2023-09-30 PROCEDURE — 80048 BASIC METABOLIC PNL TOTAL CA: CPT

## 2023-09-30 PROCEDURE — 36415 COLL VENOUS BLD VENIPUNCTURE: CPT

## 2023-09-30 PROCEDURE — 6360000002 HC RX W HCPCS: Performed by: INTERNAL MEDICINE

## 2023-09-30 PROCEDURE — 85027 COMPLETE CBC AUTOMATED: CPT

## 2023-09-30 PROCEDURE — 6370000000 HC RX 637 (ALT 250 FOR IP): Performed by: INTERNAL MEDICINE

## 2023-09-30 RX ADMIN — LEVOTHYROXINE SODIUM 125 MCG: 0.12 TABLET ORAL at 06:22

## 2023-09-30 RX ADMIN — HEPARIN SODIUM 5000 UNITS: 5000 INJECTION INTRAVENOUS; SUBCUTANEOUS at 06:22

## 2023-09-30 RX ADMIN — DOCUSATE SODIUM 100 MG: 100 CAPSULE, LIQUID FILLED ORAL at 08:46

## 2023-09-30 RX ADMIN — TRAMADOL HYDROCHLORIDE 50 MG: 50 TABLET, COATED ORAL at 00:31

## 2023-09-30 RX ADMIN — HEPARIN SODIUM 5000 UNITS: 5000 INJECTION INTRAVENOUS; SUBCUTANEOUS at 00:31

## 2023-09-30 RX ADMIN — MIRTAZAPINE 7.5 MG: 15 TABLET, FILM COATED ORAL at 00:31

## 2023-09-30 RX ADMIN — AMLODIPINE BESYLATE 5 MG: 5 TABLET ORAL at 08:46

## 2023-09-30 RX ADMIN — SEVELAMER CARBONATE 800 MG: 800 TABLET, FILM COATED ORAL at 08:46

## 2023-09-30 ASSESSMENT — PAIN SCALES - GENERAL
PAINLEVEL_OUTOF10: 6
PAINLEVEL_OUTOF10: 2

## 2023-09-30 ASSESSMENT — PAIN DESCRIPTION - LOCATION
LOCATION: GENERALIZED
LOCATION: ABDOMEN

## 2023-09-30 NOTE — PROGRESS NOTES
Report given to [facility] transport staff. All questions answered at this time.

## 2023-09-30 NOTE — DIALYSIS
Treatment time: 3 hours  Net UF: 3000 ml     Pre weight: 79 kg  Post weight:76 kg    Access used: R TDC     Access function: well with  ml/min     Medications or blood products given: None      Regular outpatient schedule: MWF     Summary of response to treatment: Patient tolerated treatment well and without any complications. Patient remained stable throughout entire treatment and upon exiting the dialysis suite via RN. Report given to Soo Anaya RN and copy of dialysis treatment record placed in chart, to be scanned into EMR.

## 2023-09-30 NOTE — DISCHARGE INSTR - COC
Continuity of Care Form    Patient Name: Em Sher   :  1940  MRN:  [id number]    Admit date:  2023  Discharge date:  ***    Code Status Order: Full Code   Advance Directives:     Admitting Physician:  Radha Meadows MD  PCP: Alea Lau MD    Discharging Nurse: [facility] Unit/Room#: 6KX-9349/3650-25  Discharging Unit Phone Number: ***    Emergency Contact:   Extended Emergency Contact Information  Primary Emergency Contact: Connie Obrien  Address: [address] [place] [address] of [address] Phone: [phone]  Mobile Phone: [phone]  Relation: Spouse    Past Surgical History:  Past Surgical History:   Procedure Laterality Date    SIGMOIDOSCOPY N/A 3/9/2023    SIGMOIDOSCOPY DIAGNOSTIC FLEXIBLE performed by Jessica Rodriguez MD at [facility]         Immunization History:   Immunization History   Administered Date(s) Administered    COVID-19, PFIZER PURPLE top, DILUTE for use, (age 15 y+), 30mcg/0.3mL 2021, 2021, 2021    Influenza Whole 2008, 2010    Pneumococcal, PPSV23, PNEUMOVAX 21, (age 2y+), SC/IM, 0.5mL 2008       Active Problems:  Patient Active Problem List   Diagnosis Code    Hyperlipidemia E78.5    Essential hypertension I10    Localized edema R60.0    Fracture of sternum S22.20XA    Fractured rib S22.39XA    Prostate cancer ([address]) C61    Thrombus I82.90    Chronic renal disease N18.9    Trigger finger M65.30    CTS (carpal tunnel syndrome) G56.00    Venous insufficiency of both lower extremities I87.2    Venous ulcer of right lower extremity without varicose veins (HCC) I87.2, L97.919    Non-pressure chronic ulcer of lower leg, limited to breakdown of skin, right ([address]) L97.911    Laceration of skin of lower leg, left, initial encounter H71.494R    Laceration of skin of lower leg, right, initial encounter S81.811A    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 with

## 2023-09-30 NOTE — PROGRESS NOTES
RN called wife to review AVS. Verbalized understanding. Educated on need to follow up with nephrology r/t catheter exchange. Information attached with AVS with contact information.

## 2023-09-30 NOTE — PROGRESS NOTES
Assessment complete. VSS. Patient resting in bed. Respirations even and easy. 2nd toe wound on left foot exposed, MD to bedside to evaluate. Per MD, will place podiatry consult but should not impede discharge if not seen while inpatient. Call light in reach. Fall precautions in place. No needs expressed at this time.

## 2023-09-30 NOTE — PLAN OF CARE
Problem: Safety - Adult  Goal: Free from fall injury  Outcome: Completed     Problem: Pain  Goal: Verbalizes/displays adequate comfort level or baseline comfort level  Outcome: Completed     Problem: Chronic Conditions and Co-morbidities  Goal: Patient's chronic conditions and co-morbidity symptoms are monitored and maintained or improved  Outcome: Completed

## 2023-10-01 NOTE — H&P
[facility]                     [address], [address]                              HISTORY AND PHYSICAL    PATIENT NAME: Anny Nava                    :        1940  MED REC NO:   [id number]                          ROOM:       5564  ACCOUNT NO:   [de-identified]                           ADMIT DATE: 2023  PROVIDER:     Joel Ayala MD    HISTORY OF PRESENT ILLNESS:  The patient is an 79-year-old black  American gentleman came to the emergency room because he has missed two  dialysis treatments due to lack of transportation. Although, the  patient himself claims that he has no problem with transportation. He  was brought in by [facility] from the home, where he lives with his  wife, having generalized muscle weakness. Stated his wife does not  drive and she is not able to get him to his dialysis appointment. The  patient states he gets dialyzed Monday, Wednesday and Friday. The  patient states he had missed this past Wednesday and this morning. The  patient is otherwise fully oriented. PAST MEDICAL HISTORY:  Pertinent for end-stage renal disease,  hypertension, anemia of chronic disease, type 2 diabetes mellitus, rib  fracture and venous thrombosis. PAST SURGICAL HISTORY:  Pertinent for TURP and sigmoidoscopy. MEDICATIONS:  The patient is on MiraLAX, Colace, amlodipine, Remeron,  tramadol, Voltaren gel, Aquaphor ointment, Renvela, Collagenase  ointment, Nephrocaps, ProAmatine, and levothyroxine. ALLERGIES:  The patient is allergic to five substances that includes;  DILAUDID, FENTANYL, MORPHINE, NORCO, TYLENOL and PERCOCET. SOCIAL HISTORY:  The patient is a  man. He is a nonsmoker,  nondrinker. He has four children. He used to work in [employer] and  then he also later on in his life work for [employer] at the name  [employer] in [place].   He does not have any history of  substance

## 2023-10-01 NOTE — PROGRESS NOTES
Hospital Problems             Last Modified POA    * (Principal) Noncompliance of patient with renal dialysis 9/29/2023 Yes    Controlled type 2 diabetes mellitus with complication, with long-term current use of insulin ([address]) 9/30/2023 Yes    Chronic combined systolic (congestive) and diastolic (congestive) heart failure ([address]) 9/30/2023 Yes    Chronic pulmonary edema 9/30/2023 Yes    Anemia, chronic disease 9/30/2023 Yes     H&P Dictated

## 2023-11-16 ENCOUNTER — HOSPITAL ENCOUNTER (INPATIENT)
Age: 83
LOS: 14 days | Discharge: HOME HEALTH | DRG: 280 | End: 2023-11-30
Attending: EMERGENCY MEDICINE | Admitting: INTERNAL MEDICINE
Payer: MEDICARE

## 2023-11-16 ENCOUNTER — APPOINTMENT (OUTPATIENT)
Dept: GENERAL RADIOLOGY | Age: 83
DRG: 280 | End: 2023-11-16
Payer: MEDICARE

## 2023-11-16 ENCOUNTER — APPOINTMENT (OUTPATIENT)
Dept: CT IMAGING | Age: 83
DRG: 280 | End: 2023-11-16
Payer: MEDICARE

## 2023-11-16 DIAGNOSIS — R53.83 FATIGUE, UNSPECIFIED TYPE: Primary | ICD-10-CM

## 2023-11-16 DIAGNOSIS — R53.1 GENERALIZED WEAKNESS: ICD-10-CM

## 2023-11-16 DIAGNOSIS — L97.901 ULCER OF LOWER EXTREMITY, LIMITED TO BREAKDOWN OF SKIN, UNSPECIFIED LATERALITY (HCC): ICD-10-CM

## 2023-11-16 PROBLEM — I48.91 ATRIAL FIBRILLATION (HCC): Status: ACTIVE | Noted: 2023-11-16

## 2023-11-16 PROBLEM — I21.4 NSTEMI (NON-ST ELEVATED MYOCARDIAL INFARCTION) (HCC): Status: ACTIVE | Noted: 2023-11-16

## 2023-11-16 PROBLEM — R55 SYNCOPE: Status: ACTIVE | Noted: 2023-11-16

## 2023-11-16 LAB
ANION GAP SERPL CALCULATED.3IONS-SCNC: 10 MMOL/L (ref 3–16)
BASOPHILS # BLD: 0.1 K/UL (ref 0–0.2)
BASOPHILS NFR BLD: 1.1 %
BUN SERPL-MCNC: 19 MG/DL (ref 7–20)
CALCIUM SERPL-MCNC: 9.5 MG/DL (ref 8.3–10.6)
CHLORIDE SERPL-SCNC: 95 MMOL/L (ref 99–110)
CO2 SERPL-SCNC: 31 MMOL/L (ref 21–32)
CREAT SERPL-MCNC: 3.3 MG/DL (ref 0.8–1.3)
DEPRECATED RDW RBC AUTO: 17.2 % (ref 12.4–15.4)
EKG ATRIAL RATE: 46 BPM
EKG DIAGNOSIS: NORMAL
EKG Q-T INTERVAL: 472 MS
EKG QRS DURATION: 118 MS
EKG QTC CALCULATION (BAZETT): 442 MS
EKG R AXIS: -72 DEGREES
EKG T AXIS: 100 DEGREES
EKG VENTRICULAR RATE: 53 BPM
EOSINOPHIL # BLD: 0.1 K/UL (ref 0–0.6)
EOSINOPHIL NFR BLD: 1.8 %
GFR SERPLBLD CREATININE-BSD FMLA CKD-EPI: 18 ML/MIN/{1.73_M2}
GLUCOSE BLD-MCNC: 107 MG/DL (ref 70–99)
GLUCOSE SERPL-MCNC: 102 MG/DL (ref 70–99)
HCT VFR BLD AUTO: 33.2 % (ref 40.5–52.5)
HGB BLD-MCNC: 10.6 G/DL (ref 13.5–17.5)
LYMPHOCYTES # BLD: 1.6 K/UL (ref 1–5.1)
LYMPHOCYTES NFR BLD: 19.5 %
MCH RBC QN AUTO: 32.7 PG (ref 26–34)
MCHC RBC AUTO-ENTMCNC: 31.9 G/DL (ref 31–36)
MCV RBC AUTO: 102.5 FL (ref 80–100)
MONOCYTES # BLD: 1.3 K/UL (ref 0–1.3)
MONOCYTES NFR BLD: 15.7 %
NEUTROPHILS # BLD: 5.2 K/UL (ref 1.7–7.7)
NEUTROPHILS NFR BLD: 61.9 %
PERFORMED ON: ABNORMAL
PLATELET # BLD AUTO: 140 K/UL (ref 135–450)
PMV BLD AUTO: 8.5 FL (ref 5–10.5)
POTASSIUM SERPL-SCNC: 3.2 MMOL/L (ref 3.5–5.1)
RBC # BLD AUTO: 3.24 M/UL (ref 4.2–5.9)
REASON FOR REJECTION: NORMAL
REJECTED TEST: NORMAL
SODIUM SERPL-SCNC: 136 MMOL/L (ref 136–145)
TROPONIN, HIGH SENSITIVITY: 412 NG/L (ref 0–22)
WBC # BLD AUTO: 8.4 K/UL (ref 4–11)

## 2023-11-16 PROCEDURE — 2580000003 HC RX 258: Performed by: INTERNAL MEDICINE

## 2023-11-16 PROCEDURE — 93005 ELECTROCARDIOGRAM TRACING: CPT | Performed by: EMERGENCY MEDICINE

## 2023-11-16 PROCEDURE — 36415 COLL VENOUS BLD VENIPUNCTURE: CPT

## 2023-11-16 PROCEDURE — 80048 BASIC METABOLIC PNL TOTAL CA: CPT

## 2023-11-16 PROCEDURE — 1200000000 HC SEMI PRIVATE

## 2023-11-16 PROCEDURE — 6370000000 HC RX 637 (ALT 250 FOR IP): Performed by: INTERNAL MEDICINE

## 2023-11-16 PROCEDURE — 84484 ASSAY OF TROPONIN QUANT: CPT

## 2023-11-16 PROCEDURE — 99285 EMERGENCY DEPT VISIT HI MDM: CPT

## 2023-11-16 PROCEDURE — 71045 X-RAY EXAM CHEST 1 VIEW: CPT

## 2023-11-16 PROCEDURE — 70450 CT HEAD/BRAIN W/O DYE: CPT

## 2023-11-16 PROCEDURE — 87636 SARSCOV2 & INF A&B AMP PRB: CPT

## 2023-11-16 PROCEDURE — 83036 HEMOGLOBIN GLYCOSYLATED A1C: CPT

## 2023-11-16 PROCEDURE — 93010 ELECTROCARDIOGRAM REPORT: CPT | Performed by: INTERNAL MEDICINE

## 2023-11-16 PROCEDURE — 85025 COMPLETE CBC W/AUTO DIFF WBC: CPT

## 2023-11-16 RX ORDER — MIDODRINE HYDROCHLORIDE 5 MG/1
2.5 TABLET ORAL DAILY
Status: DISCONTINUED | OUTPATIENT
Start: 2023-11-17 | End: 2023-11-30 | Stop reason: HOSPADM

## 2023-11-16 RX ORDER — ONDANSETRON 4 MG/1
4 TABLET, ORALLY DISINTEGRATING ORAL EVERY 8 HOURS PRN
Status: DISCONTINUED | OUTPATIENT
Start: 2023-11-16 | End: 2023-11-30 | Stop reason: HOSPADM

## 2023-11-16 RX ORDER — GLUCAGON 1 MG/ML
1 KIT INJECTION PRN
Status: DISCONTINUED | OUTPATIENT
Start: 2023-11-16 | End: 2023-11-30 | Stop reason: HOSPADM

## 2023-11-16 RX ORDER — MIRTAZAPINE 15 MG/1
15 TABLET, FILM COATED ORAL NIGHTLY
Status: DISCONTINUED | OUTPATIENT
Start: 2023-11-16 | End: 2023-11-16

## 2023-11-16 RX ORDER — CALCIUM ACETATE 667 MG/1
1 CAPSULE ORAL SEE ADMIN INSTRUCTIONS
COMMUNITY

## 2023-11-16 RX ORDER — HYDRALAZINE HYDROCHLORIDE 20 MG/ML
10 INJECTION INTRAMUSCULAR; INTRAVENOUS EVERY 6 HOURS PRN
Status: DISCONTINUED | OUTPATIENT
Start: 2023-11-16 | End: 2023-11-30 | Stop reason: HOSPADM

## 2023-11-16 RX ORDER — 0.9 % SODIUM CHLORIDE 0.9 %
500 INTRAVENOUS SOLUTION INTRAVENOUS PRN
Status: DISCONTINUED | OUTPATIENT
Start: 2023-11-16 | End: 2023-11-30 | Stop reason: HOSPADM

## 2023-11-16 RX ORDER — ALLOPURINOL 100 MG/1
100 TABLET ORAL DAILY
Status: DISCONTINUED | OUTPATIENT
Start: 2023-11-17 | End: 2023-11-30 | Stop reason: HOSPADM

## 2023-11-16 RX ORDER — HEPARIN SODIUM 5000 [USP'U]/ML
5000 INJECTION, SOLUTION INTRAVENOUS; SUBCUTANEOUS EVERY 8 HOURS SCHEDULED
Status: DISCONTINUED | OUTPATIENT
Start: 2023-11-17 | End: 2023-11-30 | Stop reason: HOSPADM

## 2023-11-16 RX ORDER — PETROLATUM,WHITE 41 %
OINTMENT (GRAM) TOPICAL 2 TIMES DAILY PRN
COMMUNITY

## 2023-11-16 RX ORDER — LEVOTHYROXINE SODIUM 0.12 MG/1
125 TABLET ORAL DAILY
Status: DISCONTINUED | OUTPATIENT
Start: 2023-11-17 | End: 2023-11-16

## 2023-11-16 RX ORDER — POTASSIUM CHLORIDE 20 MEQ/1
20 TABLET, EXTENDED RELEASE ORAL ONCE
Status: COMPLETED | OUTPATIENT
Start: 2023-11-16 | End: 2023-11-16

## 2023-11-16 RX ORDER — SEVELAMER CARBONATE 800 MG/1
1600 TABLET, FILM COATED ORAL
Status: DISCONTINUED | OUTPATIENT
Start: 2023-11-17 | End: 2023-11-30 | Stop reason: HOSPADM

## 2023-11-16 RX ORDER — SODIUM CHLORIDE 9 MG/ML
INJECTION, SOLUTION INTRAVENOUS PRN
Status: DISCONTINUED | OUTPATIENT
Start: 2023-11-16 | End: 2023-11-30 | Stop reason: HOSPADM

## 2023-11-16 RX ORDER — AMLODIPINE BESYLATE 5 MG/1
5 TABLET ORAL DAILY
Status: DISCONTINUED | OUTPATIENT
Start: 2023-11-17 | End: 2023-11-25

## 2023-11-16 RX ORDER — SODIUM CHLORIDE 0.9 % (FLUSH) 0.9 %
10 SYRINGE (ML) INJECTION EVERY 12 HOURS SCHEDULED
Status: DISCONTINUED | OUTPATIENT
Start: 2023-11-16 | End: 2023-11-30 | Stop reason: HOSPADM

## 2023-11-16 RX ORDER — POTASSIUM CHLORIDE 7.45 MG/ML
10 INJECTION INTRAVENOUS PRN
Status: DISCONTINUED | OUTPATIENT
Start: 2023-11-16 | End: 2023-11-30 | Stop reason: HOSPADM

## 2023-11-16 RX ORDER — TRAMADOL HYDROCHLORIDE 50 MG/1
50 TABLET ORAL EVERY 6 HOURS PRN
Status: DISCONTINUED | OUTPATIENT
Start: 2023-11-16 | End: 2023-11-30 | Stop reason: HOSPADM

## 2023-11-16 RX ORDER — ONDANSETRON 2 MG/ML
4 INJECTION INTRAMUSCULAR; INTRAVENOUS EVERY 6 HOURS PRN
Status: DISCONTINUED | OUTPATIENT
Start: 2023-11-16 | End: 2023-11-30 | Stop reason: HOSPADM

## 2023-11-16 RX ORDER — EZETIMIBE 10 MG/1
10 TABLET ORAL DAILY
Status: DISCONTINUED | OUTPATIENT
Start: 2023-11-17 | End: 2023-11-16 | Stop reason: RX

## 2023-11-16 RX ORDER — INSULIN LISPRO 100 [IU]/ML
0-8 INJECTION, SOLUTION INTRAVENOUS; SUBCUTANEOUS
Status: DISCONTINUED | OUTPATIENT
Start: 2023-11-17 | End: 2023-11-30 | Stop reason: HOSPADM

## 2023-11-16 RX ORDER — DOCUSATE SODIUM 100 MG/1
100 CAPSULE, LIQUID FILLED ORAL 2 TIMES DAILY
Status: DISCONTINUED | OUTPATIENT
Start: 2023-11-16 | End: 2023-11-16

## 2023-11-16 RX ORDER — INSULIN LISPRO 100 [IU]/ML
0-4 INJECTION, SOLUTION INTRAVENOUS; SUBCUTANEOUS NIGHTLY
Status: DISCONTINUED | OUTPATIENT
Start: 2023-11-16 | End: 2023-11-30 | Stop reason: HOSPADM

## 2023-11-16 RX ORDER — ACETAMINOPHEN 325 MG/1
650 TABLET ORAL EVERY 6 HOURS PRN
Status: DISCONTINUED | OUTPATIENT
Start: 2023-11-16 | End: 2023-11-30 | Stop reason: HOSPADM

## 2023-11-16 RX ORDER — BENZONATATE 100 MG/1
200 CAPSULE ORAL 3 TIMES DAILY PRN
Status: DISCONTINUED | OUTPATIENT
Start: 2023-11-16 | End: 2023-11-30 | Stop reason: HOSPADM

## 2023-11-16 RX ORDER — LEVOTHYROXINE SODIUM 0.05 MG/1
50 TABLET ORAL DAILY
COMMUNITY

## 2023-11-16 RX ORDER — DEXTROSE MONOHYDRATE 100 MG/ML
INJECTION, SOLUTION INTRAVENOUS CONTINUOUS PRN
Status: DISCONTINUED | OUTPATIENT
Start: 2023-11-16 | End: 2023-11-30 | Stop reason: HOSPADM

## 2023-11-16 RX ORDER — POTASSIUM CHLORIDE 20 MEQ/1
40 TABLET, EXTENDED RELEASE ORAL PRN
Status: DISCONTINUED | OUTPATIENT
Start: 2023-11-16 | End: 2023-11-30 | Stop reason: HOSPADM

## 2023-11-16 RX ORDER — ACETAMINOPHEN 650 MG/1
650 SUPPOSITORY RECTAL EVERY 6 HOURS PRN
Status: DISCONTINUED | OUTPATIENT
Start: 2023-11-16 | End: 2023-11-30 | Stop reason: HOSPADM

## 2023-11-16 RX ORDER — ENOXAPARIN SODIUM 100 MG/ML
40 INJECTION SUBCUTANEOUS DAILY
Status: DISCONTINUED | OUTPATIENT
Start: 2023-11-17 | End: 2023-11-16

## 2023-11-16 RX ORDER — SODIUM CHLORIDE 0.9 % (FLUSH) 0.9 %
10 SYRINGE (ML) INJECTION PRN
Status: DISCONTINUED | OUTPATIENT
Start: 2023-11-16 | End: 2023-11-30 | Stop reason: HOSPADM

## 2023-11-16 RX ADMIN — POTASSIUM CHLORIDE 20 MEQ: 1500 TABLET, EXTENDED RELEASE ORAL at 15:59

## 2023-11-16 RX ADMIN — Medication 10 ML: at 21:37

## 2023-11-16 RX ADMIN — TRAMADOL HYDROCHLORIDE 50 MG: 50 TABLET, COATED ORAL at 21:37

## 2023-11-16 ASSESSMENT — PAIN SCALES - GENERAL: PAINLEVEL_OUTOF10: 9

## 2023-11-16 NOTE — PROGRESS NOTES
ED TO INPATIENT SBAR HANDOFF    Patient Name: Inna Belle   :  1940  79 y.o. MRN:  [id number]  Preferred Name  Zonia Valdovinos  ED Room #:  ED-0008/08  Family/Caregiver Present no   Restraints no   Sitter no   Sepsis Risk Score Sepsis Risk Score: 1.71    Situation  Code Status: Prior No additional code details. Allergies: Dilaudid [hydromorphone], Fentanyl, Morphine and related, Norco [hydrocodone-acetaminophen], and Oxycodone  Weight: No data found. Arrived from: home  Chief Complaint:   Chief Complaint   Patient presents with    Fatigue     Patient in with complaints of being at dialysis and following his treatment they felt he became lethargic. Patient responsive at this time. 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 *    Imaging:   CT HEAD WO CONTRAST   Preliminary Result   No evidence of acute intracranial abnormality. XR CHEST PORTABLE   Final Result   Moderate cardiomegaly. Lungs appear clear. Hemodialysis catheter in good   position.            Abnormal labs:   Abnormal Labs Reviewed   CBC WITH AUTO DIFFERENTIAL - Abnormal; Notable for the following components:       Result Value    RBC 3.24 (*)     Hemoglobin 10.6 (*)     Hematocrit 33.2 (*)     .5 (*)     RDW 17.2 (*)     All other components within normal limits    Narrative:     CALL  [facility]  SFER tel. [phone],  Rejected BMP TRP5/Called to: Anali Gallegos, 2023 13:26, by [address] - Abnormal;

## 2023-11-16 NOTE — PROGRESS NOTES
Pt admitted for syncope, poss nstemi, ESRD    Full h+p to follow    Active Hospital Problems    Diagnosis Date Noted    Controlled type 2 diabetes mellitus with complication, with long-term current use of insulin ([address]) [E11.8, Z79.4] 03/06/2023     Priority: Medium    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 ([address]) [N18.6, Z99.2] 03/06/2023     Priority: Medium    Non-pressure chronic ulcer of left lower leg with fat layer exposed ([address]) [L97.922] 08/23/2022     Priority: Medium    NSTEMI (non-ST elevated myocardial infarction) ([address]) [I21.4] 11/16/2023    Syncope [R55] 11/16/2023    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 The hospitalist service will provide cross-coverage for this patient from 7pm to 7am.    During those hours, contact the on-call hospitalist MD/RIKKI for questions.

## 2023-11-16 NOTE — ED NOTES
History  Chief Complaint   Patient presents with    Hand Pain     Pt bumped left hand on a counter when walking by  Pt reports pain      Juan Carlos Hinds is a 30 yo F presenting with pain, swelling over left index finger after she reports accidentally striking it on a counter corner at work just prior to arrival  Notes swelling over proximal finger and pain which worsens with flexion/extension  She is right hand dominant  Denies numbness/tingling distally  No previous injury to L hand  History provided by:  Patient   used: No    Hand Pain  Associated symptoms: no abdominal pain, no chest pain, no congestion, no cough, no fever, no nausea, no rash, no rhinorrhea, no shortness of breath, no sore throat, no vomiting and no wheezing        Prior to Admission Medications   Prescriptions Last Dose Informant Patient Reported? Taking?    Prenatal MV-Min-FA-Omega-3 (PRENATAL GUMMIES/DHA & FA PO)   Yes No   Sig: Take by mouth   acetaminophen (TYLENOL) 325 mg tablet   No No   Sig: Take 3 tablets (975 mg total) by mouth every 6 (six) hours   Patient not taking: Reported on 2022    albuterol (2 5 mg/3 mL) 0 083 % nebulizer solution   No No   Sig: Take 3 mL (2 5 mg total) by nebulization every 6 (six) hours as needed for wheezing or shortness of breath   Patient not taking: Reported on 2022   albuterol (ProAir HFA) 90 mcg/act inhaler   No No   Sig: Inhale 2 puffs every 6 (six) hours as needed for wheezing   azelastine (ASTELIN) 0 1 % nasal spray   No No   Si spray into each nostril 2 (two) times a day Use in each nostril as directed   Patient not taking: Reported on 2022   fluticasone (FLONASE) 50 mcg/act nasal spray   No No   Si sprays into each nostril daily   ibuprofen (MOTRIN) 600 mg tablet   No No   Sig: Take 1 tablet (600 mg total) by mouth every 6 (six) hours as needed for moderate pain   Patient not taking: Reported on 2022    ipratropium (ATROVENT) 0 02 % nebulizer solution Report received from [facility], this RN assumes care of pt at this time. Pt moaning in bed. Refusing to be placed into a gown. Pt remains in cloths from home. Chux pads wrapped around legs, pt refusing to let this RN take them off.       John Hooks RN  11/16/23 2936 No No   Sig: Take 2 5 mL (0 5 mg total) by nebulization every 6 (six) hours as needed for wheezing or shortness of breath   levocetirizine (XYZAL) 5 MG tablet   No No   Sig: Take 1 tablet (5 mg total) by mouth every evening   Patient not taking: Reported on 6/27/2022   oxyCODONE (ROXICODONE) 5 immediate release tablet   No No   Sig: Take 1 tablet (5 mg total) by mouth every 4 (four) hours as needed for severe pain for up to 7 doses Max Daily Amount: 30 mg   Patient not taking: Reported on 3/16/2022       Facility-Administered Medications: None       Past Medical History:   Diagnosis Date    Allergic rhinitis     Asthma     Endometriosis     POTS (postural orthostatic tachycardia syndrome)        Past Surgical History:   Procedure Laterality Date    DILATION AND CURETTAGE OF UTERUS N/A 3/8/2022    Procedure: DILATATION AND CURETTAGE (D&C); Surgeon: Paz Velez MD;  [facility];  Service: Gynecology    ENDOMETRIAL ABLATION      LAPAROSCOPIC ENDOMETRIOSIS FULGURATION  03/03/2021    DE LAP,DIAGNOSTIC ABDOMEN N/A 3/8/2022    Procedure: LAPAROSCOPY DIAGNOSTIC, right ovarian cystectomy;  Surgeon: Paz Velez MD;  [facility];  Service: Gynecology       Family History   Family history unknown: Yes     I have reviewed and agree with the history as documented  E-Cigarette/Vaping    E-Cigarette Use Never User      E-Cigarette/Vaping Substances    Nicotine No     THC No     CBD No     Flavoring No     Other No     Unknown No      Social History     Tobacco Use    Smoking status: Never Smoker    Smokeless tobacco: Never Used   Vaping Use    Vaping Use: Never used   Substance Use Topics    Alcohol use: Yes     Comment: socially     Drug use: Never       Review of Systems   Constitutional: Negative for chills and fever  HENT: Negative for congestion, rhinorrhea and sore throat  Eyes: Negative for pain and visual disturbance     Respiratory: Negative for cough, shortness of breath and wheezing  Cardiovascular: Negative for chest pain and palpitations  Gastrointestinal: Negative for abdominal pain, nausea and vomiting  Genitourinary: Negative for dysuria, frequency and urgency  Musculoskeletal: Positive for arthralgias and joint swelling  Negative for back pain, neck pain and neck stiffness  Skin: Negative for rash and wound  Neurological: Negative for dizziness, weakness, light-headedness and numbness  Physical Exam  Physical Exam  Vitals and nursing note reviewed  Constitutional:       General: She is not in acute distress  Appearance: She is well-developed  She is not diaphoretic  HENT:      Head: Normocephalic and atraumatic  Right Ear: External ear normal       Left Ear: External ear normal    Eyes:      Conjunctiva/sclera: Conjunctivae normal       Pupils: Pupils are equal, round, and reactive to light  Cardiovascular:      Rate and Rhythm: Normal rate  Comments: Appears well perfused  Pulmonary:      Effort: Pulmonary effort is normal  No respiratory distress  Abdominal:      General: Abdomen is flat  There is no distension  Musculoskeletal:         General: Tenderness present  Cervical back: Normal range of motion and neck supple  Comments: TTP over dorsal L index finger over proximal phalanx with mild soft tissue swelling noted to same  Brisk cap refill, intact sensation distally  Lymphadenopathy:      Cervical: No cervical adenopathy  Skin:     General: Skin is warm and dry  Capillary Refill: Capillary refill takes less than 2 seconds  Findings: No erythema or rash  Neurological:      Mental Status: She is alert and oriented to person, place, and time  Motor: No abnormal muscle tone  Coordination: Coordination normal    Psychiatric:         Behavior: Behavior normal          Thought Content:  Thought content normal          Judgment: Judgment normal          Vital Signs  ED Triage Vitals [08/01/22 1110]   Temperature Pulse Respirations Blood Pressure SpO2   98 4 °F (36 9 °C) 86 16 133/86 98 %      Temp Source Heart Rate Source Patient Position - Orthostatic VS BP Location FiO2 (%)   Oral Monitor Sitting Right arm --      Pain Score       --           Vitals:    08/01/22 1110   BP: 133/86   Pulse: 86   Patient Position - Orthostatic VS: Sitting         Visual Acuity      ED Medications  Medications   naproxen (NAPROSYN) tablet 500 mg (500 mg Oral Given 8/1/22 1144)       Diagnostic Studies  Results Reviewed     None                 XR finger second digit-index LEFT   Final Result by Teresa Burger MD (08/01 1325)      No acute osseous abnormality  Workstation performed: [id number]                    Procedures  Procedures         ED Course                                             MDM  Number of Diagnoses or Management Options  Contusion of left index finger  Diagnosis management comments: Pain, swelling over proximal phalanx of L index finger after striking on corner of counter  No acute fracture on my initial xray read  Intact neurovascular to finger  History/exam c/w contusion  Supportive care for same  Return indications discussed  Amount and/or Complexity of Data Reviewed  Tests in the radiology section of CPT®: ordered and reviewed    Patient Progress  Patient progress: stable      Disposition  Final diagnoses:   Contusion of left index finger     Time reflects when diagnosis was documented in both MDM as applicable and the Disposition within this note     Time User Action Codes Description Comment    8/1/2022 12:20 PM Devi Melgar Add [S60 884A] Contusion of left index finger       ED Disposition     ED Disposition   Discharge    Condition   Stable    Date/Time   Mon Aug 1, 2022 12:20 PM    Comment   Rex Leavitt discharge to home/self care                 Follow-up Information     Follow up With Specialties Details Why Contact Info Additional Information    [facility] Emergency Department Emergency Medicine  If symptoms worsen [facility] [id number]  [address] [facility] [place], operated [facility], [address]  , [place], [place], [address]          Discharge Medication List as of 8/1/2022 12:20 PM      CONTINUE these medications which have NOT CHANGED    Details   acetaminophen (TYLENOL) 325 mg tablet Take 3 tablets (975 mg total) by mouth every 6 (six) hours, Starting Wed 3/9/2022, No Print      albuterol (2 5 mg/3 mL) 0 083 % nebulizer solution Take 3 mL (2 5 mg total) by nebulization every 6 (six) hours as needed for wheezing or shortness of breath, Starting Mon 4/18/2022, Normal      albuterol (ProAir HFA) 90 mcg/act inhaler Inhale 2 puffs every 6 (six) hours as needed for wheezing, Starting Fri 7/8/2022, Normal      azelastine (ASTELIN) 0 1 % nasal spray 1 spray into each nostril 2 (two) times a day Use in each nostril as directed, Starting Mon 4/18/2022, Normal      fluticasone (FLONASE) 50 mcg/act nasal spray 2 sprays into each nostril daily, Starting Mon 5/24/2021, Until Wed 6/23/2021, Normal      ibuprofen (MOTRIN) 600 mg tablet Take 1 tablet (600 mg total) by mouth every 6 (six) hours as needed for moderate pain, Starting Wed 3/9/2022, No Print      ipratropium (ATROVENT) 0 02 % nebulizer solution Take 2 5 mL (0 5 mg total) by nebulization every 6 (six) hours as needed for wheezing or shortness of breath, Starting Mon 7/4/2022, Until Wed 8/3/2022 at 2359, Print      levocetirizine (XYZAL) 5 MG tablet Take 1 tablet (5 mg total) by mouth every evening, Starting Mon 4/18/2022, Normal      oxyCODONE (ROXICODONE) 5 immediate release tablet Take 1 tablet (5 mg total) by mouth every 4 (four) hours as needed for severe pain for up to 7 doses Max Daily Amount: 30 mg, Starting Wed 3/9/2022, Normal      Prenatal MV-Min-FA-Omega-3 (PRENATAL GUMMIES/DHA & FA PO) Take by mouth, Historical Med             No discharge procedures on file      PDMP Review     None          ED Provider  Electronically Signed by           Papo Truong PA-C  08/01/22 7228

## 2023-11-16 NOTE — PROGRESS NOTES
Pharmacy Home Medication Reconciliation Note    A medication reconciliation has been completed for Lynn Thurman 1940    Pharmacy: [address] [place] [address]  Information provided by: patient's wife    The patient's home medication list is as follows: No current facility-administered medications on file prior to encounter. Current Outpatient Medications on File Prior to Encounter   Medication Sig Dispense Refill    levothyroxine (SYNTHROID) 50 MCG tablet Take 1 tablet by mouth Daily      calcium acetate (PHOSLO) 667 MG CAPS capsule Take 1 capsule by mouth See Admin Instructions Take 2 tablets three times daily with meals and one tablet with a snack. Emollient (EUCERIN ADVANCED REPAIR) CREA Apply topically 2 times daily as needed      aspirin-caffeine (ANACIN) 400-32 MG TABS Take 1 tablet by mouth every 6 hours as needed for Headaches or Pain      allopurinol (ZYLOPRIM) 100 MG tablet Take 1 tablet by mouth daily      docusate sodium (COLACE) 100 MG capsule Take 1 capsule by mouth 2 times daily (Patient not taking: Reported on 11/16/2023) 30 capsule 0    amLODIPine (NORVASC) 5 MG tablet Take 1 tablet by mouth daily      mirtazapine (REMERON) 15 MG tablet Take 1 tablet by mouth nightly Give 0.5 tablet (Patient not taking: Reported on 11/16/2023)      traMADol (ULTRAM) 50 MG tablet Take 1 tablet by mouth every 6 hours as needed for Pain.       diclofenac sodium (VOLTAREN) 1 % GEL Apply 2 g topically 2 times daily 50 g 0    mineral oil-hydrophilic petrolatum (AQUAPHOR) ointment Apply 1 application topically in the morning and at bedtime Apply topically to bilateral lower extremity for dry flaky skin (Patient not taking: Reported on 11/16/2023)      sevelamer (RENVELA) 800 MG tablet Take 2 tablets by mouth 3 times daily (with meals) (Patient not taking: Reported on 11/16/2023)      b complex-C-folic acid (NEPHROCAPS) 1 MG capsule Take 1 capsule by mouth daily      midodrine (PROAMATINE) 5

## 2023-11-17 LAB
ALBUMIN SERPL-MCNC: 3.8 G/DL (ref 3.4–5)
ALBUMIN/GLOB SERPL: 1.4 {RATIO} (ref 1.1–2.2)
ALP SERPL-CCNC: 104 U/L (ref 40–129)
ALT SERPL-CCNC: 16 U/L (ref 10–40)
ANION GAP SERPL CALCULATED.3IONS-SCNC: 12 MMOL/L (ref 3–16)
AST SERPL-CCNC: 18 U/L (ref 15–37)
BASOPHILS # BLD: 0.1 K/UL (ref 0–0.2)
BASOPHILS NFR BLD: 0.9 %
BILIRUB SERPL-MCNC: 0.6 MG/DL (ref 0–1)
BUN SERPL-MCNC: 27 MG/DL (ref 7–20)
CALCIUM SERPL-MCNC: 9.3 MG/DL (ref 8.3–10.6)
CHLORIDE SERPL-SCNC: 97 MMOL/L (ref 99–110)
CO2 SERPL-SCNC: 29 MMOL/L (ref 21–32)
CREAT SERPL-MCNC: 4.5 MG/DL (ref 0.8–1.3)
CRP SERPL-MCNC: 22.4 MG/L (ref 0–5.1)
DEPRECATED RDW RBC AUTO: 16.7 % (ref 12.4–15.4)
EOSINOPHIL # BLD: 0.1 K/UL (ref 0–0.6)
EOSINOPHIL NFR BLD: 1.3 %
EST. AVERAGE GLUCOSE BLD GHB EST-MCNC: 102.5 MG/DL
FLUAV RNA RESP QL NAA+PROBE: NOT DETECTED
FLUBV RNA RESP QL NAA+PROBE: NOT DETECTED
GFR SERPLBLD CREATININE-BSD FMLA CKD-EPI: 12 ML/MIN/{1.73_M2}
GLUCOSE BLD-MCNC: 107 MG/DL (ref 70–99)
GLUCOSE BLD-MCNC: 119 MG/DL (ref 70–99)
GLUCOSE BLD-MCNC: 134 MG/DL (ref 70–99)
GLUCOSE BLD-MCNC: 89 MG/DL (ref 70–99)
GLUCOSE SERPL-MCNC: 98 MG/DL (ref 70–99)
HBA1C MFR BLD: 5.2 %
HCT VFR BLD AUTO: 33.3 % (ref 40.5–52.5)
HGB BLD-MCNC: 10.7 G/DL (ref 13.5–17.5)
LYMPHOCYTES # BLD: 1.4 K/UL (ref 1–5.1)
LYMPHOCYTES NFR BLD: 19.1 %
MCH RBC QN AUTO: 32.5 PG (ref 26–34)
MCHC RBC AUTO-ENTMCNC: 32.1 G/DL (ref 31–36)
MCV RBC AUTO: 101.3 FL (ref 80–100)
MONOCYTES # BLD: 1 K/UL (ref 0–1.3)
MONOCYTES NFR BLD: 13.3 %
NEUTROPHILS # BLD: 4.8 K/UL (ref 1.7–7.7)
NEUTROPHILS NFR BLD: 65.4 %
PERFORMED ON: ABNORMAL
PERFORMED ON: NORMAL
PLATELET # BLD AUTO: 127 K/UL (ref 135–450)
PMV BLD AUTO: 8.4 FL (ref 5–10.5)
POTASSIUM SERPL-SCNC: 3.6 MMOL/L (ref 3.5–5.1)
PROCALCITONIN SERPL IA-MCNC: 0.39 NG/ML (ref 0–0.15)
PROT SERPL-MCNC: 6.5 G/DL (ref 6.4–8.2)
RBC # BLD AUTO: 3.29 M/UL (ref 4.2–5.9)
SARS-COV-2 RNA RESP QL NAA+PROBE: NOT DETECTED
SODIUM SERPL-SCNC: 138 MMOL/L (ref 136–145)
TROPONIN, HIGH SENSITIVITY: 428 NG/L (ref 0–22)
WBC # BLD AUTO: 7.4 K/UL (ref 4–11)

## 2023-11-17 PROCEDURE — 84484 ASSAY OF TROPONIN QUANT: CPT

## 2023-11-17 PROCEDURE — 6370000000 HC RX 637 (ALT 250 FOR IP): Performed by: INTERNAL MEDICINE

## 2023-11-17 PROCEDURE — 85025 COMPLETE CBC W/AUTO DIFF WBC: CPT

## 2023-11-17 PROCEDURE — 97530 THERAPEUTIC ACTIVITIES: CPT

## 2023-11-17 PROCEDURE — 2580000003 HC RX 258: Performed by: INTERNAL MEDICINE

## 2023-11-17 PROCEDURE — 6360000002 HC RX W HCPCS: Performed by: INTERNAL MEDICINE

## 2023-11-17 PROCEDURE — 97535 SELF CARE MNGMENT TRAINING: CPT

## 2023-11-17 PROCEDURE — 86140 C-REACTIVE PROTEIN: CPT

## 2023-11-17 PROCEDURE — 36415 COLL VENOUS BLD VENIPUNCTURE: CPT

## 2023-11-17 PROCEDURE — 97166 OT EVAL MOD COMPLEX 45 MIN: CPT

## 2023-11-17 PROCEDURE — 6370000000 HC RX 637 (ALT 250 FOR IP): Performed by: NURSE PRACTITIONER

## 2023-11-17 PROCEDURE — 99223 1ST HOSP IP/OBS HIGH 75: CPT | Performed by: STUDENT IN AN ORGANIZED HEALTH CARE EDUCATION/TRAINING PROGRAM

## 2023-11-17 PROCEDURE — 80053 COMPREHEN METABOLIC PANEL: CPT

## 2023-11-17 PROCEDURE — 97162 PT EVAL MOD COMPLEX 30 MIN: CPT

## 2023-11-17 PROCEDURE — 84145 PROCALCITONIN (PCT): CPT

## 2023-11-17 PROCEDURE — 1200000000 HC SEMI PRIVATE

## 2023-11-17 RX ORDER — ACETAMINOPHEN 500 MG
1000 TABLET ORAL ONCE
Status: COMPLETED | OUTPATIENT
Start: 2023-11-17 | End: 2023-11-17

## 2023-11-17 RX ADMIN — ACETAMINOPHEN 1000 MG: 500 TABLET ORAL at 03:36

## 2023-11-17 RX ADMIN — AMLODIPINE BESYLATE 5 MG: 5 TABLET ORAL at 08:28

## 2023-11-17 RX ADMIN — TRAMADOL HYDROCHLORIDE 50 MG: 50 TABLET, COATED ORAL at 05:56

## 2023-11-17 RX ADMIN — Medication 10 ML: at 20:49

## 2023-11-17 RX ADMIN — Medication 1 MG: at 10:05

## 2023-11-17 RX ADMIN — HEPARIN SODIUM 5000 UNITS: 5000 INJECTION INTRAVENOUS; SUBCUTANEOUS at 05:45

## 2023-11-17 RX ADMIN — TRAMADOL HYDROCHLORIDE 50 MG: 50 TABLET, COATED ORAL at 21:51

## 2023-11-17 RX ADMIN — HEPARIN SODIUM 5000 UNITS: 5000 INJECTION INTRAVENOUS; SUBCUTANEOUS at 15:27

## 2023-11-17 RX ADMIN — SEVELAMER CARBONATE 1600 MG: 800 TABLET, FILM COATED ORAL at 08:28

## 2023-11-17 RX ADMIN — TRAMADOL HYDROCHLORIDE 50 MG: 50 TABLET, COATED ORAL at 15:27

## 2023-11-17 RX ADMIN — SEVELAMER CARBONATE 1600 MG: 800 TABLET, FILM COATED ORAL at 17:24

## 2023-11-17 RX ADMIN — SEVELAMER CARBONATE 1600 MG: 800 TABLET, FILM COATED ORAL at 12:38

## 2023-11-17 RX ADMIN — HEPARIN SODIUM 5000 UNITS: 5000 INJECTION INTRAVENOUS; SUBCUTANEOUS at 20:39

## 2023-11-17 RX ADMIN — ALLOPURINOL 100 MG: 100 TABLET ORAL at 08:28

## 2023-11-17 RX ADMIN — BENZONATATE 200 MG: 100 CAPSULE ORAL at 03:38

## 2023-11-17 RX ADMIN — Medication 10 ML: at 09:08

## 2023-11-17 ASSESSMENT — PAIN SCALES - GENERAL
PAINLEVEL_OUTOF10: 9
PAINLEVEL_OUTOF10: 7
PAINLEVEL_OUTOF10: 0
PAINLEVEL_OUTOF10: 8
PAINLEVEL_OUTOF10: 0
PAINLEVEL_OUTOF10: 8
PAINLEVEL_OUTOF10: 0

## 2023-11-17 ASSESSMENT — PAIN DESCRIPTION - DESCRIPTORS
DESCRIPTORS: ACHING
DESCRIPTORS: ACHING

## 2023-11-17 ASSESSMENT — PAIN SCALES - WONG BAKER
WONGBAKER_NUMERICALRESPONSE: 0

## 2023-11-17 ASSESSMENT — PAIN DESCRIPTION - LOCATION
LOCATION: ANKLE
LOCATION: LEG

## 2023-11-17 ASSESSMENT — ENCOUNTER SYMPTOMS
ALLERGIC/IMMUNOLOGIC NEGATIVE: 1
SHORTNESS OF BREATH: 1
EYES NEGATIVE: 1

## 2023-11-17 ASSESSMENT — PAIN DESCRIPTION - ORIENTATION
ORIENTATION: RIGHT;LEFT
ORIENTATION: RIGHT

## 2023-11-17 NOTE — CONSULTS
[address] [place] [address]        Chief Complaint   Patient presents with    Fatigue     Patient in with complaints of being at dialysis and following his treatment they felt he became lethargic. Patient responsive at this time. History of Present Illness:  Nitza Cooper is a 79 y.o. patient who presented to the hospital with complaints of fatigue. I have been asked to provide consultation regarding further management and testing. This is an 79-year-old gentleman with a known history of cardiomyopathy with systolic dysfunction patient denies any chest pain but does report increasing fatigue mainly after his dialysis days. Cardiology consulted as he does have moderate biomarker elevation troponin plateaued in the low 400s. Past Medical History:   has a past medical history of Blood clot, Fracture of sternum, Fracture rib, Hyperlipidemia, Hypertension, Laceration of skin of lower leg, left, initial encounter, Laceration of skin of lower leg, right, initial encounter, Prostate cancer ([address]), and Type II or unspecified type diabetes mellitus without mention of complication, not stated as uncontrolled. Surgical History:   has a past surgical history that includes TURP (9/07) and sigmoidoscopy (N/A, 3/9/2023). Social History:   reports that he has never smoked. He has never used smokeless tobacco. He reports that he does not drink alcohol and does not use drugs. Family History:  No family history of premature coronary artery disease, aortic disease, or valve disease. Home Medications:  Were reviewed and are listed in nursing record. and/or listed below  Prior to Admission medications    Medication Sig Start Date End Date Taking?  Authorizing Provider   levothyroxine (SYNTHROID) 50 MCG tablet Take 1 tablet by mouth Daily   Yes Provider, MD Evon   calcium acetate (PHOSLO) 667 MG CAPS capsule Take 1 capsule by mouth See Admin Instructions Take 2 tablets three times daily with

## 2023-11-17 NOTE — CONSULTS
PALLIATIVE MEDICINE CONSULTATION     Patient name:Donato Vicente   ZOP:[id number]    :1940  Room/Bed:F9J-2679/5901-01   LOS: 1 day         Date of consult:2023    Inpatient consult to Palliative Care  Consult performed by: SHAHRZAD Machuca - CNP  Consult ordered by: Josefina Atkins MD  Reason for consult: [address] [place] [address]              ASSESSMENT/RECOMMENDATIONS     79 y.o. male with AMS and chest pain s/p NSTEMI      Symptom Management:  AMS- pt drowsy and confused on waking re-orients easily unsure of baseline  Chest pain- s/p NSTEMI pt reports CP has resolved    Goals of Care- talked to pt regarding [address] [place] [address] he maintains that he is independent at home and will remain independent he wants no rehab and no home health care. He wants all aggressive life saving measures he is tired and not talking much     Patient/Family Goals of Care :    significant past medical history of hypertension, peripheral vascular disease, anemia of chronic kidney disease, renal osteodystrophy, end-stage renal disease on maintenance help with dialysis as presented to the hospital with generalized pain and weakness. Disposition/Discharge Plan:   pending    Advance Directives: The patient has appointed the following active healthcare agents:    Primary Decision Maker: Sergio Whelan - [phone]    The Patient has the following current code status:    Code Status: Full Code      Interactive exchange regarding medications,tests and procedures with: patient, floor RN, Dr Andrea Lopez  Thank you for allowing us to participate in the care of this patient. HISTORY     CC: Drowsy  HPI: The patient is a 79 y.o. male with significant past medical history of hypertension, peripheral vascular disease, anemia of chronic kidney disease, renal osteodystrophy, end-stage renal disease on maintenance help with dialysis as presented to the hospital with generalized pain and weakness.             Palliative Medicine

## 2023-11-17 NOTE — PROGRESS NOTES
Physical [facility] Department   Phone: [phone]    Physical Therapy    [x] Initial Evaluation            [] Daily Treatment Note         [] Discharge Summary      Patient: Lulu Duque   : 1940   MRN: [id number]   Date of Service:  2023  Admitting Diagnosis: NSTEMI (non-ST elevated myocardial infarction) [facility])  Current Admission Summary: Patient presents with concern for NSTEMI. Pt was appparently at dialysis, became unresponsive. Unclear if LOC or not. Cannot give any hx nor ROS. Trop noted to be high, nearly double what it was on prev admit. Certainly some element of ESRD relating to elevation, but doubling in value is concerning, oleg when assoc with event during HD today. Recently admitted here, d/c summary from Dr Carisa Hanna reviewed. Pt apparently having issues caring for self, getting to dialysis. Pt also with chronic wounds, being seen in wound clinic. Needs further eval here. Past Medical History:  has a past medical history of Blood clot, Fracture of sternum, Fracture rib, Hyperlipidemia, Hypertension, Laceration of skin of lower leg, left, initial encounter, Laceration of skin of lower leg, right, initial encounter, Prostate cancer ([address]), and Type II or unspecified type diabetes mellitus without mention of complication, not stated as uncontrolled. Past Surgical History:  has a past surgical history that includes TURP () and sigmoidoscopy (N/A, 3/9/2023). Discharge Recommendations: Lulu Duque scored a  on the AM-PAC short mobility form. Current research shows that an AM-PAC score of 17 or less is typically not associated with a discharge to the patient's home setting. Based on the patient's AM-PAC score and their current functional mobility deficits, it is recommended that the patient have 3-5 sessions per week of Physical Therapy at d/c to increase the patient's independence.   Please see assessment section for further patient

## 2023-11-17 NOTE — PROGRESS NOTES
..4 Eyes Skin Assessment     NAME:  Darlene Corbett  YOB: 1940  MEDICAL RECORD NUMBER:  [id number]    The patient is being assessed for  Admission    I agree that at least one RN has performed a thorough Head to Toe Skin Assessment on the patient. ALL assessment sites listed below have been assessed. Areas assessed by both nurses:    Head, Face, Ears, Shoulders, Back, Chest, Arms, Elbows, Hands, Sacrum. Buttock, Coccyx, Ischium, and Legs. Feet and Heels        Does the Patient have a Wound? Yes wound(s) were present on assessment.  LDA wound assessment was Initiated and completed by RN       Hans Prevention initiated by RN: Yes  Wound Care Orders initiated by RN: Yes    Pressure Injury (Stage 3,4, Unstageable, DTI, NWPT, and Complex wounds) if present, place Wound referral order by RN under : Yes    New Ostomies, if present place, Ostomy referral order under : No     Nurse 1 eSignature: Electronically signed by Rory Gibson RN on 11/17/23 at 6:05 AM EST    **SHARE this note so that the co-signing nurse can place an eSignature**    Nurse 2 eSignature: Electronically signed by Tereza Bowman RN on 11/17/23 at 6:05 AM EST

## 2023-11-17 NOTE — PROGRESS NOTES
Progress Note - Dr. Yeny Barnhart - Internal Medicine  PCP: [place] [address] [address] / [facility] [phone]    Hospital Day: 1  Code Status: Full Code  Current Diet: ADULT DIET; Regular; Low Potassium (Less than 3000 mg/day); Low Phosphorus (Less than 1000 mg)        CC: follow up on medical issues    Subjective:   Keith Cristobal is a 79 y.o. male. Pt seen and examined  Chart reviewed since last visit, labs and imaging below      Doing ok  Trop not coming down, still remains 2x last admit level  No chest pain though  Cards asked to see for elev trop and syncope      Review of Systems: (1 system for EPF, 2-9 for detailed, 10+ for comprehensive)  Constitutional: Negative for chills and fever. HENT: Negative for dental problem, nosebleeds and rhinorrhea. Eyes: Negative for photophobia and visual disturbance. Respiratory: Negative for cough, chest tightness and shortness of breath. Cardiovascular: Negative for chest pain and leg swelling. Gastrointestinal: Negative for diarrhea, nausea and vomiting. Endocrine: Negative for polydipsia and polyphagia. Genitourinary: Negative for frequency, hematuria and urgency. Musculoskeletal: Negative for back pain and myalgias. Skin: Negative for rash. Allergic/Immunologic: Negative for food allergies. Neurological: Negative for dizziness, seizures, syncope and facial asymmetry. Hematological: Negative for adenopathy. Psychiatric/Behavioral: Negative for dysphoric mood. The patient is not nervous/anxious. I have reviewed the patient's medical and social history in detail and updated the computerized patient record.   To recap: He  has a past medical history of Blood clot, Fracture of sternum, Fracture rib, Hyperlipidemia, Hypertension, Laceration of skin of lower leg, left, initial encounter, Laceration of skin of lower leg, right, initial encounter, Prostate cancer ([address]), and Type II or

## 2023-11-17 NOTE — ED NOTES
ED TO INPATIENT SBAR HANDOFF    Patient Name: Leslie Pena   :  1940  79 y.o. MRN:  [id number]  Preferred Name  Caleb Mckeon   ED Room #:  ED-0008/08  Family/Caregiver Present yes --wife  Restraints no   Sitter no   Sepsis Risk Score Sepsis Risk Score: 2.55    Situation  Code Status: Prior No additional code details. Allergies: Dilaudid [hydromorphone], Fentanyl, Morphine and related, Norco [hydrocodone-acetaminophen], and Oxycodone  Weight: No data found. Arrived from: home  Chief Complaint:   Chief Complaint   Patient presents with    Fatigue     Patient in with complaints of being at dialysis and following his treatment they felt he became lethargic. Patient responsive at this time. 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 *    Imaging:   CT HEAD WO CONTRAST   Preliminary Result   No evidence of acute intracranial abnormality. XR CHEST PORTABLE   Final Result   Moderate cardiomegaly. Lungs appear clear. Hemodialysis catheter in good   position.            Abnormal labs:   Abnormal Labs Reviewed   CBC WITH AUTO DIFFERENTIAL - Abnormal; Notable for the following components:       Result Value    RBC 3.24 (*)     Hemoglobin 10.6 (*)     Hematocrit 33.2 (*)     .5 (*)     RDW 17.2 (*)     All other components within normal limits    Narrative:     CALL  [place]  [facility] tel. [phone],  Rejected BMP TRP5/Called to: Nuno Khoury, 2023 13:26, by [address] -
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
## 2023-11-17 NOTE — DISCHARGE INSTRUCTIONS
Podiatry Wound Care Discharge Instructions  Please perform daily dressing changes to the left Lower leg as follows  -Apply betadine soaked gauze to the wound on the 2nd digit and adaptic to the wound on the leg   -Next apply gauze to the top of the left foot, ankle, and leg  -Next loosely wrap the left lower extremity with Kerlix starting from just in front of the toes and ending just below the knee and secure with tape.    Please follow-up at [facility] wound care

## 2023-11-17 NOTE — DISCHARGE INSTR - COC
Continuity of Care Form    Patient Name: Pj Carvalho   :  1940  MRN:  [id number]    Admit date:  2023  Discharge date:  2023    Code Status Order: Full Code   Advance Directives:     Admitting Physician:  Max Jackson MD  PCP: Emmanuel Ontiveros MD    Discharging Nurse: Genie Dickinson [facility] Unit/Room#: F7B-9857/2698-31  Discharging Unit Phone Number: [phone]    Emergency Contact:   Extended Emergency Contact Information  Primary Emergency Contact: Connie Obrien  Address: [address] [place] [address], [facility] [place] [address] [facility] [address] Phone: [phone]  Mobile Phone: [phone]  Relation: Spouse    Past Surgical History:  Past Surgical History:   Procedure Laterality Date    SIGMOIDOSCOPY N/A 3/9/2023    SIGMOIDOSCOPY DIAGNOSTIC FLEXIBLE performed by Ofelia Baltazar MD at [facility]         Immunization History:   Immunization History   Administered Date(s) Administered    COVID-19, PFIZER PURPLE top, DILUTE for use, (age 15 y+), 30mcg/0.3mL 2021, 2021, 2021    Influenza Whole 2008, 2010    Pneumococcal, PPSV23, PNEUMOVAX 21, (age 2y+), SC/IM, 0.5mL 2008       Active Problems:  Patient Active Problem List   Diagnosis Code    Hyperlipidemia E78.5    Essential hypertension I10    Localized edema R60.0    Fracture of sternum S22.20XA    Fractured rib S22.39XA    Prostate cancer ([address]) C61    Thrombus I82.90    Chronic renal disease N18.9    Trigger finger M65.30    CTS (carpal tunnel syndrome) G56.00    Venous insufficiency of both lower extremities I87.2    Venous ulcer of right lower extremity without varicose veins (HCC) I87.2, L97.919    Non-pressure chronic ulcer of lower leg, limited to breakdown of skin, right ([address]) L97.911    Laceration of skin of lower leg, left, initial encounter E38.451W    Laceration of skin of lower leg, right, initial encounter S81.811A    Non-pressure chronic 1603   Number of days: 0       Wound Toe (Comment  which one) Left second digit (Active)   Wound Image   11/17/23 1511   Wound Etiology Diabetic 11/17/23 1511   Dressing Status Clean;Dry; Intact 11/17/23 1603   Wound Cleansed Betadine/povidone iodine 11/17/23 1511   Dressing/Treatment ABD;Petroleum gauze; Roll gauze 11/17/23 1603   Dressing Change Due 11/18/23 11/17/23 1511   Wound Length (cm) 3 cm 11/17/23 1511   Wound Width (cm) 2 cm 11/17/23 1511   Wound Depth (cm) 1 cm 11/17/23 1511   Wound Surface Area (cm^2) 6 cm^2 11/17/23 1511   Wound Volume (cm^3) 6 cm^3 11/17/23 1511   Wound Assessment Pink/red;Devitalized tissue 11/17/23 1511   Drainage Amount None (dry) 11/17/23 1603   Odor None 11/17/23 1603   Angela-wound Assessment Dry/flaky 11/17/23 1511   Margins Defined edges 11/17/23 1511   Wound Thickness Description not for Pressure Injury Full thickness 11/17/23 1511   Number of days:        Wound 11/16/23 Pretibial Right (Active)   Wound Image   11/17/23 1511   Wound Etiology Venous 11/17/23 1511   Wound Cleansed Betadine/povidone iodine 11/17/23 1511   Dressing/Treatment Petroleum gauze; Roll gauze 11/17/23 1511   Wound Assessment Dry 11/17/23 1511   Drainage Amount None (dry) 11/17/23 1511   Angela-wound Assessment Intact 11/17/23 1511   Margins Defined edges 11/17/23 1511   Wound Thickness Description not for Pressure Injury Partial thickness 11/17/23 1511   Number of days: 0        Elimination:  Continence: Bowel: Yes  Bladder: anuric   Urinary Catheter: None   Colostomy/Ileostomy/Ileal Conduit: No       Date of Last BM: unk    Intake/Output Summary (Last 24 hours) at 11/17/2023 1613  Last data filed at 11/17/2023 0917  Gross per 24 hour   Intake 160 ml   Output --   Net 160 ml     I/O last 3 completed shifts: In: 100 [P.O.:100]  Out: -     Safety Concerns:      At Risk for Falls and refuses wound dressing changes and refuses baths at times     Impairments/Disabilities:      Severe pain in right leg and foot
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
## 2023-11-17 NOTE — CARE COORDINATION
Case Management Assessment  Initial Evaluation    Date/Time of Evaluation: 11/17/2023 3:48 PM  Assessment Completed by: Angie Castillo RN    If patient is discharged prior to next notation, then this note serves as note for discharge by case management. Patient Name: Lauren Crocker                   YOB: 1940  Diagnosis: NSTEMI (non-ST elevated myocardial infarction) [facility]) [I21.4]                   Date / Time: 11/16/2023 12:04 PM    Patient Admission Status: Inpatient   Readmission Risk (Low < 19, Mod (19-27), High > 27): Readmission Risk Score: 21.2    Current PCP: Anali Cabrera MD  PCP verified by CM? No    Chart Reviewed: Yes      History Provided by: Spouse, Patient (By phone)  Patient Orientation: Alert and Oriented, Person, Place    Patient Cognition: Alert    Hospitalization in the last 30 days (Readmission):  No    If yes, Readmission Assessment in CM Navigator will be completed. Advance Directives:      Code Status: Full Code   Patient's Primary Decision Maker is: Legal Next of Kin    Primary Decision MakeDerrick Linn Spouse - [phone]    Discharge Planning:    Patient lives with: Spouse/Significant Other Type of Home: House  Primary Care Giver: Spouse  Patient Support Systems include: Spouse/Significant Other   Current Financial resources: Medicare  Current community resources: Other (Comment) (outpatient HD)  Current services prior to admission: Durable Medical Equipment, Transportation            Current DME: Carlitos Thomas            Type of Home Care services:  None    ADLS  Prior functional level: Assistance with the following:, Bathing, Dressing, Toileting, Cooking, Housework, Shopping, Mobility  Current functional level: Assistance with the following:, Bathing, Dressing, Toileting, Cooking, Housework, Shopping, Mobility    PT AM-PAC: 12 /24  OT AM-PAC: 16 /24    Family can provide assistance at DC:  Yes  Would you like Case Management to discuss the discharge plan with any other family members/significant others, and if so, who? Yes (spouse)  Plans to Return to Present Housing: Yes  Other Identified Issues/Barriers to RETURNING to current housing: Yes  Potential Assistance needed at discharge: [address] [place], [place] [address]e [place] (SNF recommended)            Potential DME:    Patient expects to discharge to: [address] [place] [address] for transportation at discharge:      Financial    Payor: [place] / Plan: MEDICARE PART A AND B / Product Type: *No Product type* /     Does insurance require precert for SNF: No    Potential assistance Purchasing Medications: No  Meds-to-Beds request:        [facility] [id number] Jaswinder Muñoz, [address] [facility] -  [phone] [place] [phone]  [facility] [address]  Phone: [phone] Fax: [phone]      Notes:    Factors facilitating achievement of predicted outcomes: Family support and Has needed Durable Medical Equipment at home    Barriers to discharge: Limited family support, No caregiver support, Decreased endurance, Lower extremity weakness, and Incontinence of bladder    Additional Case Management Notes:   CM met with  patient and also spoke with his spouse. Patient lives at home with his spouse who is the primary caregiver. Patient requires Assistance with  ADLs. Therapy recommending SNF placement. Spouse declines stating that he has been in SNF [facility]) in the past , that did not do him any good. Patient's spouse would prefer patient to be discharge home with skilled [facility] services when medically stable  & [facility]. CM will send the referral .    Patient gets outpatient HD via  [facility]  [address] [facility], [address]  Phone: [phone] [employer] [phone]   Schedule;  T,TH,S  time 7.00am  Stretcher transportation by [facility].      The Plan for Transition of Care is related to the following treatment goals of NSTEMI (non-ST elevated myocardial infarction) ([address]) [R18.6]    IF APPLICABLE: The Patient
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
## 2023-11-17 NOTE — CARE COORDINATION
Discharge Planning Note Re: [facility] prefer patient discharged home with skilled hhc instead of SNF as recommended by PT & OT    CM met with patient and  also spoke with patient's spouse on the phone . Introduced self and explained role of CM and discharge planning. Patient/ spouse are  agreeable to home health on discharge instead the SNF as recommended by therapy. [place] of choice list was provided with basic dialogue that supports the patient's individualized plan of care/goals, treatment preferences and shares the quality data associated with the providers. [x] Yes [] No.  Patient and Spouse  have reviewed the provided list and made selections. Referral made to [address]  Phone: [phone]  Fax:  [phone] . Pending [facility] order for  [x]RN [x]PT  [x]OT  []HHA  []SW  []  SLP    CM spoke with Megan Mario ( [phone]) in Intake who accepted the referral and stated will follow patient on discharge.

## 2023-11-18 ENCOUNTER — APPOINTMENT (OUTPATIENT)
Dept: GENERAL RADIOLOGY | Age: 83
DRG: 280 | End: 2023-11-18
Payer: MEDICARE

## 2023-11-18 LAB
ANION GAP SERPL CALCULATED.3IONS-SCNC: 12 MMOL/L (ref 3–16)
BASOPHILS # BLD: 0 K/UL (ref 0–0.2)
BASOPHILS NFR BLD: 0.6 %
BUN SERPL-MCNC: 42 MG/DL (ref 7–20)
CALCIUM SERPL-MCNC: 9.5 MG/DL (ref 8.3–10.6)
CHLORIDE SERPL-SCNC: 93 MMOL/L (ref 99–110)
CO2 SERPL-SCNC: 27 MMOL/L (ref 21–32)
CREAT SERPL-MCNC: 6 MG/DL (ref 0.8–1.3)
CRP SERPL-MCNC: 46.9 MG/L (ref 0–5.1)
DEPRECATED RDW RBC AUTO: 16.8 % (ref 12.4–15.4)
EOSINOPHIL # BLD: 0.1 K/UL (ref 0–0.6)
EOSINOPHIL NFR BLD: 2.1 %
GFR SERPLBLD CREATININE-BSD FMLA CKD-EPI: 9 ML/MIN/{1.73_M2}
GLUCOSE BLD-MCNC: 82 MG/DL (ref 70–99)
GLUCOSE BLD-MCNC: 83 MG/DL (ref 70–99)
GLUCOSE BLD-MCNC: 98 MG/DL (ref 70–99)
GLUCOSE SERPL-MCNC: 95 MG/DL (ref 70–99)
HCT VFR BLD AUTO: 34.1 % (ref 40.5–52.5)
HGB BLD-MCNC: 11.1 G/DL (ref 13.5–17.5)
LYMPHOCYTES # BLD: 1.2 K/UL (ref 1–5.1)
LYMPHOCYTES NFR BLD: 17.2 %
MCH RBC QN AUTO: 32.7 PG (ref 26–34)
MCHC RBC AUTO-ENTMCNC: 32.5 G/DL (ref 31–36)
MCV RBC AUTO: 100.7 FL (ref 80–100)
MONOCYTES # BLD: 1 K/UL (ref 0–1.3)
MONOCYTES NFR BLD: 13.8 %
NEUTROPHILS # BLD: 4.7 K/UL (ref 1.7–7.7)
NEUTROPHILS NFR BLD: 66.3 %
PERFORMED ON: NORMAL
PLATELET # BLD AUTO: 123 K/UL (ref 135–450)
PMV BLD AUTO: 8.5 FL (ref 5–10.5)
POTASSIUM SERPL-SCNC: 3.4 MMOL/L (ref 3.5–5.1)
PROCALCITONIN SERPL IA-MCNC: 0.42 NG/ML (ref 0–0.15)
RBC # BLD AUTO: 3.38 M/UL (ref 4.2–5.9)
SODIUM SERPL-SCNC: 132 MMOL/L (ref 136–145)
TROPONIN, HIGH SENSITIVITY: 427 NG/L (ref 0–22)
WBC # BLD AUTO: 7.1 K/UL (ref 4–11)

## 2023-11-18 PROCEDURE — 90935 HEMODIALYSIS ONE EVALUATION: CPT

## 2023-11-18 PROCEDURE — 84484 ASSAY OF TROPONIN QUANT: CPT

## 2023-11-18 PROCEDURE — 1200000000 HC SEMI PRIVATE

## 2023-11-18 PROCEDURE — 6370000000 HC RX 637 (ALT 250 FOR IP): Performed by: INTERNAL MEDICINE

## 2023-11-18 PROCEDURE — 6360000002 HC RX W HCPCS: Performed by: INTERNAL MEDICINE

## 2023-11-18 PROCEDURE — 85025 COMPLETE CBC W/AUTO DIFF WBC: CPT

## 2023-11-18 PROCEDURE — 84145 PROCALCITONIN (PCT): CPT

## 2023-11-18 PROCEDURE — 99232 SBSQ HOSP IP/OBS MODERATE 35: CPT | Performed by: NURSE PRACTITIONER

## 2023-11-18 PROCEDURE — 80048 BASIC METABOLIC PNL TOTAL CA: CPT

## 2023-11-18 PROCEDURE — 2580000003 HC RX 258: Performed by: INTERNAL MEDICINE

## 2023-11-18 PROCEDURE — 86140 C-REACTIVE PROTEIN: CPT

## 2023-11-18 PROCEDURE — 36415 COLL VENOUS BLD VENIPUNCTURE: CPT

## 2023-11-18 PROCEDURE — 73630 X-RAY EXAM OF FOOT: CPT

## 2023-11-18 RX ORDER — REGADENOSON 0.08 MG/ML
0.4 INJECTION, SOLUTION INTRAVENOUS
Status: DISCONTINUED | OUTPATIENT
Start: 2023-11-18 | End: 2023-11-30 | Stop reason: HOSPADM

## 2023-11-18 RX ADMIN — TRAMADOL HYDROCHLORIDE 50 MG: 50 TABLET, COATED ORAL at 04:36

## 2023-11-18 RX ADMIN — TRAMADOL HYDROCHLORIDE 50 MG: 50 TABLET, COATED ORAL at 13:58

## 2023-11-18 RX ADMIN — Medication 10 ML: at 20:00

## 2023-11-18 RX ADMIN — SEVELAMER CARBONATE 1600 MG: 800 TABLET, FILM COATED ORAL at 18:43

## 2023-11-18 RX ADMIN — HEPARIN SODIUM 5000 UNITS: 5000 INJECTION INTRAVENOUS; SUBCUTANEOUS at 04:37

## 2023-11-18 RX ADMIN — TRAMADOL HYDROCHLORIDE 50 MG: 50 TABLET, COATED ORAL at 19:58

## 2023-11-18 RX ADMIN — AMLODIPINE BESYLATE 5 MG: 5 TABLET ORAL at 13:59

## 2023-11-18 RX ADMIN — SEVELAMER CARBONATE 1600 MG: 800 TABLET, FILM COATED ORAL at 13:59

## 2023-11-18 RX ADMIN — HEPARIN SODIUM 5000 UNITS: 5000 INJECTION INTRAVENOUS; SUBCUTANEOUS at 14:01

## 2023-11-18 RX ADMIN — ALLOPURINOL 100 MG: 100 TABLET ORAL at 13:59

## 2023-11-18 RX ADMIN — HEPARIN SODIUM 5000 UNITS: 5000 INJECTION INTRAVENOUS; SUBCUTANEOUS at 20:00

## 2023-11-18 RX ADMIN — Medication 10 ML: at 14:03

## 2023-11-18 ASSESSMENT — PAIN DESCRIPTION - LOCATION
LOCATION: LEG;FOOT
LOCATION: ANKLE
LOCATION: LEG
LOCATION: LEG;FOOT

## 2023-11-18 ASSESSMENT — PAIN SCALES - GENERAL
PAINLEVEL_OUTOF10: 0
PAINLEVEL_OUTOF10: 0
PAINLEVEL_OUTOF10: 10
PAINLEVEL_OUTOF10: 7
PAINLEVEL_OUTOF10: 0
PAINLEVEL_OUTOF10: 10
PAINLEVEL_OUTOF10: 0
PAINLEVEL_OUTOF10: 8
PAINLEVEL_OUTOF10: 0
PAINLEVEL_OUTOF10: 0

## 2023-11-18 ASSESSMENT — PAIN DESCRIPTION - ORIENTATION
ORIENTATION: RIGHT;LEFT
ORIENTATION: RIGHT

## 2023-11-18 ASSESSMENT — PAIN DESCRIPTION - FREQUENCY: FREQUENCY: CONTINUOUS

## 2023-11-18 ASSESSMENT — PAIN DESCRIPTION - ONSET: ONSET: ON-GOING

## 2023-11-18 ASSESSMENT — PAIN DESCRIPTION - DESCRIPTORS
DESCRIPTORS: ACHING
DESCRIPTORS: ACHING

## 2023-11-18 ASSESSMENT — PAIN DESCRIPTION - PAIN TYPE: TYPE: ACUTE PAIN

## 2023-11-18 ASSESSMENT — PAIN SCALES - WONG BAKER
WONGBAKER_NUMERICALRESPONSE: 0
WONGBAKER_NUMERICALRESPONSE: 0

## 2023-11-18 NOTE — PROGRESS NOTES
Treatment time: 3 hours    Net UF: 2000 ml    Pre weight: 85.3 kg  Post weight: 83.3 kg  EDW: 76 kg    Access used: R [facility]  Access function: . Access runs without malfunction     Medications or blood products given: None    Regular outpatient schedule: TTS    Summary of response to treatment: Pt. Tolerates treatment well today. Copy of dialysis treatment record placed in chart, to be scanned into EMR.

## 2023-11-18 NOTE — PROGRESS NOTES
Dialysis is scheduled for this AM therefore stress test will be completed Monday AM. Please keep patient NPO after midnight Sunday. No caffeine starting Sunday morning.
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
## 2023-11-18 NOTE — PLAN OF CARE
Problem: Discharge Planning  Goal: Discharge to home or other facility with appropriate resources  11/17/2023 2218 by Tilden Halsted  Outcome: Progressing  Flowsheets (Taken 11/17/2023 2000)  Discharge to home or other facility with appropriate resources:   Identify barriers to discharge with patient and caregiver   Arrange for needed discharge resources and transportation as appropriate   Arrange for interpreters to assist at discharge as needed   Identify discharge learning needs (meds, wound care, etc)   Refer to discharge planning if patient needs post-hospital services based on physician order or complex needs related to functional status, cognitive ability or social support system  11/17/2023 0922 by Kimberly Finney, DIANA  Outcome: Progressing     Problem: Skin/Tissue Integrity  Goal: Absence of new skin breakdown  Description: 1. Monitor for areas of redness and/or skin breakdown  2. Assess vascular access sites hourly  3. Every 4-6 hours minimum:  Change oxygen saturation probe site  4. Every 4-6 hours:  If on nasal continuous positive airway pressure, respiratory therapy assess nares and determine need for appliance change or resting period.   11/17/2023 2218 by Tilden Halsted  Outcome: Progressing  11/17/2023 0922 by Kimberly Finney, DIANA  Outcome: Progressing     Problem: Safety - Adult  Goal: Free from fall injury  11/17/2023 2218 by Tilden Halsted  Outcome: Progressing  Flowsheets (Taken 11/17/2023 2216)  Free From Fall Injury:   Lulu Kent family/caregiver on patient safety   Based on caregiver fall risk screen, instruct family/caregiver to ask for assistance with transferring infant if caregiver noted to have fall risk factors  11/17/2023 0922 by Kimberly Finney, RN  Outcome: Progressing

## 2023-11-19 LAB
ANION GAP SERPL CALCULATED.3IONS-SCNC: 16 MMOL/L (ref 3–16)
BASOPHILS # BLD: 0.1 K/UL (ref 0–0.2)
BASOPHILS NFR BLD: 1 %
BUN SERPL-MCNC: 30 MG/DL (ref 7–20)
CALCIUM SERPL-MCNC: 9.5 MG/DL (ref 8.3–10.6)
CHLORIDE SERPL-SCNC: 98 MMOL/L (ref 99–110)
CO2 SERPL-SCNC: 26 MMOL/L (ref 21–32)
CREAT SERPL-MCNC: 5 MG/DL (ref 0.8–1.3)
DEPRECATED RDW RBC AUTO: 16 % (ref 12.4–15.4)
EOSINOPHIL # BLD: 0.1 K/UL (ref 0–0.6)
EOSINOPHIL NFR BLD: 1.9 %
GFR SERPLBLD CREATININE-BSD FMLA CKD-EPI: 11 ML/MIN/{1.73_M2}
GLUCOSE BLD-MCNC: 69 MG/DL (ref 70–99)
GLUCOSE BLD-MCNC: 85 MG/DL (ref 70–99)
GLUCOSE BLD-MCNC: 87 MG/DL (ref 70–99)
GLUCOSE BLD-MCNC: 90 MG/DL (ref 70–99)
GLUCOSE SERPL-MCNC: 82 MG/DL (ref 70–99)
HCT VFR BLD AUTO: 35.2 % (ref 40.5–52.5)
HGB BLD-MCNC: 11.2 G/DL (ref 13.5–17.5)
LYMPHOCYTES # BLD: 1.2 K/UL (ref 1–5.1)
LYMPHOCYTES NFR BLD: 18.3 %
MCH RBC QN AUTO: 32.4 PG (ref 26–34)
MCHC RBC AUTO-ENTMCNC: 31.9 G/DL (ref 31–36)
MCV RBC AUTO: 101.3 FL (ref 80–100)
MONOCYTES # BLD: 0.9 K/UL (ref 0–1.3)
MONOCYTES NFR BLD: 13.6 %
NEUTROPHILS # BLD: 4.2 K/UL (ref 1.7–7.7)
NEUTROPHILS NFR BLD: 65.2 %
PERFORMED ON: ABNORMAL
PERFORMED ON: NORMAL
PLATELET # BLD AUTO: 132 K/UL (ref 135–450)
PMV BLD AUTO: 9 FL (ref 5–10.5)
POTASSIUM SERPL-SCNC: 3.8 MMOL/L (ref 3.5–5.1)
RBC # BLD AUTO: 3.47 M/UL (ref 4.2–5.9)
SODIUM SERPL-SCNC: 140 MMOL/L (ref 136–145)
WBC # BLD AUTO: 6.5 K/UL (ref 4–11)

## 2023-11-19 PROCEDURE — 6360000002 HC RX W HCPCS: Performed by: INTERNAL MEDICINE

## 2023-11-19 PROCEDURE — 6370000000 HC RX 637 (ALT 250 FOR IP): Performed by: INTERNAL MEDICINE

## 2023-11-19 PROCEDURE — 80048 BASIC METABOLIC PNL TOTAL CA: CPT

## 2023-11-19 PROCEDURE — 36415 COLL VENOUS BLD VENIPUNCTURE: CPT

## 2023-11-19 PROCEDURE — 85025 COMPLETE CBC W/AUTO DIFF WBC: CPT

## 2023-11-19 PROCEDURE — 99232 SBSQ HOSP IP/OBS MODERATE 35: CPT | Performed by: NURSE PRACTITIONER

## 2023-11-19 PROCEDURE — 2580000003 HC RX 258: Performed by: INTERNAL MEDICINE

## 2023-11-19 PROCEDURE — 1200000000 HC SEMI PRIVATE

## 2023-11-19 RX ADMIN — AMLODIPINE BESYLATE 5 MG: 5 TABLET ORAL at 08:07

## 2023-11-19 RX ADMIN — ALLOPURINOL 100 MG: 100 TABLET ORAL at 08:07

## 2023-11-19 RX ADMIN — Medication 10 ML: at 20:06

## 2023-11-19 RX ADMIN — TRAMADOL HYDROCHLORIDE 50 MG: 50 TABLET, COATED ORAL at 20:09

## 2023-11-19 RX ADMIN — HEPARIN SODIUM 5000 UNITS: 5000 INJECTION INTRAVENOUS; SUBCUTANEOUS at 20:06

## 2023-11-19 RX ADMIN — SEVELAMER CARBONATE 1600 MG: 800 TABLET, FILM COATED ORAL at 08:06

## 2023-11-19 RX ADMIN — Medication 1 MG: at 08:06

## 2023-11-19 RX ADMIN — HEPARIN SODIUM 5000 UNITS: 5000 INJECTION INTRAVENOUS; SUBCUTANEOUS at 14:24

## 2023-11-19 RX ADMIN — Medication 10 ML: at 08:07

## 2023-11-19 RX ADMIN — SEVELAMER CARBONATE 1600 MG: 800 TABLET, FILM COATED ORAL at 18:26

## 2023-11-19 RX ADMIN — SEVELAMER CARBONATE 1600 MG: 800 TABLET, FILM COATED ORAL at 12:10

## 2023-11-19 RX ADMIN — HEPARIN SODIUM 5000 UNITS: 5000 INJECTION INTRAVENOUS; SUBCUTANEOUS at 06:43

## 2023-11-19 ASSESSMENT — PAIN DESCRIPTION - LOCATION: LOCATION: FOOT

## 2023-11-19 ASSESSMENT — PAIN SCALES - GENERAL
PAINLEVEL_OUTOF10: 0

## 2023-11-19 ASSESSMENT — PAIN DESCRIPTION - DESCRIPTORS: DESCRIPTORS: STABBING

## 2023-11-19 ASSESSMENT — PAIN DESCRIPTION - ORIENTATION: ORIENTATION: LEFT

## 2023-11-19 NOTE — PROGRESS NOTES
Pt alert and oriented x4, VSS, IV ns locked in right arm. Lungs are clear. Pt denies sob at this time. BLE are wrapped. Pts second toe on left foot is dangling. Bed alarm on, bedside table and call light in reach.

## 2023-11-19 NOTE — PLAN OF CARE
Problem: Discharge Planning  Goal: Discharge to home or other facility with appropriate resources  Outcome: Progressing     Problem: Skin/Tissue Integrity  Goal: Absence of new skin breakdown  Description: 1. Monitor for areas of redness and/or skin breakdown  2. Assess vascular access sites hourly  3. Every 4-6 hours minimum:  Change oxygen saturation probe site  4. Every 4-6 hours:  If on nasal continuous positive airway pressure, respiratory therapy assess nares and determine need for appliance change or resting period.   Outcome: Progressing     Problem: Safety - Adult  Goal: Free from fall injury  11/19/2023 0926 by Ilsa Lezama RN  Outcome: Progressing  11/19/2023 0427 by Felipa Lilly RN  Outcome: Progressing     Problem: Pain  Goal: Verbalizes/displays adequate comfort level or baseline comfort level  11/19/2023 0926 by Ilsa Lezama RN  Outcome: Progressing  11/19/2023 0427 by Felipa Lilly RN  Outcome: Progressing     Problem: ABCDS Injury Assessment  Goal: Absence of physical injury  Outcome: Progressing

## 2023-11-19 NOTE — PROGRESS NOTES
Progress Note - Dr. Yeny Barnhart - Internal Medicine  PCP: Anya Sinclair MD [address] / [address] [phone]    Hospital Day: 3  Code Status: Full Code  Current Diet: ADULT DIET; Regular; Low Potassium (Less than 3000 mg/day); Low Phosphorus (Less than 1000 mg)  Diet NPO        CC: follow up on medical issues    Subjective:   Keiht Cristobal is a 79 y.o. male. Pt seen and examined  Chart reviewed since last visit, labs and imaging below      Doing ok  Appreciate cards and podiatry eval - \"Pt likely will need official amp of L 2nd toe pending vascular status\"    For GXT monday    Tolerated HD ok 11/18      Review of Systems: (1 system for EPF, 2-9 for detailed, 10+ for comprehensive)  Constitutional: Negative for chills and fever. HENT: Negative for dental problem, nosebleeds and rhinorrhea. Eyes: Negative for photophobia and visual disturbance. Respiratory: Negative for cough, chest tightness and shortness of breath. Cardiovascular: Negative for chest pain and leg swelling. Gastrointestinal: Negative for diarrhea, nausea and vomiting. Endocrine: Negative for polydipsia and polyphagia. Genitourinary: Negative for frequency, hematuria and urgency. Musculoskeletal: Negative for back pain and myalgias. Skin: Negative for rash. Allergic/Immunologic: Negative for food allergies. Neurological: Negative for dizziness, seizures, syncope and facial asymmetry. Hematological: Negative for adenopathy. Psychiatric/Behavioral: Negative for dysphoric mood. The patient is not nervous/anxious. I have reviewed the patient's medical and social history in detail and updated the computerized patient record.   To recap: He  has a past medical history of Blood clot, Fracture of sternum, Fracture rib, Hyperlipidemia, Hypertension, Laceration of skin of lower leg, left, initial encounter, Laceration of skin of lower leg, right, initial encounter,

## 2023-11-19 NOTE — PROGRESS NOTES
Dressings changed on pt's bilateral lower extremity wounds. Pt tolerated fairly well. Pt complained of pain during the dressing change but tolerable. Petroleum gauze not changed on left wound, ABD pad applied with new kerlex wrapped. Petroleum gauze on right leg and new kelrex pad applied.

## 2023-11-19 NOTE — PLAN OF CARE
Problem: Safety - Adult  Goal: Free from fall injury  11/19/2023 0427 by Amy Mejía RN  Outcome: Progressing   Pt has been free from falls this shift, bed alarm on, bed in lowest position, 2/4 side rails up, nonskid socks on, wheels locked, bedside table and call light in reach. Encouraged pt to call out if needed anything. Problem: Pain  Goal: Verbalizes/displays adequate comfort level or baseline comfort level  11/19/2023 0427 by Amy Mejía RN  Outcome: Progressing   Pt c/o ble aching pain, medicated per mar with prn tramadol. Pt verbalized relief and is now resting in bed. No further needs at this time.

## 2023-11-19 NOTE — PROGRESS NOTES
PODIATRY PROGRESS NOTE    Seen bedside. No overnight events. Await eval by vascular. Reviewed xray of foot. Assessment:1. Ulcer b/l LE  2. 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 PVD  4. DM2        Plan: 1. Pt seen bedside this AM reviewed chart and imaging  2. Xray of L foot ordered. No obvious signs of OM. 3. Vascular consult placed  4. NWB L foot   5. Orders placed for betadine and DSD to b/l LE. 6. Will follow after vascular eval. Pt likely will need official amp of L 2nd toe pending vascular status     Podiatry will follow.

## 2023-11-20 PROBLEM — R53.83 FATIGUE: Status: ACTIVE | Noted: 2023-03-04

## 2023-11-20 LAB
ANION GAP SERPL CALCULATED.3IONS-SCNC: 16 MMOL/L (ref 3–16)
BASOPHILS # BLD: 0 K/UL (ref 0–0.2)
BASOPHILS NFR BLD: 0.7 %
BUN SERPL-MCNC: 42 MG/DL (ref 7–20)
CALCIUM SERPL-MCNC: 9.6 MG/DL (ref 8.3–10.6)
CHLORIDE SERPL-SCNC: 98 MMOL/L (ref 99–110)
CO2 SERPL-SCNC: 25 MMOL/L (ref 21–32)
CREAT SERPL-MCNC: 7 MG/DL (ref 0.8–1.3)
CRP SERPL-MCNC: 54 MG/L (ref 0–5.1)
DEPRECATED RDW RBC AUTO: 16.5 % (ref 12.4–15.4)
EOSINOPHIL # BLD: 0.1 K/UL (ref 0–0.6)
EOSINOPHIL NFR BLD: 1.6 %
ERYTHROCYTE [SEDIMENTATION RATE] IN BLOOD BY WESTERGREN METHOD: 65 MM/HR (ref 0–20)
GFR SERPLBLD CREATININE-BSD FMLA CKD-EPI: 7 ML/MIN/{1.73_M2}
GLUCOSE BLD-MCNC: 121 MG/DL (ref 70–99)
GLUCOSE BLD-MCNC: 67 MG/DL (ref 70–99)
GLUCOSE BLD-MCNC: 78 MG/DL (ref 70–99)
GLUCOSE BLD-MCNC: 81 MG/DL (ref 70–99)
GLUCOSE BLD-MCNC: 90 MG/DL (ref 70–99)
GLUCOSE SERPL-MCNC: 87 MG/DL (ref 70–99)
HCT VFR BLD AUTO: 35.3 % (ref 40.5–52.5)
HGB BLD-MCNC: 11.3 G/DL (ref 13.5–17.5)
LYMPHOCYTES # BLD: 1.3 K/UL (ref 1–5.1)
LYMPHOCYTES NFR BLD: 20.8 %
MAGNESIUM SERPL-MCNC: 2.4 MG/DL (ref 1.8–2.4)
MCH RBC QN AUTO: 32.3 PG (ref 26–34)
MCHC RBC AUTO-ENTMCNC: 32.1 G/DL (ref 31–36)
MCV RBC AUTO: 100.6 FL (ref 80–100)
MONOCYTES # BLD: 0.8 K/UL (ref 0–1.3)
MONOCYTES NFR BLD: 13.5 %
NEUTROPHILS # BLD: 3.9 K/UL (ref 1.7–7.7)
NEUTROPHILS NFR BLD: 63.4 %
PERFORMED ON: ABNORMAL
PERFORMED ON: ABNORMAL
PERFORMED ON: NORMAL
PLATELET # BLD AUTO: 144 K/UL (ref 135–450)
PMV BLD AUTO: 8.4 FL (ref 5–10.5)
POTASSIUM SERPL-SCNC: 4.1 MMOL/L (ref 3.5–5.1)
PROCALCITONIN SERPL IA-MCNC: 0.48 NG/ML (ref 0–0.15)
RBC # BLD AUTO: 3.5 M/UL (ref 4.2–5.9)
SODIUM SERPL-SCNC: 139 MMOL/L (ref 136–145)
WBC # BLD AUTO: 6.2 K/UL (ref 4–11)

## 2023-11-20 PROCEDURE — 90935 HEMODIALYSIS ONE EVALUATION: CPT

## 2023-11-20 PROCEDURE — 86140 C-REACTIVE PROTEIN: CPT

## 2023-11-20 PROCEDURE — 83735 ASSAY OF MAGNESIUM: CPT

## 2023-11-20 PROCEDURE — 6370000000 HC RX 637 (ALT 250 FOR IP): Performed by: INTERNAL MEDICINE

## 2023-11-20 PROCEDURE — 85652 RBC SED RATE AUTOMATED: CPT

## 2023-11-20 PROCEDURE — APPSS60 APP SPLIT SHARED TIME 46-60 MINUTES: Performed by: NURSE PRACTITIONER

## 2023-11-20 PROCEDURE — 85025 COMPLETE CBC W/AUTO DIFF WBC: CPT

## 2023-11-20 PROCEDURE — 2580000003 HC RX 258: Performed by: INTERNAL MEDICINE

## 2023-11-20 PROCEDURE — 6360000002 HC RX W HCPCS: Performed by: INTERNAL MEDICINE

## 2023-11-20 PROCEDURE — 84145 PROCALCITONIN (PCT): CPT

## 2023-11-20 PROCEDURE — 1200000000 HC SEMI PRIVATE

## 2023-11-20 PROCEDURE — 99222 1ST HOSP IP/OBS MODERATE 55: CPT | Performed by: SURGERY

## 2023-11-20 PROCEDURE — 36415 COLL VENOUS BLD VENIPUNCTURE: CPT

## 2023-11-20 PROCEDURE — APPNB30 APP NON BILLABLE TIME 0-30 MINS: Performed by: NURSE PRACTITIONER

## 2023-11-20 PROCEDURE — 80048 BASIC METABOLIC PNL TOTAL CA: CPT

## 2023-11-20 PROCEDURE — 99233 SBSQ HOSP IP/OBS HIGH 50: CPT | Performed by: NURSE PRACTITIONER

## 2023-11-20 RX ORDER — HEPARIN SODIUM 1000 [USP'U]/ML
3600 INJECTION, SOLUTION INTRAVENOUS; SUBCUTANEOUS PRN
Status: DISCONTINUED | OUTPATIENT
Start: 2023-11-20 | End: 2023-11-30 | Stop reason: HOSPADM

## 2023-11-20 RX ADMIN — SEVELAMER CARBONATE 1600 MG: 800 TABLET, FILM COATED ORAL at 13:15

## 2023-11-20 RX ADMIN — HEPARIN SODIUM 5000 UNITS: 5000 INJECTION INTRAVENOUS; SUBCUTANEOUS at 13:45

## 2023-11-20 RX ADMIN — SEVELAMER CARBONATE 1600 MG: 800 TABLET, FILM COATED ORAL at 17:43

## 2023-11-20 RX ADMIN — TRAMADOL HYDROCHLORIDE 50 MG: 50 TABLET, COATED ORAL at 17:42

## 2023-11-20 RX ADMIN — ALLOPURINOL 100 MG: 100 TABLET ORAL at 13:16

## 2023-11-20 RX ADMIN — DEXTROSE MONOHYDRATE 125 ML: 100 INJECTION, SOLUTION INTRAVENOUS at 12:31

## 2023-11-20 RX ADMIN — Medication 1 MG: at 16:57

## 2023-11-20 RX ADMIN — TRAMADOL HYDROCHLORIDE 50 MG: 50 TABLET, COATED ORAL at 09:44

## 2023-11-20 RX ADMIN — Medication 10 ML: at 20:06

## 2023-11-20 RX ADMIN — HEPARIN SODIUM 5000 UNITS: 5000 INJECTION INTRAVENOUS; SUBCUTANEOUS at 20:06

## 2023-11-20 RX ADMIN — Medication 10 ML: at 13:17

## 2023-11-20 RX ADMIN — HEPARIN SODIUM 5000 UNITS: 5000 INJECTION INTRAVENOUS; SUBCUTANEOUS at 06:29

## 2023-11-20 RX ADMIN — AMLODIPINE BESYLATE 5 MG: 5 TABLET ORAL at 13:16

## 2023-11-20 ASSESSMENT — PAIN DESCRIPTION - ORIENTATION: ORIENTATION: LEFT

## 2023-11-20 ASSESSMENT — PAIN SCALES - GENERAL
PAINLEVEL_OUTOF10: 10
PAINLEVEL_OUTOF10: 0
PAINLEVEL_OUTOF10: 8
PAINLEVEL_OUTOF10: 0
PAINLEVEL_OUTOF10: 10

## 2023-11-20 ASSESSMENT — PAIN DESCRIPTION - LOCATION
LOCATION: FOOT
LOCATION: FOOT
LOCATION: GENERALIZED

## 2023-11-20 NOTE — PROGRESS NOTES
Per floor RN patient refusing to have stress test today. Will attempt again in the AM. Keep patient caffeine free and NPO after midnight.

## 2023-11-20 NOTE — PROGRESS NOTES
Treatment time: 3 hours    Net UF: 2500ml    Pre weight: 81.4 kg  Post weight: 79.4 kg  EDW: 76 kg    Access used: R [facility]  Access function: . Access runs without malfunction. Medications or blood products given: None    Regular outpatient schedule: TTS    Summary of response to treatment: Pt. Tolerates treatment well today. Copy of dialysis treatment record placed in chart, to be scanned into EMR.

## 2023-11-20 NOTE — CONSULTS
Interventional Cardiology Consultation     Evaluation requested for PAD. Previously seen at [facility]. Peripheral angiogram at that time 4/15/23 without significant disease. Discussed with Chago Kim, (Vascular Sx NP). Will defer further management to Dr. Lonell Snellen. Please do not hesitate to re-consult. Will sign off.      Britney Almanza MD [facility],[address] [facility], Interventional Cardiology, and Peripheral Vascular Disease   [facility]   (O): [phone]  (F): [phone]
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
## 2023-11-21 ENCOUNTER — APPOINTMENT (OUTPATIENT)
Dept: CT IMAGING | Age: 83
DRG: 280 | End: 2023-11-21
Payer: MEDICARE

## 2023-11-21 LAB
ANION GAP SERPL CALCULATED.3IONS-SCNC: 16 MMOL/L (ref 3–16)
BASOPHILS # BLD: 0.1 K/UL (ref 0–0.2)
BASOPHILS NFR BLD: 0.9 %
BUN SERPL-MCNC: 31 MG/DL (ref 7–20)
CALCIUM SERPL-MCNC: 9.5 MG/DL (ref 8.3–10.6)
CHLORIDE SERPL-SCNC: 98 MMOL/L (ref 99–110)
CO2 SERPL-SCNC: 24 MMOL/L (ref 21–32)
CREAT SERPL-MCNC: 5.5 MG/DL (ref 0.8–1.3)
DEPRECATED RDW RBC AUTO: 16.3 % (ref 12.4–15.4)
EOSINOPHIL # BLD: 0.1 K/UL (ref 0–0.6)
EOSINOPHIL NFR BLD: 1.8 %
GFR SERPLBLD CREATININE-BSD FMLA CKD-EPI: 10 ML/MIN/{1.73_M2}
GLUCOSE BLD-MCNC: 74 MG/DL (ref 70–99)
GLUCOSE BLD-MCNC: 76 MG/DL (ref 70–99)
GLUCOSE BLD-MCNC: 76 MG/DL (ref 70–99)
GLUCOSE BLD-MCNC: 80 MG/DL (ref 70–99)
GLUCOSE SERPL-MCNC: 83 MG/DL (ref 70–99)
HCT VFR BLD AUTO: 36.1 % (ref 40.5–52.5)
HGB BLD-MCNC: 11.6 G/DL (ref 13.5–17.5)
LYMPHOCYTES # BLD: 1.5 K/UL (ref 1–5.1)
LYMPHOCYTES NFR BLD: 19.2 %
MCH RBC QN AUTO: 32.6 PG (ref 26–34)
MCHC RBC AUTO-ENTMCNC: 32.1 G/DL (ref 31–36)
MCV RBC AUTO: 101.4 FL (ref 80–100)
MONOCYTES # BLD: 1.1 K/UL (ref 0–1.3)
MONOCYTES NFR BLD: 14.9 %
NEUTROPHILS # BLD: 4.8 K/UL (ref 1.7–7.7)
NEUTROPHILS NFR BLD: 63.2 %
PERFORMED ON: NORMAL
PLATELET # BLD AUTO: 163 K/UL (ref 135–450)
PMV BLD AUTO: 8.9 FL (ref 5–10.5)
POTASSIUM SERPL-SCNC: 4 MMOL/L (ref 3.5–5.1)
RBC # BLD AUTO: 3.56 M/UL (ref 4.2–5.9)
SODIUM SERPL-SCNC: 138 MMOL/L (ref 136–145)
WBC # BLD AUTO: 7.6 K/UL (ref 4–11)

## 2023-11-21 PROCEDURE — 6360000002 HC RX W HCPCS: Performed by: INTERNAL MEDICINE

## 2023-11-21 PROCEDURE — 36415 COLL VENOUS BLD VENIPUNCTURE: CPT

## 2023-11-21 PROCEDURE — 99232 SBSQ HOSP IP/OBS MODERATE 35: CPT | Performed by: STUDENT IN AN ORGANIZED HEALTH CARE EDUCATION/TRAINING PROGRAM

## 2023-11-21 PROCEDURE — 6370000000 HC RX 637 (ALT 250 FOR IP): Performed by: INTERNAL MEDICINE

## 2023-11-21 PROCEDURE — 93922 UPR/L XTREMITY ART 2 LEVELS: CPT

## 2023-11-21 PROCEDURE — 2580000003 HC RX 258: Performed by: INTERNAL MEDICINE

## 2023-11-21 PROCEDURE — 97530 THERAPEUTIC ACTIVITIES: CPT

## 2023-11-21 PROCEDURE — 99232 SBSQ HOSP IP/OBS MODERATE 35: CPT | Performed by: SURGERY

## 2023-11-21 PROCEDURE — 75574 CT ANGIO HRT W/3D IMAGE: CPT

## 2023-11-21 PROCEDURE — 85025 COMPLETE CBC W/AUTO DIFF WBC: CPT

## 2023-11-21 PROCEDURE — 97535 SELF CARE MNGMENT TRAINING: CPT

## 2023-11-21 PROCEDURE — 80048 BASIC METABOLIC PNL TOTAL CA: CPT

## 2023-11-21 PROCEDURE — 1200000000 HC SEMI PRIVATE

## 2023-11-21 PROCEDURE — 5A1D70Z PERFORMANCE OF URINARY FILTRATION, INTERMITTENT, LESS THAN 6 HOURS PER DAY: ICD-10-PCS | Performed by: INTERNAL MEDICINE

## 2023-11-21 PROCEDURE — 93926 LOWER EXTREMITY STUDY: CPT

## 2023-11-21 PROCEDURE — 2500000003 HC RX 250 WO HCPCS: Performed by: STUDENT IN AN ORGANIZED HEALTH CARE EDUCATION/TRAINING PROGRAM

## 2023-11-21 PROCEDURE — 6370000000 HC RX 637 (ALT 250 FOR IP): Performed by: STUDENT IN AN ORGANIZED HEALTH CARE EDUCATION/TRAINING PROGRAM

## 2023-11-21 PROCEDURE — 6360000004 HC RX CONTRAST MEDICATION: Performed by: INTERNAL MEDICINE

## 2023-11-21 RX ORDER — METOPROLOL TARTRATE 1 MG/ML
5 INJECTION, SOLUTION INTRAVENOUS EVERY 5 MIN PRN
Status: DISCONTINUED | OUTPATIENT
Start: 2023-11-21 | End: 2023-11-30 | Stop reason: HOSPADM

## 2023-11-21 RX ORDER — NITROGLYCERIN 0.4 MG/1
0.4 TABLET SUBLINGUAL EVERY 5 MIN PRN
Status: DISCONTINUED | OUTPATIENT
Start: 2023-11-21 | End: 2023-11-30 | Stop reason: HOSPADM

## 2023-11-21 RX ADMIN — HEPARIN SODIUM 5000 UNITS: 5000 INJECTION INTRAVENOUS; SUBCUTANEOUS at 05:40

## 2023-11-21 RX ADMIN — IOPAMIDOL 120 ML: 755 INJECTION, SOLUTION INTRAVENOUS at 15:04

## 2023-11-21 RX ADMIN — HEPARIN SODIUM 5000 UNITS: 5000 INJECTION INTRAVENOUS; SUBCUTANEOUS at 16:15

## 2023-11-21 RX ADMIN — Medication 1 MG: at 13:33

## 2023-11-21 RX ADMIN — HEPARIN SODIUM 5000 UNITS: 5000 INJECTION INTRAVENOUS; SUBCUTANEOUS at 22:06

## 2023-11-21 RX ADMIN — AMLODIPINE BESYLATE 5 MG: 5 TABLET ORAL at 10:57

## 2023-11-21 RX ADMIN — ALLOPURINOL 100 MG: 100 TABLET ORAL at 10:58

## 2023-11-21 RX ADMIN — Medication 10 ML: at 22:06

## 2023-11-21 RX ADMIN — SEVELAMER CARBONATE 1600 MG: 800 TABLET, FILM COATED ORAL at 17:18

## 2023-11-21 RX ADMIN — Medication 10 ML: at 10:58

## 2023-11-21 RX ADMIN — NITROGLYCERIN 0.4 MG: 0.4 TABLET, ORALLY DISINTEGRATING SUBLINGUAL at 16:15

## 2023-11-21 RX ADMIN — TRAMADOL HYDROCHLORIDE 50 MG: 50 TABLET, COATED ORAL at 22:06

## 2023-11-21 RX ADMIN — METOPROLOL TARTRATE 5 MG: 5 INJECTION INTRAVENOUS at 14:38

## 2023-11-21 ASSESSMENT — PAIN DESCRIPTION - ORIENTATION: ORIENTATION: LEFT

## 2023-11-21 ASSESSMENT — PAIN SCALES - GENERAL
PAINLEVEL_OUTOF10: 0
PAINLEVEL_OUTOF10: 8

## 2023-11-21 ASSESSMENT — PAIN DESCRIPTION - DESCRIPTORS: DESCRIPTORS: ACHING

## 2023-11-21 ASSESSMENT — PAIN - FUNCTIONAL ASSESSMENT: PAIN_FUNCTIONAL_ASSESSMENT: PREVENTS OR INTERFERES WITH MANY ACTIVE NOT PASSIVE ACTIVITIES

## 2023-11-21 ASSESSMENT — PAIN DESCRIPTION - LOCATION: LOCATION: FOOT

## 2023-11-21 ASSESSMENT — PAIN DESCRIPTION - PAIN TYPE: TYPE: CHRONIC PAIN

## 2023-11-21 ASSESSMENT — PAIN DESCRIPTION - ONSET: ONSET: ON-GOING

## 2023-11-21 ASSESSMENT — PAIN DESCRIPTION - FREQUENCY: FREQUENCY: CONTINUOUS

## 2023-11-21 NOTE — PROGRESS NOTES
Physical [facility] Department   Phone: [phone]    Physical Therapy    [] Initial Evaluation            [x] Daily Treatment Note         [] Discharge Summary      Patient: Geni Thurman   : 1940   MRN: [id number]   Date of Service:  2023  Admitting Diagnosis: NSTEMI (non-ST elevated myocardial infarction) [facility])  Current Admission Summary: Patient presents with concern for NSTEMI. Pt was appparently at dialysis, became unresponsive. Unclear if LOC or not. Cannot give any hx nor ROS. Trop noted to be high, nearly double what it was on prev admit. Certainly some element of ESRD relating to elevation, but doubling in value is concerning, oleg when assoc with event during HD today. Recently admitted here, d/c summary from Dr Osiris Caballero reviewed. Pt apparently having issues caring for self, getting to dialysis. Pt also with chronic wounds, being seen in wound clinic. Needs further eval here. Past Medical History:  has a past medical history of Blood clot, Fracture of sternum, Fracture rib, Hyperlipidemia, Hypertension, Laceration of skin of lower leg, left, initial encounter, Laceration of skin of lower leg, right, initial encounter, Prostate cancer ([address]), and Type II or unspecified type diabetes mellitus without mention of complication, not stated as uncontrolled. Past Surgical History:  has a past surgical history that includes TURP () and sigmoidoscopy (N/A, 3/9/2023). Discharge Recommendations: Geni Thurman scored a 10/24 on the AM-PAC short mobility form. Current research shows that an AM-PAC score of 17 or less is typically not associated with a discharge to the patient's home setting. Based on the patient's AM-PAC score and their current functional mobility deficits, it is recommended that the patient have 3-5 sessions per week of Physical Therapy at d/c to increase the patient's independence.   Please see assessment section for further date.  Comments:  Wheelchair Mobility:  No w/c mobility completed on this date.   Comments:  Balance:  Static Sitting Balance: fair (-): maintains balance at SBA with use of UE support  Dynamic Sitting Balance: fair (-): maintains balance at CGA with use of UE support  Static Standing Balance: poor (-): requires max (A) to maintain balance  Dynamic Standing Balance: poor (-): requires max (A) to maintain balance  Comments:    Other Therapeutic Interventions  See OT note for assistance with ADLs    Functional Outcomes  AM-PAC Inpatient Mobility Raw Score : 10              Cognition  Overall Cognitive Status: Impaired  Memory: decreased recall of biographical information, decreased recall of recent events, decreased short term memory  Safety Judgement: decreased awareness of need for assistance, decreased awareness of need for safety  Problem Solving: decreased awareness of errors  Insights: decreased awareness of deficits  Initiation: requires cues for some  Sequencing: requires cues for some  Orientation:    alert and oriented x 4 (verbal cues for situation)  Command Following:   [facility]    Education  Barriers To Learning: cognition and physical  Patient Education: patient educated on goals, PT role and benefits, plan of care, general safety, functional mobility training, transfer training, discharge recommendations  Learning Assessment:  patient verbalizes understanding, would benefit from continued reinforcement    Assessment  Activity Tolerance: Fair; patient demonstrates overall decreased activity tolerance, limited by pain, weakness, decreased endurance  Impairments Requiring Therapeutic Intervention: decreased functional mobility, decreased ROM, decreased strength, decreased cognition, decreased endurance, decreased sensation, decreased balance, decreased vision, increased pain, decreased posture  Prognosis: fair  Clinical Assessment: Patient is an 79year old male who presents after becoming unresponsive at

## 2023-11-21 NOTE — PROGRESS NOTES
with use of UE support  Dynamic Sitting Balance: fair (-): maintains balance at CGA with use of UE support  Static Standing Balance: poor (-): requires max (A) to maintain balance  Dynamic Standing Balance: poor (-): requires max (A) to maintain balance  Comments: all stands in [facility] this date, use of HR to maintain balance while seated EOB    Other Therapeutic Interventions    Functional Outcomes  AM-PAC Inpatient Daily Activity Raw Score: 13    Cognition  Overall Cognitive Status: Impaired  Memory: decreased recall of biographical information, decreased recall of recent events, decreased short term memory  Safety Judgement: decreased awareness of need for assistance, decreased awareness of need for safety  Problem Solving: assistance required to generate solutions, assistance required to implement solutions, decreased awareness of errors, assistance required to identify errors made  Insights: decreased awareness of deficits  Initiation: requires cues for some  Sequencing: requires cues for some  Orientation:    alert and oriented x 4 (verbal cues for situation)  Command Following:   [facility]     Education  Barriers To Learning: cognition and physical  Patient Education: patient educated on goals, OT role and benefits, plan of care, transfer training, discharge recommendations  Learning Assessment:  patient will require reinforcement due to cognitive deficits    Assessment  Activity Tolerance: Tolerates well seated on EOB, limited by pain with standing/transfers  Impairments Requiring Therapeutic Intervention: decreased functional mobility, decreased ADL status, decreased safety awareness, decreased endurance, decreased balance, increased pain  Prognosis: fair  Clinical Assessment: Pt is below his baseline level of occupational function, based on the above deficits associated with  NSTEMI. He typically gets assist with ADLs at home and reports that he is independent with squat pivot transfers. He does not ambulate.

## 2023-11-21 NOTE — PROGRESS NOTES
Vascular    No acute changes  Noninvasive studies reviewed with patient and show evidence of multilevel disease. Patient with previous angiogram in April showing mild tibial disease with ulceration out of proportion to angiographic findings. Strong consideration for calciphylaxis. PE:  LLE ulceration stable/unchanged  Monophasic signals    Non-invasives reviewed    I: Peripheral vascular disease with left lower extremity ulceration  End-stage renal disease on hemodialysis  Severe LV dysfunction  Calciphylaxis  P: 69-year-old male with multiple medical risk factors and a nonhealing left lower extremity ulceration. Would strongly consider palliative care consult given current functional status and associated medical issues. If following palliative care consult patient would still like to proceed with repeat peripheral angiography would be happy to coordinate. However given findings in April options for intervention may be limited. Will continue to follow to ensure goals of care are reached. Gaetano Chapin M.D., FACS.  11/21/2023  3:03 PM

## 2023-11-21 NOTE — PROGRESS NOTES
Order for left lower extremity arterial duplex came over yesterday, patient refused exam at 2 pm per RN. Will attempt again today.

## 2023-11-22 LAB
ANION GAP SERPL CALCULATED.3IONS-SCNC: 15 MMOL/L (ref 3–16)
BASOPHILS # BLD: 0.1 K/UL (ref 0–0.2)
BASOPHILS NFR BLD: 0.9 %
BUN SERPL-MCNC: 42 MG/DL (ref 7–20)
CALCIUM SERPL-MCNC: 9.6 MG/DL (ref 8.3–10.6)
CHLORIDE SERPL-SCNC: 97 MMOL/L (ref 99–110)
CO2 SERPL-SCNC: 22 MMOL/L (ref 21–32)
CREAT SERPL-MCNC: 7.5 MG/DL (ref 0.8–1.3)
DEPRECATED RDW RBC AUTO: 16.5 % (ref 12.4–15.4)
EOSINOPHIL # BLD: 0.1 K/UL (ref 0–0.6)
EOSINOPHIL NFR BLD: 1.6 %
GFR SERPLBLD CREATININE-BSD FMLA CKD-EPI: 7 ML/MIN/{1.73_M2}
GLUCOSE BLD-MCNC: 110 MG/DL (ref 70–99)
GLUCOSE BLD-MCNC: 66 MG/DL (ref 70–99)
GLUCOSE BLD-MCNC: 69 MG/DL (ref 70–99)
GLUCOSE BLD-MCNC: 74 MG/DL (ref 70–99)
GLUCOSE BLD-MCNC: 86 MG/DL (ref 70–99)
GLUCOSE SERPL-MCNC: 66 MG/DL (ref 70–99)
HCT VFR BLD AUTO: 34.1 % (ref 40.5–52.5)
HGB BLD-MCNC: 11 G/DL (ref 13.5–17.5)
LYMPHOCYTES # BLD: 1.5 K/UL (ref 1–5.1)
LYMPHOCYTES NFR BLD: 21.2 %
MCH RBC QN AUTO: 32.5 PG (ref 26–34)
MCHC RBC AUTO-ENTMCNC: 32.2 G/DL (ref 31–36)
MCV RBC AUTO: 100.9 FL (ref 80–100)
MONOCYTES # BLD: 1 K/UL (ref 0–1.3)
MONOCYTES NFR BLD: 13.2 %
NEUTROPHILS # BLD: 4.5 K/UL (ref 1.7–7.7)
NEUTROPHILS NFR BLD: 63.1 %
PERFORMED ON: ABNORMAL
PERFORMED ON: NORMAL
PERFORMED ON: NORMAL
PLATELET # BLD AUTO: 146 K/UL (ref 135–450)
PMV BLD AUTO: 8.7 FL (ref 5–10.5)
POTASSIUM SERPL-SCNC: 4.3 MMOL/L (ref 3.5–5.1)
RBC # BLD AUTO: 3.38 M/UL (ref 4.2–5.9)
SODIUM SERPL-SCNC: 134 MMOL/L (ref 136–145)
WBC # BLD AUTO: 7.2 K/UL (ref 4–11)

## 2023-11-22 PROCEDURE — 99231 SBSQ HOSP IP/OBS SF/LOW 25: CPT | Performed by: SURGERY

## 2023-11-22 PROCEDURE — 6370000000 HC RX 637 (ALT 250 FOR IP): Performed by: INTERNAL MEDICINE

## 2023-11-22 PROCEDURE — 2500000003 HC RX 250 WO HCPCS

## 2023-11-22 PROCEDURE — 6360000002 HC RX W HCPCS: Performed by: INTERNAL MEDICINE

## 2023-11-22 PROCEDURE — 99232 SBSQ HOSP IP/OBS MODERATE 35: CPT | Performed by: STUDENT IN AN ORGANIZED HEALTH CARE EDUCATION/TRAINING PROGRAM

## 2023-11-22 PROCEDURE — 99153 MOD SED SAME PHYS/QHP EA: CPT

## 2023-11-22 PROCEDURE — 93454 CORONARY ARTERY ANGIO S&I: CPT

## 2023-11-22 PROCEDURE — C1894 INTRO/SHEATH, NON-LASER: HCPCS

## 2023-11-22 PROCEDURE — 4A023N7 MEASUREMENT OF CARDIAC SAMPLING AND PRESSURE, LEFT HEART, PERCUTANEOUS APPROACH: ICD-10-PCS | Performed by: INTERNAL MEDICINE

## 2023-11-22 PROCEDURE — 36415 COLL VENOUS BLD VENIPUNCTURE: CPT

## 2023-11-22 PROCEDURE — 2709999900 HC NON-CHARGEABLE SUPPLY

## 2023-11-22 PROCEDURE — 2580000003 HC RX 258: Performed by: STUDENT IN AN ORGANIZED HEALTH CARE EDUCATION/TRAINING PROGRAM

## 2023-11-22 PROCEDURE — 6360000004 HC RX CONTRAST MEDICATION: Performed by: STUDENT IN AN ORGANIZED HEALTH CARE EDUCATION/TRAINING PROGRAM

## 2023-11-22 PROCEDURE — 1200000000 HC SEMI PRIVATE

## 2023-11-22 PROCEDURE — 90935 HEMODIALYSIS ONE EVALUATION: CPT

## 2023-11-22 PROCEDURE — B2111ZZ FLUOROSCOPY OF MULTIPLE CORONARY ARTERIES USING LOW OSMOLAR CONTRAST: ICD-10-PCS | Performed by: INTERNAL MEDICINE

## 2023-11-22 PROCEDURE — 6370000000 HC RX 637 (ALT 250 FOR IP): Performed by: STUDENT IN AN ORGANIZED HEALTH CARE EDUCATION/TRAINING PROGRAM

## 2023-11-22 PROCEDURE — 2580000003 HC RX 258: Performed by: INTERNAL MEDICINE

## 2023-11-22 PROCEDURE — 99152 MOD SED SAME PHYS/QHP 5/>YRS: CPT

## 2023-11-22 PROCEDURE — 6360000002 HC RX W HCPCS

## 2023-11-22 PROCEDURE — 80048 BASIC METABOLIC PNL TOTAL CA: CPT

## 2023-11-22 PROCEDURE — 85025 COMPLETE CBC W/AUTO DIFF WBC: CPT

## 2023-11-22 PROCEDURE — 2580000003 HC RX 258

## 2023-11-22 PROCEDURE — C1769 GUIDE WIRE: HCPCS

## 2023-11-22 RX ORDER — ASPIRIN 325 MG
325 TABLET, DELAYED RELEASE (ENTERIC COATED) ORAL DAILY
Status: DISCONTINUED | OUTPATIENT
Start: 2023-11-22 | End: 2023-11-22

## 2023-11-22 RX ORDER — SODIUM CHLORIDE 0.9 % (FLUSH) 0.9 %
5-40 SYRINGE (ML) INJECTION EVERY 12 HOURS SCHEDULED
Status: DISCONTINUED | OUTPATIENT
Start: 2023-11-22 | End: 2023-11-24 | Stop reason: SDUPTHER

## 2023-11-22 RX ORDER — SODIUM CHLORIDE 0.9 % (FLUSH) 0.9 %
5-40 SYRINGE (ML) INJECTION PRN
Status: DISCONTINUED | OUTPATIENT
Start: 2023-11-22 | End: 2023-11-24 | Stop reason: SDUPTHER

## 2023-11-22 RX ORDER — SODIUM CHLORIDE 9 MG/ML
INJECTION, SOLUTION INTRAVENOUS PRN
Status: DISCONTINUED | OUTPATIENT
Start: 2023-11-22 | End: 2023-11-24 | Stop reason: SDUPTHER

## 2023-11-22 RX ORDER — ROSUVASTATIN CALCIUM 20 MG/1
20 TABLET, COATED ORAL NIGHTLY
Status: DISCONTINUED | OUTPATIENT
Start: 2023-11-22 | End: 2023-11-30 | Stop reason: HOSPADM

## 2023-11-22 RX ORDER — ASPIRIN 81 MG/1
81 TABLET, CHEWABLE ORAL DAILY
Status: DISCONTINUED | OUTPATIENT
Start: 2023-11-22 | End: 2023-11-30 | Stop reason: HOSPADM

## 2023-11-22 RX ADMIN — IOPAMIDOL 70 ML: 755 INJECTION, SOLUTION INTRAVENOUS at 10:51

## 2023-11-22 RX ADMIN — ASPIRIN 325 MG: 325 TABLET, COATED ORAL at 09:21

## 2023-11-22 RX ADMIN — Medication 10 ML: at 09:23

## 2023-11-22 RX ADMIN — ALLOPURINOL 100 MG: 100 TABLET ORAL at 09:18

## 2023-11-22 RX ADMIN — HEPARIN SODIUM 5000 UNITS: 5000 INJECTION INTRAVENOUS; SUBCUTANEOUS at 17:15

## 2023-11-22 RX ADMIN — TRAMADOL HYDROCHLORIDE 50 MG: 50 TABLET, COATED ORAL at 06:28

## 2023-11-22 RX ADMIN — HEPARIN SODIUM 5000 UNITS: 5000 INJECTION INTRAVENOUS; SUBCUTANEOUS at 06:26

## 2023-11-22 RX ADMIN — SODIUM CHLORIDE, PRESERVATIVE FREE 10 ML: 5 INJECTION INTRAVENOUS at 20:47

## 2023-11-22 RX ADMIN — AMLODIPINE BESYLATE 5 MG: 5 TABLET ORAL at 09:18

## 2023-11-22 RX ADMIN — HEPARIN SODIUM 5000 UNITS: 5000 INJECTION INTRAVENOUS; SUBCUTANEOUS at 20:47

## 2023-11-22 RX ADMIN — ROSUVASTATIN 20 MG: 20 TABLET, FILM COATED ORAL at 20:46

## 2023-11-22 RX ADMIN — SEVELAMER CARBONATE 1600 MG: 800 TABLET, FILM COATED ORAL at 16:50

## 2023-11-22 ASSESSMENT — ENCOUNTER SYMPTOMS
ALLERGIC/IMMUNOLOGIC NEGATIVE: 1
SHORTNESS OF BREATH: 1
EYES NEGATIVE: 1

## 2023-11-22 ASSESSMENT — PAIN SCALES - GENERAL
PAINLEVEL_OUTOF10: 7
PAINLEVEL_OUTOF10: 0

## 2023-11-22 ASSESSMENT — PAIN DESCRIPTION - ONSET: ONSET: ON-GOING

## 2023-11-22 ASSESSMENT — PAIN DESCRIPTION - ORIENTATION: ORIENTATION: LEFT

## 2023-11-22 ASSESSMENT — PAIN - FUNCTIONAL ASSESSMENT: PAIN_FUNCTIONAL_ASSESSMENT: PREVENTS OR INTERFERES SOME ACTIVE ACTIVITIES AND ADLS

## 2023-11-22 ASSESSMENT — PAIN DESCRIPTION - PAIN TYPE: TYPE: CHRONIC PAIN

## 2023-11-22 ASSESSMENT — PAIN DESCRIPTION - DESCRIPTORS: DESCRIPTORS: ACHING

## 2023-11-22 ASSESSMENT — PAIN DESCRIPTION - LOCATION: LOCATION: FOOT

## 2023-11-22 ASSESSMENT — PAIN DESCRIPTION - FREQUENCY: FREQUENCY: CONTINUOUS

## 2023-11-22 NOTE — PROGRESS NOTES
PATIENT HISTORY    ECHO: DATE: 3/1/23        EF: 25%  STRESS TEST PREFORMED: abrnormal  EKG: Yes    ECG     Result: Normal  Pre CATH Rhythm: Atrial Fibrillation  HYPERTENSION: Yes  DYSLIPIDEMIA: Yes  FAMILY HX OF CAD: Yes  PRIOR MI: No  PRIOR PCI: No  PRIOR CABG: No  CEREBROVASCULAR DX: No  PERIPHERAL ARTERIAL DISEASE: No  CHRONIC LUNG DISEASE: No  TOBACCO: Never  DIABETIC: Yes, No treatment  CARDIAC ARREST: {No  DIALYSIS: Yes  HEART FAILURE: Yes     NYHA Class: Class II     Newly Diagnosed: No     HF Type: Systolic  FRAILTY SCORE: 6 MODERATELY FRAIL (need help with all outside activities and housekeeping, often have problems with stairs, bathing, dressing)  CARDIAC CTA PREFORMED:  No  [place] CORONARY CALCIUM SCORE:   Assessed: Yes     Score: 8905    Date: [id number]  Prior Diagnostic Coronary Angioplasty Procedure:   No

## 2023-11-22 NOTE — PROGRESS NOTES
Treatment time: 3 hours    Net UF: 3000 ml    Pre weight: 84.3 kg  Post weight: 81.3 kg    Access used: R [facility]  Access function: . Access runs without malfunction. Medications or blood products given: None    Regular outpatient schedule: TTS    Summary of response to treatment: Pt. Tolerates 3 hours fair. States unable to complete 3.5 due to being uncomfortable. Copy of dialysis treatment record placed in chart, to be scanned into EMR.

## 2023-11-22 NOTE — PROGRESS NOTES
Pt back from cath lab. VSS. Patient resting comfortably. No needs expressed at this time.  Will continue to monitor

## 2023-11-22 NOTE — PROGRESS NOTES
Vascular    S/p cath today  Discussed with Dr. Emelia Herrera overall status  Severe coronary disease  Will not plan any LE intervention given associated medical issues  Conservative wound management for leg  Will peripherally follow  Please contact me with any questions      Walker Saenz M.D., FACS.  11/22/2023  11:35 AM

## 2023-11-22 NOTE — PLAN OF CARE
Podiatric Surgery Plan of Care Note  Kim Overton     Attempted to see patient, however patient was off of the floor at the cath lab. We will return tomorrow to see patient.      Mary Vegas, DPM  Podiatric Resident PGY-2  Pager [phone] or Perfect Serve

## 2023-11-22 NOTE — PROGRESS NOTES
PALLIATIVE MEDICINE PROGRESS NOTE     Patient name:Donato Clancy    PPB:[id number] :1940  Room/Bed:R5Y-6148/5901-01    LOS: 6 days        ASSESSMENT/RECOMMENDATIONS     79 y.o. male with AMS and chest pain s/p NSTEMI        Symptom Management:  AMS- pt awake alert and oriented today   Chest pain- s/p NSTEMI pt reports CP has resolved    Goals of Care- Pt continues to want all aggressive lifesaving measures including open heart surgery anything to [facility] another day\". Left message for wife to discuss [address] and discuss long term plan      Patient/Family Goals of Care :      talked to pt regarding [address] he maintains that he is independent at home and will remain independent he wants no rehab and no home health care. He wants all aggressive life saving measures he is tired and not talking much         Pt continues to want all aggressive lifesaving measures including open heart surgery anything to [facility] another day\". Left message for wife to discuss [address] and discuss long term plan      Disposition/Discharge Plan:   pending     Advance Directives: The patient has appointed the following active healthcare agents:    Primary Decision Maker: Claire Edward  [phone]     The Patient has the following current code status:    Code Status: Full Code       Case Discussed with:  patient, floor RN    Thank you for allowing us to participate in the care of this patient. SUBJECTIVE     Chief Complaint: chest pain     Last 24 hours:   Pt leaving for heart cath this am when I went to see him    ROS:    Review of Systems   Eyes: Negative. Respiratory:  Positive for shortness of breath. Cardiovascular:  Positive for leg swelling. Endocrine: Negative. Genitourinary: Negative. Musculoskeletal:  Positive for arthralgias. Skin:  Positive for pallor. Allergic/Immunologic: Negative. Neurological:  Positive for weakness.                    Physical Exam  Constitutional: Appearance: He is ill-appearing. Comments: drowsy   HENT:      Head: Normocephalic and atraumatic. Nose: Nose normal.      Mouth/Throat:      Mouth: Mucous membranes are dry. Eyes:      Pupils: Pupils are equal, round, and reactive to light. Cardiovascular:      Rate and Rhythm: Normal rate. Pulses: Normal pulses. Heart sounds: No murmur heard. No gallop. Pulmonary:      Effort: Pulmonary effort is normal.   Chest:      Chest wall: Tenderness present. Musculoskeletal:      Cervical back: Neck supple. Right lower leg: Edema present. Left lower leg: Edema present. Skin:     General: Skin is dry. Coloration: Skin is pale. Palliative Medicine Interventions:    patient/family support  Goals of Care discussions with patient/surrogate  Spiritual Interventions:           DATA:  Current labs in the epic chart reviewed as of 11/22/2023   Review of previous notes, admits, labs, radiology and testing relevant to this consult done in this chart today 11/22/2023    Data Reviewed related to this consultation:    Review of prior external note(s) from each unique source relevant to today's visit: Hospitalist, Case management, cardiology  Discussion of management or test with external physician/qualified health care professional: Hospitalist, Case management  Unique test results reviewed: CBC and BMP      I have spent a total of 55 minutes on: Performing a medical appropriate examination and/or evaluation    Counseling and educating the patient/family/caregiver  Preparing to see the patient (e.g., review of tests)  Referring / communicating with other healthcare professionals including care coordination (not separately reported):  Hospitalist Case management  Documenting clinical information in the electronic health record     Signed By: Electronically signed by SHAHRZAD Johnson CNP on 11/22/2023 at 12:07 PM   Palliative Medicine     November 22, 2023

## 2023-11-22 NOTE — PROCEDURES
[facility] Operative Note     PROCEDURE SUMMARY   Procedure [facility]   Indication NSTEMI   Consent Obtained   Access LCFA   US US guidance used to determine artery patency, size (>2mm), anatomic variations and ideal puncture location. Real-time US utilized concurrent with vascular needle entry into artery. Image(s) permanently recorded and reported in chart. Bleed Risk Intermediate   Sedation Minimal conscious sedation for comfort. Independent trained observer pushed medications at my direction. Level of consciousness and vital signs/physiologic status monitored throughout the procedure (see start and stop times above, as well as medication dosages). Start Time 1011   Stop Time 1042   Versed 2mg   Fentanyl 50mcg   Contrast 70cc   Flouro 3.5min   EBL <22SC   Complicat None   Specimens None   Procedure Detail Patient taken to cath lab in postabsorptive state, informed consent obtained.   LCFA prepped and draped in normal sterile fashion  Micro needle used to access artery With US guidance  Micro wire advanced into artery and 5F sheath advanced over wire   JL 4 advanced over wire to engage LM coronary artery and image in multiple views  JL 4 exchanged over a wire for JR4 to engage RCA and image in multiple views  JR4 removed over J wire  Arterial hemostasis obtained with Manual pressure     FINDINGS   Artery Findings   LM Normal   LAD 90% calcified proximal lesion   Cx 70% eccentric calcified mid lesion   RI N/A   RCA 99% long, heavily calcified lesion   LVEDP  N/a Normal 3-12mmHg   LVG N/A with N/A wall motion Normal >/= 55%   AVG N/A     INTERVENTION(S)   None        POST CATH  RECOMMENDATIONS   Continued aggressive medical therapy and risk factor modification  Heavily calcified multi-vessel CAD   Will discuss ultimate goals of care and the patient's interest in CABG vs. High risk PCI  Likely poor surgical candidate but will discuss with CT surgery regarding CABG  If proceeding with PCI, will need atherectomy with Rota vs CSI. Plan for Impella support given his significant disease and poor LVEF. If PCI - Load with plavix and CTA Pelvis for access guidance     MEDICATION RECOMMENDATIONS   Recommendation Rx Detail Reason Not Prescribed   ASA ASA 81mg PO QD    P2Y12 N/A    HI-STATIN Rosuvastatin 20mg PO QD N/A   BETA BLOCKER N/A Bradycardia   ACE/ARB/ARNI N/A Renal failure   ALDACTONE None Low BP     NON-MEDICATION RECOMMENDATIONS   Category Recommendation   EF ASSESSMENT Noninvasive assessment of EF as IP/OP as appropriate if LVG not performed. TOBACCO Avoidance of first and second hand smoke. Counseling provided. DIET/EXERCISE Maintain healthy diet and exercise program.  Counseling provided. CARDIAC REHAB Refer to OP cardiac rehab phase II. Deferred if clinically inappropriate.

## 2023-11-23 LAB
ANION GAP SERPL CALCULATED.3IONS-SCNC: 17 MMOL/L (ref 3–16)
BASOPHILS # BLD: 0.1 K/UL (ref 0–0.2)
BASOPHILS NFR BLD: 0.9 %
BUN SERPL-MCNC: 27 MG/DL (ref 7–20)
CALCIUM SERPL-MCNC: 9.5 MG/DL (ref 8.3–10.6)
CHLORIDE SERPL-SCNC: 95 MMOL/L (ref 99–110)
CO2 SERPL-SCNC: 24 MMOL/L (ref 21–32)
CREAT SERPL-MCNC: 5.7 MG/DL (ref 0.8–1.3)
DEPRECATED RDW RBC AUTO: 16.6 % (ref 12.4–15.4)
EOSINOPHIL # BLD: 0.2 K/UL (ref 0–0.6)
EOSINOPHIL NFR BLD: 2.4 %
GFR SERPLBLD CREATININE-BSD FMLA CKD-EPI: 9 ML/MIN/{1.73_M2}
GLUCOSE BLD-MCNC: 116 MG/DL (ref 70–99)
GLUCOSE BLD-MCNC: 136 MG/DL (ref 70–99)
GLUCOSE BLD-MCNC: 77 MG/DL (ref 70–99)
GLUCOSE BLD-MCNC: 89 MG/DL (ref 70–99)
GLUCOSE BLD-MCNC: 94 MG/DL (ref 70–99)
GLUCOSE SERPL-MCNC: 84 MG/DL (ref 70–99)
HCT VFR BLD AUTO: 35.8 % (ref 40.5–52.5)
HGB BLD-MCNC: 11.7 G/DL (ref 13.5–17.5)
LYMPHOCYTES # BLD: 1.6 K/UL (ref 1–5.1)
LYMPHOCYTES NFR BLD: 23 %
MCH RBC QN AUTO: 32.8 PG (ref 26–34)
MCHC RBC AUTO-ENTMCNC: 32.5 G/DL (ref 31–36)
MCV RBC AUTO: 100.8 FL (ref 80–100)
MONOCYTES # BLD: 0.9 K/UL (ref 0–1.3)
MONOCYTES NFR BLD: 12.9 %
NEUTROPHILS # BLD: 4.3 K/UL (ref 1.7–7.7)
NEUTROPHILS NFR BLD: 60.8 %
PERFORMED ON: ABNORMAL
PERFORMED ON: ABNORMAL
PERFORMED ON: NORMAL
PLATELET # BLD AUTO: 178 K/UL (ref 135–450)
PMV BLD AUTO: 8.2 FL (ref 5–10.5)
POTASSIUM SERPL-SCNC: 4.1 MMOL/L (ref 3.5–5.1)
RBC # BLD AUTO: 3.55 M/UL (ref 4.2–5.9)
SODIUM SERPL-SCNC: 136 MMOL/L (ref 136–145)
WBC # BLD AUTO: 7.1 K/UL (ref 4–11)

## 2023-11-23 PROCEDURE — 36415 COLL VENOUS BLD VENIPUNCTURE: CPT

## 2023-11-23 PROCEDURE — 99232 SBSQ HOSP IP/OBS MODERATE 35: CPT | Performed by: STUDENT IN AN ORGANIZED HEALTH CARE EDUCATION/TRAINING PROGRAM

## 2023-11-23 PROCEDURE — 1200000000 HC SEMI PRIVATE

## 2023-11-23 PROCEDURE — 6370000000 HC RX 637 (ALT 250 FOR IP): Performed by: STUDENT IN AN ORGANIZED HEALTH CARE EDUCATION/TRAINING PROGRAM

## 2023-11-23 PROCEDURE — 80048 BASIC METABOLIC PNL TOTAL CA: CPT

## 2023-11-23 PROCEDURE — 6370000000 HC RX 637 (ALT 250 FOR IP): Performed by: INTERNAL MEDICINE

## 2023-11-23 PROCEDURE — 85025 COMPLETE CBC W/AUTO DIFF WBC: CPT

## 2023-11-23 PROCEDURE — 2580000003 HC RX 258: Performed by: INTERNAL MEDICINE

## 2023-11-23 PROCEDURE — 6360000002 HC RX W HCPCS: Performed by: INTERNAL MEDICINE

## 2023-11-23 RX ADMIN — ASPIRIN 81 MG 81 MG: 81 TABLET ORAL at 08:53

## 2023-11-23 RX ADMIN — MIDODRINE HYDROCHLORIDE 2.5 MG: 5 TABLET ORAL at 08:53

## 2023-11-23 RX ADMIN — SEVELAMER CARBONATE 1600 MG: 800 TABLET, FILM COATED ORAL at 08:52

## 2023-11-23 RX ADMIN — SEVELAMER CARBONATE 1600 MG: 800 TABLET, FILM COATED ORAL at 12:08

## 2023-11-23 RX ADMIN — ACETAMINOPHEN 650 MG: 325 TABLET ORAL at 21:08

## 2023-11-23 RX ADMIN — ALLOPURINOL 100 MG: 100 TABLET ORAL at 08:54

## 2023-11-23 RX ADMIN — Medication 10 ML: at 21:08

## 2023-11-23 RX ADMIN — HEPARIN SODIUM 5000 UNITS: 5000 INJECTION INTRAVENOUS; SUBCUTANEOUS at 14:48

## 2023-11-23 RX ADMIN — TRAMADOL HYDROCHLORIDE 50 MG: 50 TABLET, COATED ORAL at 00:17

## 2023-11-23 RX ADMIN — HEPARIN SODIUM 5000 UNITS: 5000 INJECTION INTRAVENOUS; SUBCUTANEOUS at 06:15

## 2023-11-23 RX ADMIN — Medication 1 MG: at 09:44

## 2023-11-23 RX ADMIN — ACETAMINOPHEN 650 MG: 325 TABLET ORAL at 00:17

## 2023-11-23 RX ADMIN — ROSUVASTATIN 20 MG: 20 TABLET, FILM COATED ORAL at 21:08

## 2023-11-23 RX ADMIN — SEVELAMER CARBONATE 1600 MG: 800 TABLET, FILM COATED ORAL at 17:55

## 2023-11-23 RX ADMIN — Medication 10 ML: at 08:54

## 2023-11-23 RX ADMIN — HEPARIN SODIUM 5000 UNITS: 5000 INJECTION INTRAVENOUS; SUBCUTANEOUS at 21:08

## 2023-11-23 RX ADMIN — AMLODIPINE BESYLATE 5 MG: 5 TABLET ORAL at 08:54

## 2023-11-23 RX ADMIN — TRAMADOL HYDROCHLORIDE 50 MG: 50 TABLET, COATED ORAL at 14:49

## 2023-11-23 ASSESSMENT — PAIN - FUNCTIONAL ASSESSMENT
PAIN_FUNCTIONAL_ASSESSMENT: PREVENTS OR INTERFERES WITH ALL ACTIVE AND SOME PASSIVE ACTIVITIES
PAIN_FUNCTIONAL_ASSESSMENT: PREVENTS OR INTERFERES WITH MANY ACTIVE NOT PASSIVE ACTIVITIES

## 2023-11-23 ASSESSMENT — PAIN DESCRIPTION - FREQUENCY
FREQUENCY: CONTINUOUS

## 2023-11-23 ASSESSMENT — PAIN DESCRIPTION - ONSET
ONSET: ON-GOING

## 2023-11-23 ASSESSMENT — PAIN SCALES - GENERAL
PAINLEVEL_OUTOF10: 7
PAINLEVEL_OUTOF10: 0
PAINLEVEL_OUTOF10: 0
PAINLEVEL_OUTOF10: 10
PAINLEVEL_OUTOF10: 0
PAINLEVEL_OUTOF10: 1
PAINLEVEL_OUTOF10: 5
PAINLEVEL_OUTOF10: 7
PAINLEVEL_OUTOF10: 0

## 2023-11-23 ASSESSMENT — PAIN DESCRIPTION - DESCRIPTORS
DESCRIPTORS: ACHING
DESCRIPTORS: ACHING

## 2023-11-23 ASSESSMENT — PAIN DESCRIPTION - ORIENTATION
ORIENTATION: LEFT

## 2023-11-23 ASSESSMENT — PAIN DESCRIPTION - PAIN TYPE
TYPE: CHRONIC PAIN

## 2023-11-23 ASSESSMENT — PAIN DESCRIPTION - LOCATION
LOCATION: FOOT;TOE (COMMENT WHICH ONE)
LOCATION: TOE (COMMENT WHICH ONE)
LOCATION: TOE (COMMENT WHICH ONE)
LOCATION: FOOT

## 2023-11-23 ASSESSMENT — PAIN SCALES - WONG BAKER: WONGBAKER_NUMERICALRESPONSE: 0

## 2023-11-23 NOTE — PROGRESS NOTES
Progress Note - Dr. Miranda [place] - Internal Medicine  PCP: [place] [id number] [address] / [address] [phone]    Hospital Day: 7  Code Status: Full Code  Current Diet: ADULT DIET; Regular; Low Fat/Low Chol/High Fiber/2 gm Na        CC: follow up on medical issues    Subjective:   Shakila Sol is a 79 y.o. male. Pt seen and examined  Chart reviewed since last visit, labs and imaging below    No new issues  Cardiac cath       POST CATH  RECOMMENDATIONS   Continued aggressive medical therapy and risk factor modification  Heavily calcified multi-vessel CAD   Will discuss ultimate goals of care and the patient's interest in CABG vs. High risk PCI  Likely poor surgical candidate but will discuss with CT surgery regarding CABG  If proceeding with PCI, will need atherectomy with Rota vs CSI. Plan for Impella support given his significant disease and poor LVEF. If PCI - Load with plavix and CTA Pelvis for access guidance     Awaiting  CT surg  eval    Review of Systems: (1 system for EPF, 2-9 for detailed, 10+ for comprehensive)  Constitutional: Negative for chills and fever. HENT: Negative for dental problem, nosebleeds and rhinorrhea. Eyes: Negative for photophobia and visual disturbance. Respiratory: Negative for cough, chest tightness and shortness of breath. Cardiovascular: Negative for chest pain and leg swelling. Gastrointestinal: Negative for diarrhea, nausea and vomiting. Endocrine: Negative for polydipsia and polyphagia. Genitourinary: Negative for frequency, hematuria and urgency. Musculoskeletal: Negative for back pain and myalgias. Skin: Negative for rash. Allergic/Immunologic: Negative for food allergies. Neurological: Negative for dizziness, seizures, syncope and facial asymmetry. Hematological: Negative for adenopathy. Psychiatric/Behavioral: Negative for dysphoric mood. The patient is not nervous/anxious.         I have Atrial fibrillation ([address]) -Established problem. Stable. In chronic fib  Plan: cont meds, cont on tele      Case discussed with: cards  Tests ordered/reviewed: cbc, bmp, fsbs          (Please note that portions of this note were completed with a voice recognition program.  Efforts were made to edit the dictations but occasionally words are mis-transcribed.)        Radha Tovar MD  11/23/2023    Please use [employer] to contact me with any questions during the day. The hospitalist service will provide cross-coverage for this patient from 7pm to 7am.    During those hours, contact the on-call hospitalist MD/RIKKI for questions.

## 2023-11-24 LAB
ANION GAP SERPL CALCULATED.3IONS-SCNC: 17 MMOL/L (ref 3–16)
BASOPHILS # BLD: 0.1 K/UL (ref 0–0.2)
BASOPHILS NFR BLD: 0.9 %
BUN SERPL-MCNC: 38 MG/DL (ref 7–20)
CALCIUM SERPL-MCNC: 9.4 MG/DL (ref 8.3–10.6)
CHLORIDE SERPL-SCNC: 101 MMOL/L (ref 99–110)
CO2 SERPL-SCNC: 22 MMOL/L (ref 21–32)
CREAT SERPL-MCNC: 7.4 MG/DL (ref 0.8–1.3)
DEPRECATED RDW RBC AUTO: 17 % (ref 12.4–15.4)
EOSINOPHIL # BLD: 0.2 K/UL (ref 0–0.6)
EOSINOPHIL NFR BLD: 2.2 %
GFR SERPLBLD CREATININE-BSD FMLA CKD-EPI: 7 ML/MIN/{1.73_M2}
GLUCOSE BLD-MCNC: 101 MG/DL (ref 70–99)
GLUCOSE BLD-MCNC: 119 MG/DL (ref 70–99)
GLUCOSE BLD-MCNC: 136 MG/DL (ref 70–99)
GLUCOSE BLD-MCNC: 146 MG/DL (ref 70–99)
GLUCOSE BLD-MCNC: 159 MG/DL (ref 70–99)
GLUCOSE SERPL-MCNC: 114 MG/DL (ref 70–99)
HCT VFR BLD AUTO: 38.3 % (ref 40.5–52.5)
HGB BLD-MCNC: 12.4 G/DL (ref 13.5–17.5)
LYMPHOCYTES # BLD: 1.6 K/UL (ref 1–5.1)
LYMPHOCYTES NFR BLD: 20.5 %
MCH RBC QN AUTO: 32.6 PG (ref 26–34)
MCHC RBC AUTO-ENTMCNC: 32.3 G/DL (ref 31–36)
MCV RBC AUTO: 101 FL (ref 80–100)
MONOCYTES # BLD: 1.1 K/UL (ref 0–1.3)
MONOCYTES NFR BLD: 13.3 %
NEUTROPHILS # BLD: 5.1 K/UL (ref 1.7–7.7)
NEUTROPHILS NFR BLD: 63.1 %
PERFORMED ON: ABNORMAL
PLATELET # BLD AUTO: 184 K/UL (ref 135–450)
PMV BLD AUTO: 8.1 FL (ref 5–10.5)
POTASSIUM SERPL-SCNC: 4 MMOL/L (ref 3.5–5.1)
RBC # BLD AUTO: 3.79 M/UL (ref 4.2–5.9)
SODIUM SERPL-SCNC: 140 MMOL/L (ref 136–145)
WBC # BLD AUTO: 8 K/UL (ref 4–11)

## 2023-11-24 PROCEDURE — 80048 BASIC METABOLIC PNL TOTAL CA: CPT

## 2023-11-24 PROCEDURE — 85025 COMPLETE CBC W/AUTO DIFF WBC: CPT

## 2023-11-24 PROCEDURE — 6370000000 HC RX 637 (ALT 250 FOR IP): Performed by: INTERNAL MEDICINE

## 2023-11-24 PROCEDURE — 6360000002 HC RX W HCPCS: Performed by: INTERNAL MEDICINE

## 2023-11-24 PROCEDURE — 2580000003 HC RX 258: Performed by: INTERNAL MEDICINE

## 2023-11-24 PROCEDURE — 99232 SBSQ HOSP IP/OBS MODERATE 35: CPT | Performed by: STUDENT IN AN ORGANIZED HEALTH CARE EDUCATION/TRAINING PROGRAM

## 2023-11-24 PROCEDURE — 1200000000 HC SEMI PRIVATE

## 2023-11-24 PROCEDURE — 6370000000 HC RX 637 (ALT 250 FOR IP): Performed by: STUDENT IN AN ORGANIZED HEALTH CARE EDUCATION/TRAINING PROGRAM

## 2023-11-24 PROCEDURE — 36415 COLL VENOUS BLD VENIPUNCTURE: CPT

## 2023-11-24 RX ADMIN — Medication 10 ML: at 11:21

## 2023-11-24 RX ADMIN — ROSUVASTATIN 20 MG: 20 TABLET, FILM COATED ORAL at 19:44

## 2023-11-24 RX ADMIN — HEPARIN SODIUM 5000 UNITS: 5000 INJECTION INTRAVENOUS; SUBCUTANEOUS at 20:25

## 2023-11-24 RX ADMIN — HEPARIN SODIUM 5000 UNITS: 5000 INJECTION INTRAVENOUS; SUBCUTANEOUS at 06:40

## 2023-11-24 RX ADMIN — Medication 1 MG: at 11:21

## 2023-11-24 RX ADMIN — ASPIRIN 81 MG 81 MG: 81 TABLET ORAL at 10:56

## 2023-11-24 RX ADMIN — HEPARIN SODIUM 5000 UNITS: 5000 INJECTION INTRAVENOUS; SUBCUTANEOUS at 15:16

## 2023-11-24 RX ADMIN — AMLODIPINE BESYLATE 5 MG: 5 TABLET ORAL at 10:55

## 2023-11-24 RX ADMIN — Medication 10 ML: at 19:44

## 2023-11-24 RX ADMIN — SEVELAMER CARBONATE 1600 MG: 800 TABLET, FILM COATED ORAL at 18:06

## 2023-11-24 RX ADMIN — MIDODRINE HYDROCHLORIDE 2.5 MG: 5 TABLET ORAL at 10:56

## 2023-11-24 RX ADMIN — ALLOPURINOL 100 MG: 100 TABLET ORAL at 10:55

## 2023-11-24 RX ADMIN — TRAMADOL HYDROCHLORIDE 50 MG: 50 TABLET, COATED ORAL at 19:48

## 2023-11-24 RX ADMIN — SEVELAMER CARBONATE 1600 MG: 800 TABLET, FILM COATED ORAL at 10:55

## 2023-11-24 ASSESSMENT — PAIN SCALES - GENERAL
PAINLEVEL_OUTOF10: 0

## 2023-11-24 NOTE — PROGRESS NOTES
Nutrition Note    RECOMMENDATIONS  PO Diet: Cardiac, CCC  ONS: Glucerna BID     NUTRITION ASSESSMENT   Nutrition intervention triggered for LOS. Pt receives a cardiac diet with variable po intake recorded, 26-75% of meals. Wts have fluctuated throughout hx, likely d/t HD. Pt with increased nutrient needs d/t multiple venous/DM wounds identified (per MD, may need amputation). Will add CCC modifier & offer Glucerna BID to promote healing. Nutrition Related Findings: +BS;  trace edema BLE; gluc 114  Wounds: Multiple, Diabetic Ulcer, Venous Stasis  Nutrition Education:  Education not indicated   Nutrition Goals: PO intake 50% or greater     MALNUTRITION ASSESSMENT   Acute Illness  Malnutrition Status: No malnutrition    NUTRITION DIAGNOSIS   Increased nutrient needs related to increase demand for energy/nutrients as evidenced by wounds      CURRENT NUTRITION THERAPIES  ADULT DIET; Regular; Low Fat/Low Chol/High Fiber/2 gm Na     PO Intake: 26-50%, 51-75%   PO Supplement Intake:None Ordered    ANTHROPOMETRICS  Current Height: 177.8 cm (5' 10\")  Current Weight - Scale: 76 kg (167 lb 8.8 oz)    Ideal Body Weight (IBW): 166 lbs  (75 kg)        BMI: 24      COMPARATIVE STANDARDS  Total Energy Requirements (kcals/day): 1900- 2280     Protein (g):  91- 152g       Fluid (mL/day):  1 ml/kcal    The patient will be monitored per nutrition standards of care. Consult dietitian if additional nutrition interventions are needed prior to RD reassessment.      Wolf Lane RD, LD    Contact: [phone]

## 2023-11-24 NOTE — PROGRESS NOTES
Progress Note - Dr. River Cohen - Internal Medicine  PCP: [place] [id number] [facility] / [facility] [phone]    Hospital Day: 8  Code Status: Full Code  Current Diet: ADULT DIET; Regular; Low Fat/Low Chol/High Fiber/2 gm Na        CC: follow up on medical issues    Subjective:   Clifton Miranda is a 79 y.o. male. Pt seen and examined  Chart reviewed since last visit, labs and imaging below    No new issues    Cards considering PCI  Pt seems to want this    No plans for vascular/podiatry intervention at this time      Review of Systems: (1 system for EPF, 2-9 for detailed, 10+ for comprehensive)  Constitutional: Negative for chills and fever. HENT: Negative for dental problem, nosebleeds and rhinorrhea. Eyes: Negative for photophobia and visual disturbance. Respiratory: Negative for cough, chest tightness and shortness of breath. Cardiovascular: Negative for chest pain and leg swelling. Gastrointestinal: Negative for diarrhea, nausea and vomiting. Endocrine: Negative for polydipsia and polyphagia. Genitourinary: Negative for frequency, hematuria and urgency. Musculoskeletal: Negative for back pain and myalgias. Skin: Negative for rash. Allergic/Immunologic: Negative for food allergies. Neurological: Negative for dizziness, seizures, syncope and facial asymmetry. Hematological: Negative for adenopathy. Psychiatric/Behavioral: Negative for dysphoric mood. The patient is not nervous/anxious. I have reviewed the patient's medical and social history in detail and updated the computerized patient record.   To recap: He  has a past medical history of Blood clot, Fracture of sternum, Fracture rib, Hemodialysis patient ([address]), Hyperlipidemia, Hypertension, Laceration of skin of lower leg, left, initial encounter, Laceration of skin of lower leg, right, initial encounter, Prostate cancer ([address]), and Type II or unspecified type diabetes

## 2023-11-24 NOTE — PROGRESS NOTES
Occupational/Physical Therapy           Em Nikky    PT/OT treatment attempted but the pt refused at this time. He was sitting EOB and refused to lay in supine, transfer to chair or use BSC. He refused all activity. PT/OT will follow-up with this pt as the schedule allows. Thanks.   Radu Stephenson [place] DPT [id number]  Fabio Craft, OTR/L

## 2023-11-25 LAB
ANION GAP SERPL CALCULATED.3IONS-SCNC: 14 MMOL/L (ref 3–16)
BASOPHILS # BLD: 0.1 K/UL (ref 0–0.2)
BASOPHILS NFR BLD: 0.7 %
BUN SERPL-MCNC: 48 MG/DL (ref 7–20)
CALCIUM SERPL-MCNC: 9.7 MG/DL (ref 8.3–10.6)
CHLORIDE SERPL-SCNC: 97 MMOL/L (ref 99–110)
CO2 SERPL-SCNC: 24 MMOL/L (ref 21–32)
CREAT SERPL-MCNC: 9.2 MG/DL (ref 0.8–1.3)
DEPRECATED RDW RBC AUTO: 16.4 % (ref 12.4–15.4)
EOSINOPHIL # BLD: 0.2 K/UL (ref 0–0.6)
EOSINOPHIL NFR BLD: 1.7 %
GFR SERPLBLD CREATININE-BSD FMLA CKD-EPI: 5 ML/MIN/{1.73_M2}
GLUCOSE BLD-MCNC: 113 MG/DL (ref 70–99)
GLUCOSE BLD-MCNC: 75 MG/DL (ref 70–99)
GLUCOSE BLD-MCNC: 81 MG/DL (ref 70–99)
GLUCOSE BLD-MCNC: 83 MG/DL (ref 70–99)
GLUCOSE BLD-MCNC: 95 MG/DL (ref 70–99)
GLUCOSE SERPL-MCNC: 90 MG/DL (ref 70–99)
HCT VFR BLD AUTO: 36.3 % (ref 40.5–52.5)
HGB BLD-MCNC: 11.7 G/DL (ref 13.5–17.5)
LYMPHOCYTES # BLD: 2.1 K/UL (ref 1–5.1)
LYMPHOCYTES NFR BLD: 19.8 %
MCH RBC QN AUTO: 32.5 PG (ref 26–34)
MCHC RBC AUTO-ENTMCNC: 32.1 G/DL (ref 31–36)
MCV RBC AUTO: 101.1 FL (ref 80–100)
MONOCYTES # BLD: 1.3 K/UL (ref 0–1.3)
MONOCYTES NFR BLD: 12.3 %
NEUTROPHILS # BLD: 7.1 K/UL (ref 1.7–7.7)
NEUTROPHILS NFR BLD: 65.5 %
PERFORMED ON: ABNORMAL
PERFORMED ON: NORMAL
PLATELET # BLD AUTO: 212 K/UL (ref 135–450)
PMV BLD AUTO: 8 FL (ref 5–10.5)
POTASSIUM SERPL-SCNC: 4.6 MMOL/L (ref 3.5–5.1)
RBC # BLD AUTO: 3.59 M/UL (ref 4.2–5.9)
SODIUM SERPL-SCNC: 135 MMOL/L (ref 136–145)
WBC # BLD AUTO: 10.8 K/UL (ref 4–11)

## 2023-11-25 PROCEDURE — 90935 HEMODIALYSIS ONE EVALUATION: CPT

## 2023-11-25 PROCEDURE — 1200000000 HC SEMI PRIVATE

## 2023-11-25 PROCEDURE — 2580000003 HC RX 258: Performed by: INTERNAL MEDICINE

## 2023-11-25 PROCEDURE — 6370000000 HC RX 637 (ALT 250 FOR IP): Performed by: STUDENT IN AN ORGANIZED HEALTH CARE EDUCATION/TRAINING PROGRAM

## 2023-11-25 PROCEDURE — 99233 SBSQ HOSP IP/OBS HIGH 50: CPT | Performed by: NURSE PRACTITIONER

## 2023-11-25 PROCEDURE — 6360000002 HC RX W HCPCS: Performed by: INTERNAL MEDICINE

## 2023-11-25 PROCEDURE — 85025 COMPLETE CBC W/AUTO DIFF WBC: CPT

## 2023-11-25 PROCEDURE — 6370000000 HC RX 637 (ALT 250 FOR IP): Performed by: INTERNAL MEDICINE

## 2023-11-25 PROCEDURE — 80048 BASIC METABOLIC PNL TOTAL CA: CPT

## 2023-11-25 PROCEDURE — 36415 COLL VENOUS BLD VENIPUNCTURE: CPT

## 2023-11-25 PROCEDURE — 6370000000 HC RX 637 (ALT 250 FOR IP): Performed by: NURSE PRACTITIONER

## 2023-11-25 RX ORDER — HYDRALAZINE HYDROCHLORIDE 10 MG/1
10 TABLET, FILM COATED ORAL EVERY 8 HOURS SCHEDULED
Status: DISCONTINUED | OUTPATIENT
Start: 2023-11-25 | End: 2023-11-27

## 2023-11-25 RX ORDER — ISOSORBIDE MONONITRATE 30 MG/1
30 TABLET, EXTENDED RELEASE ORAL DAILY
Status: DISCONTINUED | OUTPATIENT
Start: 2023-11-25 | End: 2023-11-30 | Stop reason: HOSPADM

## 2023-11-25 RX ADMIN — Medication 10 ML: at 21:31

## 2023-11-25 RX ADMIN — HYDRALAZINE HYDROCHLORIDE 10 MG: 10 TABLET, FILM COATED ORAL at 21:31

## 2023-11-25 RX ADMIN — Medication 1 MG: at 15:40

## 2023-11-25 RX ADMIN — HEPARIN SODIUM 5000 UNITS: 5000 INJECTION INTRAVENOUS; SUBCUTANEOUS at 15:40

## 2023-11-25 RX ADMIN — ROSUVASTATIN 20 MG: 20 TABLET, FILM COATED ORAL at 21:30

## 2023-11-25 RX ADMIN — ASPIRIN 81 MG 81 MG: 81 TABLET ORAL at 12:41

## 2023-11-25 RX ADMIN — TRAMADOL HYDROCHLORIDE 50 MG: 50 TABLET, COATED ORAL at 21:30

## 2023-11-25 RX ADMIN — HEPARIN SODIUM 5000 UNITS: 5000 INJECTION INTRAVENOUS; SUBCUTANEOUS at 21:30

## 2023-11-25 RX ADMIN — TRAMADOL HYDROCHLORIDE 50 MG: 50 TABLET, COATED ORAL at 12:39

## 2023-11-25 RX ADMIN — HEPARIN SODIUM 5000 UNITS: 5000 INJECTION INTRAVENOUS; SUBCUTANEOUS at 05:42

## 2023-11-25 RX ADMIN — ISOSORBIDE MONONITRATE 30 MG: 30 TABLET, EXTENDED RELEASE ORAL at 15:40

## 2023-11-25 RX ADMIN — TRAMADOL HYDROCHLORIDE 50 MG: 50 TABLET, COATED ORAL at 04:13

## 2023-11-25 RX ADMIN — SEVELAMER CARBONATE 1600 MG: 800 TABLET, FILM COATED ORAL at 12:40

## 2023-11-25 RX ADMIN — ACETAMINOPHEN 650 MG: 325 TABLET ORAL at 12:39

## 2023-11-25 RX ADMIN — MIDODRINE HYDROCHLORIDE 2.5 MG: 5 TABLET ORAL at 12:40

## 2023-11-25 RX ADMIN — Medication 10 ML: at 12:47

## 2023-11-25 RX ADMIN — HYDRALAZINE HYDROCHLORIDE 10 MG: 10 TABLET, FILM COATED ORAL at 15:40

## 2023-11-25 RX ADMIN — ALLOPURINOL 100 MG: 100 TABLET ORAL at 12:40

## 2023-11-25 RX ADMIN — SEVELAMER CARBONATE 1600 MG: 800 TABLET, FILM COATED ORAL at 18:07

## 2023-11-25 ASSESSMENT — PAIN SCALES - GENERAL
PAINLEVEL_OUTOF10: 10
PAINLEVEL_OUTOF10: 8
PAINLEVEL_OUTOF10: 8

## 2023-11-25 ASSESSMENT — PAIN DESCRIPTION - ORIENTATION
ORIENTATION: RIGHT;LEFT
ORIENTATION: LEFT
ORIENTATION: RIGHT;LEFT

## 2023-11-25 ASSESSMENT — PAIN DESCRIPTION - LOCATION
LOCATION: TOE (COMMENT WHICH ONE)
LOCATION: FOOT;ANKLE
LOCATION: FOOT
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
## 2023-11-25 NOTE — PROGRESS NOTES
Treatment time: 3.5hours  Net UF: 1262 ml     Pre weight: 78.1 kg  Post weight:76.8 kg    Access used: RTDC    Access function: well with  ml/min       Regular outpatient schedule: TTS     Summary of response to treatment: Patient tolerated treatment well and without any complications. Patient remained stable throughout entire treatment.

## 2023-11-25 NOTE — PROGRESS NOTES
Progress Note - Dr. Gisselle Mendenhall - Internal Medicine  PCP: [place] [id number] [facility] [phone]    Hospital Day: 9  Code Status: Full Code  Current Diet: ADULT DIET; Regular; 4 carb choices (60 gm/meal); Low Fat/Low Chol/High Fiber/2 gm Na  ADULT ORAL NUTRITION SUPPLEMENT; Breakfast, Dinner; Diabetic Oral Supplement        CC: follow up on medical issues    Subjective:   Sienna Schulte is a 79 y.o. male. Pt seen and examined  Chart reviewed since last visit, labs and imaging below    No new issues    Cards not considering PCI after long discussion with pt  Palliaitive care has been discussed  No plans for vascular/podiatry intervention at this time    Therapy recommends snf  Social work asked to assist with this    Due for HD today  Creat>9    Review of Systems: (1 system for EPF, 2-9 for detailed, 10+ for comprehensive)  Constitutional: Negative for chills and fever. HENT: Negative for dental problem, nosebleeds and rhinorrhea. Eyes: Negative for photophobia and visual disturbance. Respiratory: Negative for cough, chest tightness and shortness of breath. Cardiovascular: Negative for chest pain and leg swelling. Gastrointestinal: Negative for diarrhea, nausea and vomiting. Endocrine: Negative for polydipsia and polyphagia. Genitourinary: Negative for frequency, hematuria and urgency. Musculoskeletal: Negative for back pain and myalgias. Skin: Negative for rash. Allergic/Immunologic: Negative for food allergies. Neurological: Negative for dizziness, seizures, syncope and facial asymmetry. Hematological: Negative for adenopathy. Psychiatric/Behavioral: Negative for dysphoric mood. The patient is not nervous/anxious. I have reviewed the patient's medical and social history in detail and updated the computerized patient record.   To recap: He  has a past medical history of Blood clot, Fracture of sternum, Fracture rib, Problems:    Non-pressure chronic ulcer of left lower leg ([address]) -Established problem. Stable. 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 Stable. Plan: renal on board. Cont HD per them    Controlled type 2 diabetes mellitus with complication, with long-term current use of insulin ([address]) -Established problem. Stable. Glu 90  Plan: Continue on CCC diet, home medications. CBC and BMP ordered to monitor sugars and for other medication complications. Fingerstick glucose ordered to check for alterations in levels throughout day. Sliding scale insulin ordered to cover fingerstick levels. Essential hypertension -Established problem. Stable. 114/76  Plan: Continue medications. Continue to check BP q shift. CBC and BMP ordered to monitor for disease progression, medication side effect. 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 amputation does not appear recommended    Atrial fibrillation ([address]) -Established problem. Stable. In chronic fib  Plan: cont meds, cont on tele      Case discussed with: cards  Tests ordered/reviewed: cbc, bmp, fsbs      Disp - work on placement. Pallitiave care to see again, consider outpt pall care vs hospice. Otherwise, SNF recommended    (Please note that portions of this note were completed with a voice recognition program.  Efforts were made to edit the dictations but occasionally words are mis-transcribed.)        Lalo Vincent MD  11/25/2023    Please use [employer] to contact me with any questions during the day. The hospitalist service will provide cross-coverage for this patient from 7pm to 7am.    During those hours, contact the on-call hospitalist MD/RIKKI for questions.

## 2023-11-25 NOTE — PLAN OF CARE
care plan with appropriate goals if chronic or comorbid symptoms are exacerbated and prevent overall improvement and discharge  11/24/2023 1724 by Tabatha Rosales RN  Outcome: Progressing     Problem: Metabolic/Fluid and Electrolytes - Adult  Goal: Electrolytes maintained within normal limits  Outcome: Progressing  Flowsheets (Taken 11/25/2023 0342)  Electrolytes maintained within normal limits:   Monitor labs and assess patient for signs and symptoms of electrolyte imbalances   Administer electrolyte replacement as ordered   Monitor response to electrolyte replacements, including repeat lab results as appropriate   Fluid restriction as ordered   Instruct patient on fluid and nutrition restrictions as appropriate  Goal: Hemodynamic stability and optimal renal function maintained  Outcome: Progressing  Flowsheets (Taken 11/25/2023 0342)  Hemodynamic stability and optimal renal function maintained:   Monitor labs and assess for signs and symptoms of volume excess or deficit   Monitor intake, output and patient weight   Monitor urine specific gravity, serum osmolarity and serum sodium as indicated or ordered   Monitor response to interventions for patient's volume status, including labs, urine output, blood pressure (other measures as available)   Encourage oral intake as appropriate   Instruct patient on fluid and nutrition restrictions as appropriate  Goal: Glucose maintained within prescribed range  Outcome: Progressing  Flowsheets (Taken 11/25/2023 0342)  Glucose maintained within prescribed range:   Monitor blood glucose as ordered   Assess for signs and symptoms of hyperglycemia and hypoglycemia   Assess barriers to adequate nutritional intake and initiate nutrition consult as needed   Administer ordered medications to maintain glucose within target range   Instruct patient on self management of diabetes and initiate consult as needed     Problem: Hematologic - Adult  Goal: Maintains hematologic stability  Outcome: Progressing  Flowsheets (Taken 11/25/2023 0342)  Maintains hematologic stability:   Assess for signs and symptoms of bleeding or hemorrhage   Monitor labs for bleeding or clotting disorders   Administer blood products/factors as ordered     Problem: Nutrition Deficit:  Goal: Optimize nutritional status  11/25/2023 0342 by Samira Chery RN  Outcome: Progressing  Flowsheets (Taken 11/25/2023 0342)  Nutrient intake appropriate for improving, restoring, or maintaining nutritional needs:   Assess nutritional status and recommend course of action   Monitor oral intake, labs, and treatment plans   Order, calculate, and assess calorie counts as needed   Recommend appropriate diets, oral nutritional supplements, and vitamin/mineral supplements   Recommend, monitor, and adjust tube feedings and TPN/PPN based on assessed needs   Provide specific nutrition education to patient or family as appropriate  11/25/2023 0341 by Samira Chery RN  Outcome: Progressing  Flowsheets (Taken 11/25/2023 0341)  Nutrient intake appropriate for improving, restoring, or maintaining nutritional needs:   Assess nutritional status and recommend course of action   Monitor oral intake, labs, and treatment plans   Recommend appropriate diets, oral nutritional supplements, and vitamin/mineral supplements   Order, calculate, and assess calorie counts as needed   Recommend, monitor, and adjust tube feedings and TPN/PPN based on assessed needs   Provide specific nutrition education to patient or family as appropriate  11/24/2023 1724 by Beena Chadwick RN  Outcome: Progressing

## 2023-11-26 LAB
ANION GAP SERPL CALCULATED.3IONS-SCNC: 13 MMOL/L (ref 3–16)
BASOPHILS # BLD: 0.1 K/UL (ref 0–0.2)
BASOPHILS NFR BLD: 1 %
BUN SERPL-MCNC: 28 MG/DL (ref 7–20)
CALCIUM SERPL-MCNC: 9.2 MG/DL (ref 8.3–10.6)
CHLORIDE SERPL-SCNC: 97 MMOL/L (ref 99–110)
CO2 SERPL-SCNC: 24 MMOL/L (ref 21–32)
CREAT SERPL-MCNC: 6.2 MG/DL (ref 0.8–1.3)
DEPRECATED RDW RBC AUTO: 16.1 % (ref 12.4–15.4)
EOSINOPHIL # BLD: 0.2 K/UL (ref 0–0.6)
EOSINOPHIL NFR BLD: 2 %
GFR SERPLBLD CREATININE-BSD FMLA CKD-EPI: 8 ML/MIN/{1.73_M2}
GLUCOSE BLD-MCNC: 103 MG/DL (ref 70–99)
GLUCOSE BLD-MCNC: 103 MG/DL (ref 70–99)
GLUCOSE BLD-MCNC: 124 MG/DL (ref 70–99)
GLUCOSE BLD-MCNC: 80 MG/DL (ref 70–99)
GLUCOSE SERPL-MCNC: 79 MG/DL (ref 70–99)
HCT VFR BLD AUTO: 33.3 % (ref 40.5–52.5)
HGB BLD-MCNC: 10.9 G/DL (ref 13.5–17.5)
LYMPHOCYTES # BLD: 1.8 K/UL (ref 1–5.1)
LYMPHOCYTES NFR BLD: 22.4 %
MCH RBC QN AUTO: 32.9 PG (ref 26–34)
MCHC RBC AUTO-ENTMCNC: 32.9 G/DL (ref 31–36)
MCV RBC AUTO: 100.2 FL (ref 80–100)
MONOCYTES # BLD: 1.1 K/UL (ref 0–1.3)
MONOCYTES NFR BLD: 13.3 %
NEUTROPHILS # BLD: 4.9 K/UL (ref 1.7–7.7)
NEUTROPHILS NFR BLD: 61.3 %
PERFORMED ON: ABNORMAL
PERFORMED ON: NORMAL
PLATELET # BLD AUTO: 159 K/UL (ref 135–450)
PMV BLD AUTO: 7.9 FL (ref 5–10.5)
POTASSIUM SERPL-SCNC: 4.2 MMOL/L (ref 3.5–5.1)
RBC # BLD AUTO: 3.32 M/UL (ref 4.2–5.9)
SODIUM SERPL-SCNC: 134 MMOL/L (ref 136–145)
WBC # BLD AUTO: 8 K/UL (ref 4–11)

## 2023-11-26 PROCEDURE — 6370000000 HC RX 637 (ALT 250 FOR IP): Performed by: NURSE PRACTITIONER

## 2023-11-26 PROCEDURE — 1200000000 HC SEMI PRIVATE

## 2023-11-26 PROCEDURE — 85025 COMPLETE CBC W/AUTO DIFF WBC: CPT

## 2023-11-26 PROCEDURE — 6370000000 HC RX 637 (ALT 250 FOR IP): Performed by: STUDENT IN AN ORGANIZED HEALTH CARE EDUCATION/TRAINING PROGRAM

## 2023-11-26 PROCEDURE — 80048 BASIC METABOLIC PNL TOTAL CA: CPT

## 2023-11-26 PROCEDURE — 2580000003 HC RX 258: Performed by: INTERNAL MEDICINE

## 2023-11-26 PROCEDURE — 99233 SBSQ HOSP IP/OBS HIGH 50: CPT | Performed by: NURSE PRACTITIONER

## 2023-11-26 PROCEDURE — 6370000000 HC RX 637 (ALT 250 FOR IP): Performed by: INTERNAL MEDICINE

## 2023-11-26 PROCEDURE — 6360000002 HC RX W HCPCS: Performed by: INTERNAL MEDICINE

## 2023-11-26 PROCEDURE — 36415 COLL VENOUS BLD VENIPUNCTURE: CPT

## 2023-11-26 RX ORDER — CLOPIDOGREL BISULFATE 75 MG/1
75 TABLET ORAL DAILY
Status: DISCONTINUED | OUTPATIENT
Start: 2023-11-26 | End: 2023-11-30 | Stop reason: HOSPADM

## 2023-11-26 RX ADMIN — MIDODRINE HYDROCHLORIDE 2.5 MG: 5 TABLET ORAL at 09:13

## 2023-11-26 RX ADMIN — CLOPIDOGREL BISULFATE 75 MG: 75 TABLET ORAL at 11:22

## 2023-11-26 RX ADMIN — HEPARIN SODIUM 5000 UNITS: 5000 INJECTION INTRAVENOUS; SUBCUTANEOUS at 15:13

## 2023-11-26 RX ADMIN — ACETAMINOPHEN 650 MG: 325 TABLET ORAL at 09:14

## 2023-11-26 RX ADMIN — Medication 10 ML: at 09:14

## 2023-11-26 RX ADMIN — ASPIRIN 81 MG 81 MG: 81 TABLET ORAL at 09:14

## 2023-11-26 RX ADMIN — ROSUVASTATIN 20 MG: 20 TABLET, FILM COATED ORAL at 20:32

## 2023-11-26 RX ADMIN — ALLOPURINOL 100 MG: 100 TABLET ORAL at 09:14

## 2023-11-26 RX ADMIN — HYDRALAZINE HYDROCHLORIDE 10 MG: 10 TABLET, FILM COATED ORAL at 20:33

## 2023-11-26 RX ADMIN — TRAMADOL HYDROCHLORIDE 50 MG: 50 TABLET, COATED ORAL at 11:22

## 2023-11-26 RX ADMIN — HEPARIN SODIUM 5000 UNITS: 5000 INJECTION INTRAVENOUS; SUBCUTANEOUS at 20:33

## 2023-11-26 RX ADMIN — TRAMADOL HYDROCHLORIDE 50 MG: 50 TABLET, COATED ORAL at 20:33

## 2023-11-26 RX ADMIN — Medication 1 MG: at 09:14

## 2023-11-26 RX ADMIN — SEVELAMER CARBONATE 1600 MG: 800 TABLET, FILM COATED ORAL at 08:53

## 2023-11-26 RX ADMIN — SEVELAMER CARBONATE 1600 MG: 800 TABLET, FILM COATED ORAL at 17:27

## 2023-11-26 RX ADMIN — TRAMADOL HYDROCHLORIDE 50 MG: 50 TABLET, COATED ORAL at 05:13

## 2023-11-26 RX ADMIN — HYDRALAZINE HYDROCHLORIDE 10 MG: 10 TABLET, FILM COATED ORAL at 05:14

## 2023-11-26 RX ADMIN — SEVELAMER CARBONATE 1600 MG: 800 TABLET, FILM COATED ORAL at 11:47

## 2023-11-26 RX ADMIN — HEPARIN SODIUM 5000 UNITS: 5000 INJECTION INTRAVENOUS; SUBCUTANEOUS at 05:14

## 2023-11-26 RX ADMIN — ISOSORBIDE MONONITRATE 30 MG: 30 TABLET, EXTENDED RELEASE ORAL at 09:13

## 2023-11-26 RX ADMIN — Medication 10 ML: at 20:39

## 2023-11-26 ASSESSMENT — PAIN DESCRIPTION - PAIN TYPE
TYPE: CHRONIC PAIN
TYPE: CHRONIC PAIN

## 2023-11-26 ASSESSMENT — PAIN DESCRIPTION - ORIENTATION
ORIENTATION: RIGHT;LEFT
ORIENTATION: RIGHT;LEFT

## 2023-11-26 ASSESSMENT — PAIN SCALES - GENERAL
PAINLEVEL_OUTOF10: 8
PAINLEVEL_OUTOF10: 8
PAINLEVEL_OUTOF10: 4
PAINLEVEL_OUTOF10: 6
PAINLEVEL_OUTOF10: 8
PAINLEVEL_OUTOF10: 8

## 2023-11-26 ASSESSMENT — PAIN DESCRIPTION - FREQUENCY
FREQUENCY: INTERMITTENT
FREQUENCY: INTERMITTENT

## 2023-11-26 ASSESSMENT — PAIN DESCRIPTION - LOCATION
LOCATION: FOOT;LEG
LOCATION: FOOT;LEG

## 2023-11-26 ASSESSMENT — PAIN DESCRIPTION - ONSET
ONSET: SUDDEN
ONSET: SUDDEN

## 2023-11-26 NOTE — PLAN OF CARE
Problem: Discharge Planning  Goal: Discharge to home or other facility with appropriate resources  Outcome: Progressing  Flowsheets (Taken 11/26/2023 0042)  Discharge to home or other facility with appropriate resources:   Identify barriers to discharge with patient and caregiver   Arrange for needed discharge resources and transportation as appropriate   Identify discharge learning needs (meds, wound care, etc)   Arrange for interpreters to assist at discharge as needed   Refer to discharge planning if patient needs post-hospital services based on physician order or complex needs related to functional status, cognitive ability or social support system     Problem: Skin/Tissue Integrity  Goal: Absence of new skin breakdown  Description: 1. Monitor for areas of redness and/or skin breakdown  2. Assess vascular access sites hourly  3. Every 4-6 hours minimum:  Change oxygen saturation probe site  4. Every 4-6 hours:  If on nasal continuous positive airway pressure, respiratory therapy assess nares and determine need for appliance change or resting period.   Outcome: Progressing     Problem: Safety - Adult  Goal: Free from fall injury  Outcome: Progressing  Flowsheets (Taken 11/26/2023 0042)  Free From Fall Injury:   Instruct family/caregiver on patient safety   Based on caregiver fall risk screen, instruct family/caregiver to ask for assistance with transferring infant if caregiver noted to have fall risk factors     Problem: Pain  Goal: Verbalizes/displays adequate comfort level or baseline comfort level  Outcome: Progressing  Flowsheets (Taken 11/26/2023 0042)  Verbalizes/displays adequate comfort level or baseline comfort level:   Encourage patient to monitor pain and request assistance   Assess pain using appropriate pain scale   Administer analgesics based on type and severity of pain and evaluate response   Implement non-pharmacological measures as appropriate and evaluate response   Consider cultural and

## 2023-11-26 NOTE — PLAN OF CARE
RN  Outcome: Progressing  Flowsheets (Taken 11/26/2023 0042)  Electrolytes maintained within normal limits:   Monitor labs and assess patient for signs and symptoms of electrolyte imbalances   Administer electrolyte replacement as ordered   Monitor response to electrolyte replacements, including repeat lab results as appropriate   Fluid restriction as ordered   Instruct patient on fluid and nutrition restrictions as appropriate  Goal: Hemodynamic stability and optimal renal function maintained  11/26/2023 0920 by Leda Slaughter RN  Outcome: Progressing  11/26/2023 0042 by Patrice Negron RN  Outcome: Progressing  Flowsheets (Taken 11/26/2023 8381)  Hemodynamic stability and optimal renal function maintained:   Monitor labs and assess for signs and symptoms of volume excess or deficit   Monitor intake, output and patient weight   Monitor urine specific gravity, serum osmolarity and serum sodium as indicated or ordered   Monitor response to interventions for patient's volume status, including labs, urine output, blood pressure (other measures as available)   Encourage oral intake as appropriate   Instruct patient on fluid and nutrition restrictions as appropriate  Goal: Glucose maintained within prescribed range  11/26/2023 0920 by Leda Slaughter RN  Outcome: Progressing  11/26/2023 0042 by Patrice Negron RN  Outcome: Progressing  Flowsheets (Taken 11/26/2023 0042)  Glucose maintained within prescribed range:   Monitor blood glucose as ordered   Assess for signs and symptoms of hyperglycemia and hypoglycemia   Administer ordered medications to maintain glucose within target range   Assess barriers to adequate nutritional intake and initiate nutrition consult as needed   Instruct patient on self management of diabetes and initiate consult as needed     Problem: Hematologic - Adult  Goal: Maintains hematologic stability  11/26/2023 0920 by Leda Slaughter RN  Outcome: Progressing  11/26/2023 0042 by Anuradha Ames RN  Outcome: Progressing  Flowsheets (Taken 11/26/2023 2686)  Maintains hematologic stability:   Assess for signs and symptoms of bleeding or hemorrhage   Monitor labs for bleeding or clotting disorders   Administer blood products/factors as ordered     Problem: Nutrition Deficit:  Goal: Optimize nutritional status  11/26/2023 0920 by Nicola Pérez RN  Outcome: Progressing  11/26/2023 0042 by Anuradha Ames RN  Outcome: Progressing  Flowsheets (Taken 11/26/2023 0042)  Nutrient intake appropriate for improving, restoring, or maintaining nutritional needs:   Assess nutritional status and recommend course of action   Monitor oral intake, labs, and treatment plans   Recommend appropriate diets, oral nutritional supplements, and vitamin/mineral supplements   Order, calculate, and assess calorie counts as needed   Recommend, monitor, and adjust tube feedings and TPN/PPN based on assessed needs   Provide specific nutrition education to patient or family as appropriate

## 2023-11-26 NOTE — PROGRESS NOTES
NICM  ~ last echo EF 20-25%  ~ fluid management with HD.   ~ stopped norvasc and changed to hydralazine/ imdur. Not on acei/arb/arni/julius d/t esrd. No bb d/t bradycardia. Wide complex tachycardia   ~ no recurrence today  ~ keep mag>2 ang K>4  ~ Previously not on bb d/t concern for bradycardia. Atrial fibrillation   ~ rate controlled   ~ on and off coumadin d/t falls    ESRD  ~ on HD  ~ per nephrology     PAD / Gangrene toes  ~ vascular and podiatry following. HTN  HLD  DM    Multiple medical conditions with risk of decompensation. All pertinent information and plan of care discussed with the physician. All questions and concerns were addressed to the patient. Alternatives to my treatment were discussed. I have discussed the above stated plan with patient and the nurse. The patient verbalized understanding and agreed with the plan. Thank you for allowing to us to participate in the care of Clifton iMranda. Total visit time > 55 minutes; > 50% spend counseling / coordinating care. I reviewed interval history, physical exam, review of data including labs, imaging, development and implementation of treatment plan and coordination of complex care. Counseled on risk factor modifications. Hugh MARKHAM-CNP  [facility]   Office: [phone]    NOTE:  This report was transcribed using voice recognition software. Every effort was made to ensure accuracy; however, inadvertent computerized transcription errors may be present.

## 2023-11-27 LAB
GLUCOSE BLD-MCNC: 101 MG/DL (ref 70–99)
GLUCOSE BLD-MCNC: 101 MG/DL (ref 70–99)
GLUCOSE BLD-MCNC: 118 MG/DL (ref 70–99)
GLUCOSE BLD-MCNC: 124 MG/DL (ref 70–99)
PERFORMED ON: ABNORMAL

## 2023-11-27 PROCEDURE — 6370000000 HC RX 637 (ALT 250 FOR IP): Performed by: INTERNAL MEDICINE

## 2023-11-27 PROCEDURE — 6370000000 HC RX 637 (ALT 250 FOR IP): Performed by: NURSE PRACTITIONER

## 2023-11-27 PROCEDURE — 6370000000 HC RX 637 (ALT 250 FOR IP): Performed by: STUDENT IN AN ORGANIZED HEALTH CARE EDUCATION/TRAINING PROGRAM

## 2023-11-27 PROCEDURE — 99233 SBSQ HOSP IP/OBS HIGH 50: CPT | Performed by: NURSE PRACTITIONER

## 2023-11-27 PROCEDURE — 1200000000 HC SEMI PRIVATE

## 2023-11-27 PROCEDURE — 6360000002 HC RX W HCPCS: Performed by: INTERNAL MEDICINE

## 2023-11-27 PROCEDURE — 97110 THERAPEUTIC EXERCISES: CPT

## 2023-11-27 PROCEDURE — 97530 THERAPEUTIC ACTIVITIES: CPT

## 2023-11-27 PROCEDURE — 2580000003 HC RX 258: Performed by: INTERNAL MEDICINE

## 2023-11-27 PROCEDURE — 97535 SELF CARE MNGMENT TRAINING: CPT

## 2023-11-27 RX ADMIN — SEVELAMER CARBONATE 1600 MG: 800 TABLET, FILM COATED ORAL at 14:43

## 2023-11-27 RX ADMIN — SEVELAMER CARBONATE 1600 MG: 800 TABLET, FILM COATED ORAL at 09:05

## 2023-11-27 RX ADMIN — CLOPIDOGREL BISULFATE 75 MG: 75 TABLET ORAL at 09:05

## 2023-11-27 RX ADMIN — MIDODRINE HYDROCHLORIDE 2.5 MG: 5 TABLET ORAL at 09:05

## 2023-11-27 RX ADMIN — HEPARIN SODIUM 5000 UNITS: 5000 INJECTION INTRAVENOUS; SUBCUTANEOUS at 21:46

## 2023-11-27 RX ADMIN — HEPARIN SODIUM 5000 UNITS: 5000 INJECTION INTRAVENOUS; SUBCUTANEOUS at 05:07

## 2023-11-27 RX ADMIN — TRAMADOL HYDROCHLORIDE 50 MG: 50 TABLET, COATED ORAL at 09:05

## 2023-11-27 RX ADMIN — ASPIRIN 81 MG 81 MG: 81 TABLET ORAL at 09:05

## 2023-11-27 RX ADMIN — ROSUVASTATIN 20 MG: 20 TABLET, FILM COATED ORAL at 21:46

## 2023-11-27 RX ADMIN — HYDRALAZINE HYDROCHLORIDE 10 MG: 10 TABLET, FILM COATED ORAL at 05:07

## 2023-11-27 RX ADMIN — Medication 10 ML: at 09:08

## 2023-11-27 RX ADMIN — ALLOPURINOL 100 MG: 100 TABLET ORAL at 09:05

## 2023-11-27 RX ADMIN — HEPARIN SODIUM 5000 UNITS: 5000 INJECTION INTRAVENOUS; SUBCUTANEOUS at 14:43

## 2023-11-27 RX ADMIN — TRAMADOL HYDROCHLORIDE 50 MG: 50 TABLET, COATED ORAL at 21:46

## 2023-11-27 RX ADMIN — Medication 10 ML: at 21:49

## 2023-11-27 RX ADMIN — SEVELAMER CARBONATE 1600 MG: 800 TABLET, FILM COATED ORAL at 17:22

## 2023-11-27 RX ADMIN — ISOSORBIDE MONONITRATE 30 MG: 30 TABLET, EXTENDED RELEASE ORAL at 09:05

## 2023-11-27 ASSESSMENT — PAIN SCALES - GENERAL
PAINLEVEL_OUTOF10: 6
PAINLEVEL_OUTOF10: 8
PAINLEVEL_OUTOF10: 8
PAINLEVEL_OUTOF10: 5

## 2023-11-27 ASSESSMENT — ENCOUNTER SYMPTOMS
EYES NEGATIVE: 1
SHORTNESS OF BREATH: 1
ALLERGIC/IMMUNOLOGIC NEGATIVE: 1

## 2023-11-27 ASSESSMENT — PAIN DESCRIPTION - DESCRIPTORS: DESCRIPTORS: ACHING

## 2023-11-27 ASSESSMENT — PAIN DESCRIPTION - ORIENTATION: ORIENTATION: RIGHT;LEFT

## 2023-11-27 ASSESSMENT — PAIN DESCRIPTION - LOCATION: LOCATION: LEG

## 2023-11-27 NOTE — PROGRESS NOTES
basal to mid   inferoseptum, basal to mid inferior walls appear akinetic. The anteroseptum   appears hypokinetic. Grade III diastolic dysfunction with elevated LV   filling pressures. E/e' = 19.8. Left atrium is severely dilated   Right atrium is dilated. Aortic valve appears sclerotic but opens adequately. Mitral annular calcification is present. Mild mitral regurgitation. Moderate tricuspid regurgitation. Right ventricle is dilated with reduced function. TAPSE: 1.63 cm. RVS   velocity: 6.38 cm/s. RVSP is estimated to be 42-45 mmHG. IVC not well visualized. 11/22/23    [facility] Operative Note          PROCEDURE SUMMARY   Procedure [facility]   Indication NSTEMI   Consent Obtained   Access LCFA   US US guidance used to determine artery patency, size (>2mm), anatomic variations and ideal puncture location. Real-time US utilized concurrent with vascular needle entry into artery. Image(s) permanently recorded and reported in chart. Bleed Risk Intermediate   Sedation Minimal conscious sedation for comfort. Independent trained observer pushed medications at my direction. Level of consciousness and vital signs/physiologic status monitored throughout the procedure (see start and stop times above, as well as medication dosages). Start Time 1011   Stop Time 1042   Versed 2mg   Fentanyl 50mcg   Contrast 70cc   Flouro 3.5min   EBL <65FS   Complicat None   Specimens None   Procedure Detail Patient taken to cath lab in postabsorptive state, informed consent obtained.   LCFA prepped and draped in normal sterile fashion  Micro needle used to access artery With US guidance  Micro wire advanced into artery and 5F sheath advanced over wire   JL 4 advanced over wire to engage LM coronary artery and image in multiple views  JL 4 exchanged over a wire for JR4 to engage RCA and image in multiple views  JR4 removed over J wire  Arterial hemostasis obtained with Manual pressure           FINDINGS changed to hydralazine/ imdur. Not on acei/arb/arni/julius d/t esrd. No bb d/t bradycardia. Wide complex tachycardia   ~ Tele with 11 beats of NSVT overnight, asymptomatic.   ~ keep mag>2 ang K>4  ~ Previously not on bb d/t concern for bradycardia. Atrial fibrillation   ~ rate controlled   ~ on and off coumadin d/t falls    ESRD  ~ on HD  ~ per nephrology     PAD / Gangrene toes  ~ vascular and podiatry following. HTN  HLD  DM    Referral has been made to hospice. Pt states his wife has contacted Dr. Nicky Strickland for second opinion. Multiple medical conditions with risk of decompensation. All pertinent information and plan of care discussed with the physician. All questions and concerns were addressed to the patient. Alternatives to my treatment were discussed. I have discussed the above stated plan with patient and the nurse. The patient verbalized understanding and agreed with the plan. Thank you for allowing to us to participate in the care of Lulu Duque. Total visit time > 55 minutes; > 50% spend counseling / coordinating care. I reviewed interval history, physical exam, review of data including labs, imaging, development and implementation of treatment plan and coordination of complex care. Counseled on risk factor modifications. Rad MARKHAM-CNP  [facility]   Office: [phone]    NOTE:  This report was transcribed using voice recognition software. Every effort was made to ensure accuracy; however, inadvertent computerized transcription errors may be present.

## 2023-11-27 NOTE — PLAN OF CARE
Problem: Discharge Planning  Goal: Discharge to home or other facility with appropriate resources  Outcome: Progressing     Problem: Skin/Tissue Integrity  Goal: Absence of new skin breakdown  Description: 1. Monitor for areas of redness and/or skin breakdown  2. Assess vascular access sites hourly  3. Every 4-6 hours minimum:  Change oxygen saturation probe site  4. Every 4-6 hours:  If on nasal continuous positive airway pressure, respiratory therapy assess nares and determine need for appliance change or resting period.   Outcome: Progressing     Problem: Safety - Adult  Goal: Free from fall injury  Outcome: Progressing     Problem: Pain  Goal: Verbalizes/displays adequate comfort level or baseline comfort level  Outcome: Progressing     Problem: ABCDS Injury Assessment  Goal: Absence of physical injury  Outcome: Progressing     Problem: Chronic Conditions and Co-morbidities  Goal: Patient's chronic conditions and co-morbidity symptoms are monitored and maintained or improved  Outcome: Progressing     Problem: Metabolic/Fluid and Electrolytes - Adult  Goal: Electrolytes maintained within normal limits  Outcome: Progressing  Goal: Hemodynamic stability and optimal renal function maintained  Outcome: Progressing  Goal: Glucose maintained within prescribed range  Outcome: Progressing     Problem: Hematologic - Adult  Goal: Maintains hematologic stability  Outcome: Progressing     Problem: Nutrition Deficit:  Goal: Optimize nutritional status  Outcome: Progressing

## 2023-11-27 NOTE — PROGRESS NOTES
Progress Note - Dr. Kike Agee - Internal Medicine  PCP: Vol[place]ymyr Goncalves [place] [id number] [facility] [phone]    Hospital Day: 11  Code Status: Full Code  Current Diet: ADULT DIET; Regular; 4 carb choices (60 gm/meal); Low Fat/Low Chol/High Fiber/2 gm Na  ADULT ORAL NUTRITION SUPPLEMENT; Breakfast, Dinner; Diabetic Oral Supplement        CC: follow up on medical issues    Subjective:   Lynn Thurman is a 79 y.o. male. Pt seen and examined  Chart reviewed since last visit, labs and imaging below    No new issues  Still c/o pain to his feet    Cards not considering PCI after long discussion with pt  Palliaitive care has been discussed  No plans for vascular/podiatry intervention at this time  Therapy recommends snf  Social work asked to assist with this discussion    Tolerated HD ok sat ,   Awaiting repeat palliative care consult/poss SNF placement      Review of Systems: (1 system for EPF, 2-9 for detailed, 10+ for comprehensive)  Constitutional: Negative for chills and fever. HENT: Negative for dental problem, nosebleeds and rhinorrhea. Eyes: Negative for photophobia and visual disturbance. Respiratory: Negative for cough, chest tightness and shortness of breath. Cardiovascular: Negative for chest pain and leg swelling. Gastrointestinal: Negative for diarrhea, nausea and vomiting. Endocrine: Negative for polydipsia and polyphagia. Genitourinary: Negative for frequency, hematuria and urgency. Musculoskeletal: Negative for back pain and myalgias. Skin: Negative for rash. Allergic/Immunologic: Negative for food allergies. Neurological: Negative for dizziness, seizures, syncope and facial asymmetry. Hematological: Negative for adenopathy. Psychiatric/Behavioral: Negative for dysphoric mood. The patient is not nervous/anxious.         I have reviewed the patient's medical and social history in detail and updated the computerized patient

## 2023-11-27 NOTE — PROGRESS NOTES
Physical [address] [facility] Department   Phone: [phone]    Physical Therapy    [] Initial Evaluation            [x] Daily Treatment Note         [] Discharge Summary      Patient: Walter Angela   : 1940   MRN: [id number]   Date of Service:  2023  Admitting Diagnosis: NSTEMI (non-ST elevated myocardial infarction) [facility])  Current Admission Summary: Patient presents with concern for NSTEMI. Pt was appparently at dialysis, became unresponsive. Unclear if LOC or not. Cannot give any hx nor ROS. Trop noted to be high, nearly double what it was on prev admit. Certainly some element of ESRD relating to elevation, but doubling in value is concerning, oleg when assoc with event during HD today. Recently admitted here, d/c summary from Dr Davy Ch reviewed. Pt apparently having issues caring for self, getting to dialysis. Pt also with chronic wounds, being seen in wound clinic. Needs further eval here. Past Medical History:  has a past medical history of Blood clot, Fracture of sternum, Fracture rib, Hemodialysis patient ([address]), Hyperlipidemia, Hypertension, Laceration of skin of lower leg, left, initial encounter, Laceration of skin of lower leg, right, initial encounter, Prostate cancer ([address]), and Type II or unspecified type diabetes mellitus without mention of complication, not stated as uncontrolled. Past Surgical History:  has a past surgical history that includes TURP () and sigmoidoscopy (N/A, 3/9/2023). Discharge Recommendations: Walter Angela scored a 12 on the AM-PAC short mobility form. Current research shows that an AM-PAC score of 17 or less is typically not associated with a discharge to the patient's home setting. Based on the patient's AM-PAC score and their current functional mobility deficits, it is recommended that the patient have 3-5 sessions per week of Physical Therapy at d/c to increase the patient's independence.   Please see ambulate at baseline . Distance: n/a  Gait Mechanics: n/a  Comments:    Stair Mobility:  Stair mobility not completed on this date. Comments:  Wheelchair Mobility:  No w/c mobility completed on this date. Comments:  Balance:  Static Sitting Balance: fair (-): maintains balance at SBA with use of UE support  Dynamic Sitting Balance: fair (-): maintains balance at CGA with use of UE support  Static Standing Balance: poor (-): requires max (A) to maintain balance  Dynamic Standing Balance: poor (-): requires max (A) to maintain balance  Comments: CGA progressing to SBA sitting EOB. STEDY needed for transfers. Other Therapeutic Interventions  Pt initially dizzy upon sitting EOB and BP 92/54. After sitting for a short time, symptoms resolved. See OT note for assistance with ADLs. Pt assisted with oral care, bathing, dressing, and toileting which he needed full assist for. Pt performed there ex including marching, LAQs lacking TKE bilat R>L x 10 bilat, manual HS stretch seated bilat LEs, 10 sec hold each. Podiatry perfect served and Surgical shoe applied to L foot during activity. Pt encouraged to eat meal but unable, \"I don't like the food\".     Functional Outcomes  -PAC Inpatient Mobility Raw Score : 12              Cognition  Overall Cognitive Status: Impaired  Memory: decreased recall of biographical information, decreased recall of recent events, decreased short term memory  Safety Judgement: decreased awareness of need for assistance, decreased awareness of need for safety  Problem Solving: decreased awareness of errors  Insights: decreased awareness of deficits  Initiation: requires cues for some  Sequencing: requires cues for some  Orientation:    alert and oriented x 4 (verbal cues for situation)  Command Following:   [facility]    Education  Barriers To Learning: cognition and physical  Patient Education: patient educated on goals, PT role and benefits, plan of care, general safety, functional mobility training,

## 2023-11-27 NOTE — PROGRESS NOTES
PALLIATIVE MEDICINE PROGRESS NOTE     Patient name:Donato Bloom    FCE:[id number] :1940  Room/Bed:[facility]3315/3315-01    LOS: 11 days        ASSESSMENT/RECOMMENDATIONS     79 y.o. male with AMS and chest pain s/p NSTEMI        Symptom Management:  AMS- pt awake alert today he seems anxious and confused   Chest pain- s/p NSTEMI pt reports CP has resolved    Goals of Care- Finally was able to talk to spouse Scarlett Hansen) pt also has refused CABG when he talked to CT surgery. Scarlett Hansen is adamant that pt is not to go to rehab he was in LTC for 6 months this year when she was going through health issues and declined significantly. We talked about poor prognosis wit pts heart she understands pt is long term HD pt and does not want to stop HD. They have had [facility] homecare in the past. Sent a referral to [facility] to see if pt would qualify if he continues HD its not likely that he will but wife is agreeable to hospice support code updated to [facility]. Patient/Family Goals of Care :      talked to pt regarding [address] he maintains that he is independent at home and will remain independent he wants no rehab and no home health care. He wants all aggressive life saving measures he is tired and not talking much         Pt continues to want all aggressive lifesaving measures including open heart surgery anything to [facility] another day\". Left message for wife to discuss [address] and discuss long term plan       Finally was able to talk to spouse Scarlett Tyrone) pt also has refused CABG when he talked to CT surgery. Scarlett Hansen is adamant that pt is not to go to rehab he was in LTC for 6 months this year when she was going through health issues and declined significantly. We talked about poor prognosis wit pts heart she understands pt is long term HD pt and does not want to stop HD.  They have had [facility] homecare in the past. Sent a referral to [facility] to see if pt would qualify if he continues HD its not likely that he will but wife is agreeable to hospice support code updated to [facility]. Disposition/Discharge Plan:   pending     Advance Directives: The patient has appointed the following active healthcare agents:    Primary Decision Maker: Celena Desir - [phone]     The Patient has the following current code status:    Code Status: [facility]       Case Discussed with:  patient, floor RN    Thank you for allowing us to participate in the care of this patient. SUBJECTIVE     Chief Complaint: chest pain     Last 24 hours:   Pt refused CABG he is very anxious this am and not engaging in conversation with me    ROS:    Review of Systems   Eyes: Negative. Respiratory:  Positive for shortness of breath. Cardiovascular:  Positive for leg swelling. Endocrine: Negative. Genitourinary: Negative. Musculoskeletal:  Positive for arthralgias. Skin:  Positive for pallor. Allergic/Immunologic: Negative. Neurological:  Positive for weakness. Physical Exam  Constitutional:       Appearance: He is ill-appearing. Comments: drowsy   HENT:      Head: Normocephalic and atraumatic. Nose: Nose normal.      Mouth/Throat:      Mouth: Mucous membranes are dry. Eyes:      Pupils: Pupils are equal, round, and reactive to light. Cardiovascular:      Rate and Rhythm: Normal rate. Pulses: Normal pulses. Heart sounds: No murmur heard. No gallop. Pulmonary:      Effort: Pulmonary effort is normal.   Chest:      Chest wall: Tenderness present. Musculoskeletal:      Cervical back: Neck supple. Right lower leg: Edema present. Left lower leg: Edema present. Skin:     General: Skin is dry. Coloration: Skin is pale.            Palliative Medicine Interventions:    patient/family support  Goals of Care discussions with patient/surrogate  Spiritual Interventions:           DATA:  Current labs in the epic chart reviewed as of 11/27/2023

## 2023-11-27 NOTE — PROGRESS NOTES
[address] [facility] Department   Phone: [phone]    Occupational Therapy    [] Initial Evaluation            [x] Daily Treatment Note         [] Discharge Summary      Patient: Terrance Hodges   : 1940   MRN: [id number]   Date of Service:  2023    Admitting Diagnosis:  NSTEMI (non-ST elevated myocardial infarction) [facility])  Current Admission Summary: Per ED note, \"ome element of ESRD relating to elevation, but doubling in value is concerning, oleg when assoc with event during HD today. Recently admitted here, d/c summary from Dr Dwain Young reviewed. Pt apparently having issues caring for self, getting to dialysis. Pt also with chronic wounds, being seen in wound clinic. Needs further eval here. \"  Past Medical History:  has a past medical history of Blood clot, Fracture of sternum, Fracture rib, Hemodialysis patient ([address]), Hyperlipidemia, Hypertension, Laceration of skin of lower leg, left, initial encounter, Laceration of skin of lower leg, right, initial encounter, Prostate cancer ([address]), and Type II or unspecified type diabetes mellitus without mention of complication, not stated as uncontrolled. Past Surgical History:  has a past surgical history that includes TURP () and sigmoidoscopy (N/A, 3/9/2023). Discharge Recommendations: Terrance Hodges scored a 13/24 on the AM-PAC ADL Inpatient form. Current research shows that an AM-PAC score of 17 or less is typically not associated with a discharge to the patient's home setting. Based on the patient's AM-PAC score and their current ADL deficits, it is recommended that the patient have 3-5 sessions per week of Occupational Therapy at d/c to increase the patient's independence. Please see assessment section for further patient specific details. If patient discharges prior to next session this note will serve as a discharge summary. Please see below for the latest assessment towards goals.       DME Required For

## 2023-11-28 LAB
ANION GAP SERPL CALCULATED.3IONS-SCNC: 13 MMOL/L (ref 3–16)
BASOPHILS # BLD: 0.1 K/UL (ref 0–0.2)
BASOPHILS NFR BLD: 1.2 %
BUN SERPL-MCNC: 51 MG/DL (ref 7–20)
CALCIUM SERPL-MCNC: 9.5 MG/DL (ref 8.3–10.6)
CHLORIDE SERPL-SCNC: 96 MMOL/L (ref 99–110)
CO2 SERPL-SCNC: 24 MMOL/L (ref 21–32)
CREAT SERPL-MCNC: 9.3 MG/DL (ref 0.8–1.3)
DEPRECATED RDW RBC AUTO: 15.4 % (ref 12.4–15.4)
EOSINOPHIL # BLD: 0.2 K/UL (ref 0–0.6)
EOSINOPHIL NFR BLD: 2.1 %
GFR SERPLBLD CREATININE-BSD FMLA CKD-EPI: 5 ML/MIN/{1.73_M2}
GLUCOSE BLD-MCNC: 168 MG/DL (ref 70–99)
GLUCOSE BLD-MCNC: 77 MG/DL (ref 70–99)
GLUCOSE SERPL-MCNC: 111 MG/DL (ref 70–99)
HCT VFR BLD AUTO: 35.4 % (ref 40.5–52.5)
HGB BLD-MCNC: 11.4 G/DL (ref 13.5–17.5)
LYMPHOCYTES # BLD: 1.6 K/UL (ref 1–5.1)
LYMPHOCYTES NFR BLD: 18.8 %
MCH RBC QN AUTO: 32.5 PG (ref 26–34)
MCHC RBC AUTO-ENTMCNC: 32.3 G/DL (ref 31–36)
MCV RBC AUTO: 100.6 FL (ref 80–100)
MONOCYTES # BLD: 1.1 K/UL (ref 0–1.3)
MONOCYTES NFR BLD: 13.6 %
NEUTROPHILS # BLD: 5.3 K/UL (ref 1.7–7.7)
NEUTROPHILS NFR BLD: 64.3 %
PERFORMED ON: ABNORMAL
PERFORMED ON: NORMAL
PLATELET # BLD AUTO: 203 K/UL (ref 135–450)
PMV BLD AUTO: 8.5 FL (ref 5–10.5)
POTASSIUM SERPL-SCNC: 4.9 MMOL/L (ref 3.5–5.1)
RBC # BLD AUTO: 3.52 M/UL (ref 4.2–5.9)
SODIUM SERPL-SCNC: 133 MMOL/L (ref 136–145)
WBC # BLD AUTO: 8.3 K/UL (ref 4–11)

## 2023-11-28 PROCEDURE — 6370000000 HC RX 637 (ALT 250 FOR IP): Performed by: STUDENT IN AN ORGANIZED HEALTH CARE EDUCATION/TRAINING PROGRAM

## 2023-11-28 PROCEDURE — 2580000003 HC RX 258: Performed by: INTERNAL MEDICINE

## 2023-11-28 PROCEDURE — 1200000000 HC SEMI PRIVATE

## 2023-11-28 PROCEDURE — 6370000000 HC RX 637 (ALT 250 FOR IP): Performed by: INTERNAL MEDICINE

## 2023-11-28 PROCEDURE — 36415 COLL VENOUS BLD VENIPUNCTURE: CPT

## 2023-11-28 PROCEDURE — 6360000002 HC RX W HCPCS: Performed by: INTERNAL MEDICINE

## 2023-11-28 PROCEDURE — 99232 SBSQ HOSP IP/OBS MODERATE 35: CPT | Performed by: STUDENT IN AN ORGANIZED HEALTH CARE EDUCATION/TRAINING PROGRAM

## 2023-11-28 PROCEDURE — 85025 COMPLETE CBC W/AUTO DIFF WBC: CPT

## 2023-11-28 PROCEDURE — 90935 HEMODIALYSIS ONE EVALUATION: CPT

## 2023-11-28 PROCEDURE — 6370000000 HC RX 637 (ALT 250 FOR IP): Performed by: NURSE PRACTITIONER

## 2023-11-28 PROCEDURE — 80048 BASIC METABOLIC PNL TOTAL CA: CPT

## 2023-11-28 RX ORDER — ISOSORBIDE MONONITRATE 30 MG/1
30 TABLET, EXTENDED RELEASE ORAL DAILY
Qty: 30 TABLET | Refills: 1 | Status: SHIPPED | OUTPATIENT
Start: 2023-11-28

## 2023-11-28 RX ORDER — ROSUVASTATIN CALCIUM 20 MG/1
20 TABLET, COATED ORAL NIGHTLY
Qty: 30 TABLET | Refills: 1 | Status: SHIPPED | OUTPATIENT
Start: 2023-11-28

## 2023-11-28 RX ORDER — CLOPIDOGREL BISULFATE 75 MG/1
75 TABLET ORAL DAILY
Qty: 30 TABLET | Refills: 1 | Status: SHIPPED | OUTPATIENT
Start: 2023-11-28

## 2023-11-28 RX ORDER — TRAMADOL HYDROCHLORIDE 50 MG/1
50 TABLET ORAL EVERY 6 HOURS PRN
Qty: 28 TABLET | Refills: 0 | Status: SHIPPED | OUTPATIENT
Start: 2023-11-28 | End: 2023-12-05

## 2023-11-28 RX ADMIN — HEPARIN SODIUM 5000 UNITS: 5000 INJECTION INTRAVENOUS; SUBCUTANEOUS at 05:41

## 2023-11-28 RX ADMIN — Medication 1 MG: at 12:01

## 2023-11-28 RX ADMIN — SEVELAMER CARBONATE 1600 MG: 800 TABLET, FILM COATED ORAL at 11:40

## 2023-11-28 RX ADMIN — ASPIRIN 81 MG 81 MG: 81 TABLET ORAL at 11:41

## 2023-11-28 RX ADMIN — Medication 10 ML: at 11:43

## 2023-11-28 RX ADMIN — CLOPIDOGREL BISULFATE 75 MG: 75 TABLET ORAL at 11:40

## 2023-11-28 RX ADMIN — ROSUVASTATIN 20 MG: 20 TABLET, FILM COATED ORAL at 21:29

## 2023-11-28 RX ADMIN — ISOSORBIDE MONONITRATE 30 MG: 30 TABLET, EXTENDED RELEASE ORAL at 11:40

## 2023-11-28 RX ADMIN — TRAMADOL HYDROCHLORIDE 50 MG: 50 TABLET, COATED ORAL at 11:40

## 2023-11-28 RX ADMIN — HEPARIN SODIUM 5000 UNITS: 5000 INJECTION INTRAVENOUS; SUBCUTANEOUS at 21:29

## 2023-11-28 RX ADMIN — ALLOPURINOL 100 MG: 100 TABLET ORAL at 11:40

## 2023-11-28 RX ADMIN — MIDODRINE HYDROCHLORIDE 2.5 MG: 5 TABLET ORAL at 11:40

## 2023-11-28 RX ADMIN — TRAMADOL HYDROCHLORIDE 50 MG: 50 TABLET, COATED ORAL at 05:41

## 2023-11-28 RX ADMIN — HEPARIN SODIUM 5000 UNITS: 5000 INJECTION INTRAVENOUS; SUBCUTANEOUS at 14:15

## 2023-11-28 RX ADMIN — SEVELAMER CARBONATE 1600 MG: 800 TABLET, FILM COATED ORAL at 17:20

## 2023-11-28 RX ADMIN — Medication 10 ML: at 21:29

## 2023-11-28 ASSESSMENT — PAIN SCALES - GENERAL
PAINLEVEL_OUTOF10: 2
PAINLEVEL_OUTOF10: 7
PAINLEVEL_OUTOF10: 10

## 2023-11-28 ASSESSMENT — PAIN DESCRIPTION - ORIENTATION: ORIENTATION: RIGHT;LEFT

## 2023-11-28 ASSESSMENT — PAIN SCALES - WONG BAKER: WONGBAKER_NUMERICALRESPONSE: 2

## 2023-11-28 ASSESSMENT — PAIN DESCRIPTION - DESCRIPTORS: DESCRIPTORS: ACHING

## 2023-11-28 ASSESSMENT — PAIN DESCRIPTION - LOCATION: LOCATION: LEG

## 2023-11-28 NOTE — PROGRESS NOTES
Occupational and Physical Therapy  Rashad Qiu    AM:  Patient currently off the floor to dialysis. Will attempt again as schedule allows. PM:   Returned 2 times per pt request, but declined working with PT today. Will follow up at later date as pt willing to participate. Thank you,  Victoria Jackson.  Cheko Contreras, OTR/L 2874/Phoebe Dietrich [phone]

## 2023-11-28 NOTE — PLAN OF CARE
Problem: Discharge Planning  Goal: Discharge to home or other facility with appropriate resources  Outcome: Progressing     Problem: Skin/Tissue Integrity  Goal: Absence of new skin breakdown  Description: 1. Monitor for areas of redness and/or skin breakdown  2. Assess vascular access sites hourly  3. Every 4-6 hours minimum:  Change oxygen saturation probe site  4. Every 4-6 hours:  If on nasal continuous positive airway pressure, respiratory therapy assess nares and determine need for appliance change or resting period.   Outcome: Progressing     Problem: Safety - Adult  Goal: Free from fall injury  Outcome: Progressing     Problem: Pain  Goal: Verbalizes/displays adequate comfort level or baseline comfort level  Outcome: Progressing     Problem: ABCDS Injury Assessment  Goal: Absence of physical injury  Outcome: Progressing     Problem: Chronic Conditions and Co-morbidities  Goal: Patient's chronic conditions and co-morbidity symptoms are monitored and maintained or improved  Outcome: Progressing     Problem: Nutrition Deficit:  Goal: Optimize nutritional status  Outcome: Progressing     Problem: Hematologic - Adult  Goal: Maintains hematologic stability  Outcome: Progressing

## 2023-11-28 NOTE — CARE COORDINATION
SW received confirmation that pt's appeal was started. Case # = [id number]. All documents were uploaded to [facility] website for review. Confirmed that all documents were received. Waiting on [place]'s answer at this time. Pt will stay at the hospital until the answer is received, or until spouse feels she is ready to take patient home. Called spouse and informed that process was started. Spouse reported she decided they want [facility]. They do not want hospice at this time.     Electronically signed by CLEOPATRA Senior LSW on 11/28/2023 at 3:28 PM no

## 2023-11-28 NOTE — PROGRESS NOTES
Patients head to toe assessment completed. Vital signs WNL. Bed alarm engaged, call light within reach. Scheduled medications given per MAR. Patient complained of leg pain, rates 8/10. PRN Tramadol given. Patient resting in bed. Will continue to monitor.

## 2023-11-28 NOTE — CARE COORDINATION
SW consulted by RN to inform that pt was discharged as of today, but that patient's spouse called and asked if pt could stay one more day. Called spouse who reported that their furnace \"isn't working\" and she has repair people coming out today but don't know what time or if it will be fixed today. Spouse did not want pt to sleep in a house without heat since the temperatures were so low today. Spouse also said that she has a meeting planned with [facility] tomorrow at 1pm at the hospital to discuss whether or not they will go with [facility] or hospice services at discharge. SW explained that since pt is medically clear as of today, we have only 2 options. Stated we would either need to place patient into a SNF or pt and spouse would have to appeal their discharge. Spouse and pt declined SNF and want to appeal.  Provided IMM to patient who signed.   Provided Joseph Wells number to spouse who will call Joseph Wells today to start appeal.    Electronically signed by Jerelyn Severance, MSW, YOANNA on 11/28/2023 at 12:27 PM

## 2023-11-28 NOTE — DISCHARGE SUMMARY
[facility] -- Physician Discharge Summary     Jeremiah Prather  1940  MRN: [id number]    Admit Date: 11/16/2023  Discharge Date: No discharge date for patient encounter. 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 ([address]) [N18.6, Z99.2] 03/06/2023     Priority: Medium    Fatigue [R53.83] 03/04/2023     Priority: Medium    Non-pressure chronic ulcer of left lower leg with fat layer exposed ([address]) [L97.922] 08/23/2022     Priority: Medium    NSTEMI (non-ST elevated myocardial infarction) ([address]) [I21.4] 11/16/2023    Syncope [R55] 11/16/2023    Atrial fibrillation ([address]) [I48.91] 11/16/2023    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 Unclear if LOC or not. Cannot give any hx nor ROS. Trop noted to be high, nearly double what it was on prev admit. Certainly some element of ESRD relating to elevation, but doubling in value is concerning, oleg when assoc with event during HD today. Seen by Cards for NSTEMI -    Coronary CTA 11/21/2023  Coronary arteries     Calcium score is calculated at 8905. It is difficult to determine if there  coronary artery stents present which would falsely elevate this calculation. .  Clinical correlation suggested. Score of left main 407. Score of the LAD  2233.   Score of the

## 2023-11-28 NOTE — PROGRESS NOTES
Treatment time: 3 hours  Net UF: 1600 ml     Pre weight: 77.2 kg  Post weight:75.8 kg  EDW: 76 kg      Access used: r tunneled cvc    Access function: good with  ml/min     Medications or blood products given: heparin dwiells     Regular outpatient schedule: t-th-sat     Summary of response to treatment: Patient tolerated treatment well and without any complications. Patient remained stable throughout entire treatment and upon the patient  exiting the dailysis suite via transport. Patient off 31 minutes early. MD aware     Report given to Sally Dennis RN and copy of dialysis treatment record  faxed tp 3A, to be scanned into EMR.

## 2023-11-28 NOTE — CARE COORDINATION
11/28/23 1229   IMM Letter   IMM Letter given to Patient/Family/Significant other/Guardian/POA/by: Case Management   IMM Letter date given: 11/28/23   IMM Letter time given: 8611     Electronically signed by CLEOPATRA Medina, ALONDRAW on 11/28/2023 at 12:29 PM

## 2023-11-29 LAB
ANION GAP SERPL CALCULATED.3IONS-SCNC: 15 MMOL/L (ref 3–16)
BASOPHILS # BLD: 0.1 K/UL (ref 0–0.2)
BASOPHILS NFR BLD: 0.9 %
BUN SERPL-MCNC: 32 MG/DL (ref 7–20)
CALCIUM SERPL-MCNC: 9.6 MG/DL (ref 8.3–10.6)
CHLORIDE SERPL-SCNC: 96 MMOL/L (ref 99–110)
CO2 SERPL-SCNC: 24 MMOL/L (ref 21–32)
CREAT SERPL-MCNC: 6.7 MG/DL (ref 0.8–1.3)
DEPRECATED RDW RBC AUTO: 15.8 % (ref 12.4–15.4)
EOSINOPHIL # BLD: 0.2 K/UL (ref 0–0.6)
EOSINOPHIL NFR BLD: 2.2 %
GFR SERPLBLD CREATININE-BSD FMLA CKD-EPI: 8 ML/MIN/{1.73_M2}
GLUCOSE BLD-MCNC: 100 MG/DL (ref 70–99)
GLUCOSE BLD-MCNC: 114 MG/DL (ref 70–99)
GLUCOSE BLD-MCNC: 145 MG/DL (ref 70–99)
GLUCOSE BLD-MCNC: 91 MG/DL (ref 70–99)
GLUCOSE SERPL-MCNC: 87 MG/DL (ref 70–99)
HCT VFR BLD AUTO: 36.1 % (ref 40.5–52.5)
HGB BLD-MCNC: 11.7 G/DL (ref 13.5–17.5)
LYMPHOCYTES # BLD: 1.7 K/UL (ref 1–5.1)
LYMPHOCYTES NFR BLD: 22.9 %
MCH RBC QN AUTO: 32.7 PG (ref 26–34)
MCHC RBC AUTO-ENTMCNC: 32.4 G/DL (ref 31–36)
MCV RBC AUTO: 100.9 FL (ref 80–100)
MONOCYTES # BLD: 1.1 K/UL (ref 0–1.3)
MONOCYTES NFR BLD: 15.5 %
NEUTROPHILS # BLD: 4.2 K/UL (ref 1.7–7.7)
NEUTROPHILS NFR BLD: 58.5 %
PERFORMED ON: ABNORMAL
PERFORMED ON: NORMAL
PLATELET # BLD AUTO: 210 K/UL (ref 135–450)
PMV BLD AUTO: 8.1 FL (ref 5–10.5)
POTASSIUM SERPL-SCNC: 4.7 MMOL/L (ref 3.5–5.1)
RBC # BLD AUTO: 3.58 M/UL (ref 4.2–5.9)
SODIUM SERPL-SCNC: 135 MMOL/L (ref 136–145)
WBC # BLD AUTO: 7.3 K/UL (ref 4–11)

## 2023-11-29 PROCEDURE — 80048 BASIC METABOLIC PNL TOTAL CA: CPT

## 2023-11-29 PROCEDURE — 85025 COMPLETE CBC W/AUTO DIFF WBC: CPT

## 2023-11-29 PROCEDURE — 1200000000 HC SEMI PRIVATE

## 2023-11-29 PROCEDURE — 6370000000 HC RX 637 (ALT 250 FOR IP): Performed by: NURSE PRACTITIONER

## 2023-11-29 PROCEDURE — 36415 COLL VENOUS BLD VENIPUNCTURE: CPT

## 2023-11-29 PROCEDURE — 6370000000 HC RX 637 (ALT 250 FOR IP): Performed by: INTERNAL MEDICINE

## 2023-11-29 PROCEDURE — 6370000000 HC RX 637 (ALT 250 FOR IP): Performed by: STUDENT IN AN ORGANIZED HEALTH CARE EDUCATION/TRAINING PROGRAM

## 2023-11-29 PROCEDURE — 6360000002 HC RX W HCPCS: Performed by: INTERNAL MEDICINE

## 2023-11-29 PROCEDURE — 2580000003 HC RX 258: Performed by: INTERNAL MEDICINE

## 2023-11-29 RX ADMIN — CLOPIDOGREL BISULFATE 75 MG: 75 TABLET ORAL at 11:12

## 2023-11-29 RX ADMIN — HEPARIN SODIUM 5000 UNITS: 5000 INJECTION INTRAVENOUS; SUBCUTANEOUS at 16:52

## 2023-11-29 RX ADMIN — HEPARIN SODIUM 5000 UNITS: 5000 INJECTION INTRAVENOUS; SUBCUTANEOUS at 20:28

## 2023-11-29 RX ADMIN — SEVELAMER CARBONATE 1600 MG: 800 TABLET, FILM COATED ORAL at 14:37

## 2023-11-29 RX ADMIN — HEPARIN SODIUM 5000 UNITS: 5000 INJECTION INTRAVENOUS; SUBCUTANEOUS at 06:37

## 2023-11-29 RX ADMIN — ALLOPURINOL 100 MG: 100 TABLET ORAL at 11:12

## 2023-11-29 RX ADMIN — ASPIRIN 81 MG 81 MG: 81 TABLET ORAL at 11:12

## 2023-11-29 RX ADMIN — Medication 10 ML: at 11:14

## 2023-11-29 RX ADMIN — Medication 1 MG: at 14:38

## 2023-11-29 RX ADMIN — ROSUVASTATIN 20 MG: 20 TABLET, FILM COATED ORAL at 20:28

## 2023-11-29 ASSESSMENT — PAIN SCALES - GENERAL: PAINLEVEL_OUTOF10: 0

## 2023-11-29 NOTE — PROGRESS NOTES
Patient allowed dressing change only on the left leg wound.  Patient refused dressing change on the right leg 1 week

## 2023-11-29 NOTE — CARE COORDINATION
Call to patient's wife Сергей Hayes her that the appeal had been declined and that charges for the hospital stay will start at 12:00 pm tomorrow. She acknowledged, however, there is no heat in the patient's home. Wife states the technician is supposed to be there by 17:00 pm today for a service call. Wife states patient will need transport home. Asked wife to [place] Radha Hath[place] [phone] prior to 20:00 pm with the results of the furnace repair so we know when to set up transport potentially for tomorrow. If unable to reach him, to contact the CM by phone. Discharge Planning Note Re: [address]     CM/SW noted consult for discharge planning. Chart reviewed. Noted recommendations for home health care. CM met with patient and wife per phone. Introduced self and explained role of CM and discharge planning. Patient is agreeable to home health on dc. [place] of choice list was provided with basic dialogue that supports the patient's individualized plan of care/goals, treatment preferences and shares the quality data associated with the providers. [x] Yes [] No.  Patient and wife have reviewed the provided list and made selections. Referral made to :  [address]  Phone: [phone]  Fax:  [phone] and she accepted the patient to their care. Advised he would likely d/c tomorrow. Pending [facility] order for  [x]RN [x]PT  [x]OT  []HHA  []SW  []  SLP    CM/SW will follow-up on referrals and provide any additional documentation necessary to facilitate placement.       Electronically signed by Susannah Cintron RN on 11/29/2023 at 4:19 PM

## 2023-11-29 NOTE — CARE COORDINATION
MISSAEL set stretcher transport with [facility] tomorrow at 12pm.  Patient needs to receive dialysis before discharging tomorrow. Called dialysis department who stated they will put patient first on the list at 8am tomorrow. Called spouse and informed of transport time. Discharge packet completed. [address] [facility] [facility] informing of discharge tomorrow. All case mgmt needs met at this time.     Electronically signed by CLEOPATRA Cornejo LSW on 11/29/2023 at 6:00 PM

## 2023-11-29 NOTE — PROGRESS NOTES
CLINICAL PHARMACY NOTE: MEDS TO BEDS    Total # of Prescriptions Filled: 1   The following medications were delivered to the patient:  Tramadol 50mg    Additional Documentation:     RX for clopidogrel 75mg, rosuvastatin 20mg & isosorbide mononitrate ER 30mg were transferred to [facility] on [address] [facility].  (Patient's wife Jeannie Tapia is aware)    Medication was picked up and signed for in the Outpatient Pharmacy by DIANA Jade

## 2023-11-29 NOTE — CARE COORDINATION
Shandra Paul [phone] with [facility] met with patient and his wife from 13:00-14:00 pm. She states they do not want to pursue Hospice at time. Pt wants to continue HD. Alerted from admin that the [place] appeal was denied. Will f/u with wife and pt.to speak them about discharge options.     Electronically signed by Bernardo Ty RN on 11/29/2023 at 2:26 PM

## 2023-11-29 NOTE — CARE COORDINATION
Call from patient's wife Tangela Roberts. She stated a nurse called her and told her the patient was going to be discharged today. Informed her that the patient does have a discharge order in but I spoke with the   Gabrielle Yin at 08:11 am and the decision had not come back, yet. Tangela Roberts states she is meeting with Hospice at 13:00 pm today, however she still wants to take him home with [facility].  gave wife my number and asked her to call me after her meeting with Hospice to update the CM on her decision. Wife agreed.  to follow.     Electronically signed by Dave Dailey RN on 11/29/2023 at 11:31 AM

## 2023-11-29 NOTE — PROGRESS NOTES
Progress Note - Dr. Colten Lafleur - Internal Medicine  PCP: Anali Cabrera MD [id number] [facility] / [facility] [phone]    Hospital Day: 15  Code Status: DNR-CCA  Current Diet: ADULT DIET; Regular; 4 carb choices (60 gm/meal); Low Fat/Low Chol/High Fiber/2 gm Na  ADULT ORAL NUTRITION SUPPLEMENT; Breakfast, Dinner; Diabetic Oral Supplement        CC: follow up on medical issues    Subjective:   Lauren Crocker is a 79 y.o. male. Pt seen and examined  Chart reviewed since last visit, labs and imaging below    No new issues  Still c/o pain to his feet    Discharge ordered 11/28  Pt's wife is appealing discharge  \" patient's spouse called and asked if pt could stay one more day. Called spouse who reported that their furnace \"isn't working\" and she has repair people coming out today but don't know what time or if it will be fixed today. Spouse did not want pt to sleep in a house without heat since the temperatures were so low today. Spouse also said that she has a meeting planned with [facility] tomorrow at 1pm at the hospital to discuss whether or not they will go with [facility]  [address] or hospice services at discharge. \"    Review of Systems: (1 system for EPF, 2-9 for detailed, 10+ for comprehensive)  Constitutional: Negative for chills and fever. HENT: Negative for dental problem, nosebleeds and rhinorrhea. Eyes: Negative for photophobia and visual disturbance. Respiratory: Negative for cough, chest tightness and shortness of breath. Cardiovascular: Negative for chest pain and leg swelling. Gastrointestinal: Negative for diarrhea, nausea and vomiting. Endocrine: Negative for polydipsia and polyphagia. Genitourinary: Negative for frequency, hematuria and urgency. Musculoskeletal: Negative for back pain and myalgias. Skin: Negative for rash. Allergic/Immunologic: Negative for food allergies.      Neurological: Negative for dizziness, seizures, syncope and facial flow limiting stenosis  Plan: card cath reviewed, poss PCI- but this is not recommended by cards given clinical status. Active Problems:    Non-pressure chronic ulcer of left lower leg ([address]) -Established problem. Stable. Plan: wound care and podiatry on board - no amputation recommended given clinical status    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 ([address]) -Established problem. Stable. Plan: renal on board. Cont HD per them    Controlled type 2 diabetes mellitus with complication, with long-term current use of insulin ([address]) -Established problem. Stable. FSBS 100  Plan: Continue on [address] diet, home medications. CBC and BMP ordered to monitor sugars and for other medication complications. Fingerstick glucose ordered to check for alterations in levels throughout day. Sliding scale insulin ordered to cover fingerstick levels. Essential hypertension -Established problem. Stable. 120/85  Plan: Continue medications. Continue to check BP q shift. CBC and BMP ordered to monitor for disease progression, medication side effect. 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 amputation does not appear recommended    Atrial fibrillation ([address]) -Established problem. Stable. In chronic fib  Plan: cont meds, cont on tele      Case discussed with: renal  Tests ordered/reviewed: cbc, bmp, fsbs      Disp - await appeal process. Repeat discharge order placed 11/29    T>31min    (Please note that portions of this note were completed with a voice recognition program.  Efforts were made to edit the dictations but occasionally words are mis-transcribed.)        Silvina Varela MD  11/29/2023    Please use [facility] to contact me with any questions during the day. The hospitalist service will provide cross-coverage for this patient from 7pm to 7am.    During those hours, contact the on-call hospitalist MD/RIKKI for questions.

## 2023-11-30 VITALS
BODY MASS INDEX: 24.93 KG/M2 | DIASTOLIC BLOOD PRESSURE: 62 MMHG | OXYGEN SATURATION: 95 % | TEMPERATURE: 98 F | RESPIRATION RATE: 16 BRPM | HEART RATE: 67 BPM | HEIGHT: 70 IN | SYSTOLIC BLOOD PRESSURE: 117 MMHG | WEIGHT: 174.16 LBS

## 2023-11-30 LAB
ANION GAP SERPL CALCULATED.3IONS-SCNC: 15 MMOL/L (ref 3–16)
BASOPHILS # BLD: 0.1 K/UL (ref 0–0.2)
BASOPHILS NFR BLD: 1 %
BUN SERPL-MCNC: 42 MG/DL (ref 7–20)
CALCIUM SERPL-MCNC: 9.8 MG/DL (ref 8.3–10.6)
CHLORIDE SERPL-SCNC: 99 MMOL/L (ref 99–110)
CO2 SERPL-SCNC: 23 MMOL/L (ref 21–32)
CREAT SERPL-MCNC: 7.9 MG/DL (ref 0.8–1.3)
DEPRECATED RDW RBC AUTO: 15.9 % (ref 12.4–15.4)
EOSINOPHIL # BLD: 0.2 K/UL (ref 0–0.6)
EOSINOPHIL NFR BLD: 2.1 %
GFR SERPLBLD CREATININE-BSD FMLA CKD-EPI: 6 ML/MIN/{1.73_M2}
GLUCOSE BLD-MCNC: 115 MG/DL (ref 70–99)
GLUCOSE SERPL-MCNC: 86 MG/DL (ref 70–99)
HCT VFR BLD AUTO: 36.5 % (ref 40.5–52.5)
HGB BLD-MCNC: 11.7 G/DL (ref 13.5–17.5)
LYMPHOCYTES # BLD: 1.8 K/UL (ref 1–5.1)
LYMPHOCYTES NFR BLD: 21.8 %
MCH RBC QN AUTO: 32.3 PG (ref 26–34)
MCHC RBC AUTO-ENTMCNC: 31.9 G/DL (ref 31–36)
MCV RBC AUTO: 101 FL (ref 80–100)
MONOCYTES # BLD: 1.2 K/UL (ref 0–1.3)
MONOCYTES NFR BLD: 14 %
NEUTROPHILS # BLD: 5.1 K/UL (ref 1.7–7.7)
NEUTROPHILS NFR BLD: 61.1 %
PERFORMED ON: ABNORMAL
PLATELET # BLD AUTO: 221 K/UL (ref 135–450)
PMV BLD AUTO: 8.1 FL (ref 5–10.5)
POTASSIUM SERPL-SCNC: 4.9 MMOL/L (ref 3.5–5.1)
RBC # BLD AUTO: 3.62 M/UL (ref 4.2–5.9)
SODIUM SERPL-SCNC: 137 MMOL/L (ref 136–145)
WBC # BLD AUTO: 8.4 K/UL (ref 4–11)

## 2023-11-30 PROCEDURE — 36415 COLL VENOUS BLD VENIPUNCTURE: CPT

## 2023-11-30 PROCEDURE — 90935 HEMODIALYSIS ONE EVALUATION: CPT

## 2023-11-30 PROCEDURE — 85025 COMPLETE CBC W/AUTO DIFF WBC: CPT

## 2023-11-30 PROCEDURE — 80048 BASIC METABOLIC PNL TOTAL CA: CPT

## 2023-11-30 PROCEDURE — 6360000002 HC RX W HCPCS: Performed by: INTERNAL MEDICINE

## 2023-11-30 PROCEDURE — 6370000000 HC RX 637 (ALT 250 FOR IP): Performed by: NURSE PRACTITIONER

## 2023-11-30 PROCEDURE — 6370000000 HC RX 637 (ALT 250 FOR IP): Performed by: STUDENT IN AN ORGANIZED HEALTH CARE EDUCATION/TRAINING PROGRAM

## 2023-11-30 PROCEDURE — 6370000000 HC RX 637 (ALT 250 FOR IP): Performed by: INTERNAL MEDICINE

## 2023-11-30 RX ADMIN — CLOPIDOGREL BISULFATE 75 MG: 75 TABLET ORAL at 11:57

## 2023-11-30 RX ADMIN — MIDODRINE HYDROCHLORIDE 2.5 MG: 5 TABLET ORAL at 11:57

## 2023-11-30 RX ADMIN — TRAMADOL HYDROCHLORIDE 50 MG: 50 TABLET, COATED ORAL at 08:36

## 2023-11-30 RX ADMIN — Medication 1 MG: at 11:57

## 2023-11-30 RX ADMIN — HEPARIN SODIUM 5000 UNITS: 5000 INJECTION INTRAVENOUS; SUBCUTANEOUS at 06:53

## 2023-11-30 RX ADMIN — ALLOPURINOL 100 MG: 100 TABLET ORAL at 11:57

## 2023-11-30 RX ADMIN — ASPIRIN 81 MG 81 MG: 81 TABLET ORAL at 11:57

## 2023-11-30 ASSESSMENT — PAIN DESCRIPTION - ORIENTATION
ORIENTATION: LEFT
ORIENTATION: RIGHT

## 2023-11-30 ASSESSMENT — PAIN DESCRIPTION - LOCATION
LOCATION: FOOT
LOCATION: FOOT

## 2023-11-30 ASSESSMENT — PAIN SCALES - GENERAL
PAINLEVEL_OUTOF10: 7
PAINLEVEL_OUTOF10: 7

## 2023-11-30 NOTE — CARE COORDINATION
Called Miriam ORTIZ and asked that the [address] be completed. Checked [facility] to confirm transport. Called Maya Garcia [phone] and confirmed she is ready to receive the patient. The stated that regarding service to the furnace, the technician did not come last night but it coming today, and she has space heaters to accommodate their needs in the interim.     Electronically signed by Dean Nunez RN on 11/30/2023 at 10:38 AM

## 2023-11-30 NOTE — PROGRESS NOTES
Patients head to toe assessment completed. Vital signs WNL. Bed alarm engaged, call light within reach. Scheduled medications given per MAR. Patient moaning in pain but denies pain, patient stated it comes and goes, patient refused PRN pain med and dressing change to bilateral leg wounds. Patient resting in bed. Will continue to monitor.

## 2023-11-30 NOTE — DISCHARGE SUMMARY
not enlarged, symmetric, no tenderness/mass/nodules     Respiratory: clear to auscultation and percussion bilaterally with normal respiratory effort    Cardiovascular: irreg irreg, normal S1 and S2 and no murmurs    Gastrointestinal: soft, non-tender, non-distended, normal bowel sounds, no masses or organomegaly    Extremities: no clubbing, no edema    Skin:No rashes or nodules noted. Neurologic:negative        Disposition: home    Condition: stable    Discharge Medications:       Medication List      START taking these medications     clopidogrel 75 MG tablet; Commonly known as: PLAVIX; Take 1 tablet by   mouth daily   isosorbide mononitrate 30 MG extended release tablet; Commonly known as:   IMDUR; Take 1 tablet by mouth daily   rosuvastatin 20 MG tablet; Commonly known as: CRESTOR; Take 1 tablet by   mouth nightly     CHANGE how you take these medications     Eucerin Advanced Repair Crea; What changed: Another medication with the   same name was removed. Continue taking this medication, and follow the   directions you see here. levothyroxine 50 MCG tablet; Commonly known as: SYNTHROID; What changed:   Another medication with the same name was removed. Continue taking this   medication, and follow the directions you see here. CONTINUE taking these medications     allopurinol 100 MG tablet; Commonly known as: ZYLOPRIM   amLODIPine 5 MG tablet; Commonly known as: NORVASC   aspirin-caffeine 400-32 MG Tabs; Commonly known as: ANACIN   b complex-C-folic acid 1 MG capsule   diclofenac sodium 1 % Gel; Commonly known as: VOLTAREN; Apply 2 g   topically 2 times daily   ezetimibe 10 MG tablet; Commonly known as: Zetia; Take 1 tablet by mouth   daily.    midodrine 5 MG tablet; Commonly known as: PROAMATINE   ONE TOUCH ULTRA SYSTEM KIT w/Device Kit; TEST TWICE daily BLOOD SUGAR   ONE TOUCH ULTRASOFT LANCETS Misc; TEST once daily   PhosLo 667 MG Caps capsule; Generic drug: calcium acetate   traMADol 50 MG tablet; Commonly known as: ULTRAM; Take 1 tablet by mouth   every 6 hours as needed for Pain for up to 7 days. Max Daily Amount: 200   mg     STOP taking these medications     docusate sodium 100 MG capsule; Commonly known as: Colace   mirtazapine 15 MG tablet; Commonly known as: REMERON   sevelamer 800 MG tablet; Commonly known as: RENVELA         Allergies: Allergies   Allergen Reactions    Dilaudid [Hydromorphone] Hallucinations    Fentanyl Hallucinations    Morphine And Related Hallucinations    Norco [Hydrocodone-Acetaminophen] Hallucinations    Oxycodone      Agitation        Follow up Instructions: Follow-up with PCP: Randi Lundy MD in 2 wk . Total time spent on day of discharge including face-to-face visit, examination, documentation, counseling, preparation of discharge plans and followup, and discharge medicine reconciliation and presciptions is 31 minutes      ---       Subjective from day of d/c:   Kalpesh Romero is a 79 y.o. male. Pt seen and examined  Chart reviewed since last visit, labs and imaging below      Doing ok  For HD now  Planning on home discharge as pt/wife refuse SNF    Review of Systems: (1 system for EPF, 2-9 for detailed, 10+ for comprehensive)  Constitutional: Negative for chills and fever. HENT: Negative for dental problem, nosebleeds and rhinorrhea. Eyes: Negative for photophobia and visual disturbance. Respiratory: Negative for cough, chest tightness and shortness of breath. Cardiovascular: Negative for chest pain and leg swelling. Gastrointestinal: Negative for diarrhea, nausea and vomiting. Endocrine: Negative for polydipsia and polyphagia. Genitourinary: Negative for frequency, hematuria and urgency. Musculoskeletal: Negative for back pain and myalgias. Allergic/Immunologic: Negative for food allergies. Neurological: Negative for dizziness, seizures, syncope and facial asymmetry. Hematological: Negative for adenopathy.

## 2023-11-30 NOTE — PROGRESS NOTES
Pt refused wound dressing change and refused bath and linen change. Will offer again tomorrow AM.  Pt refused all meals. He ate chili his spouse brought for him. VSS.

## 2023-11-30 NOTE — PLAN OF CARE
Problem: Discharge Planning  Goal: Discharge to home or other facility with appropriate resources  Outcome: Progressing     Problem: Skin/Tissue Integrity  Goal: Absence of new skin breakdown  Description: 1. Monitor for areas of redness and/or skin breakdown  2. Assess vascular access sites hourly  3. Every 4-6 hours minimum:  Change oxygen saturation probe site  4. Every 4-6 hours:  If on nasal continuous positive airway pressure, respiratory therapy assess nares and determine need for appliance change or resting period.   Outcome: Progressing     Problem: Safety - Adult  Goal: Free from fall injury  Outcome: Progressing     Problem: Pain  Goal: Verbalizes/displays adequate comfort level or baseline comfort level  Outcome: Progressing     Problem: ABCDS Injury Assessment  Goal: Absence of physical injury  Outcome: Progressing     Problem: Chronic Conditions and Co-morbidities  Goal: Patient's chronic conditions and co-morbidity symptoms are monitored and maintained or improved  Outcome: Progressing     Problem: Metabolic/Fluid and Electrolytes - Adult  Goal: Glucose maintained within prescribed range  Outcome: Progressing

## 2023-11-30 NOTE — CARE COORDINATION
[facility] signed by patient.      Electronically signed by David Doe RN on 11/30/2023 at 11:19 AM

## 2023-12-01 ENCOUNTER — CLINICAL DOCUMENTATION ONLY (OUTPATIENT)
Facility: CLINIC | Age: 83
End: 2023-12-01

## 2023-12-01 ENCOUNTER — CARE COORDINATION (OUTPATIENT)
Dept: CASE MANAGEMENT | Age: 83
End: 2023-12-01

## 2023-12-01 NOTE — CARE COORDINATION
Care Transitions Initial Follow Up Call    Call within 2 business days of discharge: Yes      *First attempt at 24 hour discharge call, no answer, CTN left  with contact information and request for return call. CTN will continue with outreach call attempts. CTN reached out to [facility] and confirmed with \"Jessica\" that orders for [facility] were received. Follow Up  No future appointments.     Bess Gary RN

## 2023-12-04 ENCOUNTER — CARE COORDINATION (OUTPATIENT)
Dept: CASE MANAGEMENT | Age: 83
End: 2023-12-04

## 2023-12-04 NOTE — CARE COORDINATION
Care Transitions Initial Follow Up Call    Call within 2 business days of discharge: Yes    Second attempt at 24 hour discharge call, no answer, CTN left VM with contact information and request for return call. CTN will close program and remain available. Patient is not active in My Chart and does not see a [facility] PCP. Follow Up  No future appointments.       Young Rascon RN

## 2023-12-14 ENCOUNTER — HOSPITAL ENCOUNTER (OUTPATIENT)
Dept: WOUND CARE | Age: 83
Discharge: HOME | End: 2023-12-14
Attending: EMERGENCY MEDICINE
Payer: MEDICARE

## 2023-12-14 VITALS — DIASTOLIC BLOOD PRESSURE: 68 MMHG | RESPIRATION RATE: 15 BRPM | HEART RATE: 44 BPM | SYSTOLIC BLOOD PRESSURE: 113 MMHG

## 2023-12-14 DIAGNOSIS — L97.524 DIABETIC ULCER OF TOE OF LEFT FOOT ASSOCIATED WITH TYPE 2 DIABETES MELLITUS, WITH NECROSIS OF BONE (HCC): ICD-10-CM

## 2023-12-14 DIAGNOSIS — E11.621 DIABETIC ULCER OF TOE OF LEFT FOOT ASSOCIATED WITH TYPE 2 DIABETES MELLITUS, WITH NECROSIS OF BONE (HCC): ICD-10-CM

## 2023-12-14 DIAGNOSIS — L97.524 ULCER OF TOE OF LEFT FOOT, WITH NECROSIS OF BONE (HCC): ICD-10-CM

## 2023-12-14 DIAGNOSIS — L97.423 ULCER OF LEFT HEEL, WITH NECROSIS OF MUSCLE (HCC): ICD-10-CM

## 2023-12-14 DIAGNOSIS — L97.911 NON-PRESSURE CHRONIC ULCER OF LOWER LEG, LIMITED TO BREAKDOWN OF SKIN, RIGHT (HCC): ICD-10-CM

## 2023-12-14 DIAGNOSIS — I96 GANGRENE (HCC): ICD-10-CM

## 2023-12-14 DIAGNOSIS — Z71.89 COMPLEX CARE COORDINATION: ICD-10-CM

## 2023-12-14 DIAGNOSIS — I87.2 VENOUS INSUFFICIENCY OF BOTH LOWER EXTREMITIES: Primary | ICD-10-CM

## 2023-12-14 DIAGNOSIS — Z79.4 CONTROLLED TYPE 2 DIABETES MELLITUS WITH COMPLICATION, WITH LONG-TERM CURRENT USE OF INSULIN (HCC): ICD-10-CM

## 2023-12-14 DIAGNOSIS — E11.8 CONTROLLED TYPE 2 DIABETES MELLITUS WITH COMPLICATION, WITH LONG-TERM CURRENT USE OF INSULIN (HCC): ICD-10-CM

## 2023-12-14 PROCEDURE — 11044 DBRDMT BONE 1ST 20 SQ CM/<: CPT

## 2023-12-14 PROCEDURE — 11044 DBRDMT BONE 1ST 20 SQ CM/<: CPT | Performed by: EMERGENCY MEDICINE

## 2023-12-14 RX ORDER — LIDOCAINE HYDROCHLORIDE 20 MG/ML
JELLY TOPICAL ONCE
OUTPATIENT
Start: 2023-12-14 | End: 2023-12-14

## 2023-12-14 RX ORDER — LIDOCAINE HYDROCHLORIDE 40 MG/ML
SOLUTION TOPICAL ONCE
OUTPATIENT
Start: 2023-12-14 | End: 2023-12-14

## 2023-12-14 RX ORDER — SODIUM CHLOR/HYPOCHLOROUS ACID 0.033 %
SOLUTION, IRRIGATION IRRIGATION ONCE
OUTPATIENT
Start: 2023-12-14 | End: 2023-12-14

## 2023-12-14 RX ORDER — GINSENG 100 MG
CAPSULE ORAL ONCE
OUTPATIENT
Start: 2023-12-14 | End: 2023-12-14

## 2023-12-14 RX ORDER — LIDOCAINE 40 MG/G
CREAM TOPICAL ONCE
Status: DISCONTINUED | OUTPATIENT
Start: 2023-12-14 | End: 2023-12-15 | Stop reason: HOSPADM

## 2023-12-14 RX ORDER — BACITRACIN ZINC AND POLYMYXIN B SULFATE 500; 1000 [USP'U]/G; [USP'U]/G
OINTMENT TOPICAL ONCE
OUTPATIENT
Start: 2023-12-14 | End: 2023-12-14

## 2023-12-14 RX ORDER — CLOBETASOL PROPIONATE 0.5 MG/G
OINTMENT TOPICAL ONCE
OUTPATIENT
Start: 2023-12-14 | End: 2023-12-14

## 2023-12-14 RX ORDER — TRIAMCINOLONE ACETONIDE 1 MG/G
OINTMENT TOPICAL ONCE
OUTPATIENT
Start: 2023-12-14 | End: 2023-12-14

## 2023-12-14 RX ORDER — LIDOCAINE 50 MG/G
OINTMENT TOPICAL ONCE
OUTPATIENT
Start: 2023-12-14 | End: 2023-12-14

## 2023-12-14 RX ORDER — IBUPROFEN 200 MG
TABLET ORAL ONCE
OUTPATIENT
Start: 2023-12-14 | End: 2023-12-14

## 2023-12-14 RX ORDER — LIDOCAINE 40 MG/G
CREAM TOPICAL ONCE
OUTPATIENT
Start: 2023-12-14 | End: 2023-12-14

## 2023-12-14 RX ORDER — GENTAMICIN SULFATE 1 MG/G
OINTMENT TOPICAL ONCE
OUTPATIENT
Start: 2023-12-14 | End: 2023-12-14

## 2023-12-14 RX ORDER — BETAMETHASONE DIPROPIONATE 0.05 %
OINTMENT (GRAM) TOPICAL ONCE
OUTPATIENT
Start: 2023-12-14 | End: 2023-12-14

## 2023-12-14 ASSESSMENT — PAIN SCALES - GENERAL: PAINLEVEL_OUTOF10: 8

## 2023-12-14 ASSESSMENT — PAIN DESCRIPTION - DESCRIPTORS: DESCRIPTORS: CRUSHING

## 2023-12-14 ASSESSMENT — PAIN DESCRIPTION - LOCATION: LOCATION: FOOT

## 2023-12-14 ASSESSMENT — PAIN DESCRIPTION - PAIN TYPE: TYPE: CHRONIC PAIN

## 2023-12-14 ASSESSMENT — PAIN DESCRIPTION - ORIENTATION: ORIENTATION: LEFT
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
## 2023-12-14 NOTE — PLAN OF CARE
Discharge instructions given. Patient verbalized understanding. Return to [address] [place] [address] in 1 week(s). Called/faxed orders to [facility].

## 2023-12-14 NOTE — PATIENT INSTRUCTIONS
[address], [address] [facility]  Telephone: [phone]    Discharge Instructions    Important reminders:    **If you have any signs and symptoms of illness (Cough, fever, congestion, nausea, vomiting, diarrhea, etc.) please call the wound care center prior to your appointment. 1. Increase Protein intake for optimal wound healing  2. No added salt to reduce any swelling  3. If diabetic, maintain good glucose control  4. If you smoke, smoking prohibits wound healing, we ask that you refrain from smoking. 5. When taking antibiotics take the entire prescription as ordered. Do not stop taking until medication is all gone unless otherwise instructed. 6. Exercise as tolerated. 7. Keep weight off wounds and reposition every 2 hours if applicable. 8. If wound(s) is on your lower extremity, elevate legs to the level of the heart or above for 30 minutes 4-5 times a day and/or when sitting. Avoid standing for long periods of time. 9. Do not get wounds wet in bath or shower unless otherwise instructed by your physician. If your wound is on your foot or leg, you may purchase a cast bag. Please ask at the pharmacy. If Vascular testing is ordered, please call [address] [place] [address] [phone]) to schedule. Vascular tests ordered by Wound Care Physicians may take up to 2 hours to complete. Please keep that in mind when scheduling. If Vascular testing is scheduled, please bring supplies to replace your dressing after testing is done. The vascular department does not stock supplies. Wound: Left foot 2nd toe     With each dressing change, rinse wounds with 0.9% Saline. (May use wound wash or soft contact solution. Both can be purchased at a local drug store). If unable to obtain saline, may use a gentle soap and water. Dressing care: Apply betadine to surrounding skin, silver alginate to wound, and dry dressing with kerlix change daily and as needed.

## 2024-02-21 ENCOUNTER — HOSPITAL ENCOUNTER (OUTPATIENT)
Dept: WOUND CARE | Age: 84
Discharge: HOME | End: 2024-02-21
Attending: EMERGENCY MEDICINE
Payer: MEDICARE

## 2024-02-21 VITALS
SYSTOLIC BLOOD PRESSURE: 127 MMHG | TEMPERATURE: 97 F | HEART RATE: 69 BPM | DIASTOLIC BLOOD PRESSURE: 85 MMHG | RESPIRATION RATE: 18 BRPM

## 2024-02-21 DIAGNOSIS — Z71.89 COMPLEX CARE COORDINATION: ICD-10-CM

## 2024-02-21 DIAGNOSIS — E11.8 CONTROLLED TYPE 2 DIABETES MELLITUS WITH COMPLICATION, WITH LONG-TERM CURRENT USE OF INSULIN (HCC): ICD-10-CM

## 2024-02-21 DIAGNOSIS — L97.911 NON-PRESSURE CHRONIC ULCER OF LOWER LEG, LIMITED TO BREAKDOWN OF SKIN, RIGHT (HCC): ICD-10-CM

## 2024-02-21 DIAGNOSIS — Z79.4 CONTROLLED TYPE 2 DIABETES MELLITUS WITH COMPLICATION, WITH LONG-TERM CURRENT USE OF INSULIN (HCC): ICD-10-CM

## 2024-02-21 DIAGNOSIS — E11.621 DIABETIC ULCER OF TOE OF LEFT FOOT ASSOCIATED WITH TYPE 2 DIABETES MELLITUS, WITH NECROSIS OF BONE (HCC): ICD-10-CM

## 2024-02-21 DIAGNOSIS — I87.2 VENOUS INSUFFICIENCY OF BOTH LOWER EXTREMITIES: Primary | ICD-10-CM

## 2024-02-21 DIAGNOSIS — L97.524 ULCER OF TOE OF LEFT FOOT, WITH NECROSIS OF BONE (HCC): ICD-10-CM

## 2024-02-21 DIAGNOSIS — L97.524 DIABETIC ULCER OF TOE OF LEFT FOOT ASSOCIATED WITH TYPE 2 DIABETES MELLITUS, WITH NECROSIS OF BONE (HCC): ICD-10-CM

## 2024-02-21 DIAGNOSIS — I96 GANGRENE (HCC): ICD-10-CM

## 2024-02-21 DIAGNOSIS — L97.423 ULCER OF LEFT HEEL, WITH NECROSIS OF MUSCLE (HCC): ICD-10-CM

## 2024-02-21 PROCEDURE — 11042 DBRDMT SUBQ TIS 1ST 20SQCM/<: CPT

## 2024-02-21 PROCEDURE — 28825 PARTIAL AMPUTATION OF TOE: CPT

## 2024-02-21 PROCEDURE — 88311 DECALCIFY TISSUE: CPT

## 2024-02-21 PROCEDURE — 88305 TISSUE EXAM BY PATHOLOGIST: CPT

## 2024-02-21 PROCEDURE — 99214 OFFICE O/P EST MOD 30 MIN: CPT

## 2024-02-21 PROCEDURE — 99213 OFFICE O/P EST LOW 20 MIN: CPT

## 2024-02-21 RX ORDER — BACITRACIN ZINC AND POLYMYXIN B SULFATE 500; 1000 [USP'U]/G; [USP'U]/G
OINTMENT TOPICAL ONCE
OUTPATIENT
Start: 2024-02-21 | End: 2024-02-21

## 2024-02-21 RX ORDER — LIDOCAINE 50 MG/G
OINTMENT TOPICAL ONCE
OUTPATIENT
Start: 2024-02-21 | End: 2024-02-21

## 2024-02-21 RX ORDER — BETAMETHASONE DIPROPIONATE 0.05 %
OINTMENT (GRAM) TOPICAL ONCE
OUTPATIENT
Start: 2024-02-21 | End: 2024-02-21

## 2024-02-21 RX ORDER — LIDOCAINE HYDROCHLORIDE 20 MG/ML
JELLY TOPICAL ONCE
OUTPATIENT
Start: 2024-02-21 | End: 2024-02-21

## 2024-02-21 RX ORDER — GENTAMICIN SULFATE 1 MG/G
OINTMENT TOPICAL ONCE
OUTPATIENT
Start: 2024-02-21 | End: 2024-02-21

## 2024-02-21 RX ORDER — LIDOCAINE 40 MG/G
CREAM TOPICAL ONCE
OUTPATIENT
Start: 2024-02-21 | End: 2024-02-21

## 2024-02-21 RX ORDER — LIDOCAINE HYDROCHLORIDE 40 MG/ML
SOLUTION TOPICAL ONCE
OUTPATIENT
Start: 2024-02-21 | End: 2024-02-21

## 2024-02-21 RX ORDER — TRIAMCINOLONE ACETONIDE 1 MG/G
OINTMENT TOPICAL ONCE
OUTPATIENT
Start: 2024-02-21 | End: 2024-02-21

## 2024-02-21 RX ORDER — GINSENG 100 MG
CAPSULE ORAL ONCE
OUTPATIENT
Start: 2024-02-21 | End: 2024-02-21

## 2024-02-21 RX ORDER — IBUPROFEN 200 MG
TABLET ORAL ONCE
OUTPATIENT
Start: 2024-02-21 | End: 2024-02-21

## 2024-02-21 RX ORDER — SODIUM CHLOR/HYPOCHLOROUS ACID 0.033 %
SOLUTION, IRRIGATION IRRIGATION ONCE
OUTPATIENT
Start: 2024-02-21 | End: 2024-02-21

## 2024-02-21 RX ORDER — CLOBETASOL PROPIONATE 0.5 MG/G
OINTMENT TOPICAL ONCE
OUTPATIENT
Start: 2024-02-21 | End: 2024-02-21

## 2024-02-21 ASSESSMENT — PAIN DESCRIPTION - LOCATION: LOCATION: FOOT;TOE (COMMENT WHICH ONE)

## 2024-02-21 ASSESSMENT — PAIN SCALES - GENERAL: PAINLEVEL_OUTOF10: 10

## 2024-02-21 ASSESSMENT — PAIN DESCRIPTION - ORIENTATION: ORIENTATION: LEFT

## 2024-02-21 NOTE — PATIENT INSTRUCTIONS
[facility]  [address]   [place], [place] [address]  Telephone: [phone]     FAX [phone]    Discharge Instructions    Important reminders:    **If you have any signs and symptoms of illness (Cough, fever, congestion, nausea, vomiting, diarrhea, etc.) please call the wound care center prior to your appointment.    1. Increase Protein intake for optimal wound healing  2. No added salt to reduce any swelling  3. If diabetic, maintain good glucose control  4. If you smoke, smoking prohibits wound healing, we ask that you refrain from smoking.  5. When taking antibiotics take the entire prescription as ordered. Do not stop taking until medication is all gone unless otherwise instructed.   6. Exercise as tolerated.   7. Keep weight off wounds and reposition every 2 hours if applicable.  8. If wound(s) is on your lower extremity, elevate legs to the level of the heart or above for 30 minutes 4-5 times a day and/or when sitting. Avoid standing for long periods of time.   9. Do not get wounds wet in bath or shower unless otherwise instructed by your physician. If your wound is on your foot or leg, you may purchase a cast bag. Please ask at the pharmacy.      If Vascular testing is ordered, please call [address] [place] [address] [phone]) to schedule.    Vascular tests ordered by Wound Care Physicians may take up to 2 hours to complete. Please keep that in mind when scheduling.     If Vascular testing is scheduled, please bring supplies to replace your dressing after testing is done. The vascular department does not stock supplies.     Wound: Left feet an wounds    With each dressing change, rinse wounds with 0.9% Saline. (May use wound wash or soft contact solution. Both can be purchased at a local drug store). If unable to obtain saline, may use a gentle soap and water.    Dressing care: Left and right - Betadine ointment Transfer, dry dressing to all wounds , 1 tubigrip. Change daily       Home Care

## 2024-02-21 NOTE — PLAN OF CARE
Discharge instructions given.  Patient verbalized understanding.  Return to [facility] in 1 week(s).  Called/faxed orders to  [facility]      
breakdown of skin, right ([facility]) L97.911    Laceration of skin of lower leg, left, initial encounter S81.812A    Laceration of skin of lower leg, right, initial encounter S81.811A    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 with fat layer exposed ([facility]) L97.912    Non-pressure chronic ulcer of left lower leg with fat layer exposed ([facility]) L97.922    Open wound of right great toe with damage to nail S91.201A    Right foot ulcer, with fat layer exposed ([facility]) L97.512    Ulcer of toe of left foot, with necrosis of bone ([facility]) L97.524    Pressure ulcer of left hip, unstageable ([facility]) L89.220    Fatigue R53.83    Frequent falls R29.6    Chronic pain in testicle N50.819, G89.29    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 ([facility]) N18.6, Z99.2    Pressure injury of skin of left hip L89.229    Skin ulcer of multiple sites ([facility]) L98.499    Unable to ambulate R26.2    Controlled type 2 diabetes mellitus with complication, with long-term current use of insulin ([facility]) E11.8, Z79.4    History of prostate cancer Z85.46    Overweight (BMI 25.0-29.9) E66.3    Receiving intravenous antibiotic treatment as outpatient Z79.2    Complex care coordination Z71.89    Cellulitis L03.90    Right thigh pain M79.651    Non healing left heel wound S91.302A    Foot pain, bilateral M79.671, M79.672    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    Diabetic ulcer of left heel with necrosis of muscle ([facility]) E11.621, L97.423    Diabetic ulcer of toe of left foot with necrosis of bone ([facility]) E11.621, L97.524    Gangrene ([facility]) I96    Ulcer of left heel, with necrosis of muscle ([facility]) L97.423    Diabetic ulcer of toe of left foot associated with type 2 diabetes mellitus, with necrosis of bone ([facility]) E11.621, L97.524    Diabetic ulcer of left heel associated with type 1 diabetes mellitus, with necrosis of muscle ([facility]) E10.621, L97.423    Noncompliance of patient with renal dialysis Z91.158    Chronic combined systolic (congestive) and diastolic (congestive) heart failure ([facility]) I50.42    Chronic
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
## 2024-04-23 NOTE — PATIENT INSTRUCTIONS
call 911 or go to Emergency Room    Home Care Agency/Facility:     Your wound-care supplies will be provided by:   Please note, depending on your insurance coverage, you may have out-of-pocket expenses for these supplies. Someone from the company should call you to confirm your order and discuss those potential costs before they ship your products -- please anticipate that call. If your out-of-pocket cost could be substantial, Many companies have financial hardship programs for patients who qualify, so please ask about that if you might need a hand. If you have any questions about your supplies or your potential out-of-pocket costs, or if you need to place an order for a refill of supplies (typically monthly), please call the company directly.     Your  is Patti    Follow up with Dr Melgar In 3 week(s) in the wound care center.       Wound Care Center Information: Should you experience any significant changes in your wound(s) or have questions about your wound care, please contact the [facility] at [phone] Monday  - Thursday 8:00 am - 4:00 pm and Friday 8:00 am - 1:00pm. If you need help with your wound outside these hours and cannot wait until we are again available, contact your PCP or go to the hospital emergency room.     PLEASE NOTE: IF YOU ARE UNABLE TO OBTAIN WOUND SUPPLIES, CONTINUE TO USE THE SUPPLIES YOU HAVE AVAILABLE UNTIL YOU ARE ABLE TO REACH US. IT IS MOST IMPORTANT TO KEEP THE WOUND COVERED AT ALL TIMES.    Patient Experience    Thank you for trusting us with your care.  You may receive a survey from a company called [employer] asking for your feedback.  We would appreciate it if you took a few minutes to share your experience.  Your input is very valuable to us.

## 2024-04-24 ENCOUNTER — HOSPITAL ENCOUNTER (OUTPATIENT)
Dept: WOUND CARE | Age: 84
Discharge: HOME | End: 2024-04-24
Attending: PODIATRIST
Payer: MEDICARE

## 2024-04-24 VITALS
DIASTOLIC BLOOD PRESSURE: 76 MMHG | RESPIRATION RATE: 20 BRPM | TEMPERATURE: 97 F | HEART RATE: 64 BPM | SYSTOLIC BLOOD PRESSURE: 115 MMHG

## 2024-04-24 DIAGNOSIS — L97.911 NON-PRESSURE CHRONIC ULCER OF LOWER LEG, LIMITED TO BREAKDOWN OF SKIN, RIGHT (HCC): ICD-10-CM

## 2024-04-24 DIAGNOSIS — L97.524 DIABETIC ULCER OF TOE OF LEFT FOOT ASSOCIATED WITH TYPE 2 DIABETES MELLITUS, WITH NECROSIS OF BONE (HCC): ICD-10-CM

## 2024-04-24 DIAGNOSIS — E11.8 CONTROLLED TYPE 2 DIABETES MELLITUS WITH COMPLICATION, WITH LONG-TERM CURRENT USE OF INSULIN (HCC): ICD-10-CM

## 2024-04-24 DIAGNOSIS — I96 GANGRENE (HCC): ICD-10-CM

## 2024-04-24 DIAGNOSIS — Z79.4 CONTROLLED TYPE 2 DIABETES MELLITUS WITH COMPLICATION, WITH LONG-TERM CURRENT USE OF INSULIN (HCC): ICD-10-CM

## 2024-04-24 DIAGNOSIS — L97.423 ULCER OF LEFT HEEL, WITH NECROSIS OF MUSCLE (HCC): ICD-10-CM

## 2024-04-24 DIAGNOSIS — Z71.89 COMPLEX CARE COORDINATION: ICD-10-CM

## 2024-04-24 DIAGNOSIS — L97.524 ULCER OF TOE OF LEFT FOOT, WITH NECROSIS OF BONE (HCC): ICD-10-CM

## 2024-04-24 DIAGNOSIS — E11.621 DIABETIC ULCER OF TOE OF LEFT FOOT ASSOCIATED WITH TYPE 2 DIABETES MELLITUS, WITH NECROSIS OF BONE (HCC): ICD-10-CM

## 2024-04-24 DIAGNOSIS — I87.2 VENOUS INSUFFICIENCY OF BOTH LOWER EXTREMITIES: Primary | ICD-10-CM

## 2024-04-24 PROCEDURE — 97597 DBRDMT OPN WND 1ST 20 CM/<: CPT

## 2024-04-24 RX ORDER — LIDOCAINE HYDROCHLORIDE 20 MG/ML
JELLY TOPICAL ONCE
OUTPATIENT
Start: 2024-04-24 | End: 2024-04-24

## 2024-04-24 RX ORDER — BACITRACIN ZINC AND POLYMYXIN B SULFATE 500; 1000 [USP'U]/G; [USP'U]/G
OINTMENT TOPICAL ONCE
OUTPATIENT
Start: 2024-04-24 | End: 2024-04-24

## 2024-04-24 RX ORDER — LIDOCAINE 40 MG/G
CREAM TOPICAL ONCE
OUTPATIENT
Start: 2024-04-24 | End: 2024-04-24

## 2024-04-24 RX ORDER — GENTAMICIN SULFATE 1 MG/G
OINTMENT TOPICAL ONCE
OUTPATIENT
Start: 2024-04-24 | End: 2024-04-24

## 2024-04-24 RX ORDER — LIDOCAINE 40 MG/G
CREAM TOPICAL ONCE
Status: DISCONTINUED | OUTPATIENT
Start: 2024-04-24 | End: 2024-04-25 | Stop reason: HOSPADM

## 2024-04-24 RX ORDER — LIDOCAINE 50 MG/G
OINTMENT TOPICAL ONCE
OUTPATIENT
Start: 2024-04-24 | End: 2024-04-24

## 2024-04-24 RX ORDER — SODIUM CHLOR/HYPOCHLOROUS ACID 0.033 %
SOLUTION, IRRIGATION IRRIGATION ONCE
OUTPATIENT
Start: 2024-04-24 | End: 2024-04-24

## 2024-04-24 RX ORDER — TRIAMCINOLONE ACETONIDE 1 MG/G
OINTMENT TOPICAL ONCE
OUTPATIENT
Start: 2024-04-24 | End: 2024-04-24

## 2024-04-24 RX ORDER — LIDOCAINE HYDROCHLORIDE 40 MG/ML
SOLUTION TOPICAL ONCE
OUTPATIENT
Start: 2024-04-24 | End: 2024-04-24

## 2024-04-24 RX ORDER — IBUPROFEN 200 MG
TABLET ORAL ONCE
OUTPATIENT
Start: 2024-04-24 | End: 2024-04-24

## 2024-04-24 RX ORDER — CLOBETASOL PROPIONATE 0.5 MG/G
OINTMENT TOPICAL ONCE
OUTPATIENT
Start: 2024-04-24 | End: 2024-04-24

## 2024-04-24 RX ORDER — GINSENG 100 MG
CAPSULE ORAL ONCE
OUTPATIENT
Start: 2024-04-24 | End: 2024-04-24

## 2024-04-24 RX ORDER — BETAMETHASONE DIPROPIONATE 0.05 %
OINTMENT (GRAM) TOPICAL ONCE
OUTPATIENT
Start: 2024-04-24 | End: 2024-04-24

## 2024-04-24 ASSESSMENT — PAIN DESCRIPTION - ORIENTATION: ORIENTATION: RIGHT;LEFT

## 2024-04-24 ASSESSMENT — PAIN SCALES - GENERAL: PAINLEVEL_OUTOF10: 7

## 2024-04-24 ASSESSMENT — PAIN DESCRIPTION - DESCRIPTORS: DESCRIPTORS: CRUSHING

## 2024-04-24 ASSESSMENT — PAIN DESCRIPTION - PAIN TYPE: TYPE: CHRONIC PAIN

## 2024-04-24 ASSESSMENT — PAIN DESCRIPTION - LOCATION: LOCATION: LEG
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
## 2024-04-24 NOTE — PLAN OF CARE
Discharge instructions given.  Patient verbalized understanding.  Return to [facility] in 3 week(s).  Pt needs to see cardiology

## 2024-04-24 NOTE — PLAN OF CARE
Wound Care Supplies      Supply Company:     [facility] [address] [facility] [address] [place] [address]  p: [phone] f: [phone]                                                                                                                                                                                                                                                                                                                                                                                                Ordering Center:    [facility]  [address]  [facility] [address]  [phone]  Dept: [phone]   Fax# [phone]    Patient Demographics:     Donatojewell Obrien  [address] [facility] [address]   [phone]   : 1940  AGE: 83 y.o.     GENDER: male   PATIENT EMAIL: No e-mail address on record  TODAYS DATE:  2024      Insurance Information:     PRIMARY INSURANCE:  Plan: MEDICARE PART A AND B  Coverage: MEDICARE  Effective Date: 2004  Group Number: [unfilled]  Subscriber Number: [id number] - (Medicare)    Payer/Plan Subscr  Sex Relation Sub. Ins. ID Effective Group Num   1. MEDICARE - ME* DONATO OBRIEN W 1940 Male Self [id number]                                    [address] [address]   2.  - TRI* DONATO OBRIEN W 1940 Male Self [id number] 1/1/15                                    [address]         Wound Information:    Additional ICD-10 Codes:     Patient Active Problem List   Diagnosis Code    Hyperlipidemia E78.5    Essential hypertension I10    Localized edema R60.0    Fracture of sternum S22.20XA    Fractured rib S22.39XA    Prostate cancer (HCC) C61    Thrombus I82.90    Chronic renal disease N18.9    Trigger finger M65.30    CTS (carpal tunnel syndrome) G56.00    Venous insufficiency of both lower extremities I87.2    Venous ulcer of right lower extremity without varicose veins (HCC) I87.2, L97.919    Non-pressure chronic

## 2024-04-29 RX ORDER — GENTAMICIN SULFATE 1 MG/G
OINTMENT TOPICAL
Qty: 30 G | Refills: 0 | Status: SHIPPED | OUTPATIENT
Start: 2024-04-29 | End: 2024-05-06

## 2024-05-15 ENCOUNTER — HOSPITAL ENCOUNTER (OUTPATIENT)
Dept: WOUND CARE | Age: 84
Discharge: HOME | End: 2024-05-15
Attending: PODIATRIST
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
## 2024-10-31 NOTE — PATIENT INSTRUCTIONS
tubigrip. Change every other day. Right leg - Tubigrip. Right knee - Dry dressing      Home Care Agency/Facility:      Your wound-care supplies will be provided by:   Please note, depending on your insurance coverage, you may have out-of-pocket expenses for these supplies. Someone from the company should call you to confirm your order and discuss those potential costs before they ship your products -- please anticipate that call. If your out-of-pocket cost could be substantial, Many companies have financial hardship programs for patients who qualify, so please ask about that if you might need a hand. If you have any questions about your supplies or your potential out-of-pocket costs, or if you need to place an order for a refill of supplies (typically monthly), please call the company directly.      Your  is Patti     Follow up with Dr Melgar In 3-4 week(s) in the wound care center.         Wound Care Center Information: Should you experience any significant changes in your wound(s) or have questions about your wound care, please contact the [facility] at [phone] Monday  - Thursday 8:00 am - 4:00 pm and Friday 8:00 am - 1:00pm. If you need help with your wound outside these hours and cannot wait until we are again available, contact your PCP or go to the hospital emergency room.      PLEASE NOTE: IF YOU ARE UNABLE TO OBTAIN WOUND SUPPLIES, CONTINUE TO USE THE SUPPLIES YOU HAVE AVAILABLE UNTIL YOU ARE ABLE TO REACH US. IT IS MOST IMPORTANT TO KEEP THE WOUND COVERED AT ALL TIMES.     Patient Experience     Thank you for trusting us with your care.  You may receive a survey from a company called [employer] asking for your feedback.  We would appreciate it if you took a few minutes to share your experience.  Your input is very valuable to us.

## 2024-11-01 ENCOUNTER — HOSPITAL ENCOUNTER (OUTPATIENT)
Dept: WOUND CARE | Age: 84
Discharge: HOME | End: 2024-11-01
Attending: PODIATRIST
Payer: MEDICARE

## 2024-11-01 DIAGNOSIS — E11.8 CONTROLLED TYPE 2 DIABETES MELLITUS WITH COMPLICATION, WITH LONG-TERM CURRENT USE OF INSULIN (HCC): ICD-10-CM

## 2024-11-01 DIAGNOSIS — Z79.4 CONTROLLED TYPE 2 DIABETES MELLITUS WITH COMPLICATION, WITH LONG-TERM CURRENT USE OF INSULIN (HCC): ICD-10-CM

## 2024-11-01 DIAGNOSIS — I96 GANGRENE (HCC): ICD-10-CM

## 2024-11-01 DIAGNOSIS — L97.911 NON-PRESSURE CHRONIC ULCER OF LOWER LEG, LIMITED TO BREAKDOWN OF SKIN, RIGHT (HCC): ICD-10-CM

## 2024-11-01 DIAGNOSIS — L97.524 ULCER OF TOE OF LEFT FOOT, WITH NECROSIS OF BONE (HCC): ICD-10-CM

## 2024-11-01 DIAGNOSIS — E11.621 DIABETIC ULCER OF TOE OF LEFT FOOT ASSOCIATED WITH TYPE 2 DIABETES MELLITUS, WITH NECROSIS OF BONE (HCC): ICD-10-CM

## 2024-11-01 DIAGNOSIS — I87.2 VENOUS INSUFFICIENCY OF BOTH LOWER EXTREMITIES: Primary | ICD-10-CM

## 2024-11-01 DIAGNOSIS — L97.524 DIABETIC ULCER OF TOE OF LEFT FOOT ASSOCIATED WITH TYPE 2 DIABETES MELLITUS, WITH NECROSIS OF BONE (HCC): ICD-10-CM

## 2024-11-01 DIAGNOSIS — L97.423 ULCER OF LEFT HEEL, WITH NECROSIS OF MUSCLE (HCC): ICD-10-CM

## 2024-11-01 DIAGNOSIS — Z71.89 COMPLEX CARE COORDINATION: ICD-10-CM

## 2024-11-01 PROCEDURE — 97597 DBRDMT OPN WND 1ST 20 CM/<: CPT

## 2024-11-01 PROCEDURE — 99213 OFFICE O/P EST LOW 20 MIN: CPT

## 2024-11-01 NOTE — PLAN OF CARE
Wound Care Supplies      Supply Company:     [facility] [address] [facility] [address] [place] [address]  p: [phone] f: [phone]                                                                                                                                                                                                                                                                                                                                                                                                Ordering Center:    [facility]  [address]  [facility] [address]  [phone]  Dept: [phone]   Fax# [phone]    Patient Demographics:     Donatojewell Obrien  [address] [facility] [address]   [phone]   : 1940  AGE: 84 y.o.     GENDER: male   PATIENT EMAIL: No e-mail address on record  TODAYS DATE:  2024      Insurance Information:     PRIMARY INSURANCE:  Plan: MEDICARE PART A AND B  Coverage: MEDICARE  Effective Date: 2004  Group Number: [unfilled]  Subscriber Number: [id number] - (Medicare)    Payer/Plan Subscr  Sex Relation Sub. Ins. ID Effective Group Num   1. MEDICARE - ME* DONATO OBRIEN W 1940 Male Self [id number]                                    [address] [address]   2.  - TRI* DONATO OBRIEN W 1940 Male Self [id number] 1/1/15                                    [address]         Wound Information:    Additional ICD-10 Codes:     Patient Active Problem List   Diagnosis Code    Hyperlipidemia E78.5    Essential hypertension I10    Localized edema R60.0    Fracture of sternum S22.20XA    Fractured rib S22.39XA    Prostate cancer (HCC) C61    Thrombus I82.90    Chronic renal disease N18.9    Trigger finger M65.30    CTS (carpal tunnel syndrome) G56.00    Venous insufficiency of both lower extremities I87.2    Venous ulcer of right lower extremity without varicose veins (HCC) I87.2, L97.919    Non-pressure chronic
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
## 2025-03-25 ENCOUNTER — HOSPITAL ENCOUNTER (INPATIENT)
Age: 85
LOS: 7 days | Discharge: HOME HEALTH | DRG: 312 | End: 2025-04-01
Attending: EMERGENCY MEDICINE | Admitting: INTERNAL MEDICINE
Payer: MEDICARE

## 2025-03-25 ENCOUNTER — APPOINTMENT (OUTPATIENT)
Dept: GENERAL RADIOLOGY | Age: 85
DRG: 312 | End: 2025-03-25
Payer: MEDICARE

## 2025-03-25 DIAGNOSIS — T67.1XXA HEAT SYNCOPE, INITIAL ENCOUNTER: ICD-10-CM

## 2025-03-25 DIAGNOSIS — Z99.2 ESRD NEEDING DIALYSIS (HCC): ICD-10-CM

## 2025-03-25 DIAGNOSIS — R05.9 COUGH, UNSPECIFIED TYPE: ICD-10-CM

## 2025-03-25 DIAGNOSIS — N18.6 ESRD NEEDING DIALYSIS (HCC): ICD-10-CM

## 2025-03-25 DIAGNOSIS — J81.0 ACUTE PULMONARY EDEMA (HCC): ICD-10-CM

## 2025-03-25 DIAGNOSIS — R55 SYNCOPE AND COLLAPSE: Primary | ICD-10-CM

## 2025-03-25 LAB
ALBUMIN SERPL-MCNC: 4.2 G/DL (ref 3.4–5)
ALBUMIN/GLOB SERPL: 1.4 {RATIO} (ref 1.1–2.2)
ALP SERPL-CCNC: 104 U/L (ref 40–129)
ALT SERPL-CCNC: 75 U/L (ref 10–40)
ANION GAP SERPL CALCULATED.3IONS-SCNC: 17 MMOL/L (ref 3–16)
AST SERPL-CCNC: 60 U/L (ref 15–37)
BASOPHILS # BLD: 0.1 K/UL (ref 0–0.2)
BASOPHILS NFR BLD: 0.9 %
BILIRUB SERPL-MCNC: 0.3 MG/DL (ref 0–1)
BUN SERPL-MCNC: 41 MG/DL (ref 7–20)
CALCIUM SERPL-MCNC: 9.1 MG/DL (ref 8.3–10.6)
CHLORIDE SERPL-SCNC: 91 MMOL/L (ref 99–110)
CO2 SERPL-SCNC: 25 MMOL/L (ref 21–32)
CREAT SERPL-MCNC: 4.9 MG/DL (ref 0.8–1.3)
DEPRECATED RDW RBC AUTO: 15.4 % (ref 12.4–15.4)
EKG DIAGNOSIS: NORMAL
EKG Q-T INTERVAL: 460 MS
EKG QRS DURATION: 108 MS
EKG QTC CALCULATION (BAZETT): 470 MS
EKG R AXIS: 138 DEGREES
EKG T AXIS: 4 DEGREES
EKG VENTRICULAR RATE: 63 BPM
EOSINOPHIL # BLD: 0.1 K/UL (ref 0–0.6)
EOSINOPHIL NFR BLD: 1.3 %
FLUAV RNA RESP QL NAA+PROBE: NOT DETECTED
FLUBV RNA RESP QL NAA+PROBE: NOT DETECTED
GFR SERPLBLD CREATININE-BSD FMLA CKD-EPI: 11 ML/MIN/{1.73_M2}
GLUCOSE SERPL-MCNC: 155 MG/DL (ref 70–99)
HCT VFR BLD AUTO: 41.8 % (ref 40.5–52.5)
HGB BLD-MCNC: 13.6 G/DL (ref 13.5–17.5)
LYMPHOCYTES # BLD: 0.9 K/UL (ref 1–5.1)
LYMPHOCYTES NFR BLD: 12.1 %
MCH RBC QN AUTO: 33.8 PG (ref 26–34)
MCHC RBC AUTO-ENTMCNC: 32.5 G/DL (ref 31–36)
MCV RBC AUTO: 104.1 FL (ref 80–100)
MONOCYTES # BLD: 0.8 K/UL (ref 0–1.3)
MONOCYTES NFR BLD: 10.9 %
NEUTROPHILS # BLD: 5.6 K/UL (ref 1.7–7.7)
NEUTROPHILS NFR BLD: 74.8 %
NT-PROBNP SERPL-MCNC: ABNORMAL PG/ML (ref 0–449)
PLATELET # BLD AUTO: 145 K/UL (ref 135–450)
PMV BLD AUTO: 8.2 FL (ref 5–10.5)
POTASSIUM SERPL-SCNC: 3.9 MMOL/L (ref 3.5–5.1)
PROT SERPL-MCNC: 7.2 G/DL (ref 6.4–8.2)
RBC # BLD AUTO: 4.02 M/UL (ref 4.2–5.9)
SARS-COV-2 RNA RESP QL NAA+PROBE: NOT DETECTED
SODIUM SERPL-SCNC: 133 MMOL/L (ref 136–145)
TROPONIN, HIGH SENSITIVITY: 247 NG/L (ref 0–22)
TROPONIN, HIGH SENSITIVITY: 256 NG/L (ref 0–22)
WBC # BLD AUTO: 7.5 K/UL (ref 4–11)

## 2025-03-25 PROCEDURE — 80053 COMPREHEN METABOLIC PANEL: CPT

## 2025-03-25 PROCEDURE — 6370000000 HC RX 637 (ALT 250 FOR IP): Performed by: PHYSICIAN ASSISTANT

## 2025-03-25 PROCEDURE — 93010 ELECTROCARDIOGRAM REPORT: CPT | Performed by: INTERNAL MEDICINE

## 2025-03-25 PROCEDURE — 94640 AIRWAY INHALATION TREATMENT: CPT

## 2025-03-25 PROCEDURE — 94760 N-INVAS EAR/PLS OXIMETRY 1: CPT

## 2025-03-25 PROCEDURE — 93005 ELECTROCARDIOGRAM TRACING: CPT | Performed by: EMERGENCY MEDICINE

## 2025-03-25 PROCEDURE — 99285 EMERGENCY DEPT VISIT HI MDM: CPT

## 2025-03-25 PROCEDURE — 85025 COMPLETE CBC W/AUTO DIFF WBC: CPT

## 2025-03-25 PROCEDURE — 6370000000 HC RX 637 (ALT 250 FOR IP): Performed by: INTERNAL MEDICINE

## 2025-03-25 PROCEDURE — 83880 ASSAY OF NATRIURETIC PEPTIDE: CPT

## 2025-03-25 PROCEDURE — 71046 X-RAY EXAM CHEST 2 VIEWS: CPT

## 2025-03-25 PROCEDURE — 2500000003 HC RX 250 WO HCPCS: Performed by: INTERNAL MEDICINE

## 2025-03-25 PROCEDURE — 2060000000 HC ICU INTERMEDIATE R&B

## 2025-03-25 PROCEDURE — 84484 ASSAY OF TROPONIN QUANT: CPT

## 2025-03-25 PROCEDURE — 1200000000 HC SEMI PRIVATE

## 2025-03-25 PROCEDURE — 87636 SARSCOV2 & INF A&B AMP PRB: CPT

## 2025-03-25 RX ORDER — ONDANSETRON 4 MG/1
4 TABLET, ORALLY DISINTEGRATING ORAL EVERY 8 HOURS PRN
Status: DISCONTINUED | OUTPATIENT
Start: 2025-03-25 | End: 2025-04-01 | Stop reason: HOSPADM

## 2025-03-25 RX ORDER — SODIUM CHLORIDE 9 MG/ML
INJECTION, SOLUTION INTRAVENOUS PRN
Status: DISCONTINUED | OUTPATIENT
Start: 2025-03-25 | End: 2025-04-01 | Stop reason: HOSPADM

## 2025-03-25 RX ORDER — 0.9 % SODIUM CHLORIDE 0.9 %
500 INTRAVENOUS SOLUTION INTRAVENOUS PRN
Status: DISCONTINUED | OUTPATIENT
Start: 2025-03-25 | End: 2025-04-01 | Stop reason: HOSPADM

## 2025-03-25 RX ORDER — MIDODRINE HYDROCHLORIDE 5 MG/1
10 TABLET ORAL ONCE
Status: COMPLETED | OUTPATIENT
Start: 2025-03-26 | End: 2025-03-26

## 2025-03-25 RX ORDER — ACETAMINOPHEN 325 MG/1
650 TABLET ORAL EVERY 6 HOURS PRN
Status: DISCONTINUED | OUTPATIENT
Start: 2025-03-25 | End: 2025-04-01 | Stop reason: HOSPADM

## 2025-03-25 RX ORDER — MIDODRINE HYDROCHLORIDE 5 MG/1
5 TABLET ORAL SEE ADMIN INSTRUCTIONS
Status: DISCONTINUED | OUTPATIENT
Start: 2025-03-25 | End: 2025-03-25

## 2025-03-25 RX ORDER — LEVOTHYROXINE SODIUM 25 UG/1
50 TABLET ORAL DAILY
Status: DISCONTINUED | OUTPATIENT
Start: 2025-03-25 | End: 2025-04-01 | Stop reason: HOSPADM

## 2025-03-25 RX ORDER — IPRATROPIUM BROMIDE AND ALBUTEROL SULFATE 2.5; .5 MG/3ML; MG/3ML
1 SOLUTION RESPIRATORY (INHALATION) ONCE
Status: COMPLETED | OUTPATIENT
Start: 2025-03-25 | End: 2025-03-25

## 2025-03-25 RX ORDER — HYDRALAZINE HYDROCHLORIDE 20 MG/ML
10 INJECTION INTRAMUSCULAR; INTRAVENOUS EVERY 6 HOURS PRN
Status: DISCONTINUED | OUTPATIENT
Start: 2025-03-25 | End: 2025-04-01 | Stop reason: HOSPADM

## 2025-03-25 RX ORDER — HEPARIN SODIUM 5000 [USP'U]/ML
5000 INJECTION, SOLUTION INTRAVENOUS; SUBCUTANEOUS EVERY 8 HOURS SCHEDULED
Status: DISCONTINUED | OUTPATIENT
Start: 2025-03-25 | End: 2025-04-01 | Stop reason: HOSPADM

## 2025-03-25 RX ORDER — EZETIMIBE 10 MG/1
10 TABLET ORAL DAILY
Status: DISCONTINUED | OUTPATIENT
Start: 2025-03-25 | End: 2025-03-25 | Stop reason: RX

## 2025-03-25 RX ORDER — MIDODRINE HYDROCHLORIDE 5 MG/1
10 TABLET ORAL
Status: DISCONTINUED | OUTPATIENT
Start: 2025-03-27 | End: 2025-04-01 | Stop reason: HOSPADM

## 2025-03-25 RX ORDER — ROSUVASTATIN CALCIUM 20 MG/1
20 TABLET, COATED ORAL NIGHTLY
Status: DISCONTINUED | OUTPATIENT
Start: 2025-03-25 | End: 2025-04-01 | Stop reason: HOSPADM

## 2025-03-25 RX ORDER — ONDANSETRON 2 MG/ML
4 INJECTION INTRAMUSCULAR; INTRAVENOUS EVERY 6 HOURS PRN
Status: DISCONTINUED | OUTPATIENT
Start: 2025-03-25 | End: 2025-04-01 | Stop reason: HOSPADM

## 2025-03-25 RX ORDER — ALLOPURINOL 100 MG/1
100 TABLET ORAL DAILY
Status: DISCONTINUED | OUTPATIENT
Start: 2025-03-25 | End: 2025-04-01 | Stop reason: HOSPADM

## 2025-03-25 RX ORDER — SODIUM CHLORIDE 0.9 % (FLUSH) 0.9 %
10 SYRINGE (ML) INJECTION EVERY 12 HOURS SCHEDULED
Status: DISCONTINUED | OUTPATIENT
Start: 2025-03-25 | End: 2025-04-01 | Stop reason: HOSPADM

## 2025-03-25 RX ORDER — MIDODRINE HYDROCHLORIDE 5 MG/1
5 TABLET ORAL
Status: DISCONTINUED | OUTPATIENT
Start: 2025-03-26 | End: 2025-04-01 | Stop reason: HOSPADM

## 2025-03-25 RX ORDER — CALCIUM ACETATE 667 MG/1
1 CAPSULE ORAL PRN
Status: DISCONTINUED | OUTPATIENT
Start: 2025-03-25 | End: 2025-03-30

## 2025-03-25 RX ORDER — ISOSORBIDE MONONITRATE 30 MG/1
30 TABLET, EXTENDED RELEASE ORAL DAILY
Status: DISCONTINUED | OUTPATIENT
Start: 2025-03-25 | End: 2025-04-01 | Stop reason: HOSPADM

## 2025-03-25 RX ORDER — CALCIUM ACETATE 667 MG/1
2 CAPSULE ORAL
Status: DISCONTINUED | OUTPATIENT
Start: 2025-03-26 | End: 2025-03-30

## 2025-03-25 RX ORDER — ENOXAPARIN SODIUM 100 MG/ML
40 INJECTION SUBCUTANEOUS DAILY
Status: DISCONTINUED | OUTPATIENT
Start: 2025-03-25 | End: 2025-03-25

## 2025-03-25 RX ORDER — ACETAMINOPHEN 650 MG/1
650 SUPPOSITORY RECTAL EVERY 6 HOURS PRN
Status: DISCONTINUED | OUTPATIENT
Start: 2025-03-25 | End: 2025-04-01 | Stop reason: HOSPADM

## 2025-03-25 RX ORDER — MIDODRINE HYDROCHLORIDE 5 MG/1
10 TABLET ORAL ONCE
Status: COMPLETED | OUTPATIENT
Start: 2025-03-25 | End: 2025-03-25

## 2025-03-25 RX ORDER — CLOPIDOGREL BISULFATE 75 MG/1
75 TABLET ORAL DAILY
Status: DISCONTINUED | OUTPATIENT
Start: 2025-03-25 | End: 2025-04-01 | Stop reason: HOSPADM

## 2025-03-25 RX ORDER — AMLODIPINE BESYLATE 5 MG/1
5 TABLET ORAL DAILY
Status: DISCONTINUED | OUTPATIENT
Start: 2025-03-25 | End: 2025-04-01

## 2025-03-25 RX ORDER — SODIUM CHLORIDE 0.9 % (FLUSH) 0.9 %
10 SYRINGE (ML) INJECTION PRN
Status: DISCONTINUED | OUTPATIENT
Start: 2025-03-25 | End: 2025-04-01 | Stop reason: HOSPADM

## 2025-03-25 RX ADMIN — ROSUVASTATIN 20 MG: 20 TABLET, FILM COATED ORAL at 21:30

## 2025-03-25 RX ADMIN — IPRATROPIUM BROMIDE AND ALBUTEROL SULFATE 1 DOSE: .5; 3 SOLUTION RESPIRATORY (INHALATION) at 12:15

## 2025-03-25 RX ADMIN — MIDODRINE HYDROCHLORIDE 10 MG: 5 TABLET ORAL at 11:09

## 2025-03-25 RX ADMIN — SODIUM CHLORIDE, PRESERVATIVE FREE 10 ML: 5 INJECTION INTRAVENOUS at 21:31

## 2025-03-25 ASSESSMENT — PAIN SCALES - GENERAL
PAINLEVEL_OUTOF10: 0
PAINLEVEL_OUTOF10: 3

## 2025-03-25 ASSESSMENT — PAIN - FUNCTIONAL ASSESSMENT: PAIN_FUNCTIONAL_ASSESSMENT: NONE - DENIES PAIN

## 2025-03-25 ASSESSMENT — LIFESTYLE VARIABLES
HOW MANY STANDARD DRINKS CONTAINING ALCOHOL DO YOU HAVE ON A TYPICAL DAY: PATIENT DOES NOT DRINK
HOW OFTEN DO YOU HAVE A DRINK CONTAINING ALCOHOL: NEVER

## 2025-03-25 ASSESSMENT — PAIN DESCRIPTION - LOCATION: LOCATION: LEG

## 2025-03-25 ASSESSMENT — PAIN DESCRIPTION - ORIENTATION: ORIENTATION: RIGHT;LEFT

## 2025-03-25 NOTE — ED PROVIDER NOTES
[facility] EMERGENCY DEPARTMENT  EMERGENCY DEPARTMENT ENCOUNTER        Pt Name: Donato Obrien  MRN: [id number]  Birthdate 1940  Date of evaluation: 3/25/2025  Provider: Kourtney Goodrich PA-C  PCP: Rohit Pathak III, MD  Note Started: 11:05 AM EDT 3/25/25       I have seen and evaluated this patient with my supervising physician Tres Holliday MD.      CHIEF COMPLAINT       Chief Complaint   Patient presents with    Dizziness     Pt in via EMS from dialysis. Reports dizziness and weakness after dialysis.  Pt reports that dialysis was not completed.        HISTORY OF PRESENT ILLNESS: 1 or more Elements     History From: Patient  Limitations to history : None    Donato Obrien is a 84 y.o. male who presents for evaluation of cough and difficulty breathing.  Patient states this began 4 months ago.  He reports he was at dialysis this morning and was sent to the ED by EMS due to his difficulty breathing without receiving his full treatment.  Reports his cough is productive of a yellow mucus.  He also reports a runny nose and sore throat.  He reports his wife is also currently sick with a cough and has not sought treatment.  Denies history of asthma, PE, COPD. He denies chest tightness, trouble swallowing, sinus pressure, dizziness, syncope, chest pain, abdominal pain, fever, chills, anticoagulation.  Patient has no other concerns at this time.    Nephrologist had called the dialysis center and clarified that patient did indeed have a witnessed syncopal episode during dialysis.  He had received approximately 2 hours and 30 minutes of treatment and they took off 1.3 L.  They state that he was normotensive during the syncopal episode.  No history of similar events.    Nursing Notes were all reviewed and agreed with or any disagreements were addressed in the HPI.    REVIEW OF SYSTEMS :      Review of Systems   Constitutional:  Negative for appetite change, chills and fever.   HENT:  Positive for

## 2025-03-25 NOTE — ED NOTES
Patient Name: Donato Obrien  : 1940 84 y.o.  MRN: [id number]  ED Room #: ED-0011/11     Chief complaint:   Chief Complaint   Patient presents with    Dizziness     Pt in via EMS from dialysis. Reports dizziness and weakness after dialysis.  Pt reports that dialysis was not completed.      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 Unknown, did not assess in the Emergency Department    2. How does patient take pills? Whole with Water    3. Is patient alert? Alert    4. Is patient oriented? To Person, To Place, To Time, and To Situation    5.   Patient arrived from:  home  Facility Name: ___________________________________________    6. If patient is disoriented or from a Skill Nursing Facility has family been notified of admission? No    7. Patient belongings?     Disposition of belongings? Kept with Patient     8. Any specific patient or family belongings/needs/dynamics?   a.     9. Miscellaneous comments/pending orders?  a.       If there are any additional questions please reach out to the Emergency Department.

## 2025-03-25 NOTE — ED NOTES
This RN received a call from pts wife.   Wife reports: dizziness x 4 months without notable reason, no loss of consciousness; productive cough with yellow/clear sputum and sneezing for past 3 months; pt has refused to see MD for these persistent symptoms. Reports having O2 for home use for ~4 years but use only in the last 6 months d/t increased work of breathing (no shortness of breath accompanying); recent decrease in desire to eat d/t generally feeling unwell; and RECENT presence of periodic chest pain.

## 2025-03-25 NOTE — ED PROVIDER NOTES
In addition to the advanced practice provider, I personally saw Donato Obrien and performed a substantive portion of the visit including all aspects of the medical decision making. I made/approved the management plan and take responsibility for the patient management    Briefly, this is a 84 y.o. male here for to the ER for evaluation generalized fatigue and weakness end-stage renal disease on hemodialysis several month history of cough.  Mild hypotension on arrival no syncope without his midodrine did not complete his full dialysis run.  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  Positive cough normal mental status no pulse deficit.      EKG  EKG was reviewed by emergency department physician in the absence of a cardiologist    Narrow complex sinus rhythm, rate , normal axis, normal IA and QRS intervals, normal Qtc, no ST elevations or depressions, normal t-wave morphology, impression NSR, no STEMI      Screenings   Rosa Coma Scale  Eye Opening: Spontaneous  Best Verbal Response: Oriented  Best Motor Response: Obeys commands  Callaway Coma Scale Score: 15          MDM  Very pleasant elderly gentleman, with acute on subacute cough with mild pulmonary edema.  Did receive additional midodrine and will need hemodialysis for acute on subacute pulmonary edema.  Mental status is intact no signs of severe sepsis.    I Dr. Mckeon am the primary clinician of record.      Patient Referrals:  No follow-up provider specified.    Discharge Medications:  New Prescriptions    No medications on file       FINAL IMPRESSION  No diagnosis found.    Blood pressure 122/63, pulse 59, temperature 98 °F (36.7 °C), temperature source Oral, resp. rate 16, SpO2 95%.     For further details of Donato Obrien's emergency department encounter, please see documentation by advanced practice provider, NJ MCKEON MD (electronically signed)  Attending Emergency Physician       Tres Mckeon MD  03/25/25

## 2025-03-25 NOTE — CONSULTS
[facility] ([facility])   Nephrology Consult Note  [phone]  [email or web address]        Patient: Donato Obrien    MRN: [id number]    : 1940     Reason for Consult:    ESRD Management      Requesting Provider: Dr. Hebert    History of Present Illness / Subjective:    Donato Obrien is a 84 y.o. male with a PMHx of ESRD On HD TTS, HTN, PVD with bilateral lower extremities wound, Type 2 DM, HFrEF ( EF 20-25% , DD III) CAD/NSTEMI 2023 PCI deemed high risk on medical management, afib , who was sent in by the dialysis unit for episode of unresponsive/syncopal episode during session. Per nursing staff at the dialysis unit, he went unresponsive for few seconds, and when he woke up he was confused. BP in good range during episode. The lowest BP he had during session was 94/41. He had total of 5w89zrp of HD with 1.3L UF.   He was 2Kg above his TW, and has been reaching his TW of 83.5Kg with no significant weight gain between sessions. He has been complaining of on/off shortness of breath, he reports that has been there for few months.     On presentation here, initial BP was 76/50, given a dose of midodrine with improvement in BP noted, did not receive any fluids. CXR showing CHF with pulmonary edema.   He is sating 95-97% on room air.   Flu/COVID negative.   Trop positive   Labs with K 3.9, hemolyzed specimen/     We were consulted for dialysis management.       History of HFrEF, with severe Multivessel CAD, with previous admission in  with similar complaints.   His [facility] (23) with Heavily calcified multi-vessel CAD, considered a poor surgical candidate, and PCI felt to be too high risk and medical management was recommended.         Past Medical History   Active Ambulatory Problems     Diagnosis Date Noted    Hyperlipidemia     Essential hypertension     Localized edema     Fracture of sternum     Fractured rib     Prostate cancer (HCC)     Thrombus     Chronic renal disease 2010

## 2025-03-25 NOTE — H&P
reviewed, there is not difference noted.    Old study shows afib at 73    Lab Results   Component Value Date/Time    GLUCOSE 155 03/25/2025 11:11 AM     Lab Results   Component Value Date/Time    POCGLU 115 11/30/2023 07:51 AM     /73   Pulse 66   Temp 98 °F (36.7 °C) (Oral)   Resp 22   SpO2 95%     MEDICAL DECISION MAKING:    Principal Problem:    Syncope -New Problem to me.  Witnessed syncope at HD today  Plan: admit, check echo, monitor on tele. Consider EP consult.  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 Unstable. Did not finish HD today  Plan: admit, likely HD tomorrow    Controlled type 2 diabetes mellitus with complication, with long-term current use of insulin (HCC) -Established problem. Stable.  Glu 155  Plan: Patient placed on controlled carbohydrate diet. Fingerstick sugars to be checked to monitor for both hypoglycemia as well as hyperglycemia.  Sliding scale insulin ordered.  Glucagon and dextrose ordered for hypoglycemia.  Patient will be continued on home medications. Hemoglobin a1c to be ordered to assess efficacy of therapy.     Hyperlipidemia -Established problem. Stable.    Plan: cont meds    Essential hypertension -Established problem. Stable.  112/73  Plan: Pt home BP meds reviewed and will be continued. IV Hydralazine ordered for control of extremely high blood pressures.   Will monitor labs to assess Creat/K for possible complications of medications.     Atrial fibrillation (HCC) -Established problem. Stable.  Appears to be chronic.  Plan: cont anticoagulation          Diagnoses as listed above, designated as new or established and plan outlined for each.      Case discussed with: ELICEO corey    MDM Determination    PROBLEM  High: life threatening unstable chronic illness (I.e. severe COPD, asthma) or acute illness (i.e. MI, PE, ABDULLAHI, stroke,severe resp distress, acute change in neurologic status, suicidal ideation)  PLUS  high: iv narcotics or other iv

## 2025-03-25 NOTE — ED NOTES
Wife updated on plan of care. All questions were answered and all concerns have been recorded in previous note.

## 2025-03-26 ENCOUNTER — APPOINTMENT (OUTPATIENT)
Age: 85
DRG: 312 | End: 2025-03-26
Attending: INTERNAL MEDICINE
Payer: MEDICARE

## 2025-03-26 LAB
ALBUMIN SERPL-MCNC: 3.9 G/DL (ref 3.4–5)
ALBUMIN/GLOB SERPL: 1.3 {RATIO} (ref 1.1–2.2)
ALP SERPL-CCNC: 77 U/L (ref 40–129)
ALT SERPL-CCNC: 59 U/L (ref 10–40)
ANION GAP SERPL CALCULATED.3IONS-SCNC: 17 MMOL/L (ref 3–16)
AST SERPL-CCNC: 38 U/L (ref 15–37)
BASOPHILS # BLD: 0.1 K/UL (ref 0–0.2)
BASOPHILS NFR BLD: 0.9 %
BILIRUB SERPL-MCNC: 0.4 MG/DL (ref 0–1)
BUN SERPL-MCNC: 54 MG/DL (ref 7–20)
CALCIUM SERPL-MCNC: 9.7 MG/DL (ref 8.3–10.6)
CHLORIDE SERPL-SCNC: 92 MMOL/L (ref 99–110)
CO2 SERPL-SCNC: 23 MMOL/L (ref 21–32)
CREAT SERPL-MCNC: 6.9 MG/DL (ref 0.8–1.3)
DEPRECATED RDW RBC AUTO: 15.3 % (ref 12.4–15.4)
EOSINOPHIL # BLD: 0.1 K/UL (ref 0–0.6)
EOSINOPHIL NFR BLD: 1.7 %
GFR SERPLBLD CREATININE-BSD FMLA CKD-EPI: 7 ML/MIN/{1.73_M2}
GLUCOSE SERPL-MCNC: 88 MG/DL (ref 70–99)
HCT VFR BLD AUTO: 41.9 % (ref 40.5–52.5)
HGB BLD-MCNC: 13.7 G/DL (ref 13.5–17.5)
LYMPHOCYTES # BLD: 1.6 K/UL (ref 1–5.1)
LYMPHOCYTES NFR BLD: 19.1 %
MCH RBC QN AUTO: 33.6 PG (ref 26–34)
MCHC RBC AUTO-ENTMCNC: 32.8 G/DL (ref 31–36)
MCV RBC AUTO: 102.5 FL (ref 80–100)
MONOCYTES # BLD: 1.4 K/UL (ref 0–1.3)
MONOCYTES NFR BLD: 17.1 %
NEUTROPHILS # BLD: 5.1 K/UL (ref 1.7–7.7)
NEUTROPHILS NFR BLD: 61.2 %
PLATELET # BLD AUTO: 144 K/UL (ref 135–450)
PMV BLD AUTO: 7.7 FL (ref 5–10.5)
POTASSIUM SERPL-SCNC: 5 MMOL/L (ref 3.5–5.1)
PROT SERPL-MCNC: 6.9 G/DL (ref 6.4–8.2)
RBC # BLD AUTO: 4.08 M/UL (ref 4.2–5.9)
SODIUM SERPL-SCNC: 132 MMOL/L (ref 136–145)
WBC # BLD AUTO: 8.3 K/UL (ref 4–11)

## 2025-03-26 PROCEDURE — 97535 SELF CARE MNGMENT TRAINING: CPT

## 2025-03-26 PROCEDURE — 85025 COMPLETE CBC W/AUTO DIFF WBC: CPT

## 2025-03-26 PROCEDURE — 5A1D70Z PERFORMANCE OF URINARY FILTRATION, INTERMITTENT, LESS THAN 6 HOURS PER DAY: ICD-10-PCS | Performed by: INTERNAL MEDICINE

## 2025-03-26 PROCEDURE — 2060000000 HC ICU INTERMEDIATE R&B

## 2025-03-26 PROCEDURE — 87340 HEPATITIS B SURFACE AG IA: CPT

## 2025-03-26 PROCEDURE — 6370000000 HC RX 637 (ALT 250 FOR IP): Performed by: STUDENT IN AN ORGANIZED HEALTH CARE EDUCATION/TRAINING PROGRAM

## 2025-03-26 PROCEDURE — 97166 OT EVAL MOD COMPLEX 45 MIN: CPT

## 2025-03-26 PROCEDURE — 97530 THERAPEUTIC ACTIVITIES: CPT

## 2025-03-26 PROCEDURE — 6370000000 HC RX 637 (ALT 250 FOR IP): Performed by: NURSE PRACTITIONER

## 2025-03-26 PROCEDURE — 80053 COMPREHEN METABOLIC PANEL: CPT

## 2025-03-26 PROCEDURE — 6370000000 HC RX 637 (ALT 250 FOR IP): Performed by: INTERNAL MEDICINE

## 2025-03-26 PROCEDURE — 6360000002 HC RX W HCPCS: Performed by: INTERNAL MEDICINE

## 2025-03-26 PROCEDURE — 86706 HEP B SURFACE ANTIBODY: CPT

## 2025-03-26 PROCEDURE — 97110 THERAPEUTIC EXERCISES: CPT

## 2025-03-26 PROCEDURE — 2500000003 HC RX 250 WO HCPCS: Performed by: INTERNAL MEDICINE

## 2025-03-26 PROCEDURE — 1200000000 HC SEMI PRIVATE

## 2025-03-26 PROCEDURE — 36556 INSERT NON-TUNNEL CV CATH: CPT

## 2025-03-26 PROCEDURE — 90935 HEMODIALYSIS ONE EVALUATION: CPT

## 2025-03-26 PROCEDURE — 36415 COLL VENOUS BLD VENIPUNCTURE: CPT

## 2025-03-26 PROCEDURE — 97161 PT EVAL LOW COMPLEX 20 MIN: CPT

## 2025-03-26 RX ORDER — GUAIFENESIN/DEXTROMETHORPHAN 100-10MG/5
5 SYRUP ORAL EVERY 4 HOURS PRN
Status: DISCONTINUED | OUTPATIENT
Start: 2025-03-26 | End: 2025-04-01 | Stop reason: HOSPADM

## 2025-03-26 RX ORDER — HEPARIN SODIUM 1000 [USP'U]/ML
3600 INJECTION, SOLUTION INTRAVENOUS; SUBCUTANEOUS PRN
Status: DISCONTINUED | OUTPATIENT
Start: 2025-03-26 | End: 2025-04-01 | Stop reason: HOSPADM

## 2025-03-26 RX ADMIN — LEVOTHYROXINE SODIUM 50 MCG: 0.03 TABLET ORAL at 06:40

## 2025-03-26 RX ADMIN — CALCIUM ACETATE 1334 MG: 667 CAPSULE ORAL at 17:33

## 2025-03-26 RX ADMIN — CALCIUM ACETATE 1334 MG: 667 CAPSULE ORAL at 14:13

## 2025-03-26 RX ADMIN — MIDODRINE HYDROCHLORIDE 5 MG: 5 TABLET ORAL at 08:21

## 2025-03-26 RX ADMIN — CLOPIDOGREL BISULFATE 75 MG: 75 TABLET, FILM COATED ORAL at 08:21

## 2025-03-26 RX ADMIN — SODIUM CHLORIDE, PRESERVATIVE FREE 10 ML: 5 INJECTION INTRAVENOUS at 21:53

## 2025-03-26 RX ADMIN — BENZOCAINE AND MENTHOL 1 LOZENGE: 15; 3.6 LOZENGE ORAL at 00:40

## 2025-03-26 RX ADMIN — GUAIFENESIN AND DEXTROMETHORPHAN 5 ML: 100; 10 SYRUP ORAL at 00:40

## 2025-03-26 RX ADMIN — Medication 1 MG: at 14:13

## 2025-03-26 RX ADMIN — MIDODRINE HYDROCHLORIDE 10 MG: 5 TABLET ORAL at 06:41

## 2025-03-26 RX ADMIN — SODIUM CHLORIDE, PRESERVATIVE FREE 10 ML: 5 INJECTION INTRAVENOUS at 08:22

## 2025-03-26 RX ADMIN — ROSUVASTATIN 20 MG: 20 TABLET, FILM COATED ORAL at 21:50

## 2025-03-26 RX ADMIN — CALCIUM ACETATE 1334 MG: 667 CAPSULE ORAL at 08:21

## 2025-03-26 RX ADMIN — ALLOPURINOL 100 MG: 100 TABLET ORAL at 08:21

## 2025-03-26 NOTE — DIALYSIS
Treatment time: 2.5 hours  Net UF: 2100 ml     Pre weight: 85.7 kg  Post weight:83.5 kg  EDW: TBD kg  Crit Line Used: Yes Ending Profile: A  Refill Present: No    Access used: R TDC    Access function: Well with  ml/min     Medications or blood products given: Heparin     Regular outpatient schedule: IVAN Ugalde     Summary of response to treatment: Patient tolerated treatment well and without any complications. Patient remained stable throughout entire treatment and upon the exiting the dialysis suite via transport.     Report given to Kimberlyn Enrique RN and copy of dialysis treatment record placed in chart, to be scanned into EMR.

## 2025-03-26 NOTE — PLAN OF CARE
Problem: Safety - Adult  Goal: Free from fall injury  Outcome: Progressing     Problem: Chronic Conditions and Co-morbidities  Goal: Patient's chronic conditions and co-morbidity symptoms are monitored and maintained or improved  Outcome: Progressing     Problem: Discharge Planning  Goal: Discharge to home or other facility with appropriate resources  Outcome: Progressing     Problem: Pain  Goal: Verbalizes/displays adequate comfort level or baseline comfort level  Outcome: Progressing     Problem: Skin/Tissue Integrity  Goal: Skin integrity remains intact  Description: 1.  Monitor for areas of redness and/or skin breakdown  2.  Assess vascular access sites hourly  3.  Every 4-6 hours minimum:  Change oxygen saturation probe site  4.  Every 4-6 hours:  If on nasal continuous positive airway pressure, respiratory therapy assess nares and determine need for appliance change or resting period  Outcome: Progressing  Flowsheets (Taken 3/26/2025 9907)  Skin Integrity Remains Intact: Monitor for areas of redness and/or skin breakdown     Problem: ABCDS Injury Assessment  Goal: Absence of physical injury  Outcome: Progressing

## 2025-03-26 NOTE — CARE COORDINATION
Wound care consulted for wounds to lower extremities.  Patient did not want legs unwrapped.  Patient said the nurse wrapped his legs on admission last night.  He does not have open wounds, just abrasions.  Spoke to nurse Kimberlyn, will see if patient will let her remove the bandages  as part of the skin assessment.  Patient denies any open wounds.  Patient is from home and is alert and oriented.  Will continue to follow this admission. ANGELA PÉREZN, RN, CWOCN  Inpatient  Wound/Ostomy Care  [phone]

## 2025-03-27 ENCOUNTER — APPOINTMENT (OUTPATIENT)
Dept: GENERAL RADIOLOGY | Age: 85
DRG: 312 | End: 2025-03-27
Payer: MEDICARE

## 2025-03-27 ENCOUNTER — APPOINTMENT (OUTPATIENT)
Age: 85
DRG: 312 | End: 2025-03-27
Attending: INTERNAL MEDICINE
Payer: MEDICARE

## 2025-03-27 LAB
ANION GAP SERPL CALCULATED.3IONS-SCNC: 21 MMOL/L (ref 3–16)
BASOPHILS # BLD: 0.1 K/UL (ref 0–0.2)
BASOPHILS NFR BLD: 1 %
BUN SERPL-MCNC: 72 MG/DL (ref 7–20)
CALCIUM SERPL-MCNC: 10.8 MG/DL (ref 8.3–10.6)
CHLORIDE SERPL-SCNC: 90 MMOL/L (ref 99–110)
CO2 SERPL-SCNC: 19 MMOL/L (ref 21–32)
CREAT SERPL-MCNC: 8.4 MG/DL (ref 0.8–1.3)
DEPRECATED RDW RBC AUTO: 15.3 % (ref 12.4–15.4)
ECHO AO ASC DIAM: 4.2 CM
ECHO AO ASCENDING AORTA INDEX: 2.09 CM/M2
ECHO AO ROOT DIAM: 3.7 CM
ECHO AO ROOT INDEX: 1.84 CM/M2
ECHO AV AREA PEAK VELOCITY: 1.1 CM2
ECHO AV AREA PLAN: 1.3 CM2
ECHO AV AREA PLAN: 1.3 CM2
ECHO AV AREA VTI: 1.2 CM2
ECHO AV AREA/BSA PEAK VELOCITY: 0.5 CM2/M2
ECHO AV AREA/BSA VTI: 0.6 CM2/M2
ECHO AV CUSP MM: 1 CM
ECHO AV MEAN GRADIENT: 14 MMHG
ECHO AV MEAN VELOCITY: 1.8 M/S
ECHO AV PEAK GRADIENT: 23 MMHG
ECHO AV PEAK VELOCITY: 2.4 M/S
ECHO AV VELOCITY RATIO: 0.29
ECHO AV VTI: 42.1 CM
ECHO BSA: 2.03 M2
ECHO LA AREA 2C: 35.3 CM2
ECHO LA AREA 4C: 28.2 CM2
ECHO LA DIAMETER INDEX: 1.84 CM/M2
ECHO LA DIAMETER: 3.7 CM
ECHO LA MAJOR AXIS: 7.2 CM
ECHO LA MINOR AXIS: 8 CM
ECHO LA TO AORTIC ROOT RATIO: 1
ECHO LA VOL BP: 112 ML (ref 18–58)
ECHO LA VOL MOD A2C: 129 ML (ref 18–58)
ECHO LA VOL MOD A4C: 88 ML (ref 18–58)
ECHO LA VOL/BSA BIPLANE: 56 ML/M2 (ref 16–34)
ECHO LA VOLUME INDEX MOD A2C: 64 ML/M2 (ref 16–34)
ECHO LA VOLUME INDEX MOD A4C: 44 ML/M2 (ref 16–34)
ECHO LV E' LATERAL VELOCITY: 6.09 CM/S
ECHO LV E' SEPTAL VELOCITY: 4.68 CM/S
ECHO LV EDV A2C: 72 ML
ECHO LV EDV A4C: 55 ML
ECHO LV EDV INDEX A4C: 27 ML/M2
ECHO LV EDV NDEX A2C: 36 ML/M2
ECHO LV EF PHYSICIAN: 50 %
ECHO LV EJECTION FRACTION A2C: 34 %
ECHO LV EJECTION FRACTION A4C: 31 %
ECHO LV EJECTION FRACTION BIPLANE: 34 % (ref 55–100)
ECHO LV ESV A2C: 48 ML
ECHO LV ESV A4C: 38 ML
ECHO LV ESV INDEX A2C: 24 ML/M2
ECHO LV ESV INDEX A4C: 19 ML/M2
ECHO LV FRACTIONAL SHORTENING: 25 % (ref 28–44)
ECHO LV INTERNAL DIMENSION DIASTOLE INDEX: 1.39 CM/M2
ECHO LV INTERNAL DIMENSION DIASTOLIC: 2.8 CM (ref 4.2–5.9)
ECHO LV INTERNAL DIMENSION SYSTOLIC INDEX: 1.04 CM/M2
ECHO LV INTERNAL DIMENSION SYSTOLIC: 2.1 CM
ECHO LV IVSD: 1.3 CM (ref 0.6–1)
ECHO LV MASS 2D: 113.3 G (ref 88–224)
ECHO LV MASS INDEX 2D: 56.4 G/M2 (ref 49–115)
ECHO LV POSTERIOR WALL DIASTOLIC: 1.3 CM (ref 0.6–1)
ECHO LV RELATIVE WALL THICKNESS RATIO: 0.93
ECHO LVOT AREA: 3.5 CM2
ECHO LVOT AV VTI INDEX: 0.34
ECHO LVOT DIAM: 2.1 CM
ECHO LVOT MEAN GRADIENT: 1 MMHG
ECHO LVOT MEAN GRADIENT: 1 MMHG
ECHO LVOT PEAK GRADIENT: 2 MMHG
ECHO LVOT PEAK VELOCITY: 0.7 M/S
ECHO LVOT STROKE VOLUME INDEX: 24.8 ML/M2
ECHO LVOT SV: 49.9 ML
ECHO LVOT VTI: 14.4 CM
ECHO MV AREA VTI: 1.6 CM2
ECHO MV LVOT VTI INDEX: 2.12
ECHO MV MAX VELOCITY: 0.9 M/S
ECHO MV MEAN GRADIENT: 1 MMHG
ECHO MV MEAN VELOCITY: 0.5 M/S
ECHO MV PEAK GRADIENT: 4 MMHG
ECHO MV VTI: 30.5 CM
ECHO RA AREA 4C: 27.7 CM2
ECHO RA END SYSTOLIC VOLUME APICAL 4 CHAMBER INDEX BSA: 47 ML/M2
ECHO RA VOLUME: 94 ML
ECHO RV BASAL DIMENSION: 4.8 CM
ECHO RV FREE WALL PEAK S': 11.1 CM/S
ECHO RV LONGITUDINAL DIMENSION: 7.2 CM
ECHO RV MID DIMENSION: 2.9 CM
ECHO RV TAPSE: 1.6 CM (ref 1.7–?)
ECHO TV REGURGITANT MAX VELOCITY: 2.64 M/S
ECHO TV REGURGITANT PEAK GRADIENT: 28 MMHG
ECHO TV REGURGITANT VTI: 73.5 CM
EOSINOPHIL # BLD: 0 K/UL (ref 0–0.6)
EOSINOPHIL NFR BLD: 0 %
GFR SERPLBLD CREATININE-BSD FMLA CKD-EPI: 6 ML/MIN/{1.73_M2}
GLUCOSE SERPL-MCNC: 80 MG/DL (ref 70–99)
HCT VFR BLD AUTO: 48.1 % (ref 40.5–52.5)
HGB BLD-MCNC: 15.3 G/DL (ref 13.5–17.5)
LYMPHOCYTES # BLD: 2.8 K/UL (ref 1–5.1)
LYMPHOCYTES NFR BLD: 29 %
MCH RBC QN AUTO: 33.1 PG (ref 26–34)
MCHC RBC AUTO-ENTMCNC: 31.8 G/DL (ref 31–36)
MCV RBC AUTO: 104.3 FL (ref 80–100)
MONOCYTES # BLD: 1.3 K/UL (ref 0–1.3)
MONOCYTES NFR BLD: 15 %
NEUTROPHILS # BLD: 4.3 K/UL (ref 1.7–7.7)
NEUTROPHILS NFR BLD: 51 %
OVALOCYTES BLD QL SMEAR: ABNORMAL
PLATELET # BLD AUTO: 141 K/UL (ref 135–450)
PLATELET BLD QL SMEAR: ADEQUATE
PMV BLD AUTO: 7.8 FL (ref 5–10.5)
POTASSIUM SERPL-SCNC: 5.9 MMOL/L (ref 3.5–5.1)
RBC # BLD AUTO: 4.61 M/UL (ref 4.2–5.9)
SLIDE REVIEW: ABNORMAL
SODIUM SERPL-SCNC: 130 MMOL/L (ref 136–145)
VARIANT LYMPHS NFR BLD MANUAL: 4 % (ref 0–6)
WBC # BLD AUTO: 8.4 K/UL (ref 4–11)

## 2025-03-27 PROCEDURE — 93306 TTE W/DOPPLER COMPLETE: CPT | Performed by: INTERNAL MEDICINE

## 2025-03-27 PROCEDURE — 6360000002 HC RX W HCPCS: Performed by: STUDENT IN AN ORGANIZED HEALTH CARE EDUCATION/TRAINING PROGRAM

## 2025-03-27 PROCEDURE — 2060000000 HC ICU INTERMEDIATE R&B

## 2025-03-27 PROCEDURE — 93306 TTE W/DOPPLER COMPLETE: CPT

## 2025-03-27 PROCEDURE — P9047 ALBUMIN (HUMAN), 25%, 50ML: HCPCS | Performed by: STUDENT IN AN ORGANIZED HEALTH CARE EDUCATION/TRAINING PROGRAM

## 2025-03-27 PROCEDURE — 90935 HEMODIALYSIS ONE EVALUATION: CPT

## 2025-03-27 PROCEDURE — 1200000000 HC SEMI PRIVATE

## 2025-03-27 PROCEDURE — 6370000000 HC RX 637 (ALT 250 FOR IP): Performed by: INTERNAL MEDICINE

## 2025-03-27 PROCEDURE — 80048 BASIC METABOLIC PNL TOTAL CA: CPT

## 2025-03-27 PROCEDURE — 36415 COLL VENOUS BLD VENIPUNCTURE: CPT

## 2025-03-27 PROCEDURE — 6360000002 HC RX W HCPCS: Performed by: INTERNAL MEDICINE

## 2025-03-27 PROCEDURE — 85025 COMPLETE CBC W/AUTO DIFF WBC: CPT

## 2025-03-27 PROCEDURE — 2500000003 HC RX 250 WO HCPCS: Performed by: INTERNAL MEDICINE

## 2025-03-27 PROCEDURE — 71045 X-RAY EXAM CHEST 1 VIEW: CPT

## 2025-03-27 PROCEDURE — 6370000000 HC RX 637 (ALT 250 FOR IP): Performed by: NURSE PRACTITIONER

## 2025-03-27 RX ORDER — ALBUMIN (HUMAN) 12.5 G/50ML
25 SOLUTION INTRAVENOUS ONCE
Status: COMPLETED | OUTPATIENT
Start: 2025-03-27 | End: 2025-03-27

## 2025-03-27 RX ORDER — MIDODRINE HYDROCHLORIDE 5 MG/1
10 TABLET ORAL ONCE
Status: DISCONTINUED | OUTPATIENT
Start: 2025-03-27 | End: 2025-03-28

## 2025-03-27 RX ADMIN — SODIUM CHLORIDE, PRESERVATIVE FREE 10 ML: 5 INJECTION INTRAVENOUS at 13:02

## 2025-03-27 RX ADMIN — CLOPIDOGREL BISULFATE 75 MG: 75 TABLET, FILM COATED ORAL at 13:01

## 2025-03-27 RX ADMIN — SODIUM CHLORIDE, PRESERVATIVE FREE 10 ML: 5 INJECTION INTRAVENOUS at 21:23

## 2025-03-27 RX ADMIN — ALBUMIN (HUMAN) 25 G: 0.25 INJECTION, SOLUTION INTRAVENOUS at 09:30

## 2025-03-27 RX ADMIN — ISOSORBIDE MONONITRATE 30 MG: 30 TABLET, EXTENDED RELEASE ORAL at 13:01

## 2025-03-27 RX ADMIN — ALLOPURINOL 100 MG: 100 TABLET ORAL at 13:00

## 2025-03-27 RX ADMIN — GUAIFENESIN AND DEXTROMETHORPHAN 5 ML: 100; 10 SYRUP ORAL at 18:42

## 2025-03-27 RX ADMIN — ROSUVASTATIN 20 MG: 20 TABLET, FILM COATED ORAL at 21:23

## 2025-03-27 RX ADMIN — MIDODRINE HYDROCHLORIDE 10 MG: 5 TABLET ORAL at 08:43

## 2025-03-27 ASSESSMENT — PAIN SCALES - GENERAL
PAINLEVEL_OUTOF10: 0
PAINLEVEL_OUTOF10: 0

## 2025-03-27 NOTE — DIALYSIS
Treatment time: 3.5 hours  Net UF: 500 ml     Pre weight: 83.5 kg  Post weight:83 kg  EDW: tbd      Access used: R TDC    Access function: good with  ml/min     Medications or blood products given: Midodrine 10mg   Albumin 25 grams     Regular outpatient schedule: TTS     Summary of response to treatment: Patient tolerated treatment well and without any complications. Bp soft during tx, midodrine and albumin given.   Patient remained stable throughout entire treatment and upon the exiting the dialysis suite via transport.     Report given to Nesha Rae RN and copy of dialysis treatment record placed in chart, to be scanned into EMR.

## 2025-03-27 NOTE — CARE COORDINATION
Case Management Assessment  Initial Evaluation    Date/Time of Evaluation: 3/27/2025 2:13 PM  Assessment Completed by: PRUDENCIO MARIA RN    If patient is discharged prior to next notation, then this note serves as note for discharge by case management.    Patient Name: Donato Obrien                   YOB: 1940  Diagnosis: Syncope and collapse [R55]  Acute pulmonary edema (HCC) [J81.0]  ESRD needing dialysis (HCC) [N18.6, Z99.2]  Heat syncope, initial encounter [T67.1XXA]  Cough, unspecified type [R05.9]                   Date / Time: 3/25/2025 10:10 AM    Patient Admission Status: Inpatient   Readmission Risk (Low < 19, Mod (19-27), High > 27): Readmission Risk Score: 17.6    Current PCP: Rohit Pathak III, MD  PCP verified by ? Yes    Chart Reviewed: Yes      History Provided by: Patient, Medical Record  Patient Orientation: Alert and Oriented    Patient Cognition: Alert    Hospitalization in the last 30 days (Readmission):  No    If yes, Readmission Assessment in  Navigator will be completed.    Advance Directives:      Code Status: Full Code   Patient's Primary Decision Maker is: Legal Next of Kin    Primary Decision Maker: Connie Obrien - Spouse - [phone]    Discharge Planning:    Patient lives with: Spouse/Significant Other Type of Home: House, Other (Comment) (stays on main level)  Primary Care Giver: Family  Patient Support Systems include: Spouse/Significant Other   Current Financial resources: Medicare  Current community resources: Other (Comment) (HD outpt clinic)  Current services prior to admission: Durable Medical Equipment            Current DME: Cane, Walker, Wheelchair, Hospital Bed            Type of Home Care services:  None    ADLS  Prior functional level: Assistance with the following:, Bathing, Dressing, Cooking, Housework, Shopping, Mobility  Current functional level: Mobility, Assistance with the following:, Bathing, Dressing, Cooking, Housework, Shopping, Other (see

## 2025-03-27 NOTE — CARE COORDINATION
CM called: [facility]  [address] [place] [address]  Phone: [phone]  Fax [phone]   Schedule;  T,TH,S  time 7.00am       To verify this is pt's correct schedule and time for HD.    CM had to leave  for call back.    Salena Connors RN, BSN  [phone]

## 2025-03-28 ENCOUNTER — APPOINTMENT (OUTPATIENT)
Dept: CT IMAGING | Age: 85
DRG: 312 | End: 2025-03-28
Payer: MEDICARE

## 2025-03-28 LAB
ANION GAP SERPL CALCULATED.3IONS-SCNC: 21 MMOL/L (ref 3–16)
BASOPHILS # BLD: 0.1 K/UL (ref 0–0.2)
BASOPHILS NFR BLD: 1 %
BUN SERPL-MCNC: 45 MG/DL (ref 7–20)
CALCIUM SERPL-MCNC: 10 MG/DL (ref 8.3–10.6)
CHLORIDE SERPL-SCNC: 97 MMOL/L (ref 99–110)
CO2 SERPL-SCNC: 17 MMOL/L (ref 21–32)
CREAT SERPL-MCNC: 6.7 MG/DL (ref 0.8–1.3)
DEPRECATED RDW RBC AUTO: 15.6 % (ref 12.4–15.4)
EOSINOPHIL # BLD: 0.1 K/UL (ref 0–0.6)
EOSINOPHIL NFR BLD: 1.3 %
GFR SERPLBLD CREATININE-BSD FMLA CKD-EPI: 7 ML/MIN/{1.73_M2}
GLUCOSE SERPL-MCNC: 86 MG/DL (ref 70–99)
HCT VFR BLD AUTO: 45.2 % (ref 40.5–52.5)
HGB BLD-MCNC: 14.5 G/DL (ref 13.5–17.5)
LYMPHOCYTES # BLD: 1.2 K/UL (ref 1–5.1)
LYMPHOCYTES NFR BLD: 16.3 %
MCH RBC QN AUTO: 33.5 PG (ref 26–34)
MCHC RBC AUTO-ENTMCNC: 32 G/DL (ref 31–36)
MCV RBC AUTO: 104.8 FL (ref 80–100)
MONOCYTES # BLD: 1.4 K/UL (ref 0–1.3)
MONOCYTES NFR BLD: 19.6 %
NEUTROPHILS # BLD: 4.6 K/UL (ref 1.7–7.7)
NEUTROPHILS NFR BLD: 61.8 %
ORGANISM: ABNORMAL
PLATELET # BLD AUTO: 116 K/UL (ref 135–450)
PMV BLD AUTO: 7.5 FL (ref 5–10.5)
POTASSIUM SERPL-SCNC: 5 MMOL/L (ref 3.5–5.1)
RBC # BLD AUTO: 4.31 M/UL (ref 4.2–5.9)
REPORT: NORMAL
RESP PATH DNA+RNA PNL NPH NAA+NON-PROBE: ABNORMAL
SODIUM SERPL-SCNC: 135 MMOL/L (ref 136–145)
WBC # BLD AUTO: 7.4 K/UL (ref 4–11)

## 2025-03-28 PROCEDURE — 2500000003 HC RX 250 WO HCPCS: Performed by: INTERNAL MEDICINE

## 2025-03-28 PROCEDURE — 90935 HEMODIALYSIS ONE EVALUATION: CPT

## 2025-03-28 PROCEDURE — 1200000000 HC SEMI PRIVATE

## 2025-03-28 PROCEDURE — 97542 WHEELCHAIR MNGMENT TRAINING: CPT

## 2025-03-28 PROCEDURE — P9047 ALBUMIN (HUMAN), 25%, 50ML: HCPCS | Performed by: STUDENT IN AN ORGANIZED HEALTH CARE EDUCATION/TRAINING PROGRAM

## 2025-03-28 PROCEDURE — 6370000000 HC RX 637 (ALT 250 FOR IP): Performed by: INTERNAL MEDICINE

## 2025-03-28 PROCEDURE — 6370000000 HC RX 637 (ALT 250 FOR IP): Performed by: NURSE PRACTITIONER

## 2025-03-28 PROCEDURE — 36415 COLL VENOUS BLD VENIPUNCTURE: CPT

## 2025-03-28 PROCEDURE — 85025 COMPLETE CBC W/AUTO DIFF WBC: CPT

## 2025-03-28 PROCEDURE — 71250 CT THORAX DX C-: CPT

## 2025-03-28 PROCEDURE — 6360000002 HC RX W HCPCS: Performed by: STUDENT IN AN ORGANIZED HEALTH CARE EDUCATION/TRAINING PROGRAM

## 2025-03-28 PROCEDURE — 97530 THERAPEUTIC ACTIVITIES: CPT

## 2025-03-28 PROCEDURE — 0202U NFCT DS 22 TRGT SARS-COV-2: CPT

## 2025-03-28 PROCEDURE — 99222 1ST HOSP IP/OBS MODERATE 55: CPT | Performed by: STUDENT IN AN ORGANIZED HEALTH CARE EDUCATION/TRAINING PROGRAM

## 2025-03-28 PROCEDURE — 80048 BASIC METABOLIC PNL TOTAL CA: CPT

## 2025-03-28 RX ORDER — ALBUMIN (HUMAN) 12.5 G/50ML
25 SOLUTION INTRAVENOUS ONCE
Status: COMPLETED | OUTPATIENT
Start: 2025-03-28 | End: 2025-03-28

## 2025-03-28 RX ADMIN — SODIUM CHLORIDE, PRESERVATIVE FREE 10 ML: 5 INJECTION INTRAVENOUS at 09:39

## 2025-03-28 RX ADMIN — ALBUMIN (HUMAN) 25 G: 0.25 INJECTION, SOLUTION INTRAVENOUS at 17:10

## 2025-03-28 RX ADMIN — AMLODIPINE BESYLATE 5 MG: 5 TABLET ORAL at 09:41

## 2025-03-28 RX ADMIN — Medication 1 MG: at 11:58

## 2025-03-28 RX ADMIN — LEVOTHYROXINE SODIUM 50 MCG: 0.03 TABLET ORAL at 06:28

## 2025-03-28 RX ADMIN — CLOPIDOGREL BISULFATE 75 MG: 75 TABLET, FILM COATED ORAL at 09:37

## 2025-03-28 RX ADMIN — ALLOPURINOL 100 MG: 100 TABLET ORAL at 09:38

## 2025-03-28 RX ADMIN — MIDODRINE HYDROCHLORIDE 5 MG: 5 TABLET ORAL at 09:37

## 2025-03-28 RX ADMIN — SODIUM CHLORIDE, PRESERVATIVE FREE 10 ML: 5 INJECTION INTRAVENOUS at 20:59

## 2025-03-28 RX ADMIN — ROSUVASTATIN 20 MG: 20 TABLET, FILM COATED ORAL at 20:58

## 2025-03-28 RX ADMIN — GUAIFENESIN AND DEXTROMETHORPHAN 5 ML: 100; 10 SYRUP ORAL at 09:39

## 2025-03-28 RX ADMIN — ISOSORBIDE MONONITRATE 30 MG: 30 TABLET, EXTENDED RELEASE ORAL at 09:37

## 2025-03-28 ASSESSMENT — PAIN SCALES - GENERAL
PAINLEVEL_OUTOF10: 0
PAINLEVEL_OUTOF10: 0

## 2025-03-28 NOTE — PLAN OF CARE
Problem: Safety - Adult  Goal: Free from fall injury  Outcome: Progressing  Flowsheets (Taken 3/27/2025 2232)  Free From Fall Injury: Instruct family/caregiver on patient safety     Problem: Chronic Conditions and Co-morbidities  Goal: Patient's chronic conditions and co-morbidity symptoms are monitored and maintained or improved  Outcome: Progressing  Flowsheets (Taken 3/27/2025 2232)  Care Plan - Patient's Chronic Conditions and Co-Morbidity Symptoms are Monitored and Maintained or Improved: Monitor and assess patient's chronic conditions and comorbid symptoms for stability, deterioration, or improvement     Problem: Discharge Planning  Goal: Discharge to home or other facility with appropriate resources  Outcome: Progressing  Flowsheets (Taken 3/27/2025 2232)  Discharge to home or other facility with appropriate resources:   Identify barriers to discharge with patient and caregiver   Arrange for needed discharge resources and transportation as appropriate   Identify discharge learning needs (meds, wound care, etc)     Problem: Pain  Goal: Verbalizes/displays adequate comfort level or baseline comfort level  Outcome: Progressing  Flowsheets (Taken 3/27/2025 2232)  Verbalizes/displays adequate comfort level or baseline comfort level:   Encourage patient to monitor pain and request assistance   Assess pain using appropriate pain scale   Administer analgesics based on type and severity of pain and evaluate response     Problem: Skin/Tissue Integrity  Goal: Skin integrity remains intact  Description: 1.  Monitor for areas of redness and/or skin breakdown  2.  Assess vascular access sites hourly  3.  Every 4-6 hours minimum:  Change oxygen saturation probe site  4.  Every 4-6 hours:  If on nasal continuous positive airway pressure, respiratory therapy assess nares and determine need for appliance change or resting period  Outcome: Progressing  Flowsheets (Taken 3/26/2025 0836 by Kimberlyn Enrique, RN)  Skin

## 2025-03-28 NOTE — CONSULTS
[facility]    3/25/2025    Donato Obrien (:  1940) is a 84 y.o. male    Referring Provider: Rohit Pathak III, MD    HISTORY:  84M well known to me from prior admission who is admitted with dizziness/syncope. He was at HD and had an episode of syncope/unresponsiveness. Was confused when he awoke. BP 90/40s. He was brought to the ER. EKG unchanged. Trop 250 x2. CXR mild edema. ECHO completed with improvement in LVEF to now 50% (prev 25%). Cardiology was consulted.     He was seen at bedside while working with PT. He reports only coming in because he feels like he has a viral infection. He is immobile. He reports his legs now have chronic contractures and he cannot extend his hips or knees. Even when he sleeps, his legs are stuck contracted like this. His legs are chronically wrapped for easy skin breakdown and pain. He has no CP.     REVIEW OF SYSTEMS:  A complete review of systems was reviewed and is negative except as noted in the history of present illness.    Prior to Visit Medications    Medication Sig Taking? Authorizing Provider   isosorbide mononitrate (IMDUR) 30 MG extended release tablet Take 1 tablet by mouth daily Yes Filipe Hebert MD   rosuvastatin (CRESTOR) 20 MG tablet Take 1 tablet by mouth nightly Yes Filipe Hebert MD   clopidogrel (PLAVIX) 75 MG tablet Take 1 tablet by mouth daily Yes Filipe Hebert MD   levothyroxine (SYNTHROID) 50 MCG tablet Take 1 tablet by mouth Daily Yes Evon Galvan MD   calcium acetate (PHOSLO) 667 MG CAPS capsule Take 1 capsule by mouth See Admin Instructions Take 2 tablets three times daily with meals and one tablet with a snack. Yes Evon Galvan MD   aspirin-caffeine (ANACIN) 400-32 MG TABS Take 1 tablet by mouth every 6 hours as needed for Headaches or Pain Yes Evon Galvan MD   allopurinol (ZYLOPRIM) 100 MG tablet Take 1 tablet by mouth daily Yes Evon Galvan MD   amLODIPine (NORVASC) 5 MG tablet Take

## 2025-03-29 PROBLEM — J21.0 RSV (ACUTE BRONCHIOLITIS DUE TO RESPIRATORY SYNCYTIAL VIRUS): Status: ACTIVE | Noted: 2025-03-29

## 2025-03-29 LAB
ANION GAP SERPL CALCULATED.3IONS-SCNC: 27 MMOL/L (ref 3–16)
BASOPHILS # BLD: 0.1 K/UL (ref 0–0.2)
BASOPHILS NFR BLD: 0.7 %
BUN SERPL-MCNC: 62 MG/DL (ref 7–20)
CALCIUM SERPL-MCNC: 10.1 MG/DL (ref 8.3–10.6)
CHLORIDE SERPL-SCNC: 95 MMOL/L (ref 99–110)
CO2 SERPL-SCNC: 12 MMOL/L (ref 21–32)
CREAT SERPL-MCNC: 8.9 MG/DL (ref 0.8–1.3)
DEPRECATED RDW RBC AUTO: 15.5 % (ref 12.4–15.4)
EOSINOPHIL # BLD: 0.1 K/UL (ref 0–0.6)
EOSINOPHIL NFR BLD: 1.3 %
GFR SERPLBLD CREATININE-BSD FMLA CKD-EPI: 5 ML/MIN/{1.73_M2}
GLUCOSE SERPL-MCNC: 100 MG/DL (ref 70–99)
HBV SURFACE AB SERPL IA-ACNC: 67.9 MIU/ML
HBV SURFACE AG SERPL QL IA: NORMAL
HCT VFR BLD AUTO: 45.2 % (ref 40.5–52.5)
HGB BLD-MCNC: 14.3 G/DL (ref 13.5–17.5)
LYMPHOCYTES # BLD: 1.4 K/UL (ref 1–5.1)
LYMPHOCYTES NFR BLD: 18.1 %
MAGNESIUM SERPL-MCNC: 2.8 MG/DL (ref 1.8–2.4)
MCH RBC QN AUTO: 33.5 PG (ref 26–34)
MCHC RBC AUTO-ENTMCNC: 31.7 G/DL (ref 31–36)
MCV RBC AUTO: 105.7 FL (ref 80–100)
MONOCYTES # BLD: 1.5 K/UL (ref 0–1.3)
MONOCYTES NFR BLD: 18.7 %
NEUTROPHILS # BLD: 4.8 K/UL (ref 1.7–7.7)
NEUTROPHILS NFR BLD: 61.2 %
PLATELET # BLD AUTO: 106 K/UL (ref 135–450)
PMV BLD AUTO: 7.7 FL (ref 5–10.5)
POTASSIUM SERPL-SCNC: 5.3 MMOL/L (ref 3.5–5.1)
RBC # BLD AUTO: 4.28 M/UL (ref 4.2–5.9)
SODIUM SERPL-SCNC: 134 MMOL/L (ref 136–145)
WBC # BLD AUTO: 7.8 K/UL (ref 4–11)

## 2025-03-29 PROCEDURE — 94760 N-INVAS EAR/PLS OXIMETRY 1: CPT

## 2025-03-29 PROCEDURE — 85025 COMPLETE CBC W/AUTO DIFF WBC: CPT

## 2025-03-29 PROCEDURE — 36415 COLL VENOUS BLD VENIPUNCTURE: CPT

## 2025-03-29 PROCEDURE — 83735 ASSAY OF MAGNESIUM: CPT

## 2025-03-29 PROCEDURE — 6370000000 HC RX 637 (ALT 250 FOR IP): Performed by: NURSE PRACTITIONER

## 2025-03-29 PROCEDURE — 90935 HEMODIALYSIS ONE EVALUATION: CPT

## 2025-03-29 PROCEDURE — 99223 1ST HOSP IP/OBS HIGH 75: CPT | Performed by: STUDENT IN AN ORGANIZED HEALTH CARE EDUCATION/TRAINING PROGRAM

## 2025-03-29 PROCEDURE — 6370000000 HC RX 637 (ALT 250 FOR IP): Performed by: STUDENT IN AN ORGANIZED HEALTH CARE EDUCATION/TRAINING PROGRAM

## 2025-03-29 PROCEDURE — 80048 BASIC METABOLIC PNL TOTAL CA: CPT

## 2025-03-29 PROCEDURE — 6360000002 HC RX W HCPCS: Performed by: INTERNAL MEDICINE

## 2025-03-29 PROCEDURE — 1200000000 HC SEMI PRIVATE

## 2025-03-29 PROCEDURE — 94640 AIRWAY INHALATION TREATMENT: CPT

## 2025-03-29 PROCEDURE — 99232 SBSQ HOSP IP/OBS MODERATE 35: CPT | Performed by: INTERNAL MEDICINE

## 2025-03-29 PROCEDURE — 2500000003 HC RX 250 WO HCPCS: Performed by: INTERNAL MEDICINE

## 2025-03-29 PROCEDURE — 6370000000 HC RX 637 (ALT 250 FOR IP): Performed by: INTERNAL MEDICINE

## 2025-03-29 RX ORDER — ALBUTEROL SULFATE 0.83 MG/ML
2.5 SOLUTION RESPIRATORY (INHALATION)
Status: DISCONTINUED | OUTPATIENT
Start: 2025-03-29 | End: 2025-03-30

## 2025-03-29 RX ORDER — PREDNISONE 20 MG/1
40 TABLET ORAL DAILY
Status: COMPLETED | OUTPATIENT
Start: 2025-03-29 | End: 2025-03-30

## 2025-03-29 RX ORDER — ALBUTEROL SULFATE 0.83 MG/ML
2.5 SOLUTION RESPIRATORY (INHALATION) 4 TIMES DAILY
Status: DISCONTINUED | OUTPATIENT
Start: 2025-03-29 | End: 2025-03-29

## 2025-03-29 RX ORDER — BUDESONIDE AND FORMOTEROL FUMARATE DIHYDRATE 160; 4.5 UG/1; UG/1
2 AEROSOL RESPIRATORY (INHALATION)
Status: DISCONTINUED | OUTPATIENT
Start: 2025-03-29 | End: 2025-04-01 | Stop reason: HOSPADM

## 2025-03-29 RX ADMIN — PREDNISONE 40 MG: 20 TABLET ORAL at 17:22

## 2025-03-29 RX ADMIN — ROSUVASTATIN 20 MG: 20 TABLET, FILM COATED ORAL at 20:02

## 2025-03-29 RX ADMIN — SODIUM CHLORIDE, PRESERVATIVE FREE 10 ML: 5 INJECTION INTRAVENOUS at 20:02

## 2025-03-29 RX ADMIN — GUAIFENESIN AND DEXTROMETHORPHAN 5 ML: 100; 10 SYRUP ORAL at 09:48

## 2025-03-29 RX ADMIN — ALTEPLASE 2 MG: 2.2 INJECTION, POWDER, LYOPHILIZED, FOR SOLUTION INTRAVENOUS at 17:07

## 2025-03-29 RX ADMIN — ALBUTEROL SULFATE 2.5 MG: 2.5 SOLUTION RESPIRATORY (INHALATION) at 20:55

## 2025-03-29 RX ADMIN — CLOPIDOGREL BISULFATE 75 MG: 75 TABLET, FILM COATED ORAL at 09:49

## 2025-03-29 RX ADMIN — GUAIFENESIN AND DEXTROMETHORPHAN 5 ML: 100; 10 SYRUP ORAL at 17:22

## 2025-03-29 RX ADMIN — SODIUM CHLORIDE, PRESERVATIVE FREE 10 ML: 5 INJECTION INTRAVENOUS at 09:50

## 2025-03-29 RX ADMIN — Medication 1 MG: at 12:14

## 2025-03-29 RX ADMIN — HEPARIN SODIUM 5000 UNITS: 5000 INJECTION INTRAVENOUS; SUBCUTANEOUS at 23:59

## 2025-03-29 RX ADMIN — AMLODIPINE BESYLATE 5 MG: 5 TABLET ORAL at 09:49

## 2025-03-29 RX ADMIN — ISOSORBIDE MONONITRATE 30 MG: 30 TABLET, EXTENDED RELEASE ORAL at 09:49

## 2025-03-29 RX ADMIN — ALLOPURINOL 100 MG: 100 TABLET ORAL at 09:49

## 2025-03-29 RX ADMIN — ALTEPLASE 2 MG: 2.2 INJECTION, POWDER, LYOPHILIZED, FOR SOLUTION INTRAVENOUS at 17:09

## 2025-03-29 RX ADMIN — Medication 2 PUFF: at 20:55

## 2025-03-29 RX ADMIN — LEVOTHYROXINE SODIUM 50 MCG: 0.03 TABLET ORAL at 09:48

## 2025-03-29 ASSESSMENT — PAIN SCALES - GENERAL
PAINLEVEL_OUTOF10: 0
PAINLEVEL_OUTOF10: 0

## 2025-03-29 NOTE — PLAN OF CARE
Problem: Chronic Conditions and Co-morbidities  Goal: Patient's chronic conditions and co-morbidity symptoms are monitored and maintained or improved  Outcome: Progressing  Flowsheets (Taken 3/28/2025 2109)  Care Plan - Patient's Chronic Conditions and Co-Morbidity Symptoms are Monitored and Maintained or Improved:   Monitor and assess patient's chronic conditions and comorbid symptoms for stability, deterioration, or improvement   Collaborate with multidisciplinary team to address chronic and comorbid conditions and prevent exacerbation or deterioration   Update acute care plan with appropriate goals if chronic or comorbid symptoms are exacerbated and prevent overall improvement and discharge     Problem: Discharge Planning  Goal: Discharge to home or other facility with appropriate resources  Outcome: Progressing  Flowsheets (Taken 3/28/2025 2109)  Discharge to home or other facility with appropriate resources: Identify barriers to discharge with patient and caregiver     Problem: Pain  Goal: Verbalizes/displays adequate comfort level or baseline comfort level  Outcome: Progressing  Flowsheets  Taken 3/29/2025 0108 by Alma Rosa Cantor  Verbalizes/displays adequate comfort level or baseline comfort level:   Encourage patient to monitor pain and request assistance   Assess pain using appropriate pain scale  Taken 3/28/2025 1830 by Caitlyn Robles, RN  Verbalizes/displays adequate comfort level or baseline comfort level: Encourage patient to monitor pain and request assistance     Problem: Skin/Tissue Integrity  Goal: Skin integrity remains intact  Description: 1.  Monitor for areas of redness and/or skin breakdown  2.  Assess vascular access sites hourly  3.  Every 4-6 hours minimum:  Change oxygen saturation probe site  4.  Every 4-6 hours:  If on nasal continuous positive airway pressure, respiratory therapy assess nares and determine need for appliance change or resting period  Outcome: Progressing  Flowsheets

## 2025-03-29 NOTE — CONSULTS
Pulmonary and Critical Care Medicine    CC: dizziness   Date: 3/29/2025  Admit Date:  3/25/2025  Reason for Consultation: RSV pulmonary nodule   Consult Requesting Physician: Filipe Hebert MD     HPI     This is a 83 y/o M w/ a hx of asthma, sarcoidosis, HFrEF, CAD, PVD, Aortic stenosis, ESRD on HD, Afib, HTN, HLD who presented with dizziness after HD. Patient was admitted to dizziness work up, he was positive for RSV, CT chest showed a left lower lobe nodule 9 mm thus pulmonary medicine was consulted to help with the management on my exam patient was in the dialysis unit, he was on RA, has cough.     ROS: negative except stated above    PMH:  has a past medical history of A-fib (HCC), Arthritis, Blood clot, Fracture of sternum, Fracture rib, Hemodialysis patient, Hyperlipidemia, Hypertension, Laceration of skin of lower leg, left, initial encounter, Laceration of skin of lower leg, right, initial encounter, NSTEMI (non-ST elevated myocardial infarction) (HCC), Prostate cancer (HCC), and Type II or unspecified type diabetes mellitus without mention of complication, not stated as uncontrolled.   PSH:  has a past surgical history that includes TURP () and sigmoidoscopy (N/A, 3/9/2023).    SH:  reports that he has never smoked. He has never used smokeless tobacco. He reports that he does not drink alcohol and does not use drugs.   FH: family history includes Cancer in his mother; Heart Attack in his father; High Blood Pressure in an other family member; Stroke in an other family member.    Allergies: Dilaudid [hydromorphone], Fentanyl, Morphine and codeine, Norco [hydrocodone-acetaminophen], and Oxycodone     OBJECTIVE DATA       PHYSICAL EXAM:   Temp  Av.9 °F (36.6 °C)  Min: 97.5 °F (36.4 °C)  Max: 98.4 °F (36.9 °C)  Pulse  Av.1  Min: 66  Max: 94  BP  Min: 80/57  Max: 130/71  SpO2  Av %  Min: 91 %  Max: 94 %    General: NAD  Neuro: Awake and alert  Lung: bronchial BS, equal  Heart: regular

## 2025-03-29 NOTE — PLAN OF CARE
Problem: Chronic Conditions and Co-morbidities  Goal: Patient's chronic conditions and co-morbidity symptoms are monitored and maintained or improved  3/29/2025 1033 by Pooja Perry RN  Outcome: Progressing  3/29/2025 0108 by Alma Rosa Cantor  Outcome: Progressing  Flowsheets (Taken 3/28/2025 2109)  Care Plan - Patient's Chronic Conditions and Co-Morbidity Symptoms are Monitored and Maintained or Improved:   Monitor and assess patient's chronic conditions and comorbid symptoms for stability, deterioration, or improvement   Collaborate with multidisciplinary team to address chronic and comorbid conditions and prevent exacerbation or deterioration   Update acute care plan with appropriate goals if chronic or comorbid symptoms are exacerbated and prevent overall improvement and discharge     Problem: Pain  Goal: Verbalizes/displays adequate comfort level or baseline comfort level  3/29/2025 0108 by Alma Rosa Cantor  Outcome: Progressing  Flowsheets  Taken 3/29/2025 0108 by Alma Rosa Cantor  Verbalizes/displays adequate comfort level or baseline comfort level:   Encourage patient to monitor pain and request assistance   Assess pain using appropriate pain scale  Taken 3/28/2025 1830 by Caitlyn Robles, RN  Verbalizes/displays adequate comfort level or baseline comfort level: Encourage patient to monitor pain and request assistance

## 2025-03-30 LAB
ANION GAP SERPL CALCULATED.3IONS-SCNC: 22 MMOL/L (ref 3–16)
BASOPHILS # BLD: 0 K/UL (ref 0–0.2)
BASOPHILS NFR BLD: 0.4 %
BUN SERPL-MCNC: 53 MG/DL (ref 7–20)
CALCIUM SERPL-MCNC: 9.9 MG/DL (ref 8.3–10.6)
CHLORIDE SERPL-SCNC: 97 MMOL/L (ref 99–110)
CO2 SERPL-SCNC: 17 MMOL/L (ref 21–32)
CREAT SERPL-MCNC: 8 MG/DL (ref 0.8–1.3)
DEPRECATED RDW RBC AUTO: 14.9 % (ref 12.4–15.4)
EOSINOPHIL # BLD: 0 K/UL (ref 0–0.6)
EOSINOPHIL NFR BLD: 0 %
GFR SERPLBLD CREATININE-BSD FMLA CKD-EPI: 6 ML/MIN/{1.73_M2}
GLUCOSE SERPL-MCNC: 137 MG/DL (ref 70–99)
HCT VFR BLD AUTO: 41.6 % (ref 40.5–52.5)
HGB BLD-MCNC: 13.5 G/DL (ref 13.5–17.5)
LYMPHOCYTES # BLD: 0.7 K/UL (ref 1–5.1)
LYMPHOCYTES NFR BLD: 5.3 %
MACROCYTES BLD QL SMEAR: ABNORMAL
MCH RBC QN AUTO: 33.3 PG (ref 26–34)
MCHC RBC AUTO-ENTMCNC: 32.5 G/DL (ref 31–36)
MCV RBC AUTO: 102.5 FL (ref 80–100)
MONOCYTES # BLD: 0.8 K/UL (ref 0–1.3)
MONOCYTES NFR BLD: 6.2 %
NEUTROPHILS # BLD: 11.3 K/UL (ref 1.7–7.7)
NEUTROPHILS NFR BLD: 88.1 %
PHOSPHATE SERPL-MCNC: 6 MG/DL (ref 2.5–4.9)
PLATELET # BLD AUTO: 96 K/UL (ref 135–450)
PLATELET BLD QL SMEAR: ABNORMAL
PMV BLD AUTO: 8 FL (ref 5–10.5)
POTASSIUM SERPL-SCNC: 5.3 MMOL/L (ref 3.5–5.1)
PROCALCITONIN SERPL IA-MCNC: 1.02 NG/ML (ref 0–0.15)
RBC # BLD AUTO: 4.06 M/UL (ref 4.2–5.9)
SLIDE REVIEW: ABNORMAL
SODIUM SERPL-SCNC: 136 MMOL/L (ref 136–145)
WBC # BLD AUTO: 12.8 K/UL (ref 4–11)

## 2025-03-30 PROCEDURE — 2500000003 HC RX 250 WO HCPCS: Performed by: INTERNAL MEDICINE

## 2025-03-30 PROCEDURE — 84145 PROCALCITONIN (PCT): CPT

## 2025-03-30 PROCEDURE — 80048 BASIC METABOLIC PNL TOTAL CA: CPT

## 2025-03-30 PROCEDURE — 6370000000 HC RX 637 (ALT 250 FOR IP): Performed by: INTERNAL MEDICINE

## 2025-03-30 PROCEDURE — 85025 COMPLETE CBC W/AUTO DIFF WBC: CPT

## 2025-03-30 PROCEDURE — 94640 AIRWAY INHALATION TREATMENT: CPT

## 2025-03-30 PROCEDURE — 6370000000 HC RX 637 (ALT 250 FOR IP): Performed by: NURSE PRACTITIONER

## 2025-03-30 PROCEDURE — 36415 COLL VENOUS BLD VENIPUNCTURE: CPT

## 2025-03-30 PROCEDURE — 94761 N-INVAS EAR/PLS OXIMETRY MLT: CPT

## 2025-03-30 PROCEDURE — 6370000000 HC RX 637 (ALT 250 FOR IP): Performed by: STUDENT IN AN ORGANIZED HEALTH CARE EDUCATION/TRAINING PROGRAM

## 2025-03-30 PROCEDURE — 6360000002 HC RX W HCPCS: Performed by: INTERNAL MEDICINE

## 2025-03-30 PROCEDURE — 1200000000 HC SEMI PRIVATE

## 2025-03-30 PROCEDURE — 84100 ASSAY OF PHOSPHORUS: CPT

## 2025-03-30 RX ORDER — ALBUTEROL SULFATE 0.83 MG/ML
2.5 SOLUTION RESPIRATORY (INHALATION)
Status: DISCONTINUED | OUTPATIENT
Start: 2025-03-30 | End: 2025-04-01 | Stop reason: HOSPADM

## 2025-03-30 RX ORDER — ALBUTEROL SULFATE 0.83 MG/ML
2.5 SOLUTION RESPIRATORY (INHALATION) EVERY 4 HOURS PRN
Status: DISCONTINUED | OUTPATIENT
Start: 2025-03-30 | End: 2025-04-01 | Stop reason: HOSPADM

## 2025-03-30 RX ORDER — SEVELAMER CARBONATE 800 MG/1
800 TABLET, FILM COATED ORAL
Status: DISCONTINUED | OUTPATIENT
Start: 2025-03-30 | End: 2025-04-01 | Stop reason: HOSPADM

## 2025-03-30 RX ADMIN — CLOPIDOGREL BISULFATE 75 MG: 75 TABLET, FILM COATED ORAL at 09:07

## 2025-03-30 RX ADMIN — SEVELAMER CARBONATE 800 MG: 800 TABLET, FILM COATED ORAL at 17:52

## 2025-03-30 RX ADMIN — PREDNISONE 40 MG: 20 TABLET ORAL at 09:07

## 2025-03-30 RX ADMIN — LEVOTHYROXINE SODIUM 50 MCG: 0.03 TABLET ORAL at 05:56

## 2025-03-30 RX ADMIN — SODIUM ZIRCONIUM CYCLOSILICATE 10 G: 10 POWDER, FOR SUSPENSION ORAL at 09:06

## 2025-03-30 RX ADMIN — GUAIFENESIN AND DEXTROMETHORPHAN 5 ML: 100; 10 SYRUP ORAL at 02:11

## 2025-03-30 RX ADMIN — ALLOPURINOL 100 MG: 100 TABLET ORAL at 09:07

## 2025-03-30 RX ADMIN — MIDODRINE HYDROCHLORIDE 5 MG: 5 TABLET ORAL at 16:42

## 2025-03-30 RX ADMIN — ALBUTEROL SULFATE 2.5 MG: 2.5 SOLUTION RESPIRATORY (INHALATION) at 13:04

## 2025-03-30 RX ADMIN — ISOSORBIDE MONONITRATE 30 MG: 30 TABLET, EXTENDED RELEASE ORAL at 09:07

## 2025-03-30 RX ADMIN — SODIUM CHLORIDE, PRESERVATIVE FREE 10 ML: 5 INJECTION INTRAVENOUS at 20:55

## 2025-03-30 RX ADMIN — ALBUTEROL SULFATE 2.5 MG: 2.5 SOLUTION RESPIRATORY (INHALATION) at 20:20

## 2025-03-30 RX ADMIN — ROSUVASTATIN 20 MG: 20 TABLET, FILM COATED ORAL at 20:55

## 2025-03-30 RX ADMIN — SODIUM CHLORIDE, PRESERVATIVE FREE 10 ML: 5 INJECTION INTRAVENOUS at 10:45

## 2025-03-30 RX ADMIN — Medication 1 MG: at 10:45

## 2025-03-30 RX ADMIN — Medication 2 PUFF: at 20:21

## 2025-03-30 RX ADMIN — AMLODIPINE BESYLATE 5 MG: 5 TABLET ORAL at 09:07

## 2025-03-30 RX ADMIN — ALBUTEROL SULFATE 2.5 MG: 2.5 SOLUTION RESPIRATORY (INHALATION) at 16:20

## 2025-03-30 ASSESSMENT — PAIN SCALES - GENERAL: PAINLEVEL_OUTOF10: 0

## 2025-03-30 NOTE — RT PROTOCOL NOTE
wheezing or increased work of breathing using Per Protocol order mode.        4-6 - enter or revise RT Bronchodilator order(s) to two equivalent RT bronchodilator orders with one order with BID Frequency and one order with Frequency of every 4 hours PRN wheezing or increased work of breathing using Per Protocol order mode.        7-10 - enter or revise RT Bronchodilator order(s) to two equivalent RT bronchodilator orders with one order with TID Frequency and one order with Frequency of every 4 hours PRN wheezing or increased work of breathing using Per Protocol order mode.       11-13 - enter or revise RT Bronchodilator order(s) to one equivalent RT bronchodilator order with QID Frequency and an Albuterol order with Frequency of every 4 hours PRN wheezing or increased work of breathing using Per Protocol order mode.      Greater than 13 - enter or revise RT Bronchodilator order(s) to one equivalent RT bronchodilator order with every 4 hours Frequency and an Albuterol order with Frequency of every 2 hours PRN wheezing or increased work of breathing using Per Protocol order mode.     RT to enter RT Home Evaluation for COPD & MDI Assessment order using Per Protocol order mode.    Electronically signed by Zahida Castro RCP on 3/30/2025 at 4:25 PM

## 2025-03-31 LAB
ANION GAP SERPL CALCULATED.3IONS-SCNC: 24 MMOL/L (ref 3–16)
BASOPHILS # BLD: 0 K/UL (ref 0–0.2)
BASOPHILS NFR BLD: 0.2 %
BUN SERPL-MCNC: 79 MG/DL (ref 7–20)
CALCIUM SERPL-MCNC: 10.2 MG/DL (ref 8.3–10.6)
CHLORIDE SERPL-SCNC: 96 MMOL/L (ref 99–110)
CO2 SERPL-SCNC: 19 MMOL/L (ref 21–32)
CREAT SERPL-MCNC: 10.2 MG/DL (ref 0.8–1.3)
DEPRECATED RDW RBC AUTO: 14.9 % (ref 12.4–15.4)
EOSINOPHIL # BLD: 0 K/UL (ref 0–0.6)
EOSINOPHIL NFR BLD: 0 %
GFR SERPLBLD CREATININE-BSD FMLA CKD-EPI: 5 ML/MIN/{1.73_M2}
GLUCOSE SERPL-MCNC: 161 MG/DL (ref 70–99)
HCT VFR BLD AUTO: 41.4 % (ref 40.5–52.5)
HGB BLD-MCNC: 13.8 G/DL (ref 13.5–17.5)
LYMPHOCYTES # BLD: 1 K/UL (ref 1–5.1)
LYMPHOCYTES NFR BLD: 7.3 %
MCH RBC QN AUTO: 33.8 PG (ref 26–34)
MCHC RBC AUTO-ENTMCNC: 33.5 G/DL (ref 31–36)
MCV RBC AUTO: 101 FL (ref 80–100)
MONOCYTES # BLD: 1.5 K/UL (ref 0–1.3)
MONOCYTES NFR BLD: 10.7 %
NEUTROPHILS # BLD: 11.1 K/UL (ref 1.7–7.7)
NEUTROPHILS NFR BLD: 81.8 %
PLATELET # BLD AUTO: 118 K/UL (ref 135–450)
PMV BLD AUTO: 8.3 FL (ref 5–10.5)
POTASSIUM SERPL-SCNC: 4.6 MMOL/L (ref 3.5–5.1)
RBC # BLD AUTO: 4.09 M/UL (ref 4.2–5.9)
SODIUM SERPL-SCNC: 139 MMOL/L (ref 136–145)
WBC # BLD AUTO: 13.6 K/UL (ref 4–11)

## 2025-03-31 PROCEDURE — 94640 AIRWAY INHALATION TREATMENT: CPT

## 2025-03-31 PROCEDURE — 36415 COLL VENOUS BLD VENIPUNCTURE: CPT

## 2025-03-31 PROCEDURE — 94761 N-INVAS EAR/PLS OXIMETRY MLT: CPT

## 2025-03-31 PROCEDURE — 2500000003 HC RX 250 WO HCPCS: Performed by: INTERNAL MEDICINE

## 2025-03-31 PROCEDURE — 6360000002 HC RX W HCPCS: Performed by: INTERNAL MEDICINE

## 2025-03-31 PROCEDURE — 85025 COMPLETE CBC W/AUTO DIFF WBC: CPT

## 2025-03-31 PROCEDURE — 80048 BASIC METABOLIC PNL TOTAL CA: CPT

## 2025-03-31 PROCEDURE — 6370000000 HC RX 637 (ALT 250 FOR IP): Performed by: INTERNAL MEDICINE

## 2025-03-31 PROCEDURE — 1200000000 HC SEMI PRIVATE

## 2025-03-31 RX ADMIN — ALBUTEROL SULFATE 2.5 MG: 2.5 SOLUTION RESPIRATORY (INHALATION) at 07:55

## 2025-03-31 RX ADMIN — ACETAMINOPHEN 650 MG: 325 TABLET ORAL at 10:49

## 2025-03-31 RX ADMIN — SODIUM CHLORIDE, PRESERVATIVE FREE 10 ML: 5 INJECTION INTRAVENOUS at 09:04

## 2025-03-31 RX ADMIN — ISOSORBIDE MONONITRATE 30 MG: 30 TABLET, EXTENDED RELEASE ORAL at 09:03

## 2025-03-31 RX ADMIN — MIDODRINE HYDROCHLORIDE 5 MG: 5 TABLET ORAL at 09:04

## 2025-03-31 RX ADMIN — Medication 2 PUFF: at 07:55

## 2025-03-31 RX ADMIN — LEVOTHYROXINE SODIUM 50 MCG: 0.03 TABLET ORAL at 06:15

## 2025-03-31 RX ADMIN — SEVELAMER CARBONATE 800 MG: 800 TABLET, FILM COATED ORAL at 13:22

## 2025-03-31 RX ADMIN — CLOPIDOGREL BISULFATE 75 MG: 75 TABLET, FILM COATED ORAL at 09:03

## 2025-03-31 RX ADMIN — SEVELAMER CARBONATE 800 MG: 800 TABLET, FILM COATED ORAL at 09:04

## 2025-03-31 RX ADMIN — ROSUVASTATIN 20 MG: 20 TABLET, FILM COATED ORAL at 21:13

## 2025-03-31 RX ADMIN — AMLODIPINE BESYLATE 5 MG: 5 TABLET ORAL at 09:04

## 2025-03-31 RX ADMIN — ALBUTEROL SULFATE 2.5 MG: 2.5 SOLUTION RESPIRATORY (INHALATION) at 12:57

## 2025-03-31 RX ADMIN — ALBUTEROL SULFATE 2.5 MG: 2.5 SOLUTION RESPIRATORY (INHALATION) at 21:28

## 2025-03-31 RX ADMIN — SODIUM CHLORIDE, PRESERVATIVE FREE 10 ML: 5 INJECTION INTRAVENOUS at 21:14

## 2025-03-31 RX ADMIN — Medication 2 PUFF: at 21:28

## 2025-03-31 RX ADMIN — Medication 1 MG: at 10:49

## 2025-03-31 RX ADMIN — ALLOPURINOL 100 MG: 100 TABLET ORAL at 09:04

## 2025-03-31 RX ADMIN — SEVELAMER CARBONATE 800 MG: 800 TABLET, FILM COATED ORAL at 17:55

## 2025-03-31 ASSESSMENT — PAIN SCALES - GENERAL
PAINLEVEL_OUTOF10: 4
PAINLEVEL_OUTOF10: 0
PAINLEVEL_OUTOF10: 5

## 2025-03-31 ASSESSMENT — PAIN DESCRIPTION - LOCATION: LOCATION: BACK;CHEST

## 2025-03-31 ASSESSMENT — PAIN DESCRIPTION - DESCRIPTORS: DESCRIPTORS: ACHING

## 2025-03-31 NOTE — DISCHARGE INSTR - COC
Continuity of Care Form    Patient Name: Donato Obrien   :  1940  MRN:  [id number]    Admit date:  3/25/2025  Discharge date:  ***    Code Status Order: Full Code   Advance Directives:     Admitting Physician:  Filipe Hebert MD  PCP: Rohit Pathak III, MD    Discharging Nurse: ***  Discharging Hospital Unit/Room#: 3TN-3383/3383-01  Discharging Unit Phone Number: ***    Emergency Contact:   Extended Emergency Contact Information  Primary Emergency Contact: Connie Obrien  Address: [address] [place] [id number] [facility]  Home Phone: [phone]  Mobile Phone: [phone]  Relation: Spouse    Past Surgical History:  Past Surgical History:   Procedure Laterality Date    SIGMOIDOSCOPY N/A 3/9/2023    SIGMOIDOSCOPY DIAGNOSTIC FLEXIBLE performed by Eugene Rodriguez MD at [facility] ENDOSCOPY    TURP         Immunization History:   Immunization History   Administered Date(s) Administered    COVID-19, PFIZER PURPLE top, DILUTE for use, (age 12 y+), 30mcg/0.3mL 2021, 2021, 2021    Influenza Whole 2008, 2010    Pneumococcal, PPSV23, PNEUMOVAX 23, (age 2y+), SC/IM, 0.5mL 2008       Active Problems:  Patient Active Problem List   Diagnosis Code    Hyperlipidemia E78.5    Essential hypertension I10    Localized edema R60.0    Fracture of sternum S22.20XA    Fractured rib S22.39XA    Prostate cancer ([facility]) C61    Thrombus I82.90    Chronic renal disease N18.9    Trigger finger M65.30    CTS (carpal tunnel syndrome) G56.00    Venous insufficiency of both lower extremities I87.2    Venous ulcer of right lower extremity without varicose veins ([facility]) I87.2, L97.919    Non-pressure chronic ulcer of lower leg, limited to breakdown of skin, right ([facility]) L97.911    Laceration of skin of lower leg, left, initial encounter S81.812A    Laceration of skin of lower leg, right, initial encounter S81.811A    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

## 2025-03-31 NOTE — CARE COORDINATION
Discharge Planning Note:     (SW) met with patient at bedside re discharge planning. Patient is not agreeable to SNF placement and would like to return home. Patient asked SW to contact his wife Connie [phone].    SW spoke with Connie re patient discharge plan. She states that patient should return home and was not agreeable to SNF placement or HHC. She stated that patient would need ambulance transport home. She confirmed patient's HD information. TAYLOR updated.     Preston Barrientos  [address] [place] [address]  Phone: [phone]  Fax [phone]   Schedule;  T,TH,S  time 7.00am  Stretcher transportation by [facility].   Electronically signed by CLEOPATRA Pires on 3/31/25 at 11:49 AM EDT

## 2025-04-01 VITALS
WEIGHT: 183.86 LBS | DIASTOLIC BLOOD PRESSURE: 69 MMHG | RESPIRATION RATE: 20 BRPM | BODY MASS INDEX: 26.32 KG/M2 | HEART RATE: 83 BPM | SYSTOLIC BLOOD PRESSURE: 112 MMHG | TEMPERATURE: 97.5 F | HEIGHT: 70 IN | OXYGEN SATURATION: 91 %

## 2025-04-01 LAB
ANION GAP SERPL CALCULATED.3IONS-SCNC: 24 MMOL/L (ref 3–16)
BASOPHILS # BLD: 0 K/UL (ref 0–0.2)
BASOPHILS NFR BLD: 0.2 %
BUN SERPL-MCNC: 95 MG/DL (ref 7–20)
CALCIUM SERPL-MCNC: 9.5 MG/DL (ref 8.3–10.6)
CHLORIDE SERPL-SCNC: 93 MMOL/L (ref 99–110)
CO2 SERPL-SCNC: 16 MMOL/L (ref 21–32)
CREAT SERPL-MCNC: 11.5 MG/DL (ref 0.8–1.3)
DEPRECATED RDW RBC AUTO: 15.1 % (ref 12.4–15.4)
EOSINOPHIL # BLD: 0.1 K/UL (ref 0–0.6)
EOSINOPHIL NFR BLD: 0.8 %
GFR SERPLBLD CREATININE-BSD FMLA CKD-EPI: 4 ML/MIN/{1.73_M2}
GLUCOSE SERPL-MCNC: 116 MG/DL (ref 70–99)
HCT VFR BLD AUTO: 39.4 % (ref 40.5–52.5)
HGB BLD-MCNC: 12.9 G/DL (ref 13.5–17.5)
LYMPHOCYTES # BLD: 1.6 K/UL (ref 1–5.1)
LYMPHOCYTES NFR BLD: 12.3 %
MCH RBC QN AUTO: 33.6 PG (ref 26–34)
MCHC RBC AUTO-ENTMCNC: 32.7 G/DL (ref 31–36)
MCV RBC AUTO: 102.6 FL (ref 80–100)
MONOCYTES # BLD: 1.3 K/UL (ref 0–1.3)
MONOCYTES NFR BLD: 9.9 %
NEUTROPHILS # BLD: 9.9 K/UL (ref 1.7–7.7)
NEUTROPHILS NFR BLD: 76.8 %
PLATELET # BLD AUTO: 107 K/UL (ref 135–450)
PMV BLD AUTO: 8.4 FL (ref 5–10.5)
POTASSIUM SERPL-SCNC: 4.6 MMOL/L (ref 3.5–5.1)
PROCALCITONIN SERPL IA-MCNC: 1.25 NG/ML (ref 0–0.15)
RBC # BLD AUTO: 3.83 M/UL (ref 4.2–5.9)
SODIUM SERPL-SCNC: 133 MMOL/L (ref 136–145)
WBC # BLD AUTO: 12.9 K/UL (ref 4–11)

## 2025-04-01 PROCEDURE — 6360000002 HC RX W HCPCS: Performed by: INTERNAL MEDICINE

## 2025-04-01 PROCEDURE — 6370000000 HC RX 637 (ALT 250 FOR IP): Performed by: INTERNAL MEDICINE

## 2025-04-01 PROCEDURE — 90935 HEMODIALYSIS ONE EVALUATION: CPT

## 2025-04-01 PROCEDURE — 85025 COMPLETE CBC W/AUTO DIFF WBC: CPT

## 2025-04-01 PROCEDURE — P9047 ALBUMIN (HUMAN), 25%, 50ML: HCPCS | Performed by: INTERNAL MEDICINE

## 2025-04-01 PROCEDURE — 80048 BASIC METABOLIC PNL TOTAL CA: CPT

## 2025-04-01 PROCEDURE — 36415 COLL VENOUS BLD VENIPUNCTURE: CPT

## 2025-04-01 PROCEDURE — 6370000000 HC RX 637 (ALT 250 FOR IP): Performed by: NURSE PRACTITIONER

## 2025-04-01 PROCEDURE — 84145 PROCALCITONIN (PCT): CPT

## 2025-04-01 RX ORDER — GUAIFENESIN/DEXTROMETHORPHAN 100-10MG/5
5 SYRUP ORAL EVERY 4 HOURS PRN
Qty: 120 ML | Refills: 0 | Status: ON HOLD | OUTPATIENT
Start: 2025-04-01 | End: 2025-04-11

## 2025-04-01 RX ORDER — ALBUMIN (HUMAN) 12.5 G/50ML
25 SOLUTION INTRAVENOUS ONCE
Status: COMPLETED | OUTPATIENT
Start: 2025-04-01 | End: 2025-04-01

## 2025-04-01 RX ORDER — BUDESONIDE AND FORMOTEROL FUMARATE DIHYDRATE 160; 4.5 UG/1; UG/1
2 AEROSOL RESPIRATORY (INHALATION)
Qty: 10.2 G | Refills: 3 | Status: ON HOLD | OUTPATIENT
Start: 2025-04-01

## 2025-04-01 RX ADMIN — ALBUMIN (HUMAN) 25 G: 0.25 INJECTION, SOLUTION INTRAVENOUS at 09:13

## 2025-04-01 RX ADMIN — LEVOTHYROXINE SODIUM 50 MCG: 0.03 TABLET ORAL at 06:22

## 2025-04-01 RX ADMIN — HEPARIN SODIUM 5000 UNITS: 5000 INJECTION INTRAVENOUS; SUBCUTANEOUS at 06:21

## 2025-04-01 RX ADMIN — MIDODRINE HYDROCHLORIDE 10 MG: 5 TABLET ORAL at 08:27

## 2025-04-01 RX ADMIN — GUAIFENESIN AND DEXTROMETHORPHAN 5 ML: 100; 10 SYRUP ORAL at 00:16

## 2025-04-01 NOTE — DISCHARGE SUMMARY
[facility] -- Physician Discharge Summary     Donato Obrien  1940  MRN: [id number]    Admit Date: 3/25/2025  Discharge Date: No discharge date for patient encounter.    Attending MD: Filipe Hebert MD  Discharging MD: Filipe Hebert MD  PCP: Rohit Pathak III, MD [address] [facility] [address] [phone]    Admission Diagnosis: Syncope and collapse [R55]  Acute pulmonary edema (HCC) [J81.0]  ESRD needing dialysis (HCC) [N18.6, Z99.2]  Heat syncope, initial encounter [T67.1XXA]  Cough, unspecified type [R05.9]  DISCHARGE DIAGNOSIS: same    Full Hospital Problem List:  Active Hospital Problems    Diagnosis Date Noted    Controlled type 2 diabetes mellitus with complication, with long-term current use of insulin (HCC) [E11.8, Z79.4] 03/06/2023     Priority: Medium    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 (HCC) [N18.6, Z99.2] 03/06/2023     Priority: Medium    RSV (acute bronchiolitis due to respiratory syncytial virus) [J21.0] 03/29/2025    Syncope and collapse [R55] 03/25/2025    Syncope [R55] 11/16/2023    Atrial fibrillation (HCC) [I48.91] 11/16/2023    Essential hypertension [I10]     Hyperlipidemia [E78.5]            Hospital Course:  84 y.o. male who presents for evaluation of cough and difficulty breathing.  Patient states this began 4 months ago.  He reports he was at dialysis and was sent to the ED by EMS due to his difficulty breathing without receiving his full treatment.    Per discussion between ER and Renal \"patient did indeed have a witnessed syncopal episode during dialysis. He had received approximately 2 hours and 30 minutes of treatment and they took off 1.3 L. They state that he was normotensive during the syncopal episode. \"     To be admitted for further eval  Renal consulted as he did not complete HD today  Pulm eval shows pt is positive for RSV  He is treated with steroids and placed on ICS inhaler as well    Cards also consulted for poss syncope. They feel it is

## 2025-04-01 NOTE — PLAN OF CARE
Problem: Safety - Adult  Goal: Free from fall injury  3/31/2025 2215 by Alma Rosa Griggs RN  Outcome: Progressing  3/31/2025 1106 by Pooja Perry RN  Outcome: Progressing     Problem: Chronic Conditions and Co-morbidities  Goal: Patient's chronic conditions and co-morbidity symptoms are monitored and maintained or improved  3/31/2025 2215 by Alma Rosa Griggs RN  Outcome: Progressing  Flowsheets (Taken 3/31/2025 2108)  Care Plan - Patient's Chronic Conditions and Co-Morbidity Symptoms are Monitored and Maintained or Improved:   Monitor and assess patient's chronic conditions and comorbid symptoms for stability, deterioration, or improvement   Collaborate with multidisciplinary team to address chronic and comorbid conditions and prevent exacerbation or deterioration   Update acute care plan with appropriate goals if chronic or comorbid symptoms are exacerbated and prevent overall improvement and discharge  3/31/2025 1106 by Pooja Perry RN  Outcome: Progressing     Problem: Discharge Planning  Goal: Discharge to home or other facility with appropriate resources  Outcome: Progressing  Flowsheets (Taken 3/31/2025 2108)  Discharge to home or other facility with appropriate resources:   Identify barriers to discharge with patient and caregiver   Arrange for needed discharge resources and transportation as appropriate   Identify discharge learning needs (meds, wound care, etc)   Refer to discharge planning if patient needs post-hospital services based on physician order or complex needs related to functional status, cognitive ability or social support system     Problem: Pain  Goal: Verbalizes/displays adequate comfort level or baseline comfort level  Outcome: Progressing     Problem: Skin/Tissue Integrity  Goal: Skin integrity remains intact  Description: 1.  Monitor for areas of redness and/or skin breakdown  2.  Assess vascular access sites hourly  3.  Every 4-6 hours minimum:  Change oxygen saturation probe

## 2025-04-01 NOTE — CARE COORDINATION
04/01/25 1035   IMM Letter   IMM Letter given to Patient/Family/Significant other/Guardian/POA/by: Amanda Howard RN   IMM Letter date given: 04/01/25   IMM Letter time given: 1028

## 2025-04-01 NOTE — PLAN OF CARE
Problem: Safety - Adult  Goal: Free from fall injury  Outcome: Completed     Problem: Chronic Conditions and Co-morbidities  Goal: Patient's chronic conditions and co-morbidity symptoms are monitored and maintained or improved  Outcome: Completed     Problem: Discharge Planning  Goal: Discharge to home or other facility with appropriate resources  Outcome: Completed     Problem: Pain  Goal: Verbalizes/displays adequate comfort level or baseline comfort level  Outcome: Completed     Problem: Skin/Tissue Integrity  Goal: Skin integrity remains intact  Description: 1.  Monitor for areas of redness and/or skin breakdown  2.  Assess vascular access sites hourly  3.  Every 4-6 hours minimum:  Change oxygen saturation probe site  4.  Every 4-6 hours:  If on nasal continuous positive airway pressure, respiratory therapy assess nares and determine need for appliance change or resting period  Outcome: Completed     Problem: ABCDS Injury Assessment  Goal: Absence of physical injury  Outcome: Completed     Problem: Infection - Adult  Goal: Absence of infection at discharge  Outcome: Completed     Problem: Respiratory - Adult  Goal: Achieves optimal ventilation and oxygenation  Outcome: Completed     Problem: Musculoskeletal - Adult  Goal: Return mobility to safest level of function  Outcome: Completed  Goal: Return ADL status to a safe level of function  Outcome: Completed     Problem: Genitourinary - Adult  Goal: Absence of urinary retention  Outcome: Completed

## 2025-04-01 NOTE — PROGRESS NOTES
Pharmacist Review and Automatic Dose Adjustment of Prophylactic Enoxaparin    Reviewed reason(s) for admission/hospital problem list    The reviewing pharmacist has made an adjustment to the ordered enoxaparin dose or converted to UFH per the approved [facility] protocol and table as identified below.        Donato Obrien is a 84 y.o. male.     Recent Labs     03/25/25  1111   CREATININE 4.9*       Estimated Creatinine Clearance: 11 mL/min (A) (based on SCr of 4.9 mg/dL (H)).    Recent Labs     03/25/25  1111   HGB 13.6   HCT 41.8        No results for input(s): \"INR\" in the last 72 hours.    Height:   Ht Readings from Last 1 Encounters:   03/25/25 1.778 m (5' 10\")     Weight:  Wt Readings from Last 1 Encounters:   03/25/25 85.9 kg (189 lb 6 oz)               Plan: Based upon the patient's weight and renal function    Ordered: Enoxaparin 40mg SUBQ Daily    Changed/converted to    New Order: Heparin 5,000 units SUBQ TID      Thank you,  Rena T[facility]ter [facility]  3/25/2025, 6:30 PM    
   03/30/25 1624   RT Protocol   History Pulmonary Disease 0   Respiratory pattern 4   Breath sounds 2   Cough 3   Indications for Bronchodilator Therapy Decreased or absent breath sounds   Bronchodilator Assessment Score 9       
  [facility] - Inpatient Rehabilitation Department   Phone: [phone]    Physical Therapy    [] Initial Evaluation            [x] Daily Treatment Note         [] Discharge Summary      Patient: Donato Obrien   : 1940   MRN: [id number]   Date of Service:  3/28/2025  Admitting Diagnosis: Syncope  Current Admission Summary: 84 y.o. male who presents for evaluation of cough and difficulty breathing. Patient states this began 4 months ago. He reports he was at dialysis this morning and was sent to the ED by EMS due to his difficulty breathing without receiving his full treatment. Reports his cough is productive of a yellow mucus. He also reports a runny nose and sore throat. He reports his wife is also currently sick with a cough and has not sought treatment. Denies history of asthma, PE, COPD.     Nephrologist had called the dialysis center and clarified that patient did indeed have a witnessed syncopal episode during dialysis. He had received approximately 2 hours and 30 minutes of treatment and they took off 1.3 L. They state that he was normotensive during the syncopal episode. No history of similar events.   Past Medical History:  has a past medical history of A-fib (HCC), Arthritis, Blood clot, Fracture of sternum, Fracture rib, Hemodialysis patient, Hyperlipidemia, Hypertension, Laceration of skin of lower leg, left, initial encounter, Laceration of skin of lower leg, right, initial encounter, NSTEMI (non-ST elevated myocardial infarction) ([facility]), Prostate cancer ([facility]), and Type II or unspecified type diabetes mellitus without mention of complication, not stated as uncontrolled.  Past Surgical History:  has a past surgical history that includes TURP () and sigmoidoscopy (N/A, 3/9/2023).  Discharge Recommendations: Donato Obrien scored a 10/24 on the AM-PAC short mobility form. Current research shows that an AM-PAC score of 17 or less is typically not associated with a discharge to the 
  [facility] - Inpatient Rehabilitation Department   Phone: [phone]    Occupational Therapy    [] Initial Evaluation            [x] Daily Treatment Note         [] Discharge Summary      Patient: Donato Obrien   : 1940   MRN: [id number]   Date of Service:  3/28/2025    Admitting Diagnosis:  Syncope  Current Admission Summary: Donato Obrien is a 84 y.o. male who presents for evaluation of cough and difficulty breathing.  Patient states this began 4 months ago.  He reports he was at dialysis this morning and was sent to the ED by EMS due to his difficulty breathing without receiving his full treatment.  Reports his cough is productive of a yellow mucus.  He also reports a runny nose and sore throat.  He reports his wife is also currently sick with a cough and has not sought treatment.  Denies history of asthma, PE, COPD. He denies chest tightness, trouble swallowing, sinus pressure, dizziness, syncope, chest pain, abdominal pain, fever, chills, anticoagulation.  Patient has no other concerns at this time.     Nephrologist had called the dialysis center and clarified that patient did indeed have a witnessed syncopal episode during dialysis.  He had received approximately 2 hours and 30 minutes of treatment and they took off 1.3 L.  They state that he was normotensive during the syncopal episode.  No history of similar events.     Nursing Notes were all reviewed and agreed with or any disagreements were addressed in the HPI.  Past Medical History:  has a past medical history of A-fib (HCC), Arthritis, Blood clot, Fracture of sternum, Fracture rib, Hemodialysis patient, Hyperlipidemia, Hypertension, Laceration of skin of lower leg, left, initial encounter, Laceration of skin of lower leg, right, initial encounter, NSTEMI (non-ST elevated myocardial infarction) ([facility]), Prostate cancer ([facility]), and Type II or unspecified type diabetes mellitus without mention of complication, not stated as 
  [facility] - Inpatient Rehabilitation Department   Phone: [phone]    Occupational Therapy    [x] Initial Evaluation            [] Daily Treatment Note         [] Discharge Summary      Patient: Donato Obrien   : 1940   MRN: [id number]   Date of Service:  3/26/2025    Admitting Diagnosis:  Syncope  Current Admission Summary: Donato Obrien is a 84 y.o. male who presents for evaluation of cough and difficulty breathing.  Patient states this began 4 months ago.  He reports he was at dialysis this morning and was sent to the ED by EMS due to his difficulty breathing without receiving his full treatment.  Reports his cough is productive of a yellow mucus.  He also reports a runny nose and sore throat.  He reports his wife is also currently sick with a cough and has not sought treatment.  Denies history of asthma, PE, COPD. He denies chest tightness, trouble swallowing, sinus pressure, dizziness, syncope, chest pain, abdominal pain, fever, chills, anticoagulation.  Patient has no other concerns at this time.     Nephrologist had called the dialysis center and clarified that patient did indeed have a witnessed syncopal episode during dialysis.  He had received approximately 2 hours and 30 minutes of treatment and they took off 1.3 L.  They state that he was normotensive during the syncopal episode.  No history of similar events.     Nursing Notes were all reviewed and agreed with or any disagreements were addressed in the HPI.  Past Medical History:  has a past medical history of A-fib (HCC), Arthritis, Blood clot, Fracture of sternum, Fracture rib, Hemodialysis patient, Hyperlipidemia, Hypertension, Laceration of skin of lower leg, left, initial encounter, Laceration of skin of lower leg, right, initial encounter, NSTEMI (non-ST elevated myocardial infarction) ([facility]), Prostate cancer ([facility]), and Type II or unspecified type diabetes mellitus without mention of complication, not stated as 
  [facility]     Daily Progress Note      Admit Date:  3/25/2025    Chief Complaint   Patient presents with    Dizziness     Pt in via EMS from dialysis. Reports dizziness and weakness after dialysis.  Pt reports that dialysis was not completed.         ASSESSMENT AND PLAN:    Syncope during dialysis  RSV bronchiolitis  Troponin elevation - demand ischemia  Dizziness, chronic hypotension  CAD, Prev sent for CABG. Turned down.  of RCA. Significant LAD.   HFrEF, LVEF recovered now 50%  ESRD on HD  TTS  Aortic Stenosis, moderate low-flow, low-gradient  PAD  A fib on NOAC  HTN  HLD    Presentation NOT consistent with ACS.  Patient would also be a very technically challenging candidate for cath due to inability to get femoral access.    Continue anticoagulation and beta blocker for AF.  Monitor telemetry.    Supportive care for RSV infection.    Echo reviewed - LVEF stable, AS is only moderate.    Cardiology will sign-off at this time. Please call us if there are other issues or questions.     Subjective:  Mr. Obrien is coughing a lot still and having trouble breathing, mostly when he is coughing.  Got some antitussives that helped a little.  No more dizziness.    Objective:   /71   Pulse 74   Temp 98.2 °F (36.8 °C) (Oral)   Resp 16   Ht 1.778 m (5' 10\")   Wt 82.5 kg (181 lb 14.1 oz)   SpO2 94%   BMI 26.10 kg/m²   No intake or output data in the 24 hours ending 03/29/25 0936    TELEMETRY: AF with short suns of NSVT     Physical Exam:  General:  Awake, alert, oriented x 3, NAD  Skin:  Warm and dry  Neck:  JVD none  Chest:  diffuse wheezes, frequent cough  Cardiovascular:  RRR S1S2, no S3, 2/6 GRACIELA   Abdomen:  Soft, ND, NT, No HSM  Extremities:  No edema    Medications:    b complex-C-folic acid  1 capsule Oral Daily    amLODIPine  5 mg Oral Daily    allopurinol  100 mg Oral Daily    levothyroxine  50 mcg Oral Daily    isosorbide mononitrate  30 mg Oral Daily    rosuvastatin  20 mg Oral Nightly    
  [facility] - Inpatient Rehabilitation Department   Phone: [phone]    Physical Therapy    [x] Initial Evaluation            [] Daily Treatment Note         [] Discharge Summary      Patient: Donato Obrien   : 1940   MRN: [id number]   Date of Service:  3/26/2025  Admitting Diagnosis: Syncope  Current Admission Summary: 84 y.o. male who presents for evaluation of cough and difficulty breathing. Patient states this began 4 months ago. He reports he was at dialysis this morning and was sent to the ED by EMS due to his difficulty breathing without receiving his full treatment. Reports his cough is productive of a yellow mucus. He also reports a runny nose and sore throat. He reports his wife is also currently sick with a cough and has not sought treatment. Denies history of asthma, PE, COPD.     Nephrologist had called the dialysis center and clarified that patient did indeed have a witnessed syncopal episode during dialysis. He had received approximately 2 hours and 30 minutes of treatment and they took off 1.3 L. They state that he was normotensive during the syncopal episode. No history of similar events.   Past Medical History:  has a past medical history of A-fib (HCC), Arthritis, Blood clot, Fracture of sternum, Fracture rib, Hemodialysis patient, Hyperlipidemia, Hypertension, Laceration of skin of lower leg, left, initial encounter, Laceration of skin of lower leg, right, initial encounter, NSTEMI (non-ST elevated myocardial infarction) ([facility]), Prostate cancer ([facility]), and Type II or unspecified type diabetes mellitus without mention of complication, not stated as uncontrolled.  Past Surgical History:  has a past surgical history that includes TURP () and sigmoidoscopy (N/A, 3/9/2023).  Discharge Recommendations: Donato Obrien scored a 10/24 on the AM-PAC short mobility form. Current research shows that an AM-PAC score of 17 or less is typically not associated with a discharge to the 
  Nephrology Progress Note  [facility]  [phone]  [email or web address]        Subjective:   Donato Obrien: 84 y.o.  male who we are seeing in consultation for ESRD Management.    Brief HPI:  Donato Obrien is a 84 y.o. male with PMH significant for ESRD On HD TTS, HTN, PVD with bilateral lower extremities wound, Type 2 DM, HFrEF ( EF 20-25% , DD III) CAD/NSTEMI 2023 PCI deemed high risk, on medical management, afib , who was sent in by the dialysis unit for episode of unresponsive/syncopal episode during session. Per nursing staff at the dialysis unit, he went unresponsive for few seconds, and when he woke up he was confused. BP in good range during episode. The lowest BP he had during session was 94/41. He had total of 6z11tmb of HD with 1.3L UF.   He was 2Kg above his TW, and has been reaching his TW of 83.5Kg after every treatment with no significant weight gain between sessions. He has been complaining of on/off shortness of breath, he reports that has been there for few months.      On presentation here, initial BP was 76/50, given a dose of midodrine with improvement in BP noted, did not receive any fluids. CXR showing CHF with pulmonary edema. Sating 95-97% on room air.  Wife sick with cold. Flu/COVID negative.We are following for dialysis management.      History of HFrEF, with severe Multivessel CAD, with previous admission in  with similar complaints. His [facility] (23) with Heavily calcified multi-vessel CAD, considered a poor surgical candidate, and PCI felt to be too high risk and medical management was recommended.     Interval History / Subjective:  Seen on HD  Not feeling well   Complaints of cough       Objective:   VITALS:    Vitals:    25 1206   BP: 121/73   Pulse: 90   Resp:    Temp: 98.2 °F (36.8 °C)   SpO2: 91%                                                                                    Temp (24hrs), Av.9 °F (36.6 °C), Min:97.5 °F (36.4 °C), 
  Nephrology Progress Note  [facility]  [phone]  [email or web address]        Subjective:   Donato Obrien: 84 y.o.  male who we are seeing in consultation for ESRD Management.    Brief HPI:  Donato Obrien is a 84 y.o. male with PMH significant for ESRD On HD TTS, HTN, PVD with bilateral lower extremities wound, Type 2 DM, HFrEF ( EF 20-25% , DD III) CAD/NSTEMI Nov 2023 PCI deemed high risk on medical management, afib , who was sent in by the dialysis unit for episode of unresponsive/syncopal episode during session. Per nursing staff at the dialysis unit, he went unresponsive for few seconds, and when he woke up he was confused. BP in good range during episode. The lowest BP he had during session was 94/41. He had total of 7o60zxv of HD with 1.3L UF.   He was 2Kg above his TW, and has been reaching his TW of 83.5Kg with no significant weight gain between sessions. He has been complaining of on/off shortness of breath, he reports that has been there for few months.      On presentation here, initial BP was 76/50, given a dose of midodrine with improvement in BP noted, did not receive any fluids. CXR showing CHF with pulmonary edema.   He is sating 95-97% on room air.   Flu/COVID negative.   Trop positive   Labs with K 3.9, hemolyzed specimen/      We were consulted for dialysis management.      History of HFrEF, with severe Multivessel CAD, with previous admission in 11/23 with similar complaints.   His [facility] (11/21/23) with Heavily calcified multi-vessel CAD, considered a poor surgical candidate, and PCI felt to be too high risk and medical management was recommended.     Interval History / Subjective:  Seen this am on Hemodialysis.   Noted drop in BP limiting UF. BP improved with albumin. UF goal lowered to 1L  Breathing comfortably, sating 95% on room air.       Objective:   VITALS:    Vitals:    03/27/25 0718   BP: 90/67   Pulse:    Resp:    Temp:    SpO2:                                  
  Nephrology Progress Note  [facility]  [phone]  [email or web address]        Brief HPI:  Donato Obrien is a 84 y.o. male with PMH significant for ESRD On HD TTS, HTN, PVD with bilateral lower extremities wound, Type 2 DM, HFrEF ( EF 20-25% , DD III) CAD/NSTEMI 2023 PCI deemed high risk, on medical management, afib , who was sent in by the dialysis unit on 3/25 no hypo-for episode of unresponsive/syncopal episode during session. Per nursing staff at the dialysis unit, he went unresponsive for few seconds, and when he woke up he was confused.  Tension noted during the episode the lowest BP he had during session was 94/41. He had total of 0j70sry of HD with 1.3L UF.   He was 2Kg above his TW, and has been reaching his TW of 83.5Kg after every treatment with no significant weight gain between sessions.     On presentation here, initial BP was 76/50, given a dose of midodrine with improvement in BP noted, did not receive any fluids. CXR showing CHF with pulmonary edema.      History of HFrEF, with severe Multivessel CAD, with previous admission in  with similar complaints. His [facility] (23) with Heavily calcified multi-vessel CAD, considered a poor surgical candidate, and PCI felt to be too high risk and medical management was recommended.     Subjective/interval history  Pt seen and examined  Had dialysis earlier today  Blood pressures have been low normal  Has been afebrile  On room air  Denies chest pain or shortness of breath      Objective:   VITALS:    Vitals:    25 1204   BP: 112/69   Pulse: 83   Resp: 20   Temp: 97.5 °F (36.4 °C)   SpO2: 91%                                                                                    Temp (24hrs), Av.8 °F (36.6 °C), Min:97.5 °F (36.4 °C), Max:98 °F (36.7 °C)     BP  Min: 90/59  Max: 124/66                                  Pulse  Av.4  Min: 70  Max: 86    24HR INTAKE/OUTPUT:  No intake or output data in the 24 hours ending 
  Nephrology Progress Note  [facility]  [phone]  [email or web address]        Subjective:   Donato Obrien: 84 y.o.  male who we are seeing in consultation for ESRD Management.    Brief HPI:  Donato Obrien is a 84 y.o. male with PMH significant for ESRD On HD TTS, HTN, PVD with bilateral lower extremities wound, Type 2 DM, HFrEF ( EF 20-25% , DD III) CAD/NSTEMI Nov 2023 PCI deemed high risk, on medical management, afib , who was sent in by the dialysis unit for episode of unresponsive/syncopal episode during session. Per nursing staff at the dialysis unit, he went unresponsive for few seconds, and when he woke up he was confused. BP in good range during episode. The lowest BP he had during session was 94/41. He had total of 4u39jyp of HD with 1.3L UF.   He was 2Kg above his TW, and has been reaching his TW of 83.5Kg after every treatment with no significant weight gain between sessions. He has been complaining of on/off shortness of breath, he reports that has been there for few months.      On presentation here, initial BP was 76/50, given a dose of midodrine with improvement in BP noted, did not receive any fluids. CXR showing CHF with pulmonary edema. Sating 95-97% on room air.  Wife sick with cold. Flu/COVID negative.We are following for dialysis management.      History of HFrEF, with severe Multivessel CAD, with previous admission in 11/23 with similar complaints. His [facility] (11/21/23) with Heavily calcified multi-vessel CAD, considered a poor surgical candidate, and PCI felt to be too high risk and medical management was recommended.     Interval History / Subjective:  Seen this am , reports ongoing coughing and shortness of breath, reports that cough has been going on for 4months.    HD yesterday with limited UF only 500cc net UF given drop in BP.   He is on 2L NC today. He uses 2L NC at home prn.   TTE with EF 50%, indeterminate diastolic function, moderate stenosis of the aortic 
  Nephrology Progress Note  [facility]  [phone]  [email or web address]        Subjective:   Donato Obrien: 84 y.o.  male who we are seeing in consultation for ESRD Management.    Brief HPI:  Donato Obrien is a 84 y.o. male with PMH significant for ESRD On HD TTS, HTN, PVD with bilateral lower extremities wound, Type 2 DM, HFrEF ( EF 20-25% , DD III) CAD/NSTEMI 2023 PCI deemed high risk, on medical management, afib , who was sent in by the dialysis unit for episode of unresponsive/syncopal episode during session. Per nursing staff at the dialysis unit, he went unresponsive for few seconds, and when he woke up he was confused. BP in good range during episode. The lowest BP he had during session was 94/41. He had total of 2q16gbj of HD with 1.3L UF.   He was 2Kg above his TW, and has been reaching his TW of 83.5Kg after every treatment with no significant weight gain between sessions. He has been complaining of on/off shortness of breath, he reports that has been there for few months.      On presentation here, initial BP was 76/50, given a dose of midodrine with improvement in BP noted, did not receive any fluids. CXR showing CHF with pulmonary edema. Sating 95-97% on room air.  Wife sick with cold. Flu/COVID negative.We are following for dialysis management.      History of HFrEF, with severe Multivessel CAD, with previous admission in  with similar complaints. His [facility] (23) with Heavily calcified multi-vessel CAD, considered a poor surgical candidate, and PCI felt to be too high risk and medical management was recommended.     Interval History / Subjective:  Pt seen and examined  BPs have been low normal  Has been afebrile  On room air       Objective:   VITALS:    Vitals:    25 0800   BP: (!) 82/58   Pulse: 85   Resp: 16   Temp: 97 °F (36.1 °C)   SpO2: 96%                                                                                    Temp (24hrs), Av.2 °F 
Follow-up with patient's wife Connie about discharge questions. RN answered all questions after messaging Dr. Hebert to get the answers.   
Occupational Therapy/Physical Therapy  OT and PT tx attempt this morning;however, pt currently HERMELINDA. Will f/u later this date as schedule allows. Thank you, Gay Wadsworth, MSOT, OTR/L, [id number], Tiffanie Pete, DPT, PT, [id number].      
Occupational Therapy/Physical Therapy  OT/PT tx attempt this afternoon; however, pt declined participation at this time secondary to fatigue.  Pt just returned from HD tx.  No further needs requested at this time and no s/s of pain or distress. Thank you, Gay Wadsworth MSOT, OTR/L, [id number], Tiffanie Pete, DPT, PT, [id number].    
Patient back from HD  
Patient complains of SOB and frequent cough, PRN robitussin offered but patient refused requesting antibiotic. Patient refused reposition and wanted to eat on a flat position, patient educated about aspiration. RN communicated with hospitalist and chest Xray ordered, patient's wife updated and patient accepted robitussin after communicated with wife. HD today 2L removed  
Patient discharged. AVS sent with transport. IV removed. All belongings taken with patient and transport. Wife updated on discharge instructions  
Patient refused Heparin. BP was 80/57 while sitting. Doctor was notified and requested midodrine for one time dose or PRN to bring up the BP. Requested denied. A nutrition consult would be beneficial.   
Patient returned from dialysis. VSS.   
Patient to HD  
Patient transported to dialysis  
Physical Therapy  Donato Obrien  PT treatment attempted this afternoon. Pt supine in bed upon arrival; pt refused PT. Will follow up at later date.   Thanks, Ingris Copeland, [id number]      
Progress Note - Dr. Hebert - Internal Medicine  PCP: Rohit Pathak III, MD [id number] [facility] [id number] [phone]    Hospital Day: 3  Code Status: Full Code  Current Diet: ADULT DIET; Regular; Low Potassium (Less than 3000 mg/day); Low Phosphorus (Less than 1000 mg)        CC: follow up on medical issues    Subjective:   Donato Obrien is a 84 y.o. male.    Pt seen and examined  Chart reviewed since last visit, labs and imaging below      No recurrence of syncope  Echo done  Left Ventricle: Low normal left ventricular systolic function with a visually estimated EF of 50%. Left ventricle size is normal. Mildly increased wall thickness. No regional wall motion abnormalities identified. Decreased sensitivity due to poor endocardial definition. Indeterminate diastolic function.    Right Ventricle: Right ventricle size is normal. Normal wall thickness. Low normal systolic function. TAPSE is 1.6 cm.    Aortic Valve: Severely thickened cusps. Severely calcified cusps. No regurgitation. Moderate stenosis of the aortic valve in the setting of low flow. AV mean gradient is 14 mmHg. AV area by continuity VTI is 1.2 cm2. AV Mean Gradient is 14 mmHg. AV Peak Velocity is 2.4 m/s. AV Area by VTI is 1.2 cm2. LVOT:AV VTI Index is 0.34. LVOT Stroke Volume Index is 24.8 mL/m2.    Tricuspid Valve: Moderate to severe regurgitation. Unable to assess RVSP.    Aorta: Aortic Root is 3.7 cm. Ascending Aorta is 4.2 cm.    Image quality is technically difficult. Technically difficult angelica      Renal eval appreciated  For HD per their orders    Pt did have some complaints of dyspnea 3/27 post HD  HD limited somewhat by low bp  CXR shows interstitial fluid  Will d/w renal if pt needs UF today      Review of Systems: (1 system for EPF, 2-9 for detailed, 10+ for comprehensive)  Constitutional: Negative for chills and fever.     HENT: Negative for dental problem, nosebleeds and rhinorrhea.      Eyes: Negative for photophobia 
Progress Note - Dr. Hebert - Internal Medicine  PCP: Rohit Pathak III, MD [address] [facility] [address] [phone]    Hospital Day: 2  Code Status: Full Code  Current Diet: ADULT DIET; Regular; Low Potassium (Less than 3000 mg/day); Low Phosphorus (Less than 1000 mg)        CC: follow up on medical issues    Subjective:   Donato Obrien is a 84 y.o. male.    Pt seen and examined  Chart reviewed since last visit, labs and imaging below      No recurrence of syncope  Echo being done today    Renal eval appreciated  For HD per their orders      Review of Systems: (1 system for EPF, 2-9 for detailed, 10+ for comprehensive)  Constitutional: Negative for chills and fever.     HENT: Negative for dental problem, nosebleeds and rhinorrhea.      Eyes: Negative for photophobia and visual disturbance.     Respiratory: Negative for cough, chest tightness and shortness of breath.      Cardiovascular: Negative for chest pain and leg swelling.     Gastrointestinal: Negative for diarrhea, nausea and vomiting.     Endocrine: Negative for polydipsia and polyphagia.     Genitourinary: Negative for frequency, hematuria and urgency.     Musculoskeletal: Negative for back pain and myalgias.     Skin: Negative for rash.     Allergic/Immunologic: Negative for food allergies.     Neurological: Negative for dizziness, seizures, syncope and facial asymmetry.     Hematological: Negative for adenopathy.     Psychiatric/Behavioral: Negative for dysphoric mood. The patient is not nervous/anxious.        I have reviewed the patient's medical and social history in detail and updated the computerized patient record.  To recap: He  has a past medical history of A-fib (HCC), Arthritis, Blood clot, Fracture of sternum, Fracture rib, Hemodialysis patient, Hyperlipidemia, Hypertension, Laceration of skin of lower leg, left, initial encounter, Laceration of skin of lower leg, right, initial encounter, NSTEMI (non-ST elevated myocardial 
Progress Note - Dr. Hebert - Internal Medicine  PCP: Rohit Pathka III, MD [address] [facility] [address] [phone]    Hospital Day: 5  Code Status: Full Code  Current Diet: ADULT DIET; Regular; Low Potassium (Less than 3000 mg/day); Low Phosphorus (Less than 1000 mg)        CC: follow up on medical issues    Subjective:   Donato Obrien is a 84 y.o. male.    Pt seen and examined  Chart reviewed since last visit, labs and imaging below        Renal eval appreciated  For HD per their orders    Pt did have some complaints of dyspnea 3/27 post HD  CXR shows interstitial fluid  Viral panel positive for RSV  CT shows nodule  Pulm asked to eval - they rec steroids, hhn and outpt f/u on nodule      Review of Systems: (1 system for EPF, 2-9 for detailed, 10+ for comprehensive)  Constitutional: Negative for chills and fever.     HENT: Negative for dental problem, nosebleeds and rhinorrhea.      Eyes: Negative for photophobia and visual disturbance.     Respiratory: Negative for cough, chest tightness and shortness of breath.      Cardiovascular: Negative for chest pain and leg swelling.     Gastrointestinal: Negative for diarrhea, nausea and vomiting.     Endocrine: Negative for polydipsia and polyphagia.     Genitourinary: Negative for frequency, hematuria and urgency.     Musculoskeletal: Negative for back pain and myalgias.     Skin: Negative for rash.     Allergic/Immunologic: Negative for food allergies.     Neurological: Negative for dizziness, seizures, syncope and facial asymmetry.     Hematological: Negative for adenopathy.     Psychiatric/Behavioral: Negative for dysphoric mood. The patient is not nervous/anxious.        I have reviewed the patient's medical and social history in detail and updated the computerized patient record.  To recap: He  has a past medical history of A-fib (HCC), Arthritis, Blood clot, Fracture of sternum, Fracture rib, Hemodialysis patient, Hyperlipidemia, Hypertension, 
Pt admitted for syncope @ HD    Full h+p to follow    Active Hospital Problems    Diagnosis Date Noted    Controlled type 2 diabetes mellitus with complication, with long-term current use of insulin (HCC) [E11.8, Z79.4] 03/06/2023     Priority: Medium    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 (HCC) [N18.6, Z99.2] 03/06/2023     Priority: Medium    Syncope [R55] 11/16/2023    Atrial fibrillation (HCC) [I48.91] 11/16/2023    Essential hypertension [I10]     Hyperlipidemia [E78.5]        Please use [employer] to contact me with any questions during the day.   The hospitalist service will provide cross-coverage for this patient from 7pm to 7am.    During those hours, contact the on-call hospitalist MD/RIKKI for questions.    
Pt declined to be placed in the bed properly at this time stating \"No I am fine this is fine\".  
Shift assessment completed, see flowsheets.  Medications administered, see MAR. Vital signs stable, SPO2 92% on room air. Plan of care discussed with patient. Fall precautions in place. Call light and bedside table within reach. Nonskid footwear on.  Pt denies further needs at this time.  Will continue to monitor.  Kimberlyn Enrique RN     
Treatment time: 2 hours (UF only)  Net UF: 1500 ml     Pre weight: 83.4 kg  Post weight:81.9 kg    Access used: RTDC    Access function: Well with  ml/min     Medications or blood products given: heparin dwells and albumin 25 grams     Regular outpatient schedule: TTS     Summary of response to treatment: BP soft from the start of treatment, albumin given as ordered by Dr. Prather. Was able to remove only 1.5L. Patient remained asymptomatic throughout entire treatment and upon the exiting the dialysis suite via transport.     Report given to Caitlyn Robles RN and copy of dialysis treatment record placed in chart, to be scanned into EMR.  
Treatment time: 3 hours    Net UF: 0 ml    Pre weight: 82.5 kg  Post weight: 82.5 kg  EDW: TBD    Access used: Rt. Chest wall TDC  Access function: Well    Medications or blood products given:   Cath-tabitha both lumens    Regular outpatient schedule: MWF    Summary of response to treatment: Patient has completed his hemodialysis treatment. He tolerated treatment fair. Throughout treatment his BP decreased making it unable to remove fluid. With one hour of his treatment his system clotted and a new system was set up when patient was reconnected to the treatment his catheter was non-functional with clots. Treatment was stopped and patient was given Cath-tabitha to dwell.    Copy of dialysis treatment record placed in chart, to be scanned into EMR.  
Treatment time: 3.5 hours  Net UF: 800 ml     Pre weight: 84.2 kg  Post weight:83.4 kg    Access used: RCVC    Access function: well with  ml/min     Medications or blood products given: heparin dwells, midodrine 10 mg, albumin 25g        Summary of response to treatment: Patient tolerated treatment well and without any complications. Patient remained stable throughout entire treatment and upon the exiting the dialysis suite via transport.     Report given to Kimberlyn Spring RN and copy of dialysis treatment record placed in chart, to be scanned into EMR.  
  Temp (24hrs), Av.2 °F (36.8 °C), Min:97.5 °F (36.4 °C), Max:98.5 °F (36.9 °C)     BP  Min: 84/63  Max: 130/70                                  Pulse  Av.6  Min: 53  Max: 74    24HR INTAKE/OUTPUT:    Intake/Output Summary (Last 24 hours) at 3/26/2025 1034  Last data filed at 3/26/2025 1003  Gross per 24 hour   Intake 240 ml   Output --   Net 240 ml     Wt Readings from Last 3 Encounters:   25 85.7 kg (188 lb 14.4 oz)   23 79 kg (174 lb 2.6 oz)   23 71.5 kg (157 lb 10.1 oz)       Physical Exam:    Gen:  Resting in bed, not in acute distress, on room air   Lungs:  Good respiratory effort, crackles  Cardiovascular: RRR   Edema:  No edema  Abd: soft, non tender, positive bowel sounds  Neuro: Awake and alert      Access : Right IJ TDC    Laboratory Data :     CBC:   Lab Results   Component Value Date/Time    WBC 8.3 2025 05:21 AM    RBC 4.08 2025 05:21 AM    HGB 13.7 2025 05:21 AM    HCT 41.9 2025 05:21 AM    .5 2025 05:21 AM    MCH 33.6 2025 05:21 AM    MCHC 32.8 2025 05:21 AM    RDW 15.3 2025 05:21 AM     2025 05:21 AM    MPV 7.7 2025 05:21 AM     BMP:    Lab Results   Component Value Date/Time     2025 05:21 AM    K 5.0 2025 05:21 AM    CL 92 2025 05:21 AM    CO2 23 2025 05:21 AM    BUN 54 2025 05:21 AM    CREATININE 6.9 2025 05:21 AM    CALCIUM 9.7 2025 05:21 AM    GFRAA 34 10/14/2013 08:07 PM    GFRAA 43 2010 03:36 PM    LABGLOM 7 2025 05:21 AM    LABGLOM 6 2023 04:28 AM    GLUCOSE 88 2025 05:21 AM     Phosphorus:    Lab Results   Component Value Date/Time    PHOS 5.9 2023 03:58 PM           Assessment and Plan      ESRD on Hemodialysis   - outpatient schedule TTS at [facility]  - Access : right IJ TDC   - TW : 83.5Kg, No significant IDWG and has been reaching his TW.   - Last HD 3/25, had [id number] with 1.3L UF, dialysis cut short due to 
Max: 123/68                                  Pulse  Av.2  Min: 66  Max: 86    24HR INTAKE/OUTPUT:  No intake or output data in the 24 hours ending 25 1643      Wt Readings from Last 3 Encounters:   25 83.4 kg (183 lb 13.8 oz)   23 79 kg (174 lb 2.6 oz)   23 71.5 kg (157 lb 10.1 oz)       Physical Exam:    Gen:  comfortable   Lungs:  Good respiratory effort, decreased at bases  Cardiovascular: RRR   Edema:  No edema  Abd: soft, non tender, positive bowel sounds  Access : Right IJ TDC    Laboratory Data :     CBC:   Lab Results   Component Value Date/Time    WBC 12.8 2025 04:27 AM    RBC 4.06 2025 04:27 AM    HGB 13.5 2025 04:27 AM    HCT 41.6 2025 04:27 AM    .5 2025 04:27 AM    MCH 33.3 2025 04:27 AM    MCHC 32.5 2025 04:27 AM    RDW 14.9 2025 04:27 AM    PLT 96 2025 04:27 AM    MPV 8.0 2025 04:27 AM     BMP:    Lab Results   Component Value Date/Time     2025 04:27 AM    K 5.3 2025 04:27 AM    K 5.0 2025 05:21 AM    CL 97 2025 04:27 AM    CO2 17 2025 04:27 AM    BUN 53 2025 04:27 AM    CREATININE 8.0 2025 04:27 AM    CALCIUM 9.9 2025 04:27 AM    GFRAA 34 10/14/2013 08:07 PM    GFRAA 43 2010 03:36 PM    LABGLOM 6 2025 04:27 AM    LABGLOM 6 2023 04:28 AM    GLUCOSE 137 2025 04:27 AM     Phosphorus:    Lab Results   Component Value Date/Time    PHOS 6.0 2025 04:27 AM           Assessment and Plan      ESRD on Hemodialysis   - outpatient schedule TTS at [facility]  - Access : right IJ TDC   - TW : 83.5Kg, No significant IDWG and has been reaching his TW.   - Missed HD 3/25 because of they syncopal episode.   Isolated UF session on 3/26 with 2.1L UF  HD session on 3/27 per schedule with only 500cc UF given intradialytic hypotension   Plan:   - HD tentatively planned for on Tuesday  - catheter loaded with Cathflo as there was aspiration of 
encounter, Laceration of skin of lower leg, right, initial encounter, NSTEMI (non-ST elevated myocardial infarction) ([facility]), Prostate cancer ([facility]), and Type II or unspecified type diabetes mellitus without mention of complication, not stated as uncontrolled.. He  has a past surgical history that includes TURP (9/07) and sigmoidoscopy (N/A, 3/9/2023).. He  reports that he has never smoked. He has never used smokeless tobacco. He reports that he does not drink alcohol and does not use drugs..        Active Hospital Problems    Diagnosis Date Noted    Controlled type 2 diabetes mellitus with complication, with long-term current use of insulin ([facility]) [E11.8, Z79.4] 03/06/2023     Priority: Medium    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 ([facility]) [N18.6, Z99.2] 03/06/2023     Priority: Medium    RSV (acute bronchiolitis due to respiratory syncytial virus) [J21.0] 03/29/2025    Syncope and collapse [R55] 03/25/2025    Syncope [R55] 11/16/2023    Atrial fibrillation ([facility]) [I48.91] 11/16/2023    Essential hypertension [I10]     Hyperlipidemia [E78.5]        Current Facility-Administered Medications: guaiFENesin-dextromethorphan (ROBITUSSIN DM) 100-10 MG/5ML syrup 5 mL, 5 mL, Oral, Q4H PRN  benzocaine-menthol (CEPACOL SORE THROAT) lozenge 1 lozenge, 1 lozenge, Oral, Q2H PRN  heparin (porcine) injection 3,600 Units, 3,600 Units, IntraCATHeter, PRN  b complex-C-folic acid (NEPHROCAPS) capsule 1 mg, 1 capsule, Oral, Daily  amLODIPine (NORVASC) tablet 5 mg, 5 mg, Oral, Daily  allopurinol (ZYLOPRIM) tablet 100 mg, 100 mg, Oral, Daily  levothyroxine (SYNTHROID) tablet 50 mcg, 50 mcg, Oral, Daily  calcium acetate (PHOSLO) capsule 667 mg, 1 capsule, Oral, PRN  isosorbide mononitrate (IMDUR) extended release tablet 30 mg, 30 mg, Oral, Daily  rosuvastatin (CRESTOR) tablet 20 mg, 20 mg, Oral, Nightly  clopidogrel (PLAVIX) tablet 75 mg, 75 mg, Oral, Daily  sodium chloride flush 0.9 % injection 10 mL, 10 mL, IntraVENous, 2 times per day  sodium chloride flush 
100 mg, 100 mg, Oral, Daily  levothyroxine (SYNTHROID) tablet 50 mcg, 50 mcg, Oral, Daily  isosorbide mononitrate (IMDUR) extended release tablet 30 mg, 30 mg, Oral, Daily  rosuvastatin (CRESTOR) tablet 20 mg, 20 mg, Oral, Nightly  clopidogrel (PLAVIX) tablet 75 mg, 75 mg, Oral, Daily  sodium chloride flush 0.9 % injection 10 mL, 10 mL, IntraVENous, 2 times per day  sodium chloride flush 0.9 % injection 10 mL, 10 mL, IntraVENous, PRN  0.9 % sodium chloride infusion, , IntraVENous, PRN  ondansetron (ZOFRAN-ODT) disintegrating tablet 4 mg, 4 mg, Oral, Q8H PRN **OR** ondansetron (ZOFRAN) injection 4 mg, 4 mg, IntraVENous, Q6H PRN  acetaminophen (TYLENOL) tablet 650 mg, 650 mg, Oral, Q6H PRN **OR** acetaminophen (TYLENOL) suppository 650 mg, 650 mg, Rectal, Q6H PRN  sulfur hexafluoride microspheres (LUMASON) 60.7-25 MG injection 2 mL, 2 mL, IntraVENous, ONCE PRN  hydrALAZINE (APRESOLINE) injection 10 mg, 10 mg, IntraVENous, Q6H PRN  sodium chloride 0.9 % bolus 500 mL, 500 mL, IntraVENous, PRN  heparin (porcine) injection 5,000 Units, 5,000 Units, SubCUTAneous, 3 times per day  midodrine (PROAMATINE) tablet 5 mg, 5 mg, Oral, Once per day on Sunday Monday Wednesday Friday  midodrine (PROAMATINE) tablet 10 mg, 10 mg, Oral, Once per day on Tuesday Thursday Saturday  aspirin-acetaminophen-caffeine (EXCEDRIN MIGRAINE) per tablet 1 tablet, 1 tablet, Oral, Q6H PRN         Objective:  /65   Pulse 74   Temp 97 °F (36.1 °C) (Oral)   Resp 17   Ht 1.778 m (5' 10\")   Wt 83.4 kg (183 lb 13.8 oz)   SpO2 90%   BMI 26.38 kg/m²      Patient Vitals for the past 24 hrs:   BP Temp Temp src Pulse Resp SpO2   03/31/25 0050 101/65 97 °F (36.1 °C) Oral 74 17 90 %   03/30/25 2045 (!) 95/58 97.2 °F (36.2 °C) Oral 91 18 93 %   03/30/25 2021 -- -- -- 74 18 95 %   03/30/25 1620 -- -- -- 72 18 91 %   03/30/25 1558 (!) 97/55 97.4 °F (36.3 °C) Oral 80 -- 90 %   03/30/25 1304 -- -- -- 68 18 92 %   03/30/25 1207 108/72 97.6 °F (36.4 °C) Oral 
mg, 650 mg, Oral, Q6H PRN **OR** acetaminophen (TYLENOL) suppository 650 mg, 650 mg, Rectal, Q6H PRN  sulfur hexafluoride microspheres (LUMASON) 60.7-25 MG injection 2 mL, 2 mL, IntraVENous, ONCE PRN  hydrALAZINE (APRESOLINE) injection 10 mg, 10 mg, IntraVENous, Q6H PRN  sodium chloride 0.9 % bolus 500 mL, 500 mL, IntraVENous, PRN  heparin (porcine) injection 5,000 Units, 5,000 Units, SubCUTAneous, 3 times per day  midodrine (PROAMATINE) tablet 5 mg, 5 mg, Oral, Once per day on Sunday Monday Wednesday Friday  [START ON 3/27/2025] midodrine (PROAMATINE) tablet 10 mg, 10 mg, Oral, Once per day on Tuesday Thursday Saturday  aspirin-acetaminophen-caffeine (EXCEDRIN MIGRAINE) per tablet 1 tablet, 1 tablet, Oral, Q6H PRN  calcium acetate (PHOSLO) capsule 1,334 mg, 2 capsule, Oral, TID WC         Objective:  /73   Pulse 70   Temp 97.5 °F (36.4 °C) (Oral)   Resp 18   Ht 1.778 m (5' 10\")   Wt 85.7 kg (188 lb 14.4 oz)   SpO2 92%   BMI 27.10 kg/m²      Patient Vitals for the past 24 hrs:   BP Temp Temp src Pulse Resp SpO2 Height Weight   03/26/25 0801 119/73 97.5 °F (36.4 °C) Oral 70 18 92 % -- --   03/26/25 0645 -- -- -- -- -- -- -- 85.7 kg (188 lb 14.4 oz)   03/26/25 0638 96/61 98.3 °F (36.8 °C) Oral 70 16 92 % -- --   03/26/25 0000 102/66 98.5 °F (36.9 °C) Oral 74 16 97 % -- --   03/25/25 2115 (!) 95/57 98.3 °F (36.8 °C) Oral 62 18 96 % -- --   03/25/25 1713 -- -- -- -- -- -- 1.778 m (5' 10\") 85.9 kg (189 lb 6 oz)   03/25/25 1710 123/68 98.3 °F (36.8 °C) Oral 69 20 95 % -- --   03/25/25 1400 112/73 -- -- 66 22 -- -- --   03/25/25 1330 126/70 -- -- 68 14 -- -- --   03/25/25 1300 130/70 -- -- 71 19 -- -- --   03/25/25 1238 122/63 -- -- -- -- -- -- --   03/25/25 1230 122/63 -- -- 65 12 -- -- --   03/25/25 1215 -- -- -- 59 16 95 % -- --   03/25/25 1209 -- -- -- 53 15 -- -- --   03/25/25 1200 109/61 -- -- 59 19 -- -- --   03/25/25 1130 96/62 -- -- 61 20 -- -- --   03/25/25 1109 (!) 84/63 -- -- 57 30 -- -- --

## 2025-04-05 ENCOUNTER — HOSPITAL ENCOUNTER (INPATIENT)
Age: 85
LOS: 4 days | Discharge: HOME | DRG: 190 | End: 2025-04-10
Attending: STUDENT IN AN ORGANIZED HEALTH CARE EDUCATION/TRAINING PROGRAM | Admitting: HOSPITALIST
Payer: MEDICARE

## 2025-04-05 ENCOUNTER — APPOINTMENT (OUTPATIENT)
Dept: GENERAL RADIOLOGY | Age: 85
DRG: 190 | End: 2025-04-05
Payer: MEDICARE

## 2025-04-05 DIAGNOSIS — R09.02 HYPOXIA: ICD-10-CM

## 2025-04-05 DIAGNOSIS — N18.6 ESRD (END STAGE RENAL DISEASE) (HCC): ICD-10-CM

## 2025-04-05 DIAGNOSIS — J44.1 COPD EXACERBATION (HCC): Primary | ICD-10-CM

## 2025-04-05 LAB
ALBUMIN SERPL-MCNC: 4 G/DL (ref 3.4–5)
ALBUMIN/GLOB SERPL: 1.1 {RATIO} (ref 1.1–2.2)
ALP SERPL-CCNC: 93 U/L (ref 40–129)
ALT SERPL-CCNC: 55 U/L (ref 10–40)
ANION GAP SERPL CALCULATED.3IONS-SCNC: 18 MMOL/L (ref 3–16)
AST SERPL-CCNC: 49 U/L (ref 15–37)
BASE EXCESS BLDV CALC-SCNC: 3.6 MMOL/L (ref -3–3)
BASOPHILS # BLD: 0 K/UL (ref 0–0.2)
BASOPHILS NFR BLD: 0 %
BILIRUB SERPL-MCNC: 0.6 MG/DL (ref 0–1)
BUN SERPL-MCNC: 35 MG/DL (ref 7–20)
CALCIUM SERPL-MCNC: 9.7 MG/DL (ref 8.3–10.6)
CHLORIDE SERPL-SCNC: 94 MMOL/L (ref 99–110)
CO2 BLDV-SCNC: 69 MMOL/L
CO2 SERPL-SCNC: 25 MMOL/L (ref 21–32)
COHGB MFR BLDV: 3.2 % (ref 0–1.5)
CREAT SERPL-MCNC: 5.2 MG/DL (ref 0.8–1.3)
DEPRECATED RDW RBC AUTO: 15.1 % (ref 12.4–15.4)
DO-HGB MFR BLDV: 20 %
EOSINOPHIL # BLD: 0 K/UL (ref 0–0.6)
EOSINOPHIL NFR BLD: 0 %
FLUAV RNA RESP QL NAA+PROBE: NOT DETECTED
FLUBV RNA RESP QL NAA+PROBE: NOT DETECTED
GFR SERPLBLD CREATININE-BSD FMLA CKD-EPI: 10 ML/MIN/{1.73_M2}
GLUCOSE SERPL-MCNC: 136 MG/DL (ref 70–99)
HCO3 BLDV-SCNC: 29.2 MMOL/L (ref 23–29)
HCT VFR BLD AUTO: 37.3 % (ref 40.5–52.5)
HGB BLD-MCNC: 12 G/DL (ref 13.5–17.5)
LYMPHOCYTES # BLD: 1.6 K/UL (ref 1–5.1)
LYMPHOCYTES NFR BLD: 11 %
MACROCYTES BLD QL SMEAR: ABNORMAL
MCH RBC QN AUTO: 33.4 PG (ref 26–34)
MCHC RBC AUTO-ENTMCNC: 32.2 G/DL (ref 31–36)
MCV RBC AUTO: 103.7 FL (ref 80–100)
METHGB MFR BLDV: 0.1 %
MONOCYTES # BLD: 1 K/UL (ref 0–1.3)
MONOCYTES NFR BLD: 7 %
NEUTROPHILS # BLD: 11.6 K/UL (ref 1.7–7.7)
NEUTROPHILS NFR BLD: 82 %
NT-PROBNP SERPL-MCNC: ABNORMAL PG/ML (ref 0–449)
O2 CT VFR BLDV CALC: 14 VOL %
O2 THERAPY: ABNORMAL
PCO2 BLDV: 47.8 MMHG (ref 40–50)
PH BLDV: 7.39 [PH] (ref 7.35–7.45)
PLATELET # BLD AUTO: 142 K/UL (ref 135–450)
PLATELET BLD QL SMEAR: ABNORMAL
PMV BLD AUTO: 8.4 FL (ref 5–10.5)
PO2 BLDV: 46.8 MMHG (ref 25–40)
POLYCHROMASIA BLD QL SMEAR: ABNORMAL
POTASSIUM SERPL-SCNC: 4.4 MMOL/L (ref 3.5–5.1)
PROT SERPL-MCNC: 7.6 G/DL (ref 6.4–8.2)
RBC # BLD AUTO: 3.6 M/UL (ref 4.2–5.9)
RSV AG NOSE QL: NEGATIVE
SAO2 % BLDV: 80 %
SARS-COV-2 RNA RESP QL NAA+PROBE: NOT DETECTED
SLIDE REVIEW: ABNORMAL
SODIUM SERPL-SCNC: 137 MMOL/L (ref 136–145)
TROPONIN, HIGH SENSITIVITY: 375 NG/L (ref 0–22)
WBC # BLD AUTO: 14.2 K/UL (ref 4–11)

## 2025-04-05 PROCEDURE — 94640 AIRWAY INHALATION TREATMENT: CPT

## 2025-04-05 PROCEDURE — 71045 X-RAY EXAM CHEST 1 VIEW: CPT

## 2025-04-05 PROCEDURE — 96374 THER/PROPH/DIAG INJ IV PUSH: CPT

## 2025-04-05 PROCEDURE — 87807 RSV ASSAY W/OPTIC: CPT

## 2025-04-05 PROCEDURE — 6360000002 HC RX W HCPCS: Performed by: STUDENT IN AN ORGANIZED HEALTH CARE EDUCATION/TRAINING PROGRAM

## 2025-04-05 PROCEDURE — 99285 EMERGENCY DEPT VISIT HI MDM: CPT

## 2025-04-05 PROCEDURE — 94761 N-INVAS EAR/PLS OXIMETRY MLT: CPT

## 2025-04-05 PROCEDURE — 83880 ASSAY OF NATRIURETIC PEPTIDE: CPT

## 2025-04-05 PROCEDURE — 93005 ELECTROCARDIOGRAM TRACING: CPT | Performed by: STUDENT IN AN ORGANIZED HEALTH CARE EDUCATION/TRAINING PROGRAM

## 2025-04-05 PROCEDURE — 6370000000 HC RX 637 (ALT 250 FOR IP): Performed by: STUDENT IN AN ORGANIZED HEALTH CARE EDUCATION/TRAINING PROGRAM

## 2025-04-05 PROCEDURE — 87636 SARSCOV2 & INF A&B AMP PRB: CPT

## 2025-04-05 PROCEDURE — 80053 COMPREHEN METABOLIC PANEL: CPT

## 2025-04-05 PROCEDURE — 82803 BLOOD GASES ANY COMBINATION: CPT

## 2025-04-05 PROCEDURE — 85025 COMPLETE CBC W/AUTO DIFF WBC: CPT

## 2025-04-05 PROCEDURE — 2700000000 HC OXYGEN THERAPY PER DAY

## 2025-04-05 PROCEDURE — 84484 ASSAY OF TROPONIN QUANT: CPT

## 2025-04-05 PROCEDURE — 2500000003 HC RX 250 WO HCPCS: Performed by: STUDENT IN AN ORGANIZED HEALTH CARE EDUCATION/TRAINING PROGRAM

## 2025-04-05 RX ORDER — GUAIFENESIN AND DEXTROMETHORPHAN HYDROBROMIDE 1200; 60 MG/1; MG/1
1 TABLET, EXTENDED RELEASE ORAL DAILY
COMMUNITY

## 2025-04-05 RX ORDER — ALBUTEROL SULFATE 0.83 MG/ML
5 SOLUTION RESPIRATORY (INHALATION) ONCE
Status: COMPLETED | OUTPATIENT
Start: 2025-04-05 | End: 2025-04-05

## 2025-04-05 RX ORDER — IPRATROPIUM BROMIDE AND ALBUTEROL SULFATE 2.5; .5 MG/3ML; MG/3ML
1 SOLUTION RESPIRATORY (INHALATION) ONCE
Status: COMPLETED | OUTPATIENT
Start: 2025-04-05 | End: 2025-04-05

## 2025-04-05 RX ADMIN — ALBUTEROL SULFATE 5 MG: 2.5 SOLUTION RESPIRATORY (INHALATION) at 21:53

## 2025-04-05 RX ADMIN — IPRATROPIUM BROMIDE AND ALBUTEROL SULFATE 1 DOSE: .5; 3 SOLUTION RESPIRATORY (INHALATION) at 21:53

## 2025-04-05 RX ADMIN — WATER 125 MG: 1 INJECTION INTRAMUSCULAR; INTRAVENOUS; SUBCUTANEOUS at 21:46

## 2025-04-05 ASSESSMENT — PAIN - FUNCTIONAL ASSESSMENT: PAIN_FUNCTIONAL_ASSESSMENT: NONE - DENIES PAIN

## 2025-04-06 PROBLEM — J44.1 COPD EXACERBATION (HCC): Status: ACTIVE | Noted: 2025-04-06

## 2025-04-06 LAB
ANION GAP SERPL CALCULATED.3IONS-SCNC: 18 MMOL/L (ref 3–16)
BASE EXCESS BLDA CALC-SCNC: 1.8 MMOL/L (ref -3–3)
BASOPHILS # BLD: 0 K/UL (ref 0–0.2)
BASOPHILS NFR BLD: 0.1 %
BUN SERPL-MCNC: 44 MG/DL (ref 7–20)
CALCIUM SERPL-MCNC: 9.6 MG/DL (ref 8.3–10.6)
CHLORIDE SERPL-SCNC: 95 MMOL/L (ref 99–110)
CO2 BLDA-SCNC: 62.2 MMOL/L
CO2 SERPL-SCNC: 23 MMOL/L (ref 21–32)
COHGB MFR BLDA: 2 % (ref 0–1.5)
CREAT SERPL-MCNC: 6 MG/DL (ref 0.8–1.3)
DEPRECATED RDW RBC AUTO: 15 % (ref 12.4–15.4)
EKG DIAGNOSIS: NORMAL
EKG Q-T INTERVAL: 402 MS
EKG QRS DURATION: 106 MS
EKG QTC CALCULATION (BAZETT): 446 MS
EKG R AXIS: -79 DEGREES
EKG T AXIS: 58 DEGREES
EKG VENTRICULAR RATE: 74 BPM
EOSINOPHIL # BLD: 0 K/UL (ref 0–0.6)
EOSINOPHIL NFR BLD: 0 %
GFR SERPLBLD CREATININE-BSD FMLA CKD-EPI: 9 ML/MIN/{1.73_M2}
GLUCOSE SERPL-MCNC: 185 MG/DL (ref 70–99)
HCO3 BLDA-SCNC: 26.5 MMOL/L (ref 21–29)
HCT VFR BLD AUTO: 34.5 % (ref 40.5–52.5)
HGB BLD-MCNC: 11.4 G/DL (ref 13.5–17.5)
HGB BLDA-MCNC: 11.4 G/DL (ref 13.5–17.5)
LYMPHOCYTES # BLD: 0.5 K/UL (ref 1–5.1)
LYMPHOCYTES NFR BLD: 4.2 %
MCH RBC QN AUTO: 33.6 PG (ref 26–34)
MCHC RBC AUTO-ENTMCNC: 33 G/DL (ref 31–36)
MCV RBC AUTO: 102 FL (ref 80–100)
METHGB MFR BLDA: 0.4 %
MONOCYTES # BLD: 0.4 K/UL (ref 0–1.3)
MONOCYTES NFR BLD: 2.7 %
NEUTROPHILS # BLD: 12 K/UL (ref 1.7–7.7)
NEUTROPHILS NFR BLD: 93 %
O2 THERAPY: ABNORMAL
PCO2 BLDA: 41.1 MMHG (ref 35–45)
PH BLDA: 7.42 [PH] (ref 7.35–7.45)
PLATELET # BLD AUTO: 148 K/UL (ref 135–450)
PMV BLD AUTO: 8 FL (ref 5–10.5)
PO2 BLDA: 116 MMHG (ref 75–108)
POTASSIUM SERPL-SCNC: 4.9 MMOL/L (ref 3.5–5.1)
RBC # BLD AUTO: 3.38 M/UL (ref 4.2–5.9)
SAO2 % BLDA: 99.5 %
SODIUM SERPL-SCNC: 136 MMOL/L (ref 136–145)
TROPONIN, HIGH SENSITIVITY: 343 NG/L (ref 0–22)
WBC # BLD AUTO: 12.9 K/UL (ref 4–11)

## 2025-04-06 PROCEDURE — 94761 N-INVAS EAR/PLS OXIMETRY MLT: CPT

## 2025-04-06 PROCEDURE — 36600 WITHDRAWAL OF ARTERIAL BLOOD: CPT

## 2025-04-06 PROCEDURE — 85025 COMPLETE CBC W/AUTO DIFF WBC: CPT

## 2025-04-06 PROCEDURE — 6360000002 HC RX W HCPCS: Performed by: PHYSICIAN ASSISTANT

## 2025-04-06 PROCEDURE — 2500000003 HC RX 250 WO HCPCS: Performed by: PHYSICIAN ASSISTANT

## 2025-04-06 PROCEDURE — 6370000000 HC RX 637 (ALT 250 FOR IP): Performed by: PHYSICIAN ASSISTANT

## 2025-04-06 PROCEDURE — 93010 ELECTROCARDIOGRAM REPORT: CPT | Performed by: INTERNAL MEDICINE

## 2025-04-06 PROCEDURE — 94640 AIRWAY INHALATION TREATMENT: CPT

## 2025-04-06 PROCEDURE — 36415 COLL VENOUS BLD VENIPUNCTURE: CPT

## 2025-04-06 PROCEDURE — 2700000000 HC OXYGEN THERAPY PER DAY

## 2025-04-06 PROCEDURE — 1200000000 HC SEMI PRIVATE

## 2025-04-06 PROCEDURE — 6370000000 HC RX 637 (ALT 250 FOR IP): Performed by: HOSPITALIST

## 2025-04-06 PROCEDURE — 80048 BASIC METABOLIC PNL TOTAL CA: CPT

## 2025-04-06 PROCEDURE — 82803 BLOOD GASES ANY COMBINATION: CPT

## 2025-04-06 RX ORDER — MIDODRINE HYDROCHLORIDE 5 MG/1
10 TABLET ORAL ONCE
Status: COMPLETED | OUTPATIENT
Start: 2025-04-07 | End: 2025-04-07

## 2025-04-06 RX ORDER — MIDODRINE HYDROCHLORIDE 5 MG/1
5 TABLET ORAL SEE ADMIN INSTRUCTIONS
Status: DISCONTINUED | OUTPATIENT
Start: 2025-04-06 | End: 2025-04-06 | Stop reason: SDUPTHER

## 2025-04-06 RX ORDER — ISOSORBIDE MONONITRATE 30 MG/1
30 TABLET, EXTENDED RELEASE ORAL DAILY
Status: DISCONTINUED | OUTPATIENT
Start: 2025-04-06 | End: 2025-04-10 | Stop reason: HOSPADM

## 2025-04-06 RX ORDER — SODIUM CHLORIDE 0.9 % (FLUSH) 0.9 %
5-40 SYRINGE (ML) INJECTION EVERY 12 HOURS SCHEDULED
Status: DISCONTINUED | OUTPATIENT
Start: 2025-04-06 | End: 2025-04-10 | Stop reason: HOSPADM

## 2025-04-06 RX ORDER — ACETAMINOPHEN 650 MG/1
650 SUPPOSITORY RECTAL EVERY 6 HOURS PRN
Status: DISCONTINUED | OUTPATIENT
Start: 2025-04-06 | End: 2025-04-10 | Stop reason: HOSPADM

## 2025-04-06 RX ORDER — MIDODRINE HYDROCHLORIDE 5 MG/1
10 TABLET ORAL DAILY PRN
Status: DISCONTINUED | OUTPATIENT
Start: 2025-04-06 | End: 2025-04-10 | Stop reason: HOSPADM

## 2025-04-06 RX ORDER — MIDODRINE HYDROCHLORIDE 5 MG/1
10 TABLET ORAL
Status: DISCONTINUED | OUTPATIENT
Start: 2025-04-08 | End: 2025-04-10 | Stop reason: HOSPADM

## 2025-04-06 RX ORDER — CLOPIDOGREL BISULFATE 75 MG/1
75 TABLET ORAL DAILY
Status: DISCONTINUED | OUTPATIENT
Start: 2025-04-06 | End: 2025-04-10 | Stop reason: HOSPADM

## 2025-04-06 RX ORDER — AMLODIPINE BESYLATE 5 MG/1
5 TABLET ORAL DAILY
Status: DISCONTINUED | OUTPATIENT
Start: 2025-04-06 | End: 2025-04-06

## 2025-04-06 RX ORDER — SENNOSIDES A AND B 8.6 MG/1
1 TABLET, FILM COATED ORAL DAILY PRN
Status: DISCONTINUED | OUTPATIENT
Start: 2025-04-06 | End: 2025-04-10 | Stop reason: HOSPADM

## 2025-04-06 RX ORDER — IPRATROPIUM BROMIDE AND ALBUTEROL SULFATE 2.5; .5 MG/3ML; MG/3ML
1 SOLUTION RESPIRATORY (INHALATION)
Status: DISCONTINUED | OUTPATIENT
Start: 2025-04-06 | End: 2025-04-06

## 2025-04-06 RX ORDER — ALBUTEROL SULFATE 0.83 MG/ML
2.5 SOLUTION RESPIRATORY (INHALATION)
Status: DISCONTINUED | OUTPATIENT
Start: 2025-04-06 | End: 2025-04-10 | Stop reason: HOSPADM

## 2025-04-06 RX ORDER — MIDODRINE HYDROCHLORIDE 5 MG/1
5 TABLET ORAL
Status: DISCONTINUED | OUTPATIENT
Start: 2025-04-06 | End: 2025-04-10 | Stop reason: HOSPADM

## 2025-04-06 RX ORDER — HEPARIN SODIUM 5000 [USP'U]/ML
5000 INJECTION, SOLUTION INTRAVENOUS; SUBCUTANEOUS EVERY 8 HOURS SCHEDULED
Status: DISCONTINUED | OUTPATIENT
Start: 2025-04-06 | End: 2025-04-10 | Stop reason: HOSPADM

## 2025-04-06 RX ORDER — ACETAMINOPHEN 325 MG/1
650 TABLET ORAL EVERY 6 HOURS PRN
Status: DISCONTINUED | OUTPATIENT
Start: 2025-04-06 | End: 2025-04-10 | Stop reason: HOSPADM

## 2025-04-06 RX ORDER — PREDNISONE 20 MG/1
40 TABLET ORAL DAILY
Status: DISCONTINUED | OUTPATIENT
Start: 2025-04-08 | End: 2025-04-06

## 2025-04-06 RX ORDER — ONDANSETRON 2 MG/ML
4 INJECTION INTRAMUSCULAR; INTRAVENOUS EVERY 6 HOURS PRN
Status: DISCONTINUED | OUTPATIENT
Start: 2025-04-06 | End: 2025-04-10 | Stop reason: HOSPADM

## 2025-04-06 RX ORDER — BUDESONIDE AND FORMOTEROL FUMARATE DIHYDRATE 160; 4.5 UG/1; UG/1
2 AEROSOL RESPIRATORY (INHALATION)
Status: DISCONTINUED | OUTPATIENT
Start: 2025-04-06 | End: 2025-04-10 | Stop reason: HOSPADM

## 2025-04-06 RX ORDER — IPRATROPIUM BROMIDE AND ALBUTEROL SULFATE 2.5; .5 MG/3ML; MG/3ML
1 SOLUTION RESPIRATORY (INHALATION)
Status: DISCONTINUED | OUTPATIENT
Start: 2025-04-06 | End: 2025-04-07

## 2025-04-06 RX ORDER — EZETIMIBE 10 MG/1
10 TABLET ORAL DAILY
Status: DISCONTINUED | OUTPATIENT
Start: 2025-04-06 | End: 2025-04-06 | Stop reason: RX

## 2025-04-06 RX ORDER — SODIUM CHLORIDE 9 MG/ML
INJECTION, SOLUTION INTRAVENOUS PRN
Status: DISCONTINUED | OUTPATIENT
Start: 2025-04-06 | End: 2025-04-10 | Stop reason: HOSPADM

## 2025-04-06 RX ORDER — ALLOPURINOL 100 MG/1
100 TABLET ORAL DAILY
Status: DISCONTINUED | OUTPATIENT
Start: 2025-04-06 | End: 2025-04-10 | Stop reason: HOSPADM

## 2025-04-06 RX ORDER — ROSUVASTATIN CALCIUM 10 MG/1
10 TABLET, COATED ORAL DAILY
Status: DISCONTINUED | OUTPATIENT
Start: 2025-04-06 | End: 2025-04-10 | Stop reason: HOSPADM

## 2025-04-06 RX ORDER — LEVOTHYROXINE SODIUM 25 UG/1
50 TABLET ORAL DAILY
Status: DISCONTINUED | OUTPATIENT
Start: 2025-04-06 | End: 2025-04-10 | Stop reason: HOSPADM

## 2025-04-06 RX ORDER — PREDNISONE 20 MG/1
40 TABLET ORAL DAILY
Status: DISCONTINUED | OUTPATIENT
Start: 2025-04-07 | End: 2025-04-09

## 2025-04-06 RX ORDER — SODIUM CHLORIDE 0.9 % (FLUSH) 0.9 %
5-40 SYRINGE (ML) INJECTION PRN
Status: DISCONTINUED | OUTPATIENT
Start: 2025-04-06 | End: 2025-04-10 | Stop reason: HOSPADM

## 2025-04-06 RX ADMIN — CLOPIDOGREL BISULFATE 75 MG: 75 TABLET, FILM COATED ORAL at 08:27

## 2025-04-06 RX ADMIN — LEVOTHYROXINE SODIUM 50 MCG: 0.03 TABLET ORAL at 07:08

## 2025-04-06 RX ADMIN — METHYLPREDNISOLONE SODIUM SUCCINATE 40 MG: 40 INJECTION INTRAMUSCULAR; INTRAVENOUS at 08:27

## 2025-04-06 RX ADMIN — SODIUM CHLORIDE, PRESERVATIVE FREE 10 ML: 5 INJECTION INTRAVENOUS at 08:30

## 2025-04-06 RX ADMIN — Medication 1 MG: at 09:10

## 2025-04-06 RX ADMIN — ROSUVASTATIN CALCIUM 10 MG: 10 TABLET, FILM COATED ORAL at 08:27

## 2025-04-06 RX ADMIN — ALLOPURINOL 100 MG: 100 TABLET ORAL at 08:27

## 2025-04-06 RX ADMIN — IPRATROPIUM BROMIDE AND ALBUTEROL SULFATE 1 DOSE: .5; 3 SOLUTION RESPIRATORY (INHALATION) at 16:18

## 2025-04-06 RX ADMIN — IPRATROPIUM BROMIDE AND ALBUTEROL SULFATE 1 DOSE: .5; 3 SOLUTION RESPIRATORY (INHALATION) at 13:04

## 2025-04-06 RX ADMIN — ISOSORBIDE MONONITRATE 30 MG: 30 TABLET, EXTENDED RELEASE ORAL at 08:27

## 2025-04-06 RX ADMIN — MIDODRINE HYDROCHLORIDE 5 MG: 5 TABLET ORAL at 08:27

## 2025-04-06 RX ADMIN — IPRATROPIUM BROMIDE AND ALBUTEROL SULFATE 1 DOSE: .5; 3 SOLUTION RESPIRATORY (INHALATION) at 05:14

## 2025-04-06 RX ADMIN — IPRATROPIUM BROMIDE AND ALBUTEROL SULFATE 1 DOSE: .5; 3 SOLUTION RESPIRATORY (INHALATION) at 09:35

## 2025-04-06 RX ADMIN — AMLODIPINE BESYLATE 5 MG: 5 TABLET ORAL at 08:27

## 2025-04-06 RX ADMIN — IPRATROPIUM BROMIDE AND ALBUTEROL SULFATE 1 DOSE: .5; 3 SOLUTION RESPIRATORY (INHALATION) at 20:09

## 2025-04-06 RX ADMIN — BUDESONIDE AND FORMOTEROL FUMARATE DIHYDRATE 2 PUFF: 160; 4.5 AEROSOL RESPIRATORY (INHALATION) at 20:10

## 2025-04-06 RX ADMIN — IPRATROPIUM BROMIDE AND ALBUTEROL SULFATE 1 DOSE: .5; 3 SOLUTION RESPIRATORY (INHALATION) at 23:36

## 2025-04-06 RX ADMIN — SODIUM CHLORIDE, PRESERVATIVE FREE 10 ML: 5 INJECTION INTRAVENOUS at 21:11

## 2025-04-06 RX ADMIN — BUDESONIDE AND FORMOTEROL FUMARATE DIHYDRATE 2 PUFF: 160; 4.5 AEROSOL RESPIRATORY (INHALATION) at 09:48

## 2025-04-06 ASSESSMENT — PAIN DESCRIPTION - LOCATION: LOCATION: THROAT

## 2025-04-06 ASSESSMENT — PAIN SCALES - GENERAL
PAINLEVEL_OUTOF10: 4
PAINLEVEL_OUTOF10: 0

## 2025-04-06 NOTE — ED PROVIDER NOTES
Tx.):  MRI not deemed clinically necessary in the ED setting  Social Determinants none          PROCEDURES:  Unless otherwise noted below, none     Procedures        FINAL IMPRESSION      1. COPD exacerbation (HCC)    2. Hypoxia    3. ESRD (end stage renal disease) (HCC)          DISPOSITION/PLAN   DISPOSITION        PATIENT REFERRED TO:  No follow-up provider specified.    DISCHARGE MEDICATIONS:      New Prescriptions    No medications on file       Controlled Substances Monitoring:    If the patient was prescribed a controlled substance today, the PDMP was reviewed as documented below.         No data to display                (Please note that portions of this note were completed with a voice recognition program.  Efforts were made to edit the dictations but occasionally words are mis-transcribed.)    Tayler Grissom MD (electronically signed)  Attending Emergency Physician           Tayler Grissom MD  04/05/25 2523    
mis-transcribed.)    Donna Grey PA-C (electronically signed)       Donna Grey PA-C  04/06/25 0001

## 2025-04-06 NOTE — ED NOTES
Patient Name: Donato Obrien  : 1940 84 y.o.  MRN: [id number]  ED Room #: ED-0021/21     Chief complaint:   Chief Complaint   Patient presents with    Shortness of Breath     Pt arrived via [facility] from home w c/o SOB. Pt arrived on 4 lpm via [facility] (baseline 2 lpm).      Hospital Problem/Diagnosis:   Hospital Problems           Last Modified POA    * (Principal) COPD exacerbation (HCC) 2025 Yes         O2 Flow Rate:O2 Device: Nasal cannula O2 Flow Rate (L/min): 2 L/min (if applicable)  Cardiac Rhythm:   (if applicable)  Active LDA's:   Peripheral IV 25 Right Forearm (Active)   Site Assessment Clean, dry & intact 25   Line Status Blood return noted 25   Phlebitis Assessment No symptoms 25   Infiltration Assessment 0 25            How does patient ambulate? Front Wheeled Walker    2. How does patient take pills? Unknown, no oral medications were given in the Emergency Department    3. Is patient alert? Alert    4. Is patient oriented? To Person, To Place, To Time, and To Situation    5.   Patient arrived from:  home  Facility Name: ___________________________________________    6. If patient is disoriented or from a [facility] Facility has family been notified of admission? No    7. Patient belongings? Belongings: Clothing    Disposition of belongings? Kept with Patient     8. Any specific patient or family belongings/needs/dynamics?   a. [place] (Tuesday, Thursday, Saturday[place] [facility] [place]    9. Miscellaneous comments/pending orders?  a. ED orders complete      If there are any additional questions please reach out to the Emergency Department.

## 2025-04-06 NOTE — CARE COORDINATION
04/06/25 0904   Readmission Assessment   Number of Days since last admission? 1-7 days   Previous Disposition Home with Family   Who is being Interviewed Unable to Complete  (Medical Record)   What was the patient's/caregiver's perception as to why they think they needed to return back to the hospital? Other (Comment)  (SOB after dialysis)   Did you see a specialist, such as Cardiac, Pulmonary, Orthopedic Physician, etc. after you left the hospital? No   Who advised the patient to return to the hospital? Other (Comment)  (HD clinic [facility]")   Does the patient report anything that got in the way of taking their medications? No   In our efforts to provide the best possible care to you and others like you, can you think of anything that we could have done to help you after you left the hospital the first time, so that you might not have needed to return so soon? Other (Comment)  (n/a)

## 2025-04-06 NOTE — H&P
(ROBITUSSIN DM) 100-10 MG/5ML syrup Take 5 mLs by mouth every 4 hours as needed for Cough 4/1/25 4/11/25 Yes Filipe Hebert MD   isosorbide mononitrate (IMDUR) 30 MG extended release tablet Take 1 tablet by mouth daily 11/28/23  Yes Filipe Hebert MD   rosuvastatin (CRESTOR) 20 MG tablet Take 1 tablet by mouth nightly  Patient taking differently: Take 1 tablet by mouth daily 11/28/23  Yes Filipe Hebert MD   clopidogrel (PLAVIX) 75 MG tablet Take 1 tablet by mouth daily 11/28/23  Yes Filipe Hebert MD   levothyroxine (SYNTHROID) 50 MCG tablet Take 1 tablet by mouth Daily   Yes Evon Galvan MD   calcium acetate (PHOSLO) 667 MG CAPS capsule Take 1 capsule by mouth See Admin Instructions Take 2 tablets three times daily with meals and one tablet with a snack.   Yes ProviderEvon MD   Emollient (EUCERIN ADVANCED REPAIR) CREA Apply topically 2 times daily as needed   Yes ProviderEvon MD   aspirin-caffeine (ANACIN) 400-32 MG TABS Take 1 tablet by mouth every 6 hours as needed for Headaches or Pain   Yes ProviderEvon MD   allopurinol (ZYLOPRIM) 100 MG tablet Take 1 tablet by mouth daily   Yes Evon Galvan MD   amLODIPine (NORVASC) 5 MG tablet Take 1 tablet by mouth daily   Yes Evon Galvan MD   diclofenac sodium (VOLTAREN) 1 % GEL Apply 2 g topically 2 times daily 4/21/23  Yes Radha King MD   b complex-C-folic acid (NEPHROCAPS) 1 MG capsule Take 1 capsule by mouth daily   Yes Evon Galvan MD   midodrine (PROAMATINE) 5 MG tablet Take 1 tablet by mouth See Admin Instructions Take 2 tablets by mouth prior to dialysis (Tuesday, Thursday, Saturday) and 1 tablet daily all other days.   Yes Evon Galvan MD   ezetimibe (ZETIA) 10 MG tablet Take 1 tablet by mouth daily. 12/20/10  Yes Rohit Pathak III, MD   ONE TOUCH ULTRASOFT LANCETS MISC TEST once daily 9/9/10   Rohit Pathak III, MD   Blood Glucose Monitoring Suppl (ONE TOUCH ULTRA SYSTEM

## 2025-04-07 ENCOUNTER — APPOINTMENT (OUTPATIENT)
Dept: CT IMAGING | Age: 85
DRG: 190 | End: 2025-04-07
Payer: MEDICARE

## 2025-04-07 LAB
ANION GAP SERPL CALCULATED.3IONS-SCNC: 22 MMOL/L (ref 3–16)
BASOPHILS # BLD: 0 K/UL (ref 0–0.2)
BASOPHILS NFR BLD: 0.1 %
BUN SERPL-MCNC: 68 MG/DL (ref 7–20)
CALCIUM SERPL-MCNC: 9.8 MG/DL (ref 8.3–10.6)
CHLORIDE SERPL-SCNC: 92 MMOL/L (ref 99–110)
CO2 SERPL-SCNC: 25 MMOL/L (ref 21–32)
CREAT SERPL-MCNC: 7.7 MG/DL (ref 0.8–1.3)
DEPRECATED RDW RBC AUTO: 14.9 % (ref 12.4–15.4)
EOSINOPHIL # BLD: 0 K/UL (ref 0–0.6)
EOSINOPHIL NFR BLD: 0 %
GFR SERPLBLD CREATININE-BSD FMLA CKD-EPI: 6 ML/MIN/{1.73_M2}
GLUCOSE SERPL-MCNC: 148 MG/DL (ref 70–99)
HCT VFR BLD AUTO: 34.9 % (ref 40.5–52.5)
HGB BLD-MCNC: 11.5 G/DL (ref 13.5–17.5)
LYMPHOCYTES # BLD: 0.4 K/UL (ref 1–5.1)
LYMPHOCYTES NFR BLD: 3.3 %
MCH RBC QN AUTO: 33.7 PG (ref 26–34)
MCHC RBC AUTO-ENTMCNC: 32.8 G/DL (ref 31–36)
MCV RBC AUTO: 102.5 FL (ref 80–100)
MONOCYTES # BLD: 1.3 K/UL (ref 0–1.3)
MONOCYTES NFR BLD: 10.3 %
NEUTROPHILS # BLD: 11 K/UL (ref 1.7–7.7)
NEUTROPHILS NFR BLD: 86.3 %
PHOSPHATE SERPL-MCNC: 4.6 MG/DL (ref 2.5–4.9)
PLATELET # BLD AUTO: 174 K/UL (ref 135–450)
PMV BLD AUTO: 8.1 FL (ref 5–10.5)
POTASSIUM SERPL-SCNC: 4.5 MMOL/L (ref 3.5–5.1)
PROCALCITONIN SERPL IA-MCNC: 1.38 NG/ML (ref 0–0.15)
RBC # BLD AUTO: 3.41 M/UL (ref 4.2–5.9)
SODIUM SERPL-SCNC: 139 MMOL/L (ref 136–145)
WBC # BLD AUTO: 12.7 K/UL (ref 4–11)

## 2025-04-07 PROCEDURE — 71250 CT THORAX DX C-: CPT

## 2025-04-07 PROCEDURE — 6370000000 HC RX 637 (ALT 250 FOR IP): Performed by: INTERNAL MEDICINE

## 2025-04-07 PROCEDURE — 97530 THERAPEUTIC ACTIVITIES: CPT

## 2025-04-07 PROCEDURE — 2500000003 HC RX 250 WO HCPCS: Performed by: PHYSICIAN ASSISTANT

## 2025-04-07 PROCEDURE — 97165 OT EVAL LOW COMPLEX 30 MIN: CPT

## 2025-04-07 PROCEDURE — 6370000000 HC RX 637 (ALT 250 FOR IP): Performed by: PHYSICIAN ASSISTANT

## 2025-04-07 PROCEDURE — 6370000000 HC RX 637 (ALT 250 FOR IP): Performed by: HOSPITALIST

## 2025-04-07 PROCEDURE — 84145 PROCALCITONIN (PCT): CPT

## 2025-04-07 PROCEDURE — 99223 1ST HOSP IP/OBS HIGH 75: CPT | Performed by: STUDENT IN AN ORGANIZED HEALTH CARE EDUCATION/TRAINING PROGRAM

## 2025-04-07 PROCEDURE — 1200000000 HC SEMI PRIVATE

## 2025-04-07 PROCEDURE — 97162 PT EVAL MOD COMPLEX 30 MIN: CPT

## 2025-04-07 PROCEDURE — 93005 ELECTROCARDIOGRAM TRACING: CPT

## 2025-04-07 PROCEDURE — 80048 BASIC METABOLIC PNL TOTAL CA: CPT

## 2025-04-07 PROCEDURE — 94640 AIRWAY INHALATION TREATMENT: CPT

## 2025-04-07 PROCEDURE — 94669 MECHANICAL CHEST WALL OSCILL: CPT

## 2025-04-07 PROCEDURE — 84100 ASSAY OF PHOSPHORUS: CPT

## 2025-04-07 PROCEDURE — 94761 N-INVAS EAR/PLS OXIMETRY MLT: CPT

## 2025-04-07 PROCEDURE — 36415 COLL VENOUS BLD VENIPUNCTURE: CPT

## 2025-04-07 PROCEDURE — 6370000000 HC RX 637 (ALT 250 FOR IP): Performed by: STUDENT IN AN ORGANIZED HEALTH CARE EDUCATION/TRAINING PROGRAM

## 2025-04-07 PROCEDURE — 2700000000 HC OXYGEN THERAPY PER DAY

## 2025-04-07 PROCEDURE — 85025 COMPLETE CBC W/AUTO DIFF WBC: CPT

## 2025-04-07 RX ORDER — IPRATROPIUM BROMIDE AND ALBUTEROL SULFATE 2.5; .5 MG/3ML; MG/3ML
1 SOLUTION RESPIRATORY (INHALATION)
Status: DISCONTINUED | OUTPATIENT
Start: 2025-04-07 | End: 2025-04-10 | Stop reason: HOSPADM

## 2025-04-07 RX ADMIN — ACETAMINOPHEN 650 MG: 325 TABLET ORAL at 19:32

## 2025-04-07 RX ADMIN — IPRATROPIUM BROMIDE AND ALBUTEROL SULFATE 1 DOSE: .5; 3 SOLUTION RESPIRATORY (INHALATION) at 21:26

## 2025-04-07 RX ADMIN — Medication 1 MG: at 15:28

## 2025-04-07 RX ADMIN — PREDNISONE 40 MG: 20 TABLET ORAL at 08:44

## 2025-04-07 RX ADMIN — ALLOPURINOL 100 MG: 100 TABLET ORAL at 08:44

## 2025-04-07 RX ADMIN — ISOSORBIDE MONONITRATE 30 MG: 30 TABLET, EXTENDED RELEASE ORAL at 08:45

## 2025-04-07 RX ADMIN — IPRATROPIUM BROMIDE AND ALBUTEROL SULFATE 1 DOSE: .5; 3 SOLUTION RESPIRATORY (INHALATION) at 07:34

## 2025-04-07 RX ADMIN — LEVOTHYROXINE SODIUM 50 MCG: 0.03 TABLET ORAL at 08:45

## 2025-04-07 RX ADMIN — SODIUM CHLORIDE, PRESERVATIVE FREE 10 ML: 5 INJECTION INTRAVENOUS at 08:45

## 2025-04-07 RX ADMIN — IPRATROPIUM BROMIDE AND ALBUTEROL SULFATE 1 DOSE: .5; 3 SOLUTION RESPIRATORY (INHALATION) at 03:38

## 2025-04-07 RX ADMIN — CLOPIDOGREL BISULFATE 75 MG: 75 TABLET, FILM COATED ORAL at 08:45

## 2025-04-07 RX ADMIN — MIDODRINE HYDROCHLORIDE 10 MG: 5 TABLET ORAL at 08:43

## 2025-04-07 RX ADMIN — BUDESONIDE AND FORMOTEROL FUMARATE DIHYDRATE 2 PUFF: 160; 4.5 AEROSOL RESPIRATORY (INHALATION) at 07:34

## 2025-04-07 RX ADMIN — ROSUVASTATIN CALCIUM 10 MG: 10 TABLET, FILM COATED ORAL at 08:44

## 2025-04-07 RX ADMIN — IPRATROPIUM BROMIDE AND ALBUTEROL SULFATE 1 DOSE: .5; 3 SOLUTION RESPIRATORY (INHALATION) at 14:48

## 2025-04-07 RX ADMIN — BUDESONIDE AND FORMOTEROL FUMARATE DIHYDRATE 2 PUFF: 160; 4.5 AEROSOL RESPIRATORY (INHALATION) at 21:26

## 2025-04-07 RX ADMIN — SODIUM CHLORIDE, PRESERVATIVE FREE 10 ML: 5 INJECTION INTRAVENOUS at 22:30

## 2025-04-07 ASSESSMENT — PAIN DESCRIPTION - ORIENTATION: ORIENTATION: LEFT

## 2025-04-07 ASSESSMENT — PAIN SCALES - GENERAL
PAINLEVEL_OUTOF10: 8
PAINLEVEL_OUTOF10: 5
PAINLEVEL_OUTOF10: 0

## 2025-04-07 ASSESSMENT — PAIN DESCRIPTION - DESCRIPTORS: DESCRIPTORS: DISCOMFORT

## 2025-04-07 ASSESSMENT — PAIN DESCRIPTION - LOCATION: LOCATION: CHEST

## 2025-04-07 ASSESSMENT — PAIN - FUNCTIONAL ASSESSMENT: PAIN_FUNCTIONAL_ASSESSMENT: ACTIVITIES ARE NOT PREVENTED

## 2025-04-07 NOTE — PLAN OF CARE
Problem: Chronic Conditions and Co-morbidities  Goal: Patient's chronic conditions and co-morbidity symptoms are monitored and maintained or improved  Outcome: Progressing     Problem: Discharge Planning  Goal: Discharge to home or other facility with appropriate resources  Outcome: Progressing     Problem: Skin/Tissue Integrity  Goal: Skin integrity remains intact  Description: 1.  Monitor for areas of redness and/or skin breakdown  2.  Assess vascular access sites hourly  3.  Every 4-6 hours minimum:  Change oxygen saturation probe site  4.  Every 4-6 hours:  If on nasal continuous positive airway pressure, respiratory therapy assess nares and determine need for appliance change or resting period  Outcome: Progressing  Flowsheets (Taken 4/7/2025 5561)  Skin Integrity Remains Intact: Monitor for areas of redness and/or skin breakdown     Problem: Safety - Adult  Goal: Free from fall injury  Outcome: Progressing     Problem: ABCDS Injury Assessment  Goal: Absence of physical injury  Outcome: Progressing     Problem: Pain  Goal: Verbalizes/displays adequate comfort level or baseline comfort level  Outcome: Progressing

## 2025-04-07 NOTE — CARE COORDINATION
Case Management Assessment  Initial Evaluation    Date/Time of Evaluation: 4/7/2025 11:30 AM  Assessment Completed by: CLEOPATRA Pires    If patient is discharged prior to next notation, then this note serves as note for discharge by case management.    Patient Name: Donato Obrien                   YOB: 1940  Diagnosis: Hypoxia [R09.02]  ESRD (end stage renal disease) (HCC) [N18.6]  COPD exacerbation (HCC) [J44.1]                   Date / Time: 4/5/2025  9:15 PM    Patient Admission Status: Inpatient   Readmission Risk (Low < 19, Mod (19-27), High > 27): Readmission Risk Score: 22.5    Current PCP: Rohit Pathak III, MD  PCP verified by ? Yes    Chart Reviewed: Yes      History Provided by: Significant Other  Patient Orientation: Alert and Oriented    Patient Cognition: Alert    Hospitalization in the last 30 days (Readmission):  Yes    If yes, Readmission Assessment in  Navigator will be completed.    Advance Directives:      Code Status: Full Code   Patient's Primary Decision Maker is: Legal Next of Kin    Primary Decision Maker: Connie Obrien - Spouse - [phone]    Discharge Planning:    Patient lives with: Spouse/Significant Other Type of Home: Homeless  Primary Care Giver: Family  Patient Support Systems include: Spouse/Significant Other   Current Financial resources: Medicare  Current community resources: None  Current services prior to admission: Durable Medical Equipment            Current DME: Walker, Wheelchair, Cane, Hospital Bed            Type of Home Care services:  (P) None    ADLS  Prior functional level: Assistance with the following:, Bathing, Dressing, Toileting, Mobility  Current functional level: Mobility, Toileting, Dressing, Bathing, Assistance with the following:    PT AM-PAC: 15 /24  OT AM-PAC: 13 /24    Family can provide assistance at DC: Yes  Would you like Case Management to discuss the discharge plan with any other family members/significant others, and if

## 2025-04-08 LAB
ALBUMIN SERPL-MCNC: 3.8 G/DL (ref 3.4–5)
ANION GAP SERPL CALCULATED.3IONS-SCNC: 24 MMOL/L (ref 3–16)
ANISOCYTOSIS BLD QL SMEAR: ABNORMAL
BASOPHILS # BLD: 0 K/UL (ref 0–0.2)
BASOPHILS NFR BLD: 0 %
BUN SERPL-MCNC: 90 MG/DL (ref 7–20)
CALCIUM SERPL-MCNC: 9.7 MG/DL (ref 8.3–10.6)
CHLORIDE SERPL-SCNC: 90 MMOL/L (ref 99–110)
CO2 SERPL-SCNC: 23 MMOL/L (ref 21–32)
CREAT SERPL-MCNC: 9.5 MG/DL (ref 0.8–1.3)
DEPRECATED RDW RBC AUTO: 15 % (ref 12.4–15.4)
EOSINOPHIL # BLD: 0 K/UL (ref 0–0.6)
EOSINOPHIL NFR BLD: 0 %
GFR SERPLBLD CREATININE-BSD FMLA CKD-EPI: 5 ML/MIN/{1.73_M2}
GLUCOSE SERPL-MCNC: 154 MG/DL (ref 70–99)
HCT VFR BLD AUTO: 36.3 % (ref 40.5–52.5)
HGB BLD-MCNC: 11.8 G/DL (ref 13.5–17.5)
LYMPHOCYTES # BLD: 0.7 K/UL (ref 1–5.1)
LYMPHOCYTES NFR BLD: 4 %
MCH RBC QN AUTO: 33 PG (ref 26–34)
MCHC RBC AUTO-ENTMCNC: 32.4 G/DL (ref 31–36)
MCV RBC AUTO: 101.8 FL (ref 80–100)
MONOCYTES # BLD: 1.1 K/UL (ref 0–1.3)
MONOCYTES NFR BLD: 6 %
NEUTROPHILS # BLD: 16.6 K/UL (ref 1.7–7.7)
NEUTROPHILS NFR BLD: 85 %
NEUTS BAND NFR BLD MANUAL: 5 % (ref 0–7)
PHOSPHATE SERPL-MCNC: 5.9 MG/DL (ref 2.5–4.9)
PLATELET # BLD AUTO: 238 K/UL (ref 135–450)
PMV BLD AUTO: 7.8 FL (ref 5–10.5)
POTASSIUM SERPL-SCNC: 4.7 MMOL/L (ref 3.5–5.1)
RBC # BLD AUTO: 3.57 M/UL (ref 4.2–5.9)
SODIUM SERPL-SCNC: 137 MMOL/L (ref 136–145)
WBC # BLD AUTO: 18.4 K/UL (ref 4–11)

## 2025-04-08 PROCEDURE — 87641 MR-STAPH DNA AMP PROBE: CPT

## 2025-04-08 PROCEDURE — 2700000000 HC OXYGEN THERAPY PER DAY

## 2025-04-08 PROCEDURE — 87040 BLOOD CULTURE FOR BACTERIA: CPT

## 2025-04-08 PROCEDURE — 2580000003 HC RX 258: Performed by: STUDENT IN AN ORGANIZED HEALTH CARE EDUCATION/TRAINING PROGRAM

## 2025-04-08 PROCEDURE — 6370000000 HC RX 637 (ALT 250 FOR IP): Performed by: PHYSICIAN ASSISTANT

## 2025-04-08 PROCEDURE — 6360000002 HC RX W HCPCS: Performed by: HOSPITALIST

## 2025-04-08 PROCEDURE — 1200000000 HC SEMI PRIVATE

## 2025-04-08 PROCEDURE — 99233 SBSQ HOSP IP/OBS HIGH 50: CPT | Performed by: STUDENT IN AN ORGANIZED HEALTH CARE EDUCATION/TRAINING PROGRAM

## 2025-04-08 PROCEDURE — 6370000000 HC RX 637 (ALT 250 FOR IP): Performed by: STUDENT IN AN ORGANIZED HEALTH CARE EDUCATION/TRAINING PROGRAM

## 2025-04-08 PROCEDURE — 2580000003 HC RX 258: Performed by: HOSPITALIST

## 2025-04-08 PROCEDURE — 6370000000 HC RX 637 (ALT 250 FOR IP): Performed by: INTERNAL MEDICINE

## 2025-04-08 PROCEDURE — 6360000002 HC RX W HCPCS: Performed by: PHYSICIAN ASSISTANT

## 2025-04-08 PROCEDURE — 90935 HEMODIALYSIS ONE EVALUATION: CPT

## 2025-04-08 PROCEDURE — 94640 AIRWAY INHALATION TREATMENT: CPT

## 2025-04-08 PROCEDURE — 80069 RENAL FUNCTION PANEL: CPT

## 2025-04-08 PROCEDURE — 2500000003 HC RX 250 WO HCPCS: Performed by: PHYSICIAN ASSISTANT

## 2025-04-08 PROCEDURE — 85025 COMPLETE CBC W/AUTO DIFF WBC: CPT

## 2025-04-08 PROCEDURE — 94761 N-INVAS EAR/PLS OXIMETRY MLT: CPT

## 2025-04-08 PROCEDURE — 94669 MECHANICAL CHEST WALL OSCILL: CPT

## 2025-04-08 PROCEDURE — 5A1D70Z PERFORMANCE OF URINARY FILTRATION, INTERMITTENT, LESS THAN 6 HOURS PER DAY: ICD-10-PCS | Performed by: STUDENT IN AN ORGANIZED HEALTH CARE EDUCATION/TRAINING PROGRAM

## 2025-04-08 PROCEDURE — 36415 COLL VENOUS BLD VENIPUNCTURE: CPT

## 2025-04-08 PROCEDURE — 6360000002 HC RX W HCPCS: Performed by: STUDENT IN AN ORGANIZED HEALTH CARE EDUCATION/TRAINING PROGRAM

## 2025-04-08 RX ORDER — VANCOMYCIN HYDROCHLORIDE 750 MG/150ML
750 INJECTION, SOLUTION INTRAVENOUS
Status: DISCONTINUED | OUTPATIENT
Start: 2025-04-08 | End: 2025-04-08

## 2025-04-08 RX ADMIN — CLOPIDOGREL BISULFATE 75 MG: 75 TABLET, FILM COATED ORAL at 12:52

## 2025-04-08 RX ADMIN — SODIUM CHLORIDE 1500 MG: 0.9 INJECTION, SOLUTION INTRAVENOUS at 16:40

## 2025-04-08 RX ADMIN — ROSUVASTATIN CALCIUM 10 MG: 10 TABLET, FILM COATED ORAL at 12:52

## 2025-04-08 RX ADMIN — ALLOPURINOL 100 MG: 100 TABLET ORAL at 12:52

## 2025-04-08 RX ADMIN — LEVOTHYROXINE SODIUM 50 MCG: 0.03 TABLET ORAL at 06:12

## 2025-04-08 RX ADMIN — ISOSORBIDE MONONITRATE 30 MG: 30 TABLET, EXTENDED RELEASE ORAL at 12:53

## 2025-04-08 RX ADMIN — SODIUM CHLORIDE, PRESERVATIVE FREE 40 MG: 5 INJECTION INTRAVENOUS at 16:42

## 2025-04-08 RX ADMIN — PIPERACILLIN AND TAZOBACTAM 4500 MG: 4; .5 INJECTION, POWDER, FOR SOLUTION INTRAVENOUS at 12:52

## 2025-04-08 RX ADMIN — SODIUM CHLORIDE, PRESERVATIVE FREE 10 ML: 5 INJECTION INTRAVENOUS at 12:53

## 2025-04-08 RX ADMIN — BUDESONIDE AND FORMOTEROL FUMARATE DIHYDRATE 2 PUFF: 160; 4.5 AEROSOL RESPIRATORY (INHALATION) at 08:29

## 2025-04-08 RX ADMIN — BUDESONIDE AND FORMOTEROL FUMARATE DIHYDRATE 2 PUFF: 160; 4.5 AEROSOL RESPIRATORY (INHALATION) at 22:13

## 2025-04-08 RX ADMIN — IPRATROPIUM BROMIDE AND ALBUTEROL SULFATE 1 DOSE: .5; 3 SOLUTION RESPIRATORY (INHALATION) at 22:13

## 2025-04-08 RX ADMIN — PREDNISONE 40 MG: 20 TABLET ORAL at 12:52

## 2025-04-08 RX ADMIN — MIDODRINE HYDROCHLORIDE 10 MG: 5 TABLET ORAL at 09:25

## 2025-04-08 RX ADMIN — Medication 1 MG: at 13:22

## 2025-04-08 RX ADMIN — ALBUTEROL SULFATE 2.5 MG: 2.5 SOLUTION RESPIRATORY (INHALATION) at 08:29

## 2025-04-08 RX ADMIN — PIPERACILLIN AND TAZOBACTAM 3375 MG: 3; .375 INJECTION, POWDER, LYOPHILIZED, FOR SOLUTION INTRAVENOUS at 22:11

## 2025-04-08 RX ADMIN — MIDODRINE HYDROCHLORIDE 10 MG: 5 TABLET ORAL at 07:22

## 2025-04-08 RX ADMIN — SODIUM CHLORIDE, PRESERVATIVE FREE 10 ML: 5 INJECTION INTRAVENOUS at 22:05

## 2025-04-08 ASSESSMENT — PAIN SCALES - GENERAL: PAINLEVEL_OUTOF10: 0
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
## 2025-04-08 NOTE — PLAN OF CARE
Problem: Chronic Conditions and Co-morbidities  Goal: Patient's chronic conditions and co-morbidity symptoms are monitored and maintained or improved  Outcome: Progressing     Problem: Discharge Planning  Goal: Discharge to home or other facility with appropriate resources  Outcome: Progressing     Problem: Skin/Tissue Integrity  Goal: Skin integrity remains intact  Description: 1.  Monitor for areas of redness and/or skin breakdown  2.  Assess vascular access sites hourly  3.  Every 4-6 hours minimum:  Change oxygen saturation probe site  4.  Every 4-6 hours:  If on nasal continuous positive airway pressure, respiratory therapy assess nares and determine need for appliance change or resting period  Outcome: Progressing  Flowsheets (Taken 4/8/2025 8647)  Skin Integrity Remains Intact: Monitor for areas of redness and/or skin breakdown     Problem: Safety - Adult  Goal: Free from fall injury  Outcome: Progressing     Problem: ABCDS Injury Assessment  Goal: Absence of physical injury  Outcome: Progressing     Problem: Pain  Goal: Verbalizes/displays adequate comfort level or baseline comfort level  Outcome: Progressing

## 2025-04-08 NOTE — PLAN OF CARE
Problem: Chronic Conditions and Co-morbidities  Goal: Patient's chronic conditions and co-morbidity symptoms are monitored and maintained or improved  4/8/2025 0138 by Peyton Guillen RN  Outcome: Progressing  4/7/2025 1633 by Kimberlyn Enrique RN  Outcome: Progressing     Problem: Discharge Planning  Goal: Discharge to home or other facility with appropriate resources  4/8/2025 0138 by Peyton Guillen RN  Outcome: Progressing  4/7/2025 1633 by Kimberlyn Enrique RN  Outcome: Progressing     Problem: Skin/Tissue Integrity  Goal: Skin integrity remains intact  Description: 1.  Monitor for areas of redness and/or skin breakdown  2.  Assess vascular access sites hourly  3.  Every 4-6 hours minimum:  Change oxygen saturation probe site  4.  Every 4-6 hours:  If on nasal continuous positive airway pressure, respiratory therapy assess nares and determine need for appliance change or resting period  4/8/2025 0138 by Peyton Guillen RN  Outcome: Progressing  4/7/2025 1633 by Kimberlyn Enrique RN  Outcome: Progressing  Flowsheets (Taken 4/7/2025 0857)  Skin Integrity Remains Intact: Monitor for areas of redness and/or skin breakdown     Problem: Safety - Adult  Goal: Free from fall injury  4/8/2025 0138 by Peyton Guillen RN  Outcome: Progressing  4/7/2025 1633 by Kimberlyn Enrique RN  Outcome: Progressing     Problem: ABCDS Injury Assessment  Goal: Absence of physical injury  4/8/2025 0138 by Peyton Guillen RN  Outcome: Progressing  4/7/2025 1633 by Kimberlyn Enrique RN  Outcome: Progressing     Problem: Pain  Goal: Verbalizes/displays adequate comfort level or baseline comfort level  4/8/2025 0138 by Peyton Guillen RN  Outcome: Progressing  4/7/2025 1633 by Kimberlyn Enrique RN  Outcome: Progressing

## 2025-04-08 NOTE — CONSULTS
Clinical Pharmacy Note: Pharmacy to Dose Vancomycin    Donato Obrien is a 84 y.o. male started on Vancomycin for PNA; consult received from Dr. Moore to manage therapy. Also receiving the following antibiotics: zosyn.    Goal Trough Level: 15 mcg/mL    Assessment/Plan:  Will give vancomycin 1500 mg x1 today  Patient is ESRD on HD and further doses will be ordered based on dialysis days and levels.  A vancomycin random level has been ordered for 4/10 at 0600  Changes in regimen will be determined based on culture results, renal function, and clinical response.  Pharmacy will continue to monitor and adjust regimen as necessary.    Allergies:  Dilaudid [hydromorphone], Fentanyl, Morphine and codeine, Norco [hydrocodone-acetaminophen], and Oxycodone     Recent Labs     04/07/25  0526 04/08/25  0821   CREATININE 7.7* 9.5*       Recent Labs     04/07/25  0526 04/08/25  0821   WBC 12.7* 18.4*       Ht Readings from Last 1 Encounters:   04/05/25 1.778 m (5' 10\")        Wt Readings from Last 1 Encounters:   04/08/25 85.2 kg (187 lb 13.3 oz)         Estimated Creatinine Clearance: 6 mL/min (A) (based on SCr of 9.5 mg/dL (HH)).      Thank you for the consult,    Evelia Villar, PharmD, [facility]  [id number]  4/8/2025 9:16 AM       
Av.5  Min: 70  Max: 90  BP  Min: 94/56  Max: 103/65  SpO2  Av.5 %  Min: 94 %  Max: 97 %    General: NAD  Neuro: A0x3  Lung: crackles, course, equal non labored   Heart: regular rate  LE: warm perfused    MEDICATIONS: Reviewed  Scheduled Meds:   ipratropium 0.5 mg-albuterol 2.5 mg  1 Dose Inhalation TID RT    allopurinol  100 mg Oral Daily    b complex-C-folic acid  1 capsule Oral Daily    budesonide-formoterol  2 puff Inhalation BID RT    clopidogrel  75 mg Oral Daily    isosorbide mononitrate  30 mg Oral Daily    levothyroxine  50 mcg Oral Daily    rosuvastatin  10 mg Oral Daily    sodium chloride flush  5-40 mL IntraVENous 2 times per day    heparin (porcine)  5,000 Units SubCUTAneous 3 times per day    midodrine  5 mg Oral Once per day on     predniSONE  40 mg Oral Daily    [START ON 2025] midodrine  10 mg Oral Once per day on      Continuous Infusions:   sodium chloride       PRN Meds:.sodium chloride flush, sodium chloride, ondansetron, senna, acetaminophen **OR** acetaminophen, albuterol, midodrine **AND** midodrine     LABS: Reviewed.   Recent Labs     25    136 139   K 4.4 4.9 4.5   CL 94* 95* 92*   CO2    PHOS  --   --  4.6   BUN 35* 44* 68*   CREATININE 5.2* 6.0* 7.7*     Recent Labs     25  0607 25  0526   WBC 14.2* 12.9* 12.7*   HGB 12.0* 11.4* 11.5*   HCT 37.3* 34.5* 34.9*   .7* 102.0* 102.5*    148 174     Lab Results   Component Value Date/Time    PROTIME 14.6 2023 05:51 AM    INR 1.15 2023 05:51 AM     Lab Results   Component Value Date/Time    RPE1JFV 26.5 2025 11:54 AM    BEART 1.8 2025 11:54 AM    X5ZHBOUY 99.5 2025 11:54 AM    PHART 7.418 2025 11:54 AM    BNK2XKG 41.1 2025 11:54 AM    PO2ART 116.0 2025 11:54 AM    WPD0NME 62.2 2025 11:54 AM       Intake/Output Summary (Last 24 
tunnel syndrome) 01/25/2012    Venous insufficiency of both lower extremities 10/17/2019    Venous ulcer of right lower extremity without varicose veins 10/17/2019    Non-pressure chronic ulcer of lower leg, limited to breakdown of skin, right ([facility]) 10/31/2019    Laceration of skin of lower leg, left, initial encounter 06/23/2022    Laceration of skin of lower leg, right, initial encounter 06/23/2022    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 with fat layer exposed ([facility]) 08/23/2022    Non-pressure chronic ulcer of left lower leg with fat layer exposed ([facility]) 08/23/2022    Open wound of right great toe with damage to nail 11/29/2022    Right foot ulcer, with fat layer exposed ([facility]) 11/29/2022    Ulcer of toe of left foot, with necrosis of bone ([facility]) 02/28/2023    Pressure ulcer of left hip, unstageable ([facility]) 02/28/2023    Fatigue 03/04/2023    Frequent falls 03/06/2023    Chronic pain in testicle 03/06/2023    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 ([facility]) 03/06/2023    Pressure injury of skin of left hip 03/06/2023    Skin ulcer of multiple sites ([facility]) 03/06/2023    Unable to ambulate 03/06/2023    Controlled type 2 diabetes mellitus with complication, with long-term current use of insulin ([facility]) 03/06/2023    History of prostate cancer 03/06/2023    Overweight (BMI 25.0-29.9) 03/06/2023    Receiving intravenous antibiotic treatment as outpatient 03/08/2023    Complex care coordination 03/08/2023    Cellulitis 04/15/2023    Right thigh pain 04/17/2023    Non healing left heel wound 04/17/2023    Foot pain, bilateral 04/20/2023    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

## 2025-04-09 LAB
EKG ATRIAL RATE: 241 BPM
EKG DIAGNOSIS: NORMAL
EKG Q-T INTERVAL: 438 MS
EKG QRS DURATION: 106 MS
EKG QTC CALCULATION (BAZETT): 482 MS
EKG R AXIS: -76 DEGREES
EKG T AXIS: 73 DEGREES
EKG VENTRICULAR RATE: 73 BPM
MRSA DNA SPEC QL NAA+PROBE: ABNORMAL
ORGANISM: ABNORMAL

## 2025-04-09 PROCEDURE — 94640 AIRWAY INHALATION TREATMENT: CPT

## 2025-04-09 PROCEDURE — 6370000000 HC RX 637 (ALT 250 FOR IP): Performed by: INTERNAL MEDICINE

## 2025-04-09 PROCEDURE — 6360000002 HC RX W HCPCS: Performed by: PHYSICIAN ASSISTANT

## 2025-04-09 PROCEDURE — 2500000003 HC RX 250 WO HCPCS: Performed by: PHYSICIAN ASSISTANT

## 2025-04-09 PROCEDURE — 2580000003 HC RX 258: Performed by: STUDENT IN AN ORGANIZED HEALTH CARE EDUCATION/TRAINING PROGRAM

## 2025-04-09 PROCEDURE — 1200000000 HC SEMI PRIVATE

## 2025-04-09 PROCEDURE — 6370000000 HC RX 637 (ALT 250 FOR IP): Performed by: PHYSICIAN ASSISTANT

## 2025-04-09 PROCEDURE — 2580000003 HC RX 258: Performed by: HOSPITALIST

## 2025-04-09 PROCEDURE — 99232 SBSQ HOSP IP/OBS MODERATE 35: CPT | Performed by: STUDENT IN AN ORGANIZED HEALTH CARE EDUCATION/TRAINING PROGRAM

## 2025-04-09 PROCEDURE — 94669 MECHANICAL CHEST WALL OSCILL: CPT

## 2025-04-09 PROCEDURE — 6360000002 HC RX W HCPCS: Performed by: STUDENT IN AN ORGANIZED HEALTH CARE EDUCATION/TRAINING PROGRAM

## 2025-04-09 PROCEDURE — 2700000000 HC OXYGEN THERAPY PER DAY

## 2025-04-09 PROCEDURE — 6360000002 HC RX W HCPCS: Performed by: HOSPITALIST

## 2025-04-09 PROCEDURE — 94761 N-INVAS EAR/PLS OXIMETRY MLT: CPT

## 2025-04-09 RX ORDER — PREDNISONE 20 MG/1
40 TABLET ORAL DAILY
Status: DISCONTINUED | OUTPATIENT
Start: 2025-04-10 | End: 2025-04-10 | Stop reason: HOSPADM

## 2025-04-09 RX ADMIN — IPRATROPIUM BROMIDE AND ALBUTEROL SULFATE 1 DOSE: .5; 3 SOLUTION RESPIRATORY (INHALATION) at 12:47

## 2025-04-09 RX ADMIN — SODIUM CHLORIDE, PRESERVATIVE FREE 10 ML: 5 INJECTION INTRAVENOUS at 22:30

## 2025-04-09 RX ADMIN — ISOSORBIDE MONONITRATE 30 MG: 30 TABLET, EXTENDED RELEASE ORAL at 10:10

## 2025-04-09 RX ADMIN — SODIUM CHLORIDE, PRESERVATIVE FREE 10 ML: 5 INJECTION INTRAVENOUS at 10:22

## 2025-04-09 RX ADMIN — LEVOTHYROXINE SODIUM 50 MCG: 0.03 TABLET ORAL at 06:20

## 2025-04-09 RX ADMIN — PIPERACILLIN AND TAZOBACTAM 3375 MG: 3; .375 INJECTION, POWDER, LYOPHILIZED, FOR SOLUTION INTRAVENOUS at 22:29

## 2025-04-09 RX ADMIN — Medication 1 MG: at 14:55

## 2025-04-09 RX ADMIN — ALLOPURINOL 100 MG: 100 TABLET ORAL at 10:10

## 2025-04-09 RX ADMIN — MIDODRINE HYDROCHLORIDE 5 MG: 5 TABLET ORAL at 10:10

## 2025-04-09 RX ADMIN — SODIUM CHLORIDE, PRESERVATIVE FREE 40 MG: 5 INJECTION INTRAVENOUS at 10:10

## 2025-04-09 RX ADMIN — PREDNISONE 40 MG: 20 TABLET ORAL at 10:09

## 2025-04-09 RX ADMIN — BUDESONIDE AND FORMOTEROL FUMARATE DIHYDRATE 2 PUFF: 160; 4.5 AEROSOL RESPIRATORY (INHALATION) at 21:08

## 2025-04-09 RX ADMIN — CLOPIDOGREL BISULFATE 75 MG: 75 TABLET, FILM COATED ORAL at 10:10

## 2025-04-09 RX ADMIN — HEPARIN SODIUM 5000 UNITS: 5000 INJECTION INTRAVENOUS; SUBCUTANEOUS at 14:55

## 2025-04-09 RX ADMIN — IPRATROPIUM BROMIDE AND ALBUTEROL SULFATE 1 DOSE: .5; 3 SOLUTION RESPIRATORY (INHALATION) at 21:08

## 2025-04-09 RX ADMIN — PIPERACILLIN AND TAZOBACTAM 3375 MG: 3; .375 INJECTION, POWDER, LYOPHILIZED, FOR SOLUTION INTRAVENOUS at 10:48

## 2025-04-09 RX ADMIN — ROSUVASTATIN CALCIUM 10 MG: 10 TABLET, FILM COATED ORAL at 10:10

## 2025-04-09 NOTE — PLAN OF CARE
Problem: Chronic Conditions and Co-morbidities  Goal: Patient's chronic conditions and co-morbidity symptoms are monitored and maintained or improved  Outcome: Progressing  Flowsheets (Taken 4/9/2025 1658)  Care Plan - Patient's Chronic Conditions and Co-Morbidity Symptoms are Monitored and Maintained or Improved: Collaborate with multidisciplinary team to address chronic and comorbid conditions and prevent exacerbation or deterioration     Problem: Skin/Tissue Integrity  Goal: Skin integrity remains intact  Description: 1.  Monitor for areas of redness and/or skin breakdown  2.  Assess vascular access sites hourly  3.  Every 4-6 hours minimum:  Change oxygen saturation probe site  4.  Every 4-6 hours:  If on nasal continuous positive airway pressure, respiratory therapy assess nares and determine need for appliance change or resting period  Outcome: Progressing  Flowsheets (Taken 4/9/2025 0916)  Skin Integrity Remains Intact:   Monitor for areas of redness and/or skin breakdown   Assess vascular access sites hourly   Every 4-6 hours minimum:  Change oxygen saturation probe site   Every 4-6 hours:  If on nasal continuous positive airway pressure, assess nares and determine need for appliance change or resting period   Turn and reposition as indicated     Problem: Pain  Goal: Verbalizes/displays adequate comfort level or baseline comfort level  Outcome: Progressing  Flowsheets (Taken 4/9/2025 1658)  Verbalizes/displays adequate comfort level or baseline comfort level:   Encourage patient to monitor pain and request assistance   Assess pain using appropriate pain scale   Administer analgesics based on type and severity of pain and evaluate response   Implement non-pharmacological measures as appropriate and evaluate response   Consider cultural and social influences on pain and pain management  Note: Work with medical team to keep pain under control

## 2025-04-09 NOTE — RT PROTOCOL NOTE
RT Inhaler-Nebulizer Bronchodilator Protocol Note    There is a bronchodilator order in the chart from a provider indicating to follow the RT Bronchodilator Protocol and there is an “Initiate RT Inhaler-Nebulizer Bronchodilator Protocol” order as well (see protocol at bottom of note).    CXR Findings:  XR CHEST PORTABLE  Result Date: 4/5/2025  1. Cardiomegaly with mild interstitial edema. 2. Linear atelectatic changes in the lung bases, greater on the left. 3. Large hiatal hernia.       The findings from the last RT Protocol Assessment were as follows:   History Pulmonary Disease: Chronic pulmonary disease  Respiratory Pattern: Mild dyspnea at rest, irregular pattern, or RR 21-25 bpm  Breath Sounds: Slightly diminished and/or crackles  Cough: Strong, productive  Indication for Bronchodilator Therapy: On home bronchodilators  Bronchodilator Assessment Score: 9    Aerosolized bronchodilator medication orders have been revised according to the RT Inhaler-Nebulizer Bronchodilator Protocol below.    Respiratory Therapist to perform RT Therapy Protocol Assessment initially then follow the protocol.  Repeat RT Therapy Protocol Assessment PRN for score 0-3 or on second treatment, BID, and PRN for scores above 3.    No Indications - adjust the frequency to every 6 hours PRN wheezing or bronchospasm, if no treatments needed after 48 hours then discontinue using Per Protocol order mode.     If indication present, adjust the RT bronchodilator orders based on the Bronchodilator Assessment Score as indicated below.  Use Inhaler orders unless patient has one or more of the following: on home nebulizer, not able to hold breath for 10 seconds, is not alert and oriented, cannot activate and use MDI correctly, or respiratory rate 25 breaths per minute or more, then use the equivalent nebulizer order(s) with same Frequency and PRN reasons based on the score.  If a patient is on this medication at home then do not decrease Frequency 
RT Nebulizer Bronchodilator Protocol Note    There is a bronchodilator order in the chart from a provider indicating to follow the RT Bronchodilator Protocol and there is an “Initiate RT Bronchodilator Protocol” order as well (see protocol at bottom of note).    CXR Findings:  XR CHEST PORTABLE  Result Date: 4/5/2025  1. Cardiomegaly with mild interstitial edema. 2. Linear atelectatic changes in the lung bases, greater on the left. 3. Large hiatal hernia.       The findings from the last RT Protocol Assessment were as follows:  Smoking: Chronic pulmonary disease  Respiratory Pattern: Dyspnea on exertion or RR 21-25 bpm  Breath Sounds: Intermittent or unilateral wheezes  Cough: Strong, productive  Indication for Bronchodilator Therapy: On home bronchodilators  Bronchodilator Assessment Score: 9    Aerosolized bronchodilator medication orders have been revised according to the RT Nebulizer Bronchodilator Protocol below.    Respiratory Therapist to perform RT Therapy Protocol Assessment initially then follow the protocol.  Repeat RT Therapy Protocol Assessment PRN for score 0-3 or on second treatment, BID, and PRN for scores above 3.    No Indications - adjust the frequency to every 6 hours PRN wheezing or bronchospasm, if no treatments needed after 48 hours then discontinue using Per Protocol order mode.     If indication present, adjust the RT bronchodilator orders based on the Bronchodilator Assessment Score as indicated below.  If a patient is on this medication at home then do not decrease Frequency below that used at home.    0-3 - enter or revise RT bronchodilator order(s) to equivalent RT Bronchodilator order with Frequency of every 4 hours PRN for wheezing or increased work of breathing using Per Protocol order mode.       4-6 - enter or revise RT Bronchodilator order(s) to two equivalent RT bronchodilator orders with one order with BID Frequency and one order with Frequency of every 4 hours PRN wheezing or 
RX sent. Please facilitate a follow up appointment after 1/2/24.   
breathing using Per Protocol order mode.        4-6 - enter or revise RT Bronchodilator order(s) to two equivalent RT bronchodilator orders with one order with BID Frequency and one order with Frequency of every 4 hours PRN wheezing or increased work of breathing using Per Protocol order mode.        7-10 - enter or revise RT Bronchodilator order(s) to two equivalent RT bronchodilator orders with one order with TID Frequency and one order with Frequency of every 4 hours PRN wheezing or increased work of breathing using Per Protocol order mode.       11-13 - enter or revise RT Bronchodilator order(s) to one equivalent RT bronchodilator order with QID Frequency and an Albuterol order with Frequency of every 4 hours PRN wheezing or increased work of breathing using Per Protocol order mode.      Greater than 13 - enter or revise RT Bronchodilator order(s) to one equivalent RT bronchodilator order with every 4 hours Frequency and an Albuterol order with Frequency of every 2 hours PRN wheezing or increased work of breathing using Per Protocol order mode.     RT to enter RT Home Evaluation for COPD & MDI Assessment order using Per Protocol order mode.    Electronically signed by NATE BRAR RCP on 4/9/2025 at 4:43 PM

## 2025-04-09 NOTE — PLAN OF CARE
Problem: Chronic Conditions and Co-morbidities  Goal: Patient's chronic conditions and co-morbidity symptoms are monitored and maintained or improved  4/9/2025 0152 by Peyton Guillen RN  Outcome: Progressing  4/8/2025 1706 by Kimberlyn Enrique RN  Outcome: Progressing     Problem: Discharge Planning  Goal: Discharge to home or other facility with appropriate resources  4/9/2025 0152 by Peyton Guillen RN  Outcome: Progressing  4/8/2025 1706 by Kimberlyn Enrique RN  Outcome: Progressing     Problem: Skin/Tissue Integrity  Goal: Skin integrity remains intact  Description: 1.  Monitor for areas of redness and/or skin breakdown  2.  Assess vascular access sites hourly  3.  Every 4-6 hours minimum:  Change oxygen saturation probe site  4.  Every 4-6 hours:  If on nasal continuous positive airway pressure, respiratory therapy assess nares and determine need for appliance change or resting period  4/9/2025 0152 by Peyton Guillen RN  Outcome: Progressing  4/8/2025 1706 by Kimberlyn Enrique RN  Outcome: Progressing  Flowsheets (Taken 4/8/2025 0810)  Skin Integrity Remains Intact: Monitor for areas of redness and/or skin breakdown     Problem: Safety - Adult  Goal: Free from fall injury  4/9/2025 0152 by Peyton Guillen RN  Outcome: Progressing  4/8/2025 1706 by Kimberlyn Enrique RN  Outcome: Progressing     Problem: ABCDS Injury Assessment  Goal: Absence of physical injury  4/9/2025 0152 by Peyton Guillen RN  Outcome: Progressing  4/8/2025 1706 by Kimberlyn Enrique RN  Outcome: Progressing     Problem: Pain  Goal: Verbalizes/displays adequate comfort level or baseline comfort level  4/9/2025 0152 by Peyton Guillen RN  Outcome: Progressing  4/8/2025 1706 by Kimberlyn Enrique RN  Outcome: Progressing

## 2025-04-10 VITALS
RESPIRATION RATE: 20 BRPM | DIASTOLIC BLOOD PRESSURE: 73 MMHG | OXYGEN SATURATION: 96 % | TEMPERATURE: 97.5 F | SYSTOLIC BLOOD PRESSURE: 119 MMHG | HEART RATE: 67 BPM | HEIGHT: 70 IN | BODY MASS INDEX: 27.46 KG/M2 | WEIGHT: 191.8 LBS

## 2025-04-10 LAB
ALBUMIN SERPL-MCNC: 3.5 G/DL (ref 3.4–5)
ANION GAP SERPL CALCULATED.3IONS-SCNC: 23 MMOL/L (ref 3–16)
BASOPHILS # BLD: 0 K/UL (ref 0–0.2)
BASOPHILS NFR BLD: 0 %
BUN SERPL-MCNC: 76 MG/DL (ref 7–20)
CALCIUM SERPL-MCNC: 9.1 MG/DL (ref 8.3–10.6)
CHLORIDE SERPL-SCNC: 93 MMOL/L (ref 99–110)
CO2 SERPL-SCNC: 17 MMOL/L (ref 21–32)
CREAT SERPL-MCNC: 8.6 MG/DL (ref 0.8–1.3)
DEPRECATED RDW RBC AUTO: 15.4 % (ref 12.4–15.4)
EOSINOPHIL # BLD: 0 K/UL (ref 0–0.6)
EOSINOPHIL NFR BLD: 0 %
GFR SERPLBLD CREATININE-BSD FMLA CKD-EPI: 6 ML/MIN/{1.73_M2}
GLUCOSE SERPL-MCNC: 147 MG/DL (ref 70–99)
HCT VFR BLD AUTO: 34.5 % (ref 40.5–52.5)
HGB BLD-MCNC: 11.1 G/DL (ref 13.5–17.5)
LYMPHOCYTES # BLD: 0.5 K/UL (ref 1–5.1)
LYMPHOCYTES NFR BLD: 3 %
MCH RBC QN AUTO: 33.1 PG (ref 26–34)
MCHC RBC AUTO-ENTMCNC: 32.3 G/DL (ref 31–36)
MCV RBC AUTO: 102.3 FL (ref 80–100)
METAMYELOCYTES NFR BLD MANUAL: 1 %
MONOCYTES # BLD: 0.9 K/UL (ref 0–1.3)
MONOCYTES NFR BLD: 5 %
NEUTROPHILS # BLD: 15.7 K/UL (ref 1.7–7.7)
NEUTROPHILS NFR BLD: 91 %
NRBC BLD-RTO: 1 /100 WBC
PHOSPHATE SERPL-MCNC: 6.4 MG/DL (ref 2.5–4.9)
PLATELET # BLD AUTO: 180 K/UL (ref 135–450)
PLATELET BLD QL SMEAR: ADEQUATE
PMV BLD AUTO: 8 FL (ref 5–10.5)
POLYCHROMASIA BLD QL SMEAR: ABNORMAL
POTASSIUM SERPL-SCNC: 5 MMOL/L (ref 3.5–5.1)
RBC # BLD AUTO: 3.37 M/UL (ref 4.2–5.9)
SLIDE REVIEW: ABNORMAL
SODIUM SERPL-SCNC: 133 MMOL/L (ref 136–145)
VANCOMYCIN SERPL-MCNC: 13.9 UG/ML
WBC # BLD AUTO: 17.1 K/UL (ref 4–11)

## 2025-04-10 PROCEDURE — 6370000000 HC RX 637 (ALT 250 FOR IP): Performed by: INTERNAL MEDICINE

## 2025-04-10 PROCEDURE — 2580000003 HC RX 258: Performed by: STUDENT IN AN ORGANIZED HEALTH CARE EDUCATION/TRAINING PROGRAM

## 2025-04-10 PROCEDURE — 94761 N-INVAS EAR/PLS OXIMETRY MLT: CPT

## 2025-04-10 PROCEDURE — 80202 ASSAY OF VANCOMYCIN: CPT

## 2025-04-10 PROCEDURE — 80069 RENAL FUNCTION PANEL: CPT

## 2025-04-10 PROCEDURE — 6370000000 HC RX 637 (ALT 250 FOR IP): Performed by: HOSPITALIST

## 2025-04-10 PROCEDURE — 6360000002 HC RX W HCPCS: Performed by: STUDENT IN AN ORGANIZED HEALTH CARE EDUCATION/TRAINING PROGRAM

## 2025-04-10 PROCEDURE — 6370000000 HC RX 637 (ALT 250 FOR IP): Performed by: PHYSICIAN ASSISTANT

## 2025-04-10 PROCEDURE — 94640 AIRWAY INHALATION TREATMENT: CPT

## 2025-04-10 PROCEDURE — 6370000000 HC RX 637 (ALT 250 FOR IP): Performed by: STUDENT IN AN ORGANIZED HEALTH CARE EDUCATION/TRAINING PROGRAM

## 2025-04-10 PROCEDURE — 90935 HEMODIALYSIS ONE EVALUATION: CPT

## 2025-04-10 PROCEDURE — 2500000003 HC RX 250 WO HCPCS: Performed by: PHYSICIAN ASSISTANT

## 2025-04-10 PROCEDURE — 85025 COMPLETE CBC W/AUTO DIFF WBC: CPT

## 2025-04-10 RX ORDER — ALBUTEROL SULFATE 0.83 MG/ML
2.5 SOLUTION RESPIRATORY (INHALATION) EVERY 6 HOURS PRN
Qty: 60 EACH | Refills: 0 | Status: SHIPPED | OUTPATIENT
Start: 2025-04-10

## 2025-04-10 RX ORDER — HEPARIN SODIUM 1000 [USP'U]/ML
3600 INJECTION, SOLUTION INTRAVENOUS; SUBCUTANEOUS PRN
Status: DISCONTINUED | OUTPATIENT
Start: 2025-04-10 | End: 2025-04-10 | Stop reason: HOSPADM

## 2025-04-10 RX ORDER — AMOXICILLIN AND CLAVULANATE POTASSIUM 500; 125 MG/1; MG/1
1 TABLET, FILM COATED ORAL 2 TIMES DAILY
Qty: 6 TABLET | Refills: 0 | Status: SHIPPED | OUTPATIENT
Start: 2025-04-10 | End: 2025-04-13

## 2025-04-10 RX ORDER — ALBUTEROL SULFATE 0.83 MG/ML
2.5 SOLUTION RESPIRATORY (INHALATION)
Qty: 60 EACH | Refills: 0 | Status: SHIPPED | OUTPATIENT
Start: 2025-04-10 | End: 2025-04-10

## 2025-04-10 RX ORDER — PREDNISONE 20 MG/1
20 TABLET ORAL 2 TIMES DAILY
Qty: 6 TABLET | Refills: 0 | Status: SHIPPED | OUTPATIENT
Start: 2025-04-10 | End: 2025-04-13

## 2025-04-10 RX ORDER — VANCOMYCIN HYDROCHLORIDE 750 MG/150ML
750 INJECTION, SOLUTION INTRAVENOUS ONCE
Status: DISCONTINUED | OUTPATIENT
Start: 2025-04-10 | End: 2025-04-10 | Stop reason: HOSPADM

## 2025-04-10 RX ADMIN — SODIUM CHLORIDE, PRESERVATIVE FREE 40 MG: 5 INJECTION INTRAVENOUS at 11:51

## 2025-04-10 RX ADMIN — SODIUM CHLORIDE, PRESERVATIVE FREE 10 ML: 5 INJECTION INTRAVENOUS at 11:51

## 2025-04-10 RX ADMIN — LEVOTHYROXINE SODIUM 50 MCG: 0.03 TABLET ORAL at 06:31

## 2025-04-10 RX ADMIN — ALLOPURINOL 100 MG: 100 TABLET ORAL at 11:43

## 2025-04-10 RX ADMIN — ISOSORBIDE MONONITRATE 30 MG: 30 TABLET, EXTENDED RELEASE ORAL at 11:43

## 2025-04-10 RX ADMIN — ROSUVASTATIN CALCIUM 10 MG: 10 TABLET, FILM COATED ORAL at 11:43

## 2025-04-10 RX ADMIN — MIDODRINE HYDROCHLORIDE 10 MG: 5 TABLET ORAL at 11:42

## 2025-04-10 RX ADMIN — IPRATROPIUM BROMIDE AND ALBUTEROL SULFATE 1 DOSE: .5; 3 SOLUTION RESPIRATORY (INHALATION) at 13:18

## 2025-04-10 RX ADMIN — CLOPIDOGREL BISULFATE 75 MG: 75 TABLET, FILM COATED ORAL at 11:43

## 2025-04-10 RX ADMIN — PREDNISONE 40 MG: 20 TABLET ORAL at 11:42

## 2025-04-10 NOTE — CARE COORDINATION
04/10/25 1218   IMM Letter   IMM Letter given to Patient/Family/Significant other/Guardian/POA/by: Given to patient by Kerri ORTIZ   IMM Letter date given: 04/10/25   IMM Letter time given: 1150     Electronically signed by Edis Rice on 4/10/25 at 12:19 PM EDT

## 2025-04-10 NOTE — DISCHARGE SUMMARY
Discharge Summary    Name:  Donato Obrien /Age/Sex: 1940 (84 y.o. male)   Admit Date: 2025  Discharge Date: 4/10/25    MRN & CSN:  [id number] & [id number] Encounter Date and Time 4/10/25    Attending:  Lavelle Moore MD Discharging Provider: Lavelle Moore MD     Hospital Course:   REASON FOR ADMISSION/CHIEF COMPLAINT:   Chief Complaint   Patient presents with    Shortness of Breath     Pt arrived via [facility] from home w c/o SOB. Pt arrived on 4 lpm via [place] (baseline 2 lpm).      COPD exacerbation (HCC)    PRINCIPAL DIAGNOSIS* AND HOSPITAL PROBLEM LIST:  Hypoxia [R09.02]  ESRD (end stage renal disease) (HCC) [N18.6]  COPD exacerbation (HCC) [J44.1]    CONSULTS DURING THE ADMISSION:  IP CONSULT TO NEPHROLOGY  IP CONSULT TO PULMONOLOGY  IP CONSULT TO PHARMACY    BRIEF HPI & SUMMARY OF HOSPITAL COURSE:   84-year-old admitted to the hospital with dyspnea. Admitted for further evaluation and management. Please review H&P and [facility] Chart for full details. In summary, during the course of hospitalization patient was found to have following problems including  Dyspnea,cough and  SOB likely d/t 2,3,4  HFrEF with volume overload: improved with aggressive HD fluid removal.  Acute exacerbation of COPD: con po prednisone, nebulized bronchodilators. F/u with PCP in 1 wk.  Ground glass opacity in CT chest, possible pneumonia: Leukocytosis, elevated pro calcitonin. Cover with antibiotics for total 5 days.  Recent RSV infection   Sarcoidosis  ESRD on HD  CAD  Hypothyroidism  Hiatal Hernia  On day of discharge, patient was feeling much better, felt had reached maximum benefit of this hospitalization, so was discharged in stable condition.    PENDING TESTS and PERTINENT FINDINGS REQUIRING FOLLOW UP:    As above.    Objective Findings at Discharge:   /69   Pulse 61   Temp 97.9 °F (36.6 °C)   Resp 20   Ht 1.778 m (5' 10\")   Wt 89 kg (196 lb 3.4 oz)   SpO2 96%   BMI 28.15 kg/m²    Physical Exam:

## 2025-04-10 NOTE — CARE COORDINATION
Case Management -  Discharge Note      Patient Name: Donato Obrien                   YOB: 1940  Room: [address] [place] [address]            Readmission Risk (Low < 19, Mod (19-27), High > 27): Readmission Risk Score: 22.8    Current PCP: Rohit Pathak III, MD      (IMM) Important Message from Medicare:    Has pt received appropriate compliance notices before being discharged if required: yes  Compliance doc:  [x] 2nd IMM; [] Code 44 [] Manrique  Date Given: 4/10/25 Given By: Gualberto ORTIZ    PT AM-PAC: 15 /24  OT AM-PAC: 13 /24    Patient/patient representative has been educated on the benefits of HHC as well as the possible risks of declining recommended services. Patient/patient representative has acknowledged the information provided and decided on the following discharge plan. Patient/ patient representative has been provided freedom of choice regarding service provider, supported by basic dialogue that supports the patient's individualized plan of care/goals.       [facility] agency notified of discharge:  [] Yes [] No  [x] NA    Family notified of discharge:  [x] Yes  [] No  [] NA    Facility notified of discharge:  [] Yes  [] No  [x] NA    Pt is being discharged with Outpt IV Antibiotics  [] Yes [x] No  [] NA  If yes, make sure TAYLOR is faxed to [facility] agency, and meds are called in to pharmacy by RN from TAYLOR orders only.      Financial    Payor: MEDICARE / Plan: MEDICARE PART A AND B / Product Type: *No Product type* /     Pharmacy:  Potential assistance Purchasing Medications: No  Meds-to-Beds request:        [id number] [place] [id number] - P [phone] - F [phone]  [address]  [facility] [address]  Phone: [phone] Fax: [phone]      Notes:    Additional Case Management Notes: Patient will discharge home today with no needs. Patient and spouse notified of transport time today with [facility] at 1pm. No further needs at this time.    Electronically

## 2025-04-10 NOTE — PLAN OF CARE
Problem: Chronic Conditions and Co-morbidities  Goal: Patient's chronic conditions and co-morbidity symptoms are monitored and maintained or improved  4/10/2025 0314 by Garret Hill RN  Outcome: Progressing     Problem: Discharge Planning  Goal: Discharge to home or other facility with appropriate resources  Outcome: Progressing     Problem: Skin/Tissue Integrity  Goal: Skin integrity remains intact  Description: 1.  Monitor for areas of redness and/or skin breakdown  2.  Assess vascular access sites hourly  3.  Every 4-6 hours minimum:  Change oxygen saturation probe site  4.  Every 4-6 hours:  If on nasal continuous positive airway pressure, respiratory therapy assess nares and determine need for appliance change or resting period  4/10/2025 0314 by Garret Hill, RN  Outcome: Progressing     Problem: Safety - Adult  Goal: Free from fall injury  Outcome: Progressing     Problem: ABCDS Injury Assessment  Goal: Absence of physical injury  Outcome: Progressing     Problem: Pain  Goal: Verbalizes/displays adequate comfort level or baseline comfort level  4/10/2025 0314 by Garret Hill, RN  Outcome: Progressing

## 2025-04-10 NOTE — PROGRESS NOTES
Hospitalist Progress Note      PCP: Rohit Pathak III, MD    Date of Admission: 4/5/2025    Chief Complaint: Dyspnea    Hospital Course:   Feeling overall better.  States having lot of cough but occ mucous.  Denies any SOB today at rest.  Denies any Abd pain/ N/V/Diarrhea.   No HA/Dizziness or any tingling/numbness.       Medications:  Reviewed      Exam:    /69   Pulse 71   Temp 97 °F (36.1 °C) (Oral)   Resp 18   Ht 1.778 m (5' 10\")   Wt 85.2 kg (187 lb 13.3 oz)   SpO2 92%   BMI 26.95 kg/m²     Physical Examination:   General: Alert, Awake, oriented x 3, cooperative. No distress.   HEENT: Oral mucosa moist. No JVD   Respiratory: BS decreased b/l. No crackles b/l. Mild expi wheezes.   Cardiovascular: S1 S2, RRR  Gastrointestinal: BS+. Soft, NT, no rebound or guarding.   Musculoskeletal/ Extermities: trace edema feet  Capillary refill: good.   Neurologic: Face symmetrical, moves all four extremities    Labs:   Recent Labs     04/06/25  0607 04/07/25  0526 04/08/25  0821   WBC 12.9* 12.7* 18.4*   HGB 11.4* 11.5* 11.8*   HCT 34.5* 34.9* 36.3*    174 238     Recent Labs     04/05/25  2145 04/06/25  0607 04/07/25  0526    136 139   K 4.4 4.9 4.5   CL 94* 95* 92*   CO2 25 23 25   BUN 35* 44* 68*   CREATININE 5.2* 6.0* 7.7*   CALCIUM 9.7 9.6 9.8   PHOS  --   --  4.6     Recent Labs     04/05/25 2145   AST 49*   ALT 55*   BILITOT 0.6   ALKPHOS 93     No results for input(s): \"INR\" in the last 72 hours.  No results for input(s): \"CKTOTAL\", \"TROPONINI\" in the last 72 hours.    Urinalysis:    No results found for: \"NITRU\", \"WBCUA\", \"BACTERIA\", \"RBCUA\", \"BLOODU\", \"SPECGRAV\", \"GLUCOSEU\"    Radiology:  CT CHEST WO CONTRAST   Final Result   1. Significant image degradation as result of motion during imaging.   2. Nonspecific ground-glass opacities right upper lobe and right greater than   left bilateral lower lobes may reflect acute pneumonia.  No dense pulmonary   consolidation evident.  No 
    Clinical Pharmacy Note: Pharmacy to Dose Vancomycin    Vancomycin Day: 3  Indication: PNA  Current Dose: intermittent with HD  Dosing Method: Dosing by random levels    Random: 13.9 mg/L    Recent Labs     04/08/25  0821 04/10/25  0457   BUN 90* 76*       Recent Labs     04/08/25  0821 04/10/25  0457   CREATININE 9.5* 8.6*       Recent Labs     04/08/25  0821 04/10/25  0457   WBC 18.4* 17.1*         Intake/Output Summary (Last 24 hours) at 4/10/2025 0908  Last data filed at 4/9/2025 0916  Gross per 24 hour   Intake 120 ml   Output --   Net 120 ml         Ht Readings from Last 1 Encounters:   04/05/25 1.778 m (5' 10\")        Wt Readings from Last 1 Encounters:   04/10/25 89 kg (196 lb 3.4 oz)         Body mass index is 28.15 kg/m².    Estimated Creatinine Clearance: 7 mL/min (A) (based on SCr of 8.6 mg/dL (HH)).      Assessment/Plan:  Vancomycin level is therapeutic.  Will redose vancomycin 750 mg one time today.   Patient is ESRD on HD and further doses will be ordered based on dialysis days and levels.  A vancomycin random level has been ordered on 4/12 at 0600 for follow-up.  Changes in regimen will be determined based on culture results, renal function, and clinical response.  Pharmacy will continue to monitor and adjust regimen as necessary.    Thank you for the consult,    Evelia Villar, PharmD, BCPS  [id number]  4/10/2025 9:08 AM       
   04/06/25 0423   RT Protocol   History Pulmonary Disease 2   Respiratory pattern 4   Breath sounds 2   Cough 1   Indications for Bronchodilator Therapy On home bronchodilators   Bronchodilator Assessment Score 9       
   04/07/25 0410   RT Protocol   History Pulmonary Disease 2   Respiratory pattern 2   Breath sounds 4   Cough 1   Bronchodilator Assessment Score 9       
   04/09/25 1642   RT Protocol   History Pulmonary Disease 2   Respiratory pattern 2   Breath sounds 2   Cough 1   Bronchodilator Assessment Score 7       
  Nephrology Consult Note  [facility]  [id number]  [email or web address]    I have been notified of the new consult request for us to see Mr. Obrien  Formal consult & note to follow.    Estelita Prather MD     For questions or concerns please contact us at   Telephone: [phone]     
  Physician Progress Note      PATIENT:               OVI NUGENT  CSN #:                  [id number]  :                       1940  ADMIT DATE:       2025 9:15 PM  DISCH DATE:  RESPONDING  PROVIDER #:        Lavelle LAZARO MD          QUERY TEXT:    Elevated cardiac troponin (cTc) levels are documented in the medical record in   the ED notes \"High-sensitivity troponin is elevated at baseline likely in the   setting of ESRD, Elevated troponin is elevated at 375 this is likely due to   her renal disease\". Please clarify the cause:    The clinical indicators include:  SOB, Chronic respiratory fail AECOPD, ESRD  Troponins 375-343  proBNP 91998    ED notes : High-sensitivity troponin is elevated at baseline likely in the   setting of ESRD, will need to trend this.    Trending Troponins  Options provided:  -- Myocardial injury, non-ischemic (non-traumatic) related to ESRD, chronic   respiratory failure and AECOPD  -- Other - I will add my own diagnosis  -- Disagree - Not applicable / Not valid  -- Disagree - Clinically unable to determine / Unknown  -- Refer to Clinical Documentation Reviewer    PROVIDER RESPONSE TEXT:    This patient has myocardial injury, non-ischemic (non-traumatic related to   ESRD, chronic respiratory failure and AECOPD.    Query created by: Taylor Reynolds on 2025 11:51 AM      Electronically signed by:  Lavelle LAZARO MD 4/10/2025 8:33 AM          
  Pulmonary and Critical Care Medicine    CC: SOB   Date: 2025  Admit Date:  2025  Reason for Consultation: COPD  Consult Requesting Physician: Lavelle Moore MD     HPI     This is a 83 y/o M w/ a hx of asthma, sarcoidosis, on home oxygen therapy 2L, HFrEF, CAD, PVD, Aortic stenosis, ESRD on HD, Afib, HTN, HLD who presented with SOB and cough.    Interm history:  Patient cough is improved  He is on 1L NC     ROS: negative except stated above    OBJECTIVE DATA       PHYSICAL EXAM:   Temp  Av.8 °F (36.6 °C)  Min: 97.6 °F (36.4 °C)  Max: 98 °F (36.7 °C)  Pulse  Av.5  Min: 73  Max: 80  BP  Min: 94/61  Max: 120/75  SpO2  Av.8 %  Min: 94 %  Max: 98 %    General: NAD  Neuro: A0x3  Lung: faint lower lobe crackles, no wheezing, equal   Heart: regular rate    MEDICATIONS: Reviewed  Scheduled Meds:   [START ON 4/10/2025] predniSONE  40 mg Oral Daily    piperacillin-tazobactam  3,375 mg IntraVENous Q12H    vancomycin (VANCOCIN) intermittent dosing (placeholder)   Other RX Placeholder    pantoprazole (PROTONIX) 40 mg in sodium chloride (PF) 0.9 % 10 mL injection  40 mg IntraVENous Daily    ipratropium 0.5 mg-albuterol 2.5 mg  1 Dose Inhalation TID RT    allopurinol  100 mg Oral Daily    b complex-C-folic acid  1 capsule Oral Daily    budesonide-formoterol  2 puff Inhalation BID RT    clopidogrel  75 mg Oral Daily    isosorbide mononitrate  30 mg Oral Daily    levothyroxine  50 mcg Oral Daily    rosuvastatin  10 mg Oral Daily    sodium chloride flush  5-40 mL IntraVENous 2 times per day    heparin (porcine)  5,000 Units SubCUTAneous 3 times per day    midodrine  5 mg Oral Once per day on     midodrine  10 mg Oral Once per day on      Continuous Infusions:   sodium chloride       PRN Meds:.sodium chloride flush, sodium chloride, ondansetron, senna, acetaminophen **OR** acetaminophen, albuterol, midodrine **AND** midodrine     LABS: Reviewed.   Recent 
  [facility] - Inpatient Rehabilitation Department   Phone: [phone]    Physical Therapy    [x] Initial Evaluation            [] Daily Treatment Note         [x] Discharge Summary      Patient: Donato Obrien   : 1940   MRN: [id number]   Date of Service:  2025  Admitting Diagnosis: COPD exacerbation (HCC)  Current Admission Summary: 84-year-old admitted to the hospital with dyspnea   Dyspnea secondary to acute exacerbation of COPD secondary to RSV on background of chronic respiratory failure  On baseline 2 L  Past Medical History:  has a past medical history of A-fib (HCC), Arthritis, Blood clot, Fracture of sternum, Fracture rib, Hemodialysis patient, Hyperlipidemia, Hypertension, Laceration of skin of lower leg, left, initial encounter, Laceration of skin of lower leg, right, initial encounter, NSTEMI (non-ST elevated myocardial infarction) (HCC), Prostate cancer ([facility]), and Type II or unspecified type diabetes mellitus without mention of complication, not stated as uncontrolled.  Past Surgical History:  has a past surgical history that includes TURP () and sigmoidoscopy (N/A, 3/9/2023).  Discharge Recommendations: Donato Obrien scored a 15/24 on the AM-PAC short mobility form.  At this time, no further PT is recommended upon discharge due to Pt. At baseline level.  Pt. Has been non-ambulatory and primarily bedbound ~5 years.  Pt. Not interested in therapy.  No Goals identified.  Recommend patient returns to prior setting with prior services.     DME Required For Discharge: patient has all required DME for discharge  Precautions/Restrictions: high fall risk  Weight Bearing Restrictions: no restrictions  [] Right Upper Extremity  [] Left Upper Extremity [] Right Lower Extremity  [] Left Lower Extremity     Required Braces/Orthotics: no braces required   [] Right  [] Left  Positional Restrictions:no positional restrictions    Pre-Admission Information   Lives With: spouse               
  [facility]   Nephrology progress Note  [phone]  [phone]   [employer]           SUMMARY :  We are following this patient for ESRD on maintenance hemodialysis  84-year-old male with past medical history of ESRD on maintenance hemodialysis, hypertension, peripheral vascular disease, T2DM, aortic stenosis, BPH s/p TURP, hypothyroidism, CHF with EF of 50%, coronary artery disease s/p NSTEMI and PCI in , lung sarcoidosis, atrial fibrillation, RSV bronchitis  Presents on 2025 with shortness of breath and a nonproductive cough.  Chest x-ray showed mild interstitial edema with linear atelectasis changes in the lung bases greater on the left.  He was started on oxygen IV steroids and dialysis done  Of note per FLORENCE on 3/27/2025 showed EF of 50% with indeterminate diastolic function, moderately aortic stenosis and a moderate to severe TR    SUBJECTIVE:   Patient progress reviewed.  25:  The patient the patient has had frequent coughing spells, occasionally productive of yellow phlegm.  No hemoptysis , no shortness of breath in between the spells  25: seen on HD , cough a bit better     Physical Exam:    VITALS:  /69   Pulse 71   Temp 97 °F (36.1 °C) (Oral)   Resp 18   Ht 1.778 m (5' 10\")   Wt 85.2 kg (187 lb 13.3 oz)   SpO2 92%   BMI 26.95 kg/m²   BLOOD PRESSURE RANGE:  Systolic (24hrs), Av , Min:109 , Max:116   ; Diastolic (24hrs), Av, Min:69, Max:75    24HR INTAKE/OUTPUT:    Intake/Output Summary (Last 24 hours) at 2025 0902  Last data filed at 2025 0858  Gross per 24 hour   Intake 360 ml   Output --   Net 360 ml       Gen:  alert, oriented x 3, in distress, on room air  PERRL , EOM +  Pallor +, No icterus  JVP not raised   CV: IRRR, ESM Gr 2/6, no rub .  Lungs:B/ L air entry, Normal breath sounds   Abd: soft, bowel sounds + , No organomegaly   Ext: No edema, no cyanosis.  Thrombosed AV graft left upper arm  Skin: Warm.  No rash  Neuro: 
  [facility]   Nephrology progress Note  [phone]  [phone]   [email or web address]           SUMMARY :  We are following this patient for ESRD on maintenance hemodialysis  84-year-old male with past medical history of ESRD on maintenance hemodialysis, hypertension, peripheral vascular disease, T2DM, aortic stenosis, BPH s/p TURP, hypothyroidism, CHF with EF of 50%, coronary artery disease s/p NSTEMI and PCI in , lung sarcoidosis, atrial fibrillation, RSV bronchitis  Presents on 2025 with shortness of breath and a nonproductive cough.  Chest x-ray showed mild interstitial edema with linear atelectasis changes in the lung bases greater on the left.  He was started on oxygen IV steroids and dialysis done  Of note per FLORENCE on 3/27/2025 showed EF of 50% with indeterminate diastolic function, moderately aortic stenosis and a moderate to severe TR    SUBJECTIVE:   Patient progress reviewed.  25:  The patient the patient has had frequent coughing spells, occasionally productive of yellow phlegm.  No hemoptysis , no shortness of breath in between the spells  25: seen on HD , cough a bit better   25: cough + , yellow phlegm , no hemoptysis     Physical Exam:    VITALS:  /75   Pulse 80   Temp 97.6 °F (36.4 °C) (Oral)   Resp 16   Ht 1.778 m (5' 10\")   Wt 89.7 kg (197 lb 12 oz)   SpO2 94%   BMI 28.37 kg/m²   BLOOD PRESSURE RANGE:  Systolic (24hrs), Av , Min:108 , Max:120   ; Diastolic (24hrs), Av, Min:62, Max:75    24HR INTAKE/OUTPUT:    Intake/Output Summary (Last 24 hours) at 2025 0958  Last data filed at 2025 1246  Gross per 24 hour   Intake 240 ml   Output --   Net 240 ml       Gen:  alert, oriented x 3, in distress, on room air  PERRL , EOM +  Pallor +, No icterus  JVP not raised   CV: IRRR, ESM Gr 2/6, no rub .  Lungs:B/ L air entry, Normal breath sounds   Abd: soft, bowel sounds + , No organomegaly   Ext: No edema, no cyanosis.  Thrombosed AV 
  [facility]   Nephrology progress Note  [phone]  [phone]   [employer]           SUMMARY :  We are following this patient for ESRD on maintenance hemodialysis  84-year-old male with past medical history of ESRD on maintenance hemodialysis, hypertension, peripheral vascular disease, T2DM, aortic stenosis, BPH s/p TURP, hypothyroidism, CHF with EF of 50%, coronary artery disease s/p NSTEMI and PCI in , lung sarcoidosis, atrial fibrillation, RSV bronchitis  Presents on 2025 with shortness of breath and a nonproductive cough.  Chest x-ray showed mild interstitial edema with linear atelectasis changes in the lung bases greater on the left.  He was started on oxygen IV steroids and dialysis done  Of note per FLORENCE on 3/27/2025 showed EF of 50% with indeterminate diastolic function, moderately aortic stenosis and a moderate to severe TR    SUBJECTIVE:   Patient progress reviewed.  25:  The patient the patient has had frequent coughing spells, occasionally productive of yellow phlegm.  No hemoptysis , no shortness of breath in between the spells  25: seen on HD , cough a bit better   25: cough + , yellow phlegm , no hemoptysis   4/10/25: seen on HD , feels better     Physical Exam:    VITALS:  /69   Pulse 61   Temp 97.9 °F (36.6 °C)   Resp 20   Ht 1.778 m (5' 10\")   Wt 89 kg (196 lb 3.4 oz)   SpO2 96%   BMI 28.15 kg/m²   BLOOD PRESSURE RANGE:  Systolic (24hrs), Av , Min:115 , Max:132   ; Diastolic (24hrs), Av, Min:69, Max:76    24HR INTAKE/OUTPUT:    Intake/Output Summary (Last 24 hours) at 4/10/2025 0949  Last data filed at 4/10/2025 0928  Gross per 24 hour   Intake 0 ml   Output --   Net 0 ml       Gen:  alert, oriented x 3, in distress, on room air  PERRL , EOM +  Pallor +, No icterus  JVP not raised   CV: IRRR, ESM Gr 2/6, no rub .  Lungs:B/ L air entry, Normal breath sounds   Abd: soft, bowel sounds + , No organomegaly   Ext: No edema, 
4 Eyes Skin Assessment     NAME:  Donato Obrien  YOB: 1940  MEDICAL RECORD NUMBER:  [id number]    The patient is being assessed for  Admission    I agree that at least one RN has performed a thorough Head to Toe Skin Assessment on the patient. ALL assessment sites listed below have been assessed.      Areas assessed by both nurses:    Head, Face, Ears        Does the Patient have a Wound? No noted wound(s)       Hans Prevention initiated by RN: Yes  Wound Care Orders initiated by RN: No    Pressure Injury (Stage 3,4, Unstageable, DTI, NWPT, and Complex wounds) if present, place Wound referral order by RN under : Yes    New Ostomies, if present place, Ostomy referral order under : No     Nurse 1 eSignature: Electronically signed by Uma Zavala RN on 4/6/25 at 7:43 AM EDT    **SHARE this note so that the co-signing nurse can place an eSignature**    Nurse 2 eSignature: Electronically signed by AARON HAMILTON RN on 4/6/25 at 11:23 PM EDT    
Discharge instructions reviewed with patient. Verbalized understanding. IV dc'd without complications. Tele box returned to nurse's station. Patient to be taken home via transport service at 1330. Spouse aware.   
Patient admitted in 3310, VSS, pt denies pain at the moment, medications given per MAR, head to toe assessment done, pt awake, in bed, call light within reach, safety precautions in place.   
Patient complaining of chest pain 8/10. Left chest radiating to left shoulder. Dr. Austin notified. Stat EKG done.   
Shift assessment completed, see flowsheets.  Medications administered, see MAR. Vital signs stable, SPO2 94% on 2L.  Plan of care discussed with patient. Safety precautions in place. Call light and bedside table within reach.  Pt denies further needs at this time.  Will continue to monitor.  Kimberlyn Enrique RN     
Shift assessment completed, see flowsheets.  Medications administered, see MAR. Vital signs stable, SPO2 95% on 1L.  Plan of care discussed with patient. Fall precautions in place. Call light and bedside table within reach. Nonskid footwear on.  Pt denies further needs at this time.  Will continue to monitor.  Kimberlyn Enrique RN     
Shift assessment completed, see flowsheets.  Medications administered, see MAR. Vital signs stable, SPO2 96% on room air. Plan of care discussed with patient. Safety precautions in place. Call light and bedside table within reach. Pt denies further needs at this time.  Will continue to monitor.  Kimberlyn Enrique RN     
Treatment time: 3 hrs    Net UF: 1200 ml    Pre weight: 85.2 kg  Post weight: 84 kg    Access used: Rt CW TDC  Access function: Well tolerated, 350 BFR    Medications or blood products given: Midodrine 10 mg    Regular outpatient schedule: TTS    Summary of response to treatment: Pt's system clotted with 30 minutes remaining. Pt remained stable throughout entire treatment and upon exiting the hemodialysis suite. Report given to Kimberlyn Enrique RN.            
Treatment time: 3.5 hours  Net UF: 2000 ml     Pre weight: 89 kg  Post weight:87 kg    Access used: RTDC    Access function: Well tolerated with  ml/min     Medications or blood products given: heparin dwells     Regular outpatient schedule: TTS     Summary of response to treatment: Patient tolerated treatment well and without any complications. Patient remained stable throughout entire treatment and upon the exiting the dialysis suite via transport.     Report given to Alma Rosa Grider RN and copy of dialysis treatment record placed in chart, to be scanned into EMR.  
Uneventful shift. Vitals stable and charted.   Wean to 0.5L NC.   Afib controlled on tele.   Received and tolerated iv antibiotics.   Spoke with patients wife, and answered all questions and concerns.   No complaints from the patient at this time.  Patient will have dialysis today per schedule.   Patient is sitting in bed watching the morning news.   Call light in reach, bed low and locked.   
Hospital Problems    Diagnosis Date Noted    COPD exacerbation (HCC) [J44.1] 04/06/2025       Acute Medical Issues Being Addressed:  84-year-old admitted to the hospital with dyspnea    Dyspnea secondary to acute exacerbation of COPD secondary to RSV on background of chronic respiratory failure  On baseline 2 L  I do not hear any wheeze  Was given IV steroids  Changed to oral prednisone starting tomorrow  Continue nebulizers  If doing well will discharge tomorrow      ESRD on HD  - Renally dose medications  - Monitor for electrolyte abnormalities   - Nephrology consulted     CAD  - Continue home Plavix, statin, imdur     Hypothyroidism  - Continue home synthroid    DVT Prophylaxis: sc lovenox   Diet: ADULT DIET; Regular  Code Status: Full Code      Dispo - once acute medical processes have resolved    Lizandro Garrett MD    
Radha King MD      Apply 2 g topically 2 times daily     Emollient (EUCERIN ADVANCED REPAIR) CREA  Past Week  --  --  --  Evon Galvan MD      Apply topically 2 times daily as needed     ezetimibe (ZETIA) 10 MG tablet  4/5/2025  --  12/20/10  --  Rohit Pathak MD      Take 1 tablet by mouth daily.     guaiFENesin-dextromethorphan (ROBITUSSIN DM) 100-10 MG/5ML syrup  Past Week  --  04/01/25 04/11/25  Filipe Hebert MD      Take 5 mLs by mouth every 4 hours as needed for Cough     isosorbide mononitrate (IMDUR) 30 MG extended release tablet  4/5/2025  --  11/28/23  --  Filipe Hebert MD      Take 1 tablet by mouth daily     levothyroxine (SYNTHROID) 50 MCG tablet  4/5/2025  --  --  --  Evon Galvan MD      Take 1 tablet by mouth Daily     midodrine (PROAMATINE) 5 MG tablet  4/5/2025  --  --  --  Evon Galvan MD      Take 1 tablet by mouth See Admin Instructions Take 2 tablets by mouth prior to dialysis (Tuesday, Thursday, Saturday) and 1 tablet daily all other days.     ONE TOUCH ULTRASOFT LANCETS MISC   --  --  09/09/10  --  Rohit Pathak MD      TEST once daily     rosuvastatin (CRESTOR) 20 MG tablet  4/5/2025  --  11/28/23  --  Filipe Hebert MD      Take 1 tablet by mouth nightly     Patient taking differently: Take 1 tablet by mouth daily          Medication History Obtained From:  Caregiver/Medical Decision Maker Patient's wife    Medication History Summary  Medications ADDED: 1 -   Medications FLAGGED FOR REMOVAL: 0 -   Medications MODIFIED: 1 -   Medications TAKING DIFFERENTLY THAN PRESCRIBED: 0 -   Total Med List Updates Made: 2  Time Spent:  10  minutes    Kimberlyn Nicholas  4/5/2025 10:48 PM    
\"SPECGRAV\", \"GLUCOSEU\"    Radiology:  XR CHEST PORTABLE   Final Result   1. Cardiomegaly with mild interstitial edema.   2. Linear atelectatic changes in the lung bases, greater on the left.   3. Large hiatal hernia.             Assessment/Plan:    Active Hospital Problems    Diagnosis Date Noted    COPD exacerbation (HCC) [J44.1] 04/06/2025       Acute Medical Issues Being Addressed:  84-year-old admitted to the hospital with dyspnea    Dyspnea  and cough likely d/t acute exacerbation of COPD secondary to RSV on background of chronic respiratory failure  On baseline 2 L  Was given IV steroids  Changed to oral prednisone starting today  Still not feeling well, obtain pulmonary team input  Continue nebulizers    ESRD on HD  - Renally dose medications  - Monitor for electrolyte abnormalities   - f/u Nephrology for HD support     CAD  - Continue home Plavix, statin, imdur     Hypothyroidism  - Continue home synthroid    DVT Prophylaxis: sc lovenox   Diet: ADULT DIET; Regular  Code Status: Full Code      Dispo - once acute medical processes have resolved    Lavelle Moore MD    
MD Naveed  Pulmonary and Critical Care Medicine   [facility]    
maintenance hemodialysis  \[facility]\""  [place]  Anemia of chronic kidney disease  Target hemoglobin 9-11  Renal osteodystrophy  Hypotension  On midodrine  Ischemic cardiomyopathy  Do not plan to ultrafiltrate today plan to do regular dialysis tomorrow, his blood pressure in the 90s will make doing isolated UF difficult  Atrial fibrillation  Rate controlled    Casper Benavides MD  
Treatment Minutes:   17  Total Treatment Minutes:  32       Electronically Signed By: BRYAN Saenz MOT, OTR/L, CNS ([id number])

## 2025-04-12 LAB
BACTERIA BLD CULT ORG #2: NORMAL
BACTERIA BLD CULT: NORMAL

## 2025-04-15 ENCOUNTER — HOSPITAL ENCOUNTER (INPATIENT)
Age: 85
LOS: 16 days | DRG: 326 | End: 2025-05-01
Attending: EMERGENCY MEDICINE | Admitting: INTERNAL MEDICINE
Payer: MEDICARE

## 2025-04-15 ENCOUNTER — APPOINTMENT (OUTPATIENT)
Dept: GENERAL RADIOLOGY | Age: 85
DRG: 326 | End: 2025-04-15
Payer: MEDICARE

## 2025-04-15 DIAGNOSIS — K44.9 PARAESOPHAGEAL HIATAL HERNIA: ICD-10-CM

## 2025-04-15 DIAGNOSIS — Z99.2 ESRD ON HEMODIALYSIS (HCC): ICD-10-CM

## 2025-04-15 DIAGNOSIS — J96.01 ACUTE RESPIRATORY FAILURE WITH HYPOXIA (HCC): Primary | ICD-10-CM

## 2025-04-15 DIAGNOSIS — N18.6 ESRD ON HEMODIALYSIS (HCC): ICD-10-CM

## 2025-04-15 DIAGNOSIS — R07.89 CHEST DISCOMFORT: ICD-10-CM

## 2025-04-15 DIAGNOSIS — K44.9 HIATAL HERNIA: ICD-10-CM

## 2025-04-15 DIAGNOSIS — R13.10 ODYNOPHAGIA: ICD-10-CM

## 2025-04-15 LAB
ALBUMIN SERPL-MCNC: 3.6 G/DL (ref 3.4–5)
ALBUMIN/GLOB SERPL: 1.5 {RATIO} (ref 1.1–2.2)
ALP SERPL-CCNC: 72 U/L (ref 40–129)
ALT SERPL-CCNC: 38 U/L (ref 10–40)
ANION GAP SERPL CALCULATED.3IONS-SCNC: 28 MMOL/L (ref 3–16)
ANISOCYTOSIS BLD QL SMEAR: ABNORMAL
AST SERPL-CCNC: 27 U/L (ref 15–37)
BASE EXCESS BLDA CALC-SCNC: -1.9 MMOL/L (ref -3–3)
BASE EXCESS BLDV CALC-SCNC: -1.9 MMOL/L (ref -3–3)
BASOPHILS # BLD: 0 K/UL (ref 0–0.2)
BASOPHILS NFR BLD: 0 %
BILIRUB SERPL-MCNC: 0.3 MG/DL (ref 0–1)
BUN SERPL-MCNC: 94 MG/DL (ref 7–20)
CALCIUM SERPL-MCNC: 8.8 MG/DL (ref 8.3–10.6)
CHLORIDE SERPL-SCNC: 89 MMOL/L (ref 99–110)
CO2 BLDA-SCNC: 50 MMOL/L
CO2 BLDV-SCNC: 58 MMOL/L
CO2 SERPL-SCNC: 22 MMOL/L (ref 21–32)
COHGB MFR BLDA: 1.6 % (ref 0–1.5)
COHGB MFR BLDV: 2.6 % (ref 0–1.5)
CREAT SERPL-MCNC: 9 MG/DL (ref 0.8–1.3)
DEPRECATED RDW RBC AUTO: 16 % (ref 12.4–15.4)
DO-HGB MFR BLDV: 35 %
EKG DIAGNOSIS: NORMAL
EKG Q-T INTERVAL: 452 MS
EKG QRS DURATION: 104 MS
EKG QTC CALCULATION (BAZETT): 452 MS
EKG R AXIS: -78 DEGREES
EKG T AXIS: 74 DEGREES
EKG VENTRICULAR RATE: 60 BPM
EOSINOPHIL # BLD: 0.2 K/UL (ref 0–0.6)
EOSINOPHIL NFR BLD: 1 %
GFR SERPLBLD CREATININE-BSD FMLA CKD-EPI: 5 ML/MIN/{1.73_M2}
GLUCOSE SERPL-MCNC: 135 MG/DL (ref 70–99)
HCO3 BLDA-SCNC: 21.4 MMOL/L (ref 21–29)
HCO3 BLDV-SCNC: 24.6 MMOL/L (ref 23–29)
HCT VFR BLD AUTO: 35.4 % (ref 40.5–52.5)
HGB BLD-MCNC: 11.5 G/DL (ref 13.5–17.5)
HGB BLDA-MCNC: 11.2 G/DL (ref 13.5–17.5)
INR PPP: 1.21 (ref 0.85–1.15)
LIPASE SERPL-CCNC: 61 U/L (ref 13–60)
LYMPHOCYTES # BLD: 1.9 K/UL (ref 1–5.1)
LYMPHOCYTES NFR BLD: 10 %
MACROCYTES BLD QL SMEAR: ABNORMAL
MCH RBC QN AUTO: 33.6 PG (ref 26–34)
MCHC RBC AUTO-ENTMCNC: 32.4 G/DL (ref 31–36)
MCV RBC AUTO: 103.7 FL (ref 80–100)
METHGB MFR BLDA: 0.6 %
METHGB MFR BLDV: 0.8 %
MONOCYTES # BLD: 0.8 K/UL (ref 0–1.3)
MONOCYTES NFR BLD: 4 %
NEUTROPHILS # BLD: 16.2 K/UL (ref 1.7–7.7)
NEUTROPHILS NFR BLD: 85 %
NRBC BLD-RTO: 3 /100 WBC
NT-PROBNP SERPL-MCNC: ABNORMAL PG/ML (ref 0–449)
O2 CT VFR BLDV CALC: 10 VOL %
O2 THERAPY: ABNORMAL
O2 THERAPY: ABNORMAL
OVALOCYTES BLD QL SMEAR: ABNORMAL
PCO2 BLDA: 30.6 MMHG (ref 35–45)
PCO2 BLDV: 48.3 MMHG (ref 40–50)
PH BLDA: 7.45 [PH] (ref 7.35–7.45)
PH BLDV: 7.32 [PH] (ref 7.35–7.45)
PLATELET # BLD AUTO: 203 K/UL (ref 135–450)
PLATELET BLD QL SMEAR: ADEQUATE
PMV BLD AUTO: 8.3 FL (ref 5–10.5)
PO2 BLDA: 150 MMHG (ref 75–108)
PO2 BLDV: 39.6 MMHG (ref 25–40)
POTASSIUM SERPL-SCNC: 4.6 MMOL/L (ref 3.5–5.1)
PROCALCITONIN SERPL IA-MCNC: 0.73 NG/ML (ref 0–0.15)
PROT SERPL-MCNC: 6 G/DL (ref 6.4–8.2)
PROTHROMBIN TIME: 15.5 SEC (ref 11.9–14.9)
RBC # BLD AUTO: 3.42 M/UL (ref 4.2–5.9)
SAO2 % BLDA: 100 %
SAO2 % BLDV: 64 %
SLIDE REVIEW: ABNORMAL
SODIUM SERPL-SCNC: 139 MMOL/L (ref 136–145)
TROPONIN, HIGH SENSITIVITY: 181 NG/L (ref 0–22)
TROPONIN, HIGH SENSITIVITY: 192 NG/L (ref 0–22)
WBC # BLD AUTO: 19.1 K/UL (ref 4–11)

## 2025-04-15 PROCEDURE — 5A1D70Z PERFORMANCE OF URINARY FILTRATION, INTERMITTENT, LESS THAN 6 HOURS PER DAY: ICD-10-PCS | Performed by: INTERNAL MEDICINE

## 2025-04-15 PROCEDURE — 82803 BLOOD GASES ANY COMBINATION: CPT

## 2025-04-15 PROCEDURE — 93010 ELECTROCARDIOGRAM REPORT: CPT | Performed by: INTERNAL MEDICINE

## 2025-04-15 PROCEDURE — 83880 ASSAY OF NATRIURETIC PEPTIDE: CPT

## 2025-04-15 PROCEDURE — 80053 COMPREHEN METABOLIC PANEL: CPT

## 2025-04-15 PROCEDURE — 90935 HEMODIALYSIS ONE EVALUATION: CPT

## 2025-04-15 PROCEDURE — 94640 AIRWAY INHALATION TREATMENT: CPT

## 2025-04-15 PROCEDURE — 71045 X-RAY EXAM CHEST 1 VIEW: CPT

## 2025-04-15 PROCEDURE — 2500000003 HC RX 250 WO HCPCS: Performed by: EMERGENCY MEDICINE

## 2025-04-15 PROCEDURE — 36600 WITHDRAWAL OF ARTERIAL BLOOD: CPT

## 2025-04-15 PROCEDURE — 2700000000 HC OXYGEN THERAPY PER DAY

## 2025-04-15 PROCEDURE — 36415 COLL VENOUS BLD VENIPUNCTURE: CPT

## 2025-04-15 PROCEDURE — 6370000000 HC RX 637 (ALT 250 FOR IP): Performed by: EMERGENCY MEDICINE

## 2025-04-15 PROCEDURE — 85610 PROTHROMBIN TIME: CPT

## 2025-04-15 PROCEDURE — 6360000002 HC RX W HCPCS: Performed by: EMERGENCY MEDICINE

## 2025-04-15 PROCEDURE — 84145 PROCALCITONIN (PCT): CPT

## 2025-04-15 PROCEDURE — 84484 ASSAY OF TROPONIN QUANT: CPT

## 2025-04-15 PROCEDURE — 94660 CPAP INITIATION&MGMT: CPT

## 2025-04-15 PROCEDURE — 85025 COMPLETE CBC W/AUTO DIFF WBC: CPT

## 2025-04-15 PROCEDURE — 83690 ASSAY OF LIPASE: CPT

## 2025-04-15 PROCEDURE — 2000000000 HC ICU R&B

## 2025-04-15 PROCEDURE — 96374 THER/PROPH/DIAG INJ IV PUSH: CPT

## 2025-04-15 PROCEDURE — 94761 N-INVAS EAR/PLS OXIMETRY MLT: CPT

## 2025-04-15 PROCEDURE — 99285 EMERGENCY DEPT VISIT HI MDM: CPT

## 2025-04-15 PROCEDURE — 93005 ELECTROCARDIOGRAM TRACING: CPT | Performed by: EMERGENCY MEDICINE

## 2025-04-15 RX ORDER — SODIUM CHLORIDE 0.9 % (FLUSH) 0.9 %
5-40 SYRINGE (ML) INJECTION PRN
Status: DISCONTINUED | OUTPATIENT
Start: 2025-04-15 | End: 2025-05-01 | Stop reason: HOSPADM

## 2025-04-15 RX ORDER — BUDESONIDE AND FORMOTEROL FUMARATE DIHYDRATE 160; 4.5 UG/1; UG/1
2 AEROSOL RESPIRATORY (INHALATION)
Status: DISCONTINUED | OUTPATIENT
Start: 2025-04-15 | End: 2025-05-01 | Stop reason: HOSPADM

## 2025-04-15 RX ORDER — MIDODRINE HYDROCHLORIDE 5 MG/1
5 TABLET ORAL
Status: DISCONTINUED | OUTPATIENT
Start: 2025-04-16 | End: 2025-05-01 | Stop reason: HOSPADM

## 2025-04-15 RX ORDER — ACETAMINOPHEN 325 MG/1
650 TABLET ORAL EVERY 6 HOURS PRN
Status: DISCONTINUED | OUTPATIENT
Start: 2025-04-15 | End: 2025-05-01 | Stop reason: HOSPADM

## 2025-04-15 RX ORDER — SODIUM CHLORIDE 9 MG/ML
INJECTION, SOLUTION INTRAVENOUS PRN
Status: DISCONTINUED | OUTPATIENT
Start: 2025-04-15 | End: 2025-05-01 | Stop reason: HOSPADM

## 2025-04-15 RX ORDER — HEPARIN SODIUM 5000 [USP'U]/ML
5000 INJECTION, SOLUTION INTRAVENOUS; SUBCUTANEOUS EVERY 8 HOURS SCHEDULED
Status: DISCONTINUED | OUTPATIENT
Start: 2025-04-15 | End: 2025-05-01 | Stop reason: HOSPADM

## 2025-04-15 RX ORDER — ROSUVASTATIN CALCIUM 10 MG/1
20 TABLET, COATED ORAL NIGHTLY
Status: DISCONTINUED | OUTPATIENT
Start: 2025-04-15 | End: 2025-05-01 | Stop reason: HOSPADM

## 2025-04-15 RX ORDER — MIDODRINE HYDROCHLORIDE 5 MG/1
10 TABLET ORAL
Status: DISCONTINUED | OUTPATIENT
Start: 2025-04-17 | End: 2025-05-01 | Stop reason: HOSPADM

## 2025-04-15 RX ORDER — LEVOTHYROXINE SODIUM 25 UG/1
50 TABLET ORAL DAILY
Status: DISCONTINUED | OUTPATIENT
Start: 2025-04-16 | End: 2025-05-01 | Stop reason: HOSPADM

## 2025-04-15 RX ORDER — ALLOPURINOL 100 MG/1
100 TABLET ORAL DAILY
Status: DISCONTINUED | OUTPATIENT
Start: 2025-04-16 | End: 2025-04-20

## 2025-04-15 RX ORDER — ISOSORBIDE MONONITRATE 30 MG/1
30 TABLET, EXTENDED RELEASE ORAL DAILY
Status: DISCONTINUED | OUTPATIENT
Start: 2025-04-16 | End: 2025-05-01 | Stop reason: HOSPADM

## 2025-04-15 RX ORDER — AMLODIPINE BESYLATE 5 MG/1
5 TABLET ORAL DAILY
Status: DISCONTINUED | OUTPATIENT
Start: 2025-04-16 | End: 2025-04-20

## 2025-04-15 RX ORDER — PREDNISONE 20 MG/1
40 TABLET ORAL DAILY
Status: DISCONTINUED | OUTPATIENT
Start: 2025-04-15 | End: 2025-04-22

## 2025-04-15 RX ORDER — SODIUM CHLORIDE 0.9 % (FLUSH) 0.9 %
5-40 SYRINGE (ML) INJECTION EVERY 12 HOURS SCHEDULED
Status: DISCONTINUED | OUTPATIENT
Start: 2025-04-15 | End: 2025-05-01 | Stop reason: HOSPADM

## 2025-04-15 RX ORDER — CALCIUM ACETATE 667 MG/1
2 CAPSULE ORAL
Status: DISCONTINUED | OUTPATIENT
Start: 2025-04-16 | End: 2025-05-01 | Stop reason: HOSPADM

## 2025-04-15 RX ORDER — MIDODRINE HYDROCHLORIDE 5 MG/1
5 TABLET ORAL SEE ADMIN INSTRUCTIONS
Status: DISCONTINUED | OUTPATIENT
Start: 2025-04-15 | End: 2025-04-15

## 2025-04-15 RX ORDER — POLYETHYLENE GLYCOL 3350 17 G/17G
17 POWDER, FOR SOLUTION ORAL DAILY PRN
Status: DISCONTINUED | OUTPATIENT
Start: 2025-04-15 | End: 2025-05-01 | Stop reason: HOSPADM

## 2025-04-15 RX ORDER — CLOPIDOGREL BISULFATE 75 MG/1
75 TABLET ORAL DAILY
Status: DISCONTINUED | OUTPATIENT
Start: 2025-04-16 | End: 2025-04-25

## 2025-04-15 RX ORDER — ALBUTEROL SULFATE 0.83 MG/ML
2.5 SOLUTION RESPIRATORY (INHALATION) EVERY 6 HOURS PRN
Status: DISCONTINUED | OUTPATIENT
Start: 2025-04-15 | End: 2025-05-01 | Stop reason: HOSPADM

## 2025-04-15 RX ORDER — ACETAMINOPHEN 650 MG/1
650 SUPPOSITORY RECTAL EVERY 6 HOURS PRN
Status: DISCONTINUED | OUTPATIENT
Start: 2025-04-15 | End: 2025-05-01 | Stop reason: HOSPADM

## 2025-04-15 RX ORDER — IPRATROPIUM BROMIDE AND ALBUTEROL SULFATE 2.5; .5 MG/3ML; MG/3ML
1 SOLUTION RESPIRATORY (INHALATION) ONCE
Status: COMPLETED | OUTPATIENT
Start: 2025-04-15 | End: 2025-04-15

## 2025-04-15 RX ORDER — EZETIMIBE 10 MG/1
10 TABLET ORAL DAILY
Status: DISCONTINUED | OUTPATIENT
Start: 2025-04-15 | End: 2025-04-15 | Stop reason: RX

## 2025-04-15 RX ADMIN — WATER 125 MG: 1 INJECTION INTRAMUSCULAR; INTRAVENOUS; SUBCUTANEOUS at 07:59

## 2025-04-15 RX ADMIN — IPRATROPIUM BROMIDE AND ALBUTEROL SULFATE 1 DOSE: .5; 3 SOLUTION RESPIRATORY (INHALATION) at 08:06

## 2025-04-15 RX ADMIN — LIDOCAINE HYDROCHLORIDE: 20 SOLUTION ORAL at 08:00

## 2025-04-15 ASSESSMENT — PAIN - FUNCTIONAL ASSESSMENT: PAIN_FUNCTIONAL_ASSESSMENT: 0-10

## 2025-04-15 ASSESSMENT — PAIN SCALES - GENERAL
PAINLEVEL_OUTOF10: 7
PAINLEVEL_OUTOF10: 0
PAINLEVEL_OUTOF10: 5

## 2025-04-15 ASSESSMENT — PAIN DESCRIPTION - LOCATION: LOCATION: GENERALIZED

## 2025-04-15 NOTE — ED NOTES
Patient dialysis schedule is Tuesday, Thursday, Saturday. Did not receive dialysis today.     Spoke to Mrs. Obrien who said that patient reported on Sunday that he felt like he got something stuck in his throat and has been complaining about it since. She also said that he threw-up on Monday. She reports that he has been wearing oxygen intermittently at home and has not had an oxygen evaluation in over a year.      If you need to speak to Mrs. Obrien her number is in the chart. She wants us to know that she doesn't have voicemail or answer the phone while she is driving, so if we call and she doesn't answer she will call us back.

## 2025-04-15 NOTE — H&P
HOSPITALISTS HISTORY AND PHYSICAL    4/15/2025 11:11 AM    Patient Information:  DONATO OBRIEN is a 84 y.o. male [id number]  PCP:  Rohit Pathak III, MD (Tel: [phone] )    Chief complaint:    Chief Complaint   Patient presents with    Shortness of Breath     Patient comes in from home with shortness of breath, he was her recently with RSV. [facility] said that end tidal CO2 was 17 on the ride in on 6L. He is on home O2 but he does not remember how much he is on.        History of Present Illness:  Donato Obrien is a 84 y.o. male with lethargic on BiPAP and ICM will squeeze hands to command but unable to answer many questions patient was recommended to hospital by April 5 of past for dyspnea secondary to volume overload and possible pneumonia and COPD treated with antibiotics and prednisone.  In the ED patient was short of breath with increased work of breathing and placed on BiPAP x-ray did not show any acute pathology does have a history of ESRD on HD      REVIEW OF SYSTEMS:   Lethargic unable to answer    Past Medical History:   has a past medical history of A-fib (HCC), Arthritis, Blood clot, Fracture of sternum, Fracture rib, Hemodialysis patient, Hyperlipidemia, Hypertension, Laceration of skin of lower leg, left, initial encounter, Laceration of skin of lower leg, right, initial encounter, NSTEMI (non-ST elevated myocardial infarction) ([facility]), Prostate cancer ([facility]), and Type II or unspecified type diabetes mellitus without mention of complication, not stated as uncontrolled.     Past Surgical History:   has a past surgical history that includes TURP (9/07) and sigmoidoscopy (N/A, 3/9/2023).     Medications:  No current facility-administered medications on file prior to encounter.     Current Outpatient Medications on File Prior to Encounter   Medication Sig Dispense Refill    albuterol (PROVENTIL) (2.5

## 2025-04-15 NOTE — ED NOTES
Patient Name: Donato Obrien  : 1940 84 y.o.  MRN: [id number]  ED Room #: ED-0012/12     Chief complaint:   Chief Complaint   Patient presents with    Shortness of Breath     Patient comes in from home with shortness of breath, he was her recently with RSV. [facility] said that end tidal CO2 was 17 on the ride in on 6L. He is on home O2 but he does not remember how much he is on.     Hospital Problem/Diagnosis:   Hospital Problems           Last Modified POA    * (Principal) Acute hypoxemic respiratory failure 4/15/2025 Yes         O2 Flow Rate:O2 Device: Nasal cannula O2 Flow Rate (L/min): 4 L/min (if applicable)  Cardiac Rhythm:   (if applicable)  Active LDA's:   Peripheral IV 04/15/25 Distal;Right;Anterior Cephalic (Active)            How does patient ambulate? Unknown, did not assess in the Emergency Department    2. How does patient take pills? Unknown, no oral medications were given in the Emergency Department    3. Is patient alert? Alert    4. Is patient oriented? To Person, To Place, To Time, To Situation, and Follows Commands    5.   Patient arrived from:  home  Facility Name: ___________________________________________    6. If patient is disoriented or from a Skill Nursing Facility has family been notified of admission? No    7. Patient belongings? Belongings: Cell Phone and Clothing    Disposition of belongings? Kept with Patient     8. Any specific patient or family belongings/needs/dynamics?   a. none    9. Miscellaneous comments/pending orders?  a. none      If there are any additional questions please reach out to the Emergency Department.

## 2025-04-15 NOTE — ED PROVIDER NOTES
Instructions Take 2 tablets by mouth prior to dialysis (Tuesday, Thursday, Saturday) and 1 tablet daily all other days.      ezetimibe (ZETIA) 10 MG tablet Take 1 tablet by mouth daily. 90 tablet 3    ONE TOUCH ULTRASOFT LANCETS MISC TEST once daily 100 each PRN    Blood Glucose Monitoring Suppl (ONE TOUCH ULTRA SYSTEM KIT) W/DEVICE KIT TEST TWICE daily BLOOD SUGAR 1 kit 0     Allergies   Allergen Reactions    Dilaudid [Hydromorphone] Hallucinations    Fentanyl Hallucinations    Morphine And Codeine Hallucinations    Norco [Hydrocodone-Acetaminophen] Hallucinations    Oxycodone      Agitation        REVIEW OF SYSTEMS  10 systems reviewed, pertinent positives per HPI otherwise noted to be negative.    PHYSICAL EXAM  /72   Pulse 56   Temp 96.8 °F (36 °C) (Axillary)   Resp 19   Ht 1.778 m (5' 10\")   Wt 86 kg (189 lb 9.5 oz)   SpO2 100%   BMI 27.20 kg/m²    GENERAL APPEARANCE: Awake and alert. Cooperative. Mild distress.  HENT: Normocephalic. Atraumatic. Mucous membranes are dry.  NECK: Supple.    EYES: PERRL. EOM's grossly intact.  HEART/CHEST: reg rate, irreg rhythm. No murmurs.  No chest wall tenderness.  LUNGS: Respirations mildly labored, tachypnea noted, scattered wheezes and bibasilar rhonchi appreciated, mild dry cough.    ABDOMEN: No tenderness. Soft. Non-distended. No masses. No organomegaly. No guarding or rebound. Normal bowel sounds throughout.  MUSCULOSKELETAL: No extremity edema. Compartments soft.  No deformity.  No tenderness in the extremities.  All extremities neurovascularly intact.  SKIN: Warm and dry. No acute rashes.   NEUROLOGICAL: Alert and oriented. CN's 2-12 intact. No gross facial drooping. Strength 5/5, sensation intact. 2 plus DTR's in knees bilaterally.  Gait not assessed due to condition.  PSYCHIATRIC: Normal mood and affect.    LABS  I have personally reviewed all labs for this visit.   Results for orders placed or performed during the hospital encounter of 04/15/25   CBC

## 2025-04-16 LAB
ANION GAP SERPL CALCULATED.3IONS-SCNC: 18 MMOL/L (ref 3–16)
BASOPHILS # BLD: 0 K/UL (ref 0–0.2)
BASOPHILS NFR BLD: 0.1 %
BUN SERPL-MCNC: 60 MG/DL (ref 7–20)
CALCIUM SERPL-MCNC: 8.6 MG/DL (ref 8.3–10.6)
CHLORIDE SERPL-SCNC: 92 MMOL/L (ref 99–110)
CO2 SERPL-SCNC: 21 MMOL/L (ref 21–32)
CREAT SERPL-MCNC: 6 MG/DL (ref 0.8–1.3)
DEPRECATED RDW RBC AUTO: 16 % (ref 12.4–15.4)
EOSINOPHIL # BLD: 0 K/UL (ref 0–0.6)
EOSINOPHIL NFR BLD: 0 %
GFR SERPLBLD CREATININE-BSD FMLA CKD-EPI: 9 ML/MIN/{1.73_M2}
GLUCOSE SERPL-MCNC: 105 MG/DL (ref 70–99)
HCT VFR BLD AUTO: 36.4 % (ref 40.5–52.5)
HGB BLD-MCNC: 11.7 G/DL (ref 13.5–17.5)
LYMPHOCYTES # BLD: 1 K/UL (ref 1–5.1)
LYMPHOCYTES NFR BLD: 5.3 %
MCH RBC QN AUTO: 33.6 PG (ref 26–34)
MCHC RBC AUTO-ENTMCNC: 32.1 G/DL (ref 31–36)
MCV RBC AUTO: 104.9 FL (ref 80–100)
MONOCYTES # BLD: 1 K/UL (ref 0–1.3)
MONOCYTES NFR BLD: 5.4 %
NEUTROPHILS # BLD: 16.4 K/UL (ref 1.7–7.7)
NEUTROPHILS NFR BLD: 89.2 %
PLATELET # BLD AUTO: 164 K/UL (ref 135–450)
PMV BLD AUTO: 7.6 FL (ref 5–10.5)
POTASSIUM SERPL-SCNC: 4.4 MMOL/L (ref 3.5–5.1)
RBC # BLD AUTO: 3.47 M/UL (ref 4.2–5.9)
SODIUM SERPL-SCNC: 131 MMOL/L (ref 136–145)
WBC # BLD AUTO: 18.4 K/UL (ref 4–11)

## 2025-04-16 PROCEDURE — 6360000002 HC RX W HCPCS: Performed by: PHYSICIAN ASSISTANT

## 2025-04-16 PROCEDURE — 6370000000 HC RX 637 (ALT 250 FOR IP): Performed by: INTERNAL MEDICINE

## 2025-04-16 PROCEDURE — 94761 N-INVAS EAR/PLS OXIMETRY MLT: CPT

## 2025-04-16 PROCEDURE — 97530 THERAPEUTIC ACTIVITIES: CPT

## 2025-04-16 PROCEDURE — 2500000003 HC RX 250 WO HCPCS: Performed by: INTERNAL MEDICINE

## 2025-04-16 PROCEDURE — 92610 EVALUATE SWALLOWING FUNCTION: CPT

## 2025-04-16 PROCEDURE — 2700000000 HC OXYGEN THERAPY PER DAY

## 2025-04-16 PROCEDURE — 6360000002 HC RX W HCPCS: Performed by: INTERNAL MEDICINE

## 2025-04-16 PROCEDURE — 85025 COMPLETE CBC W/AUTO DIFF WBC: CPT

## 2025-04-16 PROCEDURE — 2000000000 HC ICU R&B

## 2025-04-16 PROCEDURE — 80048 BASIC METABOLIC PNL TOTAL CA: CPT

## 2025-04-16 PROCEDURE — 36415 COLL VENOUS BLD VENIPUNCTURE: CPT

## 2025-04-16 PROCEDURE — 97535 SELF CARE MNGMENT TRAINING: CPT

## 2025-04-16 PROCEDURE — 87641 MR-STAPH DNA AMP PROBE: CPT

## 2025-04-16 PROCEDURE — 97166 OT EVAL MOD COMPLEX 45 MIN: CPT

## 2025-04-16 PROCEDURE — 94640 AIRWAY INHALATION TREATMENT: CPT

## 2025-04-16 PROCEDURE — 97162 PT EVAL MOD COMPLEX 30 MIN: CPT

## 2025-04-16 RX ORDER — PANTOPRAZOLE SODIUM 40 MG/10ML
40 INJECTION, POWDER, LYOPHILIZED, FOR SOLUTION INTRAVENOUS 2 TIMES DAILY
Status: DISCONTINUED | OUTPATIENT
Start: 2025-04-16 | End: 2025-05-01 | Stop reason: HOSPADM

## 2025-04-16 RX ADMIN — Medication 1 MG: at 12:51

## 2025-04-16 RX ADMIN — MIDODRINE HYDROCHLORIDE 5 MG: 5 TABLET ORAL at 09:25

## 2025-04-16 RX ADMIN — PANTOPRAZOLE SODIUM 40 MG: 40 INJECTION, POWDER, FOR SOLUTION INTRAVENOUS at 12:57

## 2025-04-16 RX ADMIN — Medication 2 PUFF: at 19:42

## 2025-04-16 RX ADMIN — CLOPIDOGREL BISULFATE 75 MG: 75 TABLET ORAL at 09:25

## 2025-04-16 RX ADMIN — ROSUVASTATIN CALCIUM 20 MG: 10 TABLET, FILM COATED ORAL at 21:06

## 2025-04-16 RX ADMIN — PREDNISONE 40 MG: 20 TABLET ORAL at 09:25

## 2025-04-16 RX ADMIN — CALCIUM ACETATE 1334 MG: 667 CAPSULE ORAL at 09:25

## 2025-04-16 RX ADMIN — Medication 10 ML: at 21:06

## 2025-04-16 RX ADMIN — PANTOPRAZOLE SODIUM 40 MG: 40 INJECTION, POWDER, FOR SOLUTION INTRAVENOUS at 21:06

## 2025-04-16 RX ADMIN — Medication 2 PUFF: at 08:14

## 2025-04-16 RX ADMIN — CALCIUM ACETATE 1334 MG: 667 CAPSULE ORAL at 18:19

## 2025-04-16 RX ADMIN — Medication 10 ML: at 12:51

## 2025-04-16 RX ADMIN — ALLOPURINOL 100 MG: 100 TABLET ORAL at 09:30

## 2025-04-16 ASSESSMENT — PAIN SCALES - GENERAL
PAINLEVEL_OUTOF10: 0

## 2025-04-16 NOTE — CARE COORDINATION
04/16/25 0908   Readmission Assessment   Number of Days since last admission? 1-7 days   Previous Disposition Home with Family   Who is being Interviewed Patient   What was the patient's/caregiver's perception as to why they think they needed to return back to the hospital? Other (Comment)  (C/o SOB)   Did you visit your Primary Care Physician after you left the hospital, before you returned this time? No   Why weren't you able to visit your PCP? Did not have an appointment   Did you see a specialist, such as Cardiac, Pulmonary, Orthopedic Physician, etc. after you left the hospital? No   Who advised the patient to return to the hospital? Self-referral   Does the patient report anything that got in the way of taking their medications? No   In our efforts to provide the best possible care to you and others like you, can you think of anything that we could have done to help you after you left the hospital the first time, so that you might not have needed to return so soon? Identify patient's health literacy needs;Discharge instructions that are concise, clear, and non contradictory

## 2025-04-16 NOTE — CARE COORDINATION
Case Management Assessment  Initial Evaluation    Date/Time of Evaluation: 4/16/2025 9:15 AM  Assessment Completed by: Suzanna Cortes RN    If patient is discharged prior to next notation, then this note serves as note for discharge by case management.    Patient Name: Donato Obrien                   YOB: 1940  Diagnosis: Chest discomfort [R07.89]  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 (HCC) [N18.6, Z99.2]  Acute respiratory failure with hypoxia [J96.01]  Acute hypoxemic respiratory failure [J96.01]                   Date / Time: 4/15/2025  7:16 AM    Patient Admission Status: Inpatient   Readmission Risk (Low < 19, Mod (19-27), High > 27): Readmission Risk Score: 24.7    Current PCP: Rohit Pathak III, MD  PCP verified by CM? (P) Yes    Chart Reviewed: Yes      History Provided by: Patient  Patient Orientation: Alert and Oriented    Patient Cognition: Alert    Hospitalization in the last 30 days (Readmission):  Yes    If yes, Readmission Assessment in  Navigator will be completed.    Readmission Assessment  Number of Days since last admission?: 1-7 days  Previous Disposition: Home with Family  Who is being Interviewed: Patient  What was the patient's/caregiver's perception as to why they think they needed to return back to the hospital?: Other (Comment) (C/o SOB)  Did you visit your Primary Care Physician after you left the hospital, before you returned this time?: No  Why weren't you able to visit your PCP?: Did not have an appointment  Did you see a specialist, such as Cardiac, Pulmonary, Orthopedic Physician, etc. after you left the hospital?: No  Who advised the patient to return to the hospital?: Self-referral  Does the patient report anything that got in the way of taking their medications?: No  In our efforts to provide the best possible care to you and others like you, can you think of anything that we could have done to help you after you left the hospital the first time, so that you might not have

## 2025-04-16 NOTE — ACP (ADVANCE CARE PLANNING)
Advanced Care Planning Note.    Purpose of Encounter: Advanced care planning in light of hospitalization  Parties In Attendance: Patient,    Decisional Capacity: Yes  Subjective: Patient  understand that this conversation is to address long term care goal  Objective: Patient to the hospital acute hypoxic respiratory failure  Goals of Care Determination: Patient would pursue CPR and Intubation if required.   Code Status: full code  Time spent on Advanced care Plannin minutes  Advanced Care Planning Documents: documented patient's wishes, would like Connie Obrien  to make medical decisions if unable to make decisions    Chantel Lima MD  2025 10:39 AM

## 2025-04-17 ENCOUNTER — ANESTHESIA (OUTPATIENT)
Dept: ENDOSCOPY | Age: 85
End: 2025-04-17
Payer: MEDICARE

## 2025-04-17 ENCOUNTER — ANESTHESIA EVENT (OUTPATIENT)
Dept: ENDOSCOPY | Age: 85
End: 2025-04-17
Payer: MEDICARE

## 2025-04-17 PROBLEM — R13.10 ODYNOPHAGIA: Status: ACTIVE | Noted: 2025-04-17

## 2025-04-17 PROBLEM — K44.9 PARAESOPHAGEAL HIATAL HERNIA: Status: ACTIVE | Noted: 2025-04-17

## 2025-04-17 LAB
ANION GAP SERPL CALCULATED.3IONS-SCNC: 20 MMOL/L (ref 3–16)
ANISOCYTOSIS BLD QL SMEAR: ABNORMAL
BASOPHILS # BLD: 0 K/UL (ref 0–0.2)
BASOPHILS NFR BLD: 0 %
BUN SERPL-MCNC: 83 MG/DL (ref 7–20)
BURR CELLS BLD QL SMEAR: ABNORMAL
CALCIUM SERPL-MCNC: 8.5 MG/DL (ref 8.3–10.6)
CHLORIDE SERPL-SCNC: 91 MMOL/L (ref 99–110)
CO2 SERPL-SCNC: 19 MMOL/L (ref 21–32)
CREAT SERPL-MCNC: 7.8 MG/DL (ref 0.8–1.3)
DEPRECATED RDW RBC AUTO: 15.9 % (ref 12.4–15.4)
EOSINOPHIL # BLD: 0 K/UL (ref 0–0.6)
EOSINOPHIL NFR BLD: 0 %
GFR SERPLBLD CREATININE-BSD FMLA CKD-EPI: 6 ML/MIN/{1.73_M2}
GLUCOSE BLD-MCNC: 78 MG/DL (ref 70–99)
GLUCOSE SERPL-MCNC: 129 MG/DL (ref 70–99)
HCT VFR BLD AUTO: 32.8 % (ref 40.5–52.5)
HGB BLD-MCNC: 11 G/DL (ref 13.5–17.5)
LYMPHOCYTES # BLD: 0.6 K/UL (ref 1–5.1)
LYMPHOCYTES NFR BLD: 4 %
MACROCYTES BLD QL SMEAR: ABNORMAL
MCH RBC QN AUTO: 34.3 PG (ref 26–34)
MCHC RBC AUTO-ENTMCNC: 33.4 G/DL (ref 31–36)
MCV RBC AUTO: 102.7 FL (ref 80–100)
MONOCYTES # BLD: 0.6 K/UL (ref 0–1.3)
MONOCYTES NFR BLD: 4 %
MRSA DNA SPEC QL NAA+PROBE: ABNORMAL
NEUTROPHILS # BLD: 13.9 K/UL (ref 1.7–7.7)
NEUTROPHILS NFR BLD: 91 %
NEUTS BAND NFR BLD MANUAL: 1 % (ref 0–7)
ORGANISM: ABNORMAL
OVALOCYTES BLD QL SMEAR: ABNORMAL
PERFORMED ON: NORMAL
PLATELET # BLD AUTO: 165 K/UL (ref 135–450)
PLATELET BLD QL SMEAR: ADEQUATE
PMV BLD AUTO: 8 FL (ref 5–10.5)
POLYCHROMASIA BLD QL SMEAR: ABNORMAL
POTASSIUM SERPL-SCNC: 4.5 MMOL/L (ref 3.5–5.1)
RBC # BLD AUTO: 3.2 M/UL (ref 4.2–5.9)
SLIDE REVIEW: ABNORMAL
SODIUM SERPL-SCNC: 130 MMOL/L (ref 136–145)
WBC # BLD AUTO: 15.1 K/UL (ref 4–11)

## 2025-04-17 PROCEDURE — 80048 BASIC METABOLIC PNL TOTAL CA: CPT

## 2025-04-17 PROCEDURE — 0DB48ZX EXCISION OF ESOPHAGOGASTRIC JUNCTION, VIA NATURAL OR ARTIFICIAL OPENING ENDOSCOPIC, DIAGNOSTIC: ICD-10-PCS | Performed by: INTERNAL MEDICINE

## 2025-04-17 PROCEDURE — 2709999900 HC NON-CHARGEABLE SUPPLY: Performed by: INTERNAL MEDICINE

## 2025-04-17 PROCEDURE — 7100000001 HC PACU RECOVERY - ADDTL 15 MIN: Performed by: INTERNAL MEDICINE

## 2025-04-17 PROCEDURE — 6370000000 HC RX 637 (ALT 250 FOR IP): Performed by: INTERNAL MEDICINE

## 2025-04-17 PROCEDURE — 3609013300 HC EGD TUBE PLACEMENT: Performed by: INTERNAL MEDICINE

## 2025-04-17 PROCEDURE — 94640 AIRWAY INHALATION TREATMENT: CPT

## 2025-04-17 PROCEDURE — 6370000000 HC RX 637 (ALT 250 FOR IP): Performed by: NURSE PRACTITIONER

## 2025-04-17 PROCEDURE — 3609012400 HC EGD TRANSORAL BIOPSY SINGLE/MULTIPLE: Performed by: INTERNAL MEDICINE

## 2025-04-17 PROCEDURE — 3700000000 HC ANESTHESIA ATTENDED CARE: Performed by: INTERNAL MEDICINE

## 2025-04-17 PROCEDURE — 36415 COLL VENOUS BLD VENIPUNCTURE: CPT

## 2025-04-17 PROCEDURE — 88312 SPECIAL STAINS GROUP 1: CPT

## 2025-04-17 PROCEDURE — 6360000002 HC RX W HCPCS: Performed by: INTERNAL MEDICINE

## 2025-04-17 PROCEDURE — 88305 TISSUE EXAM BY PATHOLOGIST: CPT

## 2025-04-17 PROCEDURE — 3700000001 HC ADD 15 MINUTES (ANESTHESIA): Performed by: INTERNAL MEDICINE

## 2025-04-17 PROCEDURE — 90935 HEMODIALYSIS ONE EVALUATION: CPT

## 2025-04-17 PROCEDURE — 88341 IMHCHEM/IMCYTCHM EA ADD ANTB: CPT

## 2025-04-17 PROCEDURE — P9047 ALBUMIN (HUMAN), 25%, 50ML: HCPCS | Performed by: INTERNAL MEDICINE

## 2025-04-17 PROCEDURE — 7100000000 HC PACU RECOVERY - FIRST 15 MIN: Performed by: INTERNAL MEDICINE

## 2025-04-17 PROCEDURE — 99222 1ST HOSP IP/OBS MODERATE 55: CPT | Performed by: SURGERY

## 2025-04-17 PROCEDURE — 2000000000 HC ICU R&B

## 2025-04-17 PROCEDURE — 88342 IMHCHEM/IMCYTCHM 1ST ANTB: CPT

## 2025-04-17 PROCEDURE — 85025 COMPLETE CBC W/AUTO DIFF WBC: CPT

## 2025-04-17 PROCEDURE — 2500000003 HC RX 250 WO HCPCS: Performed by: INTERNAL MEDICINE

## 2025-04-17 PROCEDURE — APPNB30 APP NON BILLABLE TIME 0-30 MINS: Performed by: NURSE PRACTITIONER

## 2025-04-17 PROCEDURE — 0D9680Z DRAINAGE OF STOMACH WITH DRAINAGE DEVICE, VIA NATURAL OR ARTIFICIAL OPENING ENDOSCOPIC: ICD-10-PCS | Performed by: INTERNAL MEDICINE

## 2025-04-17 PROCEDURE — 6360000002 HC RX W HCPCS: Performed by: PHYSICIAN ASSISTANT

## 2025-04-17 PROCEDURE — 6360000002 HC RX W HCPCS: Performed by: NURSE ANESTHETIST, CERTIFIED REGISTERED

## 2025-04-17 RX ORDER — PROPOFOL 10 MG/ML
INJECTION, EMULSION INTRAVENOUS
Status: DISCONTINUED | OUTPATIENT
Start: 2025-04-17 | End: 2025-04-17 | Stop reason: SDUPTHER

## 2025-04-17 RX ORDER — PHENYLEPHRINE HCL IN 0.9% NACL 1 MG/10 ML
SYRINGE (ML) INTRAVENOUS
Status: DISCONTINUED | OUTPATIENT
Start: 2025-04-17 | End: 2025-04-17 | Stop reason: SDUPTHER

## 2025-04-17 RX ORDER — ALBUMIN (HUMAN) 12.5 G/50ML
25 SOLUTION INTRAVENOUS ONCE
Status: COMPLETED | OUTPATIENT
Start: 2025-04-17 | End: 2025-04-17

## 2025-04-17 RX ORDER — HEPARIN SODIUM 1000 [USP'U]/ML
3600 INJECTION, SOLUTION INTRAVENOUS; SUBCUTANEOUS PRN
Status: DISCONTINUED | OUTPATIENT
Start: 2025-04-17 | End: 2025-05-01 | Stop reason: HOSPADM

## 2025-04-17 RX ORDER — LIDOCAINE HYDROCHLORIDE 20 MG/ML
INJECTION, SOLUTION EPIDURAL; INFILTRATION; INTRACAUDAL; PERINEURAL
Status: DISCONTINUED | OUTPATIENT
Start: 2025-04-17 | End: 2025-04-17 | Stop reason: SDUPTHER

## 2025-04-17 RX ADMIN — CLOPIDOGREL BISULFATE 75 MG: 75 TABLET ORAL at 15:11

## 2025-04-17 RX ADMIN — ALLOPURINOL 100 MG: 100 TABLET ORAL at 15:11

## 2025-04-17 RX ADMIN — PROPOFOL 25 MG: 10 INJECTION, EMULSION INTRAVENOUS at 13:56

## 2025-04-17 RX ADMIN — PANTOPRAZOLE SODIUM 40 MG: 40 INJECTION, POWDER, FOR SOLUTION INTRAVENOUS at 15:11

## 2025-04-17 RX ADMIN — ALBUMIN (HUMAN) 25 G: 0.25 INJECTION, SOLUTION INTRAVENOUS at 09:37

## 2025-04-17 RX ADMIN — Medication 100 MCG: at 13:56

## 2025-04-17 RX ADMIN — PHENOL 1 SPRAY: 1.5 LIQUID ORAL at 16:36

## 2025-04-17 RX ADMIN — MIDODRINE HYDROCHLORIDE 10 MG: 5 TABLET ORAL at 09:14

## 2025-04-17 RX ADMIN — PANTOPRAZOLE SODIUM 40 MG: 40 INJECTION, POWDER, FOR SOLUTION INTRAVENOUS at 21:52

## 2025-04-17 RX ADMIN — PREDNISONE 40 MG: 20 TABLET ORAL at 15:10

## 2025-04-17 RX ADMIN — LEVOTHYROXINE SODIUM 50 MCG: 0.03 TABLET ORAL at 07:48

## 2025-04-17 RX ADMIN — Medication 10 ML: at 21:53

## 2025-04-17 RX ADMIN — LIDOCAINE HYDROCHLORIDE 50 MG: 20 INJECTION, SOLUTION EPIDURAL; INFILTRATION; INTRACAUDAL; PERINEURAL at 13:56

## 2025-04-17 RX ADMIN — Medication 2 PUFF: at 19:53

## 2025-04-17 ASSESSMENT — PAIN - FUNCTIONAL ASSESSMENT
PAIN_FUNCTIONAL_ASSESSMENT: WONG-BAKER FACES
PAIN_FUNCTIONAL_ASSESSMENT: NONE - DENIES PAIN

## 2025-04-17 ASSESSMENT — PAIN SCALES - GENERAL: PAINLEVEL_OUTOF10: 0

## 2025-04-17 ASSESSMENT — ENCOUNTER SYMPTOMS: SHORTNESS OF BREATH: 1

## 2025-04-17 NOTE — ANESTHESIA PRE PROCEDURE
hours.    Coags:   Lab Results   Component Value Date/Time    PROTIME 15.5 04/15/2025 08:04 AM    INR 1.21 04/15/2025 08:04 AM    APTT 32.4 03/07/2023 05:51 AM       HCG (If Applicable): No results found for: \"PREGTESTUR\", \"PREGSERUM\", \"HCG\", \"HCGQUANT\"     ABGs:   Lab Results   Component Value Date/Time    PHART 7.452 04/15/2025 08:21 AM    PO2ART 150.0 04/15/2025 08:21 AM    WRN0QXY 30.6 04/15/2025 08:21 AM    QSF3BRI 21.4 04/15/2025 08:21 AM    BEART -1.9 04/15/2025 08:21 AM    B5ZNXRTZ 100.0 04/15/2025 08:21 AM        Type & Screen (If Applicable):  No results found for: \"LABABO\"    Drug/Infectious Status (If Applicable):  No results found for: \"HIV\", \"HEPCAB\"    COVID-19 Screening (If Applicable):   Lab Results   Component Value Date/Time    COVID19 NOT DETECTED 04/05/2025 09:45 PM           Anesthesia Evaluation  Patient summary reviewed and Nursing notes reviewed   no history of anesthetic complications:   Airway: Mallampati: III  TM distance: >3 FB   Neck ROM: full  Mouth opening: > = 3 FB   Dental:    (+) poor dentition      Pulmonary:   (+)   shortness of breath: chronic,     decreased breath sounds    asthma:     (-) COPD                          ROS comment: 2 L O2 at rest    ILD   Cardiovascular:  Exercise tolerance: poor (<4 METS)  (+) hypertension:, valvular problems/murmurs (moderate): AS, past MI: > 6 months, CHF: systolic and diastolic, pulmonary hypertension (per Echo): severe, hyperlipidemia        Rhythm: regular  Rate: normal                 ROS comment: Left Ventricle: Low normal left ventricular systolic function with a visually estimated EF of 50%. Left ventricle size is normal. Mildly increased wall thickness. No regional wall motion abnormalities identified. Decreased sensitivity due to poor endocardial definition. Indeterminate diastolic function.  ·  Right Ventricle: Right ventricle size is normal. Normal wall thickness. Low normal systolic function. TAPSE is 1.6 cm.  ·  Aortic Valve:

## 2025-04-17 NOTE — BRIEF OP NOTE
EGD:     Severe esophagitis with hematin.  Biopsied.   Complicated hiatal hernia with paraesophageal component.   Dilated stomach full of liquid and food.    Unable to reach pylorus due to looping and food debris.   NG placed for decompression.  (Under endoscopic guidance).         Rec:   NG to low wall suction  BID PPI  Npo  Consult surgery for hiatal hernia repair.

## 2025-04-17 NOTE — ANESTHESIA POSTPROCEDURE EVALUATION
Department of Anesthesiology  Postprocedure Note    Patient: Donato Obrien  MRN: [id number]  YOB: 1940  Date of evaluation: 4/17/2025    Procedure Summary       Date: 04/17/25 Room / Location: [place] [address] / [facility]    Anesthesia Start: 1355 Anesthesia Stop: 1423    Procedures:       ESOPHAGOGASTRODUODENOSCOPY BIOPSY (Abdomen)      ESOPHAGOGASTRODUODENOSCOPY NG TUBE INSERTION Diagnosis:       Odynophagia      (Odynophagia [R13.10])    Surgeons: Eugene Rodriguez MD Responsible Provider: Srinivas Ramirez DO    Anesthesia Type: MAC ASA Status: 4            Anesthesia Type: No value filed.    Andrzej Phase I: Andrzej Score: 8    Andrzej Phase II:      Anesthesia Post Evaluation    Patient location during evaluation: PACU  Patient participation: complete - patient participated  Level of consciousness: awake  Pain score: 0  Airway patency: patent  Nausea & Vomiting: no nausea and no vomiting  Cardiovascular status: blood pressure returned to baseline  Respiratory status: acceptable  Hydration status: euvolemic  Multimodal analgesia pain management approach  Pain management: adequate    No notable events documented.

## 2025-04-17 NOTE — CARE COORDINATION
Discharge Planning Note:     Chart reviewed: IP LOS day 2   Risk Score: 26%      Primary Care Physician is: Rohit Pathak III, MD    Primary insurance is: MEDICARE PART A AND B      Patient here for Acute hypoxemic respiratory failure. IV PPI. NG tube, LFA HD fistula, Rt tunneled HD cath, Therapy recs SNF. Care managed by Neph, GI and Hosp MD.     Prior stay at [facility].    Patient is a 84 yr old male, modified independent with walker, lives with his spouse. Patient is active with [facility]  and has 2L base home O2.     Case management will continue to follow progress and update discharge plan as needed.    Suzanna Cortes RN

## 2025-04-18 ENCOUNTER — APPOINTMENT (OUTPATIENT)
Dept: GENERAL RADIOLOGY | Age: 85
DRG: 326 | End: 2025-04-18
Payer: MEDICARE

## 2025-04-18 LAB
ANION GAP SERPL CALCULATED.3IONS-SCNC: 18 MMOL/L (ref 3–16)
BASOPHILS # BLD: 0 K/UL (ref 0–0.2)
BASOPHILS NFR BLD: 0.1 %
BUN SERPL-MCNC: 34 MG/DL (ref 7–20)
CALCIUM SERPL-MCNC: 9 MG/DL (ref 8.3–10.6)
CHLORIDE SERPL-SCNC: 93 MMOL/L (ref 99–110)
CO2 SERPL-SCNC: 21 MMOL/L (ref 21–32)
CREAT SERPL-MCNC: 4.5 MG/DL (ref 0.8–1.3)
DEPRECATED RDW RBC AUTO: 16.6 % (ref 12.4–15.4)
EOSINOPHIL # BLD: 0.1 K/UL (ref 0–0.6)
EOSINOPHIL NFR BLD: 0.4 %
GFR SERPLBLD CREATININE-BSD FMLA CKD-EPI: 12 ML/MIN/{1.73_M2}
GLUCOSE SERPL-MCNC: 75 MG/DL (ref 70–99)
HCT VFR BLD AUTO: 34.1 % (ref 40.5–52.5)
HGB BLD-MCNC: 11 G/DL (ref 13.5–17.5)
LYMPHOCYTES # BLD: 0.9 K/UL (ref 1–5.1)
LYMPHOCYTES NFR BLD: 6.1 %
MCH RBC QN AUTO: 33.8 PG (ref 26–34)
MCHC RBC AUTO-ENTMCNC: 32.4 G/DL (ref 31–36)
MCV RBC AUTO: 104.5 FL (ref 80–100)
MONOCYTES # BLD: 1.1 K/UL (ref 0–1.3)
MONOCYTES NFR BLD: 7.4 %
NEUTROPHILS # BLD: 12.4 K/UL (ref 1.7–7.7)
NEUTROPHILS NFR BLD: 86 %
PLATELET # BLD AUTO: 144 K/UL (ref 135–450)
PMV BLD AUTO: 8.1 FL (ref 5–10.5)
POTASSIUM SERPL-SCNC: 3.8 MMOL/L (ref 3.5–5.1)
RBC # BLD AUTO: 3.26 M/UL (ref 4.2–5.9)
SODIUM SERPL-SCNC: 132 MMOL/L (ref 136–145)
WBC # BLD AUTO: 14.4 K/UL (ref 4–11)

## 2025-04-18 PROCEDURE — 94640 AIRWAY INHALATION TREATMENT: CPT

## 2025-04-18 PROCEDURE — 6370000000 HC RX 637 (ALT 250 FOR IP): Performed by: INTERNAL MEDICINE

## 2025-04-18 PROCEDURE — 2000000000 HC ICU R&B

## 2025-04-18 PROCEDURE — 36415 COLL VENOUS BLD VENIPUNCTURE: CPT

## 2025-04-18 PROCEDURE — 80048 BASIC METABOLIC PNL TOTAL CA: CPT

## 2025-04-18 PROCEDURE — 2580000003 HC RX 258: Performed by: INTERNAL MEDICINE

## 2025-04-18 PROCEDURE — 6360000002 HC RX W HCPCS: Performed by: INTERNAL MEDICINE

## 2025-04-18 PROCEDURE — 2500000003 HC RX 250 WO HCPCS: Performed by: INTERNAL MEDICINE

## 2025-04-18 PROCEDURE — 74240 X-RAY XM UPR GI TRC 1CNTRST: CPT

## 2025-04-18 PROCEDURE — 2700000000 HC OXYGEN THERAPY PER DAY

## 2025-04-18 PROCEDURE — 99232 SBSQ HOSP IP/OBS MODERATE 35: CPT | Performed by: SURGERY

## 2025-04-18 PROCEDURE — 85025 COMPLETE CBC W/AUTO DIFF WBC: CPT

## 2025-04-18 RX ORDER — GABAPENTIN 100 MG/1
100 CAPSULE ORAL EVERY 8 HOURS
Status: DISCONTINUED | OUTPATIENT
Start: 2025-04-18 | End: 2025-05-01 | Stop reason: HOSPADM

## 2025-04-18 RX ORDER — DEXTROSE MONOHYDRATE AND SODIUM CHLORIDE 5; .45 G/100ML; G/100ML
INJECTION, SOLUTION INTRAVENOUS CONTINUOUS
Status: DISCONTINUED | OUTPATIENT
Start: 2025-04-18 | End: 2025-04-23

## 2025-04-18 RX ADMIN — GABAPENTIN 100 MG: 100 CAPSULE ORAL at 15:43

## 2025-04-18 RX ADMIN — ROSUVASTATIN CALCIUM 20 MG: 10 TABLET, FILM COATED ORAL at 20:32

## 2025-04-18 RX ADMIN — MIDODRINE HYDROCHLORIDE 5 MG: 5 TABLET ORAL at 11:42

## 2025-04-18 RX ADMIN — Medication 1 MG: at 11:48

## 2025-04-18 RX ADMIN — PREDNISONE 40 MG: 20 TABLET ORAL at 11:41

## 2025-04-18 RX ADMIN — ALLOPURINOL 100 MG: 100 TABLET ORAL at 11:42

## 2025-04-18 RX ADMIN — PANTOPRAZOLE SODIUM 40 MG: 40 INJECTION, POWDER, FOR SOLUTION INTRAVENOUS at 20:32

## 2025-04-18 RX ADMIN — Medication 2 PUFF: at 20:19

## 2025-04-18 RX ADMIN — Medication 10 ML: at 20:32

## 2025-04-18 RX ADMIN — Medication 2 PUFF: at 08:34

## 2025-04-18 RX ADMIN — Medication 10 ML: at 11:48

## 2025-04-18 RX ADMIN — DEXTROSE AND SODIUM CHLORIDE: 5; .45 INJECTION, SOLUTION INTRAVENOUS at 12:08

## 2025-04-18 RX ADMIN — PANTOPRAZOLE SODIUM 40 MG: 40 INJECTION, POWDER, FOR SOLUTION INTRAVENOUS at 11:43

## 2025-04-18 ASSESSMENT — PAIN SCALES - GENERAL
PAINLEVEL_OUTOF10: 4
PAINLEVEL_OUTOF10: 0

## 2025-04-18 ASSESSMENT — PAIN DESCRIPTION - ORIENTATION: ORIENTATION: RIGHT;LEFT;LOWER

## 2025-04-18 ASSESSMENT — PAIN DESCRIPTION - LOCATION: LOCATION: FOOT;LEG

## 2025-04-18 ASSESSMENT — PAIN DESCRIPTION - PAIN TYPE: TYPE: CHRONIC PAIN

## 2025-04-18 NOTE — CARE COORDINATION
Discharge Planning Note:     Chart reviewed: IP LOS day 3   Risk Score: 26%      Primary Care Physician is: Rohit Pathak III, MD    Primary insurance is: MEDICARE PART A AND B      Patient here for Acute hypoxemic respiratory failure. IV PPI. NG tube, LFA HD fistula, Rt tunneled HD cath, Therapy recs SNF. Care managed by Neph, GI and Hosp MD.      SNF list provided. Prior stay at [facility].     Patient is a 84 yr old male, modified independent with walker, lives with his spouse. Patient is active with [facility]  and has 2L base home O2.      Case management will continue to follow progress and update discharge plan as needed.     Suzanna Cortes RN

## 2025-04-19 LAB
ANION GAP SERPL CALCULATED.3IONS-SCNC: 18 MMOL/L (ref 3–16)
BASOPHILS # BLD: 0 K/UL (ref 0–0.2)
BASOPHILS NFR BLD: 0 %
BUN SERPL-MCNC: 47 MG/DL (ref 7–20)
CALCIUM SERPL-MCNC: 8.6 MG/DL (ref 8.3–10.6)
CHLORIDE SERPL-SCNC: 93 MMOL/L (ref 99–110)
CO2 SERPL-SCNC: 23 MMOL/L (ref 21–32)
CREAT SERPL-MCNC: 6.8 MG/DL (ref 0.8–1.3)
DEPRECATED RDW RBC AUTO: 16.6 % (ref 12.4–15.4)
EOSINOPHIL # BLD: 0 K/UL (ref 0–0.6)
EOSINOPHIL NFR BLD: 0 %
GFR SERPLBLD CREATININE-BSD FMLA CKD-EPI: 7 ML/MIN/{1.73_M2}
GLUCOSE BLD-MCNC: 101 MG/DL (ref 70–99)
GLUCOSE BLD-MCNC: 103 MG/DL (ref 70–99)
GLUCOSE BLD-MCNC: 109 MG/DL (ref 70–99)
GLUCOSE SERPL-MCNC: 127 MG/DL (ref 70–99)
HCT VFR BLD AUTO: 34.1 % (ref 40.5–52.5)
HGB BLD-MCNC: 11 G/DL (ref 13.5–17.5)
LYMPHOCYTES # BLD: 0.4 K/UL (ref 1–5.1)
LYMPHOCYTES NFR BLD: 2.9 %
MCH RBC QN AUTO: 33.7 PG (ref 26–34)
MCHC RBC AUTO-ENTMCNC: 32.2 G/DL (ref 31–36)
MCV RBC AUTO: 104.9 FL (ref 80–100)
MONOCYTES # BLD: 1 K/UL (ref 0–1.3)
MONOCYTES NFR BLD: 6.7 %
NEUTROPHILS # BLD: 13.4 K/UL (ref 1.7–7.7)
NEUTROPHILS NFR BLD: 90.4 %
PERFORMED ON: ABNORMAL
PLATELET # BLD AUTO: 132 K/UL (ref 135–450)
PMV BLD AUTO: 7.9 FL (ref 5–10.5)
POTASSIUM SERPL-SCNC: 3.7 MMOL/L (ref 3.5–5.1)
RBC # BLD AUTO: 3.25 M/UL (ref 4.2–5.9)
SODIUM SERPL-SCNC: 134 MMOL/L (ref 136–145)
WBC # BLD AUTO: 14.8 K/UL (ref 4–11)

## 2025-04-19 PROCEDURE — 80048 BASIC METABOLIC PNL TOTAL CA: CPT

## 2025-04-19 PROCEDURE — 90935 HEMODIALYSIS ONE EVALUATION: CPT

## 2025-04-19 PROCEDURE — 6360000002 HC RX W HCPCS: Performed by: HOSPITALIST

## 2025-04-19 PROCEDURE — 2000000000 HC ICU R&B

## 2025-04-19 PROCEDURE — 2580000003 HC RX 258: Performed by: INTERNAL MEDICINE

## 2025-04-19 PROCEDURE — 6370000000 HC RX 637 (ALT 250 FOR IP): Performed by: INTERNAL MEDICINE

## 2025-04-19 PROCEDURE — 94761 N-INVAS EAR/PLS OXIMETRY MLT: CPT

## 2025-04-19 PROCEDURE — 94640 AIRWAY INHALATION TREATMENT: CPT

## 2025-04-19 PROCEDURE — 85025 COMPLETE CBC W/AUTO DIFF WBC: CPT

## 2025-04-19 PROCEDURE — 99232 SBSQ HOSP IP/OBS MODERATE 35: CPT | Performed by: SURGERY

## 2025-04-19 PROCEDURE — 6360000002 HC RX W HCPCS: Performed by: INTERNAL MEDICINE

## 2025-04-19 PROCEDURE — 2500000003 HC RX 250 WO HCPCS: Performed by: INTERNAL MEDICINE

## 2025-04-19 PROCEDURE — P9047 ALBUMIN (HUMAN), 25%, 50ML: HCPCS | Performed by: HOSPITALIST

## 2025-04-19 PROCEDURE — APPSS15 APP SPLIT SHARED TIME 0-15 MINUTES: Performed by: NURSE PRACTITIONER

## 2025-04-19 PROCEDURE — APPNB30 APP NON BILLABLE TIME 0-30 MINS: Performed by: NURSE PRACTITIONER

## 2025-04-19 RX ORDER — INSULIN LISPRO 100 [IU]/ML
0-4 INJECTION, SOLUTION INTRAVENOUS; SUBCUTANEOUS EVERY 4 HOURS
Status: DISCONTINUED | OUTPATIENT
Start: 2025-04-19 | End: 2025-04-23

## 2025-04-19 RX ORDER — GLUCAGON 1 MG/ML
1 KIT INJECTION PRN
Status: DISCONTINUED | OUTPATIENT
Start: 2025-04-19 | End: 2025-05-01 | Stop reason: HOSPADM

## 2025-04-19 RX ORDER — DEXTROSE MONOHYDRATE 100 MG/ML
INJECTION, SOLUTION INTRAVENOUS CONTINUOUS PRN
Status: DISCONTINUED | OUTPATIENT
Start: 2025-04-19 | End: 2025-05-01 | Stop reason: HOSPADM

## 2025-04-19 RX ORDER — THIAMINE HYDROCHLORIDE 100 MG/ML
100 INJECTION, SOLUTION INTRAMUSCULAR; INTRAVENOUS DAILY
Status: DISPENSED | OUTPATIENT
Start: 2025-04-19 | End: 2025-04-24

## 2025-04-19 RX ORDER — ALBUMIN (HUMAN) 12.5 G/50ML
25 SOLUTION INTRAVENOUS ONCE
Status: COMPLETED | OUTPATIENT
Start: 2025-04-19 | End: 2025-04-19

## 2025-04-19 RX ADMIN — THIAMINE HYDROCHLORIDE 100 MG: 100 INJECTION, SOLUTION INTRAMUSCULAR; INTRAVENOUS at 12:19

## 2025-04-19 RX ADMIN — PREDNISONE 40 MG: 20 TABLET ORAL at 07:57

## 2025-04-19 RX ADMIN — ALBUMIN (HUMAN) 25 G: 0.25 INJECTION, SOLUTION INTRAVENOUS at 08:05

## 2025-04-19 RX ADMIN — ROSUVASTATIN CALCIUM 20 MG: 10 TABLET, FILM COATED ORAL at 20:45

## 2025-04-19 RX ADMIN — Medication 2 PUFF: at 20:31

## 2025-04-19 RX ADMIN — Medication 2 PUFF: at 08:16

## 2025-04-19 RX ADMIN — GABAPENTIN 100 MG: 100 CAPSULE ORAL at 23:22

## 2025-04-19 RX ADMIN — PANTOPRAZOLE SODIUM 40 MG: 40 INJECTION, POWDER, FOR SOLUTION INTRAVENOUS at 07:57

## 2025-04-19 RX ADMIN — Medication 10 ML: at 20:45

## 2025-04-19 RX ADMIN — DEXTROSE AND SODIUM CHLORIDE: 5; .45 INJECTION, SOLUTION INTRAVENOUS at 10:12

## 2025-04-19 RX ADMIN — GABAPENTIN 100 MG: 100 CAPSULE ORAL at 06:17

## 2025-04-19 RX ADMIN — GABAPENTIN 100 MG: 100 CAPSULE ORAL at 16:02

## 2025-04-19 RX ADMIN — ALLOPURINOL 100 MG: 100 TABLET ORAL at 07:57

## 2025-04-19 RX ADMIN — CALCIUM ACETATE 1334 MG: 667 CAPSULE ORAL at 06:17

## 2025-04-19 RX ADMIN — MIDODRINE HYDROCHLORIDE 10 MG: 5 TABLET ORAL at 07:57

## 2025-04-19 RX ADMIN — PANTOPRAZOLE SODIUM 40 MG: 40 INJECTION, POWDER, FOR SOLUTION INTRAVENOUS at 20:45

## 2025-04-19 RX ADMIN — GABAPENTIN 100 MG: 100 CAPSULE ORAL at 00:36

## 2025-04-19 RX ADMIN — LEVOTHYROXINE SODIUM 50 MCG: 0.03 TABLET ORAL at 06:17

## 2025-04-19 ASSESSMENT — PAIN DESCRIPTION - PAIN TYPE: TYPE: CHRONIC PAIN

## 2025-04-19 ASSESSMENT — PAIN SCALES - GENERAL
PAINLEVEL_OUTOF10: 6
PAINLEVEL_OUTOF10: 3

## 2025-04-19 ASSESSMENT — PAIN DESCRIPTION - LOCATION
LOCATION: HEAD
LOCATION: FOOT

## 2025-04-19 ASSESSMENT — PAIN DESCRIPTION - ORIENTATION: ORIENTATION: RIGHT;LEFT

## 2025-04-19 ASSESSMENT — PAIN DESCRIPTION - DESCRIPTORS: DESCRIPTORS: NUMBNESS;TINGLING

## 2025-04-20 LAB
ALBUMIN SERPL-MCNC: 3.6 G/DL (ref 3.4–5)
ALP SERPL-CCNC: 46 U/L (ref 40–129)
ALT SERPL-CCNC: 18 U/L (ref 10–40)
ANION GAP SERPL CALCULATED.3IONS-SCNC: 15 MMOL/L (ref 3–16)
AST SERPL-CCNC: 16 U/L (ref 15–37)
BASOPHILS # BLD: 0 K/UL (ref 0–0.2)
BASOPHILS NFR BLD: 0.3 %
BILIRUB DIRECT SERPL-MCNC: 0.3 MG/DL (ref 0–0.3)
BILIRUB INDIRECT SERPL-MCNC: 0.3 MG/DL (ref 0–1)
BILIRUB SERPL-MCNC: 0.6 MG/DL (ref 0–1)
BUN SERPL-MCNC: 24 MG/DL (ref 7–20)
CALCIUM SERPL-MCNC: 8.6 MG/DL (ref 8.3–10.6)
CHLORIDE SERPL-SCNC: 96 MMOL/L (ref 99–110)
CO2 SERPL-SCNC: 23 MMOL/L (ref 21–32)
CREAT SERPL-MCNC: 4.3 MG/DL (ref 0.8–1.3)
DEPRECATED RDW RBC AUTO: 16.4 % (ref 12.4–15.4)
EOSINOPHIL # BLD: 0 K/UL (ref 0–0.6)
EOSINOPHIL NFR BLD: 0.2 %
GFR SERPLBLD CREATININE-BSD FMLA CKD-EPI: 13 ML/MIN/{1.73_M2}
GLUCOSE BLD-MCNC: 111 MG/DL (ref 70–99)
GLUCOSE BLD-MCNC: 120 MG/DL (ref 70–99)
GLUCOSE BLD-MCNC: 129 MG/DL (ref 70–99)
GLUCOSE BLD-MCNC: 134 MG/DL (ref 70–99)
GLUCOSE SERPL-MCNC: 126 MG/DL (ref 70–99)
HCT VFR BLD AUTO: 32.2 % (ref 40.5–52.5)
HGB BLD-MCNC: 10.4 G/DL (ref 13.5–17.5)
LYMPHOCYTES # BLD: 0.8 K/UL (ref 1–5.1)
LYMPHOCYTES NFR BLD: 5.7 %
MCH RBC QN AUTO: 33.8 PG (ref 26–34)
MCHC RBC AUTO-ENTMCNC: 32.3 G/DL (ref 31–36)
MCV RBC AUTO: 104.5 FL (ref 80–100)
MONOCYTES # BLD: 1.6 K/UL (ref 0–1.3)
MONOCYTES NFR BLD: 11.6 %
NEUTROPHILS # BLD: 11.5 K/UL (ref 1.7–7.7)
NEUTROPHILS NFR BLD: 82.2 %
PERFORMED ON: ABNORMAL
PHOSPHATE SERPL-MCNC: 4.7 MG/DL (ref 2.5–4.9)
PLATELET # BLD AUTO: 126 K/UL (ref 135–450)
PMV BLD AUTO: 7.9 FL (ref 5–10.5)
POTASSIUM SERPL-SCNC: 3.7 MMOL/L (ref 3.5–5.1)
PROT SERPL-MCNC: 5.5 G/DL (ref 6.4–8.2)
RBC # BLD AUTO: 3.09 M/UL (ref 4.2–5.9)
SODIUM SERPL-SCNC: 134 MMOL/L (ref 136–145)
TRIGL SERPL-MCNC: 68 MG/DL (ref 0–150)
WBC # BLD AUTO: 13.9 K/UL (ref 4–11)

## 2025-04-20 PROCEDURE — 6370000000 HC RX 637 (ALT 250 FOR IP): Performed by: INTERNAL MEDICINE

## 2025-04-20 PROCEDURE — 99232 SBSQ HOSP IP/OBS MODERATE 35: CPT | Performed by: SURGERY

## 2025-04-20 PROCEDURE — 84100 ASSAY OF PHOSPHORUS: CPT

## 2025-04-20 PROCEDURE — 6360000002 HC RX W HCPCS: Performed by: INTERNAL MEDICINE

## 2025-04-20 PROCEDURE — 2500000003 HC RX 250 WO HCPCS: Performed by: INTERNAL MEDICINE

## 2025-04-20 PROCEDURE — 85025 COMPLETE CBC W/AUTO DIFF WBC: CPT

## 2025-04-20 PROCEDURE — 80076 HEPATIC FUNCTION PANEL: CPT

## 2025-04-20 PROCEDURE — 2580000003 HC RX 258: Performed by: INTERNAL MEDICINE

## 2025-04-20 PROCEDURE — 94640 AIRWAY INHALATION TREATMENT: CPT

## 2025-04-20 PROCEDURE — 84478 ASSAY OF TRIGLYCERIDES: CPT

## 2025-04-20 PROCEDURE — APPNB30 APP NON BILLABLE TIME 0-30 MINS: Performed by: NURSE PRACTITIONER

## 2025-04-20 PROCEDURE — 2000000000 HC ICU R&B

## 2025-04-20 PROCEDURE — APPSS15 APP SPLIT SHARED TIME 0-15 MINUTES: Performed by: NURSE PRACTITIONER

## 2025-04-20 PROCEDURE — 80048 BASIC METABOLIC PNL TOTAL CA: CPT

## 2025-04-20 RX ORDER — ALLOPURINOL 100 MG/1
100 TABLET ORAL
Status: DISCONTINUED | OUTPATIENT
Start: 2025-04-22 | End: 2025-05-01 | Stop reason: HOSPADM

## 2025-04-20 RX ADMIN — Medication 10 ML: at 09:02

## 2025-04-20 RX ADMIN — GABAPENTIN 100 MG: 100 CAPSULE ORAL at 06:43

## 2025-04-20 RX ADMIN — ROSUVASTATIN CALCIUM 20 MG: 10 TABLET, FILM COATED ORAL at 20:52

## 2025-04-20 RX ADMIN — PANTOPRAZOLE SODIUM 40 MG: 40 INJECTION, POWDER, FOR SOLUTION INTRAVENOUS at 09:01

## 2025-04-20 RX ADMIN — THIAMINE HYDROCHLORIDE 100 MG: 100 INJECTION, SOLUTION INTRAMUSCULAR; INTRAVENOUS at 08:58

## 2025-04-20 RX ADMIN — DEXTROSE AND SODIUM CHLORIDE: 5; .45 INJECTION, SOLUTION INTRAVENOUS at 06:44

## 2025-04-20 RX ADMIN — CALCIUM ACETATE 1334 MG: 667 CAPSULE ORAL at 06:43

## 2025-04-20 RX ADMIN — ALLOPURINOL 100 MG: 100 TABLET ORAL at 08:58

## 2025-04-20 RX ADMIN — LEVOTHYROXINE SODIUM 50 MCG: 0.03 TABLET ORAL at 06:43

## 2025-04-20 RX ADMIN — Medication 2 PUFF: at 21:06

## 2025-04-20 RX ADMIN — MIDODRINE HYDROCHLORIDE 5 MG: 5 TABLET ORAL at 08:58

## 2025-04-20 RX ADMIN — PREDNISONE 40 MG: 20 TABLET ORAL at 08:58

## 2025-04-20 RX ADMIN — Medication 10 ML: at 20:49

## 2025-04-20 RX ADMIN — PANTOPRAZOLE SODIUM 40 MG: 40 INJECTION, POWDER, FOR SOLUTION INTRAVENOUS at 20:49

## 2025-04-20 RX ADMIN — GABAPENTIN 100 MG: 100 CAPSULE ORAL at 14:45

## 2025-04-20 ASSESSMENT — PAIN SCALES - GENERAL
PAINLEVEL_OUTOF10: 5

## 2025-04-20 ASSESSMENT — PAIN DESCRIPTION - LOCATION
LOCATION: GENERALIZED
LOCATION: GENERALIZED

## 2025-04-20 ASSESSMENT — PAIN DESCRIPTION - ONSET
ONSET: ON-GOING
ONSET: ON-GOING

## 2025-04-20 ASSESSMENT — PAIN DESCRIPTION - FREQUENCY
FREQUENCY: CONTINUOUS
FREQUENCY: CONTINUOUS

## 2025-04-20 ASSESSMENT — PAIN DESCRIPTION - PAIN TYPE
TYPE: CHRONIC PAIN

## 2025-04-20 ASSESSMENT — PAIN DESCRIPTION - DESCRIPTORS
DESCRIPTORS: ACHING
DESCRIPTORS: ACHING

## 2025-04-21 LAB
ALBUMIN SERPL-MCNC: 3.5 G/DL (ref 3.4–5)
ALBUMIN/GLOB SERPL: 1.8 {RATIO} (ref 1.1–2.2)
ALP SERPL-CCNC: 49 U/L (ref 40–129)
ALT SERPL-CCNC: 18 U/L (ref 10–40)
ANION GAP SERPL CALCULATED.3IONS-SCNC: 14 MMOL/L (ref 3–16)
AST SERPL-CCNC: 16 U/L (ref 15–37)
BASOPHILS # BLD: 0 K/UL (ref 0–0.2)
BASOPHILS NFR BLD: 0 %
BILIRUB SERPL-MCNC: 0.5 MG/DL (ref 0–1)
BUN SERPL-MCNC: 33 MG/DL (ref 7–20)
CALCIUM SERPL-MCNC: 8.4 MG/DL (ref 8.3–10.6)
CHLORIDE SERPL-SCNC: 95 MMOL/L (ref 99–110)
CO2 SERPL-SCNC: 23 MMOL/L (ref 21–32)
CREAT SERPL-MCNC: 6 MG/DL (ref 0.8–1.3)
DEPRECATED RDW RBC AUTO: 16.8 % (ref 12.4–15.4)
EOSINOPHIL # BLD: 0 K/UL (ref 0–0.6)
EOSINOPHIL NFR BLD: 0 %
GFR SERPLBLD CREATININE-BSD FMLA CKD-EPI: 9 ML/MIN/{1.73_M2}
GLUCOSE BLD-MCNC: 111 MG/DL (ref 70–99)
GLUCOSE BLD-MCNC: 117 MG/DL (ref 70–99)
GLUCOSE BLD-MCNC: 128 MG/DL (ref 70–99)
GLUCOSE BLD-MCNC: 129 MG/DL (ref 70–99)
GLUCOSE BLD-MCNC: 137 MG/DL (ref 70–99)
GLUCOSE BLD-MCNC: 151 MG/DL (ref 70–99)
GLUCOSE SERPL-MCNC: 136 MG/DL (ref 70–99)
HCT VFR BLD AUTO: 30.7 % (ref 40.5–52.5)
HGB BLD-MCNC: 10 G/DL (ref 13.5–17.5)
LYMPHOCYTES # BLD: 0.5 K/UL (ref 1–5.1)
LYMPHOCYTES NFR BLD: 4 %
MAGNESIUM SERPL-MCNC: 2.33 MG/DL (ref 1.8–2.4)
MCH RBC QN AUTO: 33.9 PG (ref 26–34)
MCHC RBC AUTO-ENTMCNC: 32.7 G/DL (ref 31–36)
MCV RBC AUTO: 103.7 FL (ref 80–100)
MONOCYTES # BLD: 1.3 K/UL (ref 0–1.3)
MONOCYTES NFR BLD: 9.7 %
NEUTROPHILS # BLD: 11.4 K/UL (ref 1.7–7.7)
NEUTROPHILS NFR BLD: 86.3 %
PATH INTERP BLD-IMP: NORMAL
PERFORMED ON: ABNORMAL
PHOSPHATE SERPL-MCNC: 6.3 MG/DL (ref 2.5–4.9)
PLATELET # BLD AUTO: 118 K/UL (ref 135–450)
PMV BLD AUTO: 8.1 FL (ref 5–10.5)
POTASSIUM SERPL-SCNC: 3.8 MMOL/L (ref 3.5–5.1)
PROT SERPL-MCNC: 5.4 G/DL (ref 6.4–8.2)
RBC # BLD AUTO: 2.96 M/UL (ref 4.2–5.9)
SODIUM SERPL-SCNC: 132 MMOL/L (ref 136–145)
WBC # BLD AUTO: 13.2 K/UL (ref 4–11)

## 2025-04-21 PROCEDURE — 94761 N-INVAS EAR/PLS OXIMETRY MLT: CPT

## 2025-04-21 PROCEDURE — 2000000000 HC ICU R&B

## 2025-04-21 PROCEDURE — 83735 ASSAY OF MAGNESIUM: CPT

## 2025-04-21 PROCEDURE — 6360000002 HC RX W HCPCS: Performed by: INTERNAL MEDICINE

## 2025-04-21 PROCEDURE — 2500000003 HC RX 250 WO HCPCS: Performed by: STUDENT IN AN ORGANIZED HEALTH CARE EDUCATION/TRAINING PROGRAM

## 2025-04-21 PROCEDURE — 36556 INSERT NON-TUNNEL CV CATH: CPT

## 2025-04-21 PROCEDURE — 94640 AIRWAY INHALATION TREATMENT: CPT

## 2025-04-21 PROCEDURE — 2500000003 HC RX 250 WO HCPCS: Performed by: INTERNAL MEDICINE

## 2025-04-21 PROCEDURE — 6370000000 HC RX 637 (ALT 250 FOR IP): Performed by: INTERNAL MEDICINE

## 2025-04-21 PROCEDURE — 94660 CPAP INITIATION&MGMT: CPT

## 2025-04-21 PROCEDURE — 2580000003 HC RX 258: Performed by: INTERNAL MEDICINE

## 2025-04-21 PROCEDURE — 2700000000 HC OXYGEN THERAPY PER DAY

## 2025-04-21 PROCEDURE — 80053 COMPREHEN METABOLIC PANEL: CPT

## 2025-04-21 PROCEDURE — 85025 COMPLETE CBC W/AUTO DIFF WBC: CPT

## 2025-04-21 PROCEDURE — 84100 ASSAY OF PHOSPHORUS: CPT

## 2025-04-21 PROCEDURE — APPNB30 APP NON BILLABLE TIME 0-30 MINS: Performed by: NURSE PRACTITIONER

## 2025-04-21 PROCEDURE — 99232 SBSQ HOSP IP/OBS MODERATE 35: CPT | Performed by: SURGERY

## 2025-04-21 PROCEDURE — 06HY33Z INSERTION OF INFUSION DEVICE INTO LOWER VEIN, PERCUTANEOUS APPROACH: ICD-10-PCS | Performed by: INTERNAL MEDICINE

## 2025-04-21 PROCEDURE — 3E0436Z INTRODUCTION OF NUTRITIONAL SUBSTANCE INTO CENTRAL VEIN, PERCUTANEOUS APPROACH: ICD-10-PCS | Performed by: INTERNAL MEDICINE

## 2025-04-21 PROCEDURE — APPSS15 APP SPLIT SHARED TIME 0-15 MINUTES: Performed by: NURSE PRACTITIONER

## 2025-04-21 RX ADMIN — GABAPENTIN 100 MG: 100 CAPSULE ORAL at 08:00

## 2025-04-21 RX ADMIN — ASCORBIC ACID, VITAMIN A PALMITATE, CHOLECALCIFEROL, THIAMINE HYDROCHLORIDE, RIBOFLAVIN-5 PHOSPHATE SODIUM, PYRIDOXINE HYDROCHLORIDE, NIACINAMIDE, DEXPANTHENOL, ALPHA-TOCOPHEROL ACETATE, VITAMIN K1, FOLIC ACID, BIOTIN, CYANOCOBALAMIN: 200; 3300; 200; 6; 3.6; 6; 40; 15; 10; 150; 600; 60; 5 INJECTION, SOLUTION INTRAVENOUS at 16:54

## 2025-04-21 RX ADMIN — THIAMINE HYDROCHLORIDE 100 MG: 100 INJECTION, SOLUTION INTRAMUSCULAR; INTRAVENOUS at 08:00

## 2025-04-21 RX ADMIN — PANTOPRAZOLE SODIUM 40 MG: 40 INJECTION, POWDER, FOR SOLUTION INTRAVENOUS at 08:00

## 2025-04-21 RX ADMIN — PANTOPRAZOLE SODIUM 40 MG: 40 INJECTION, POWDER, FOR SOLUTION INTRAVENOUS at 21:08

## 2025-04-21 RX ADMIN — GABAPENTIN 100 MG: 100 CAPSULE ORAL at 21:30

## 2025-04-21 RX ADMIN — ROSUVASTATIN CALCIUM 20 MG: 10 TABLET, FILM COATED ORAL at 21:08

## 2025-04-21 RX ADMIN — GABAPENTIN 100 MG: 100 CAPSULE ORAL at 00:14

## 2025-04-21 RX ADMIN — MIDODRINE HYDROCHLORIDE 5 MG: 5 TABLET ORAL at 08:01

## 2025-04-21 RX ADMIN — Medication 10 ML: at 08:01

## 2025-04-21 RX ADMIN — HEPARIN SODIUM 5000 UNITS: 5000 INJECTION INTRAVENOUS; SUBCUTANEOUS at 21:08

## 2025-04-21 RX ADMIN — DEXTROSE AND SODIUM CHLORIDE: 5; .45 INJECTION, SOLUTION INTRAVENOUS at 02:58

## 2025-04-21 RX ADMIN — PREDNISONE 40 MG: 20 TABLET ORAL at 08:01

## 2025-04-21 RX ADMIN — Medication 2 PUFF: at 13:02

## 2025-04-21 RX ADMIN — Medication 2 PUFF: at 20:26

## 2025-04-21 RX ADMIN — GABAPENTIN 100 MG: 100 CAPSULE ORAL at 16:05

## 2025-04-21 RX ADMIN — Medication 10 ML: at 21:30

## 2025-04-21 RX ADMIN — DEXTROSE AND SODIUM CHLORIDE: 5; .45 INJECTION, SOLUTION INTRAVENOUS at 21:29

## 2025-04-21 RX ADMIN — LEVOTHYROXINE SODIUM 50 MCG: 0.03 TABLET ORAL at 06:21

## 2025-04-21 ASSESSMENT — PAIN SCALES - GENERAL
PAINLEVEL_OUTOF10: 0

## 2025-04-21 ASSESSMENT — PAIN DESCRIPTION - LOCATION: LOCATION: GENERALIZED

## 2025-04-21 ASSESSMENT — PAIN DESCRIPTION - ONSET: ONSET: ON-GOING

## 2025-04-21 ASSESSMENT — PAIN DESCRIPTION - PAIN TYPE: TYPE: CHRONIC PAIN

## 2025-04-21 ASSESSMENT — PAIN DESCRIPTION - FREQUENCY: FREQUENCY: CONTINUOUS

## 2025-04-21 ASSESSMENT — PAIN DESCRIPTION - DESCRIPTORS: DESCRIPTORS: ACHING

## 2025-04-21 ASSESSMENT — PAIN - FUNCTIONAL ASSESSMENT: PAIN_FUNCTIONAL_ASSESSMENT: PREVENTS OR INTERFERES SOME ACTIVE ACTIVITIES AND ADLS

## 2025-04-21 NOTE — PROCEDURES
PROCEDURE NOTE  Date: 4/21/2025   Name: Donato Obrien  YOB: 1940    CENTRAL LINE    Date/Time: 4/21/2025 7:40 PM    Performed by: Rakesh Benedict MD  Authorized by: Rakesh Benedict MD  Consent: Verbal consent obtained.  Risks and benefits: risks, benefits and alternatives were discussed  Consent given by: patient  Patient consent: the patient's understanding of the procedure matches consent given  Patient identity confirmed: arm band  Time out: Immediately prior to procedure a \"time out\" was called to verify the correct patient, procedure, equipment, support staff and site/side marked as required.  Indications: vascular access  Anesthesia: local infiltration    Anesthesia:  Local Anesthetic: lidocaine 1% without epinephrine    Sedation:  Patient sedated: no    Preparation: skin prepped with ChloraPrep  Skin prep agent dried: skin prep agent completely dried prior to procedure  Sterile barriers: all five maximum sterile barriers used - cap, mask, sterile gown, sterile gloves, and large sterile sheet  Hand hygiene: hand hygiene performed prior to central venous catheter insertion  Location details: left femoral  Patient position: flat  Catheter type: triple lumen  Catheter size: 7 Fr  Ultrasound guidance: yes  Sterile ultrasound techniques: sterile gel and sterile probe covers were used  Number of attempts: 1  Successful placement: yes  Post-procedure: line sutured  Assessment: blood return through all ports  Patient tolerance: patient tolerated the procedure well with no immediate complications  Comments: Less than 8 cc blood loss                 SPOKE TO PATIENT ABOUT NO SHOW THIS DATE 11- @ 10:00 WITH QIAN GEORGE PT WAS AWARE OF APPT JUST FORGOT EXPLAINED OFFICE NO SHOW POLICY AND LETTER SENT

## 2025-04-21 NOTE — CARE COORDINATION
Discharge Planning Note:     Chart reviewed: IP LOS day 3   Risk Score: 26%      Primary Care Physician is: Rohit Pathak III, MD    Primary insurance is: MEDICARE PART A AND B      Patient here for Acute hypoxemic respiratory failure. IV PPI. NG tube, LFA HD fistula, Rt tunneled HD cath, Therapy recs SNF. Care managed by Neph, GI, Gen sx and Hosp MD.     Patient is a 84 yr old male, modified independent with walker, lives with his spouse. Patient is active with [facility]  and has 2L base home O2.     **Writer spoke to patients wife Connie [phone] regarding therapy recommendations for SNF. Connie stated that he does not want to go to a SNF and the plans are for him to return home on discharge. Writer discuss home care options and Connie stated she would have to think about it. CM acknowledged.    Case management will continue to follow progress and update discharge plan as needed.    Suzanna Cortes RN

## 2025-04-22 LAB
ALBUMIN SERPL-MCNC: 3.3 G/DL (ref 3.4–5)
ALBUMIN/GLOB SERPL: 1.8 {RATIO} (ref 1.1–2.2)
ALP SERPL-CCNC: 46 U/L (ref 40–129)
ALT SERPL-CCNC: 16 U/L (ref 10–40)
ANION GAP SERPL CALCULATED.3IONS-SCNC: 15 MMOL/L (ref 3–16)
AST SERPL-CCNC: 17 U/L (ref 15–37)
BASOPHILS # BLD: 0 K/UL (ref 0–0.2)
BASOPHILS NFR BLD: 0.2 %
BILIRUB SERPL-MCNC: 0.5 MG/DL (ref 0–1)
BUN SERPL-MCNC: 42 MG/DL (ref 7–20)
CALCIUM SERPL-MCNC: 8 MG/DL (ref 8.3–10.6)
CHLORIDE SERPL-SCNC: 94 MMOL/L (ref 99–110)
CO2 SERPL-SCNC: 24 MMOL/L (ref 21–32)
CREAT SERPL-MCNC: 7.7 MG/DL (ref 0.8–1.3)
DEPRECATED RDW RBC AUTO: 16.6 % (ref 12.4–15.4)
EOSINOPHIL # BLD: 0.1 K/UL (ref 0–0.6)
EOSINOPHIL NFR BLD: 0.6 %
GFR SERPLBLD CREATININE-BSD FMLA CKD-EPI: 6 ML/MIN/{1.73_M2}
GLUCOSE BLD-MCNC: 102 MG/DL (ref 70–99)
GLUCOSE BLD-MCNC: 135 MG/DL (ref 70–99)
GLUCOSE BLD-MCNC: 143 MG/DL (ref 70–99)
GLUCOSE BLD-MCNC: 162 MG/DL (ref 70–99)
GLUCOSE BLD-MCNC: 170 MG/DL (ref 70–99)
GLUCOSE SERPL-MCNC: 146 MG/DL (ref 70–99)
HCT VFR BLD AUTO: 29.4 % (ref 40.5–52.5)
HGB BLD-MCNC: 9.6 G/DL (ref 13.5–17.5)
LYMPHOCYTES # BLD: 0.8 K/UL (ref 1–5.1)
LYMPHOCYTES NFR BLD: 7.1 %
MAGNESIUM SERPL-MCNC: 2.23 MG/DL (ref 1.8–2.4)
MCH RBC QN AUTO: 33.5 PG (ref 26–34)
MCHC RBC AUTO-ENTMCNC: 32.5 G/DL (ref 31–36)
MCV RBC AUTO: 103.3 FL (ref 80–100)
MONOCYTES # BLD: 1.3 K/UL (ref 0–1.3)
MONOCYTES NFR BLD: 11.2 %
NEUTROPHILS # BLD: 9.6 K/UL (ref 1.7–7.7)
NEUTROPHILS NFR BLD: 80.9 %
PERFORMED ON: ABNORMAL
PHOSPHATE SERPL-MCNC: 6.7 MG/DL (ref 2.5–4.9)
PLATELET # BLD AUTO: 112 K/UL (ref 135–450)
PMV BLD AUTO: 7.6 FL (ref 5–10.5)
POTASSIUM SERPL-SCNC: 3.4 MMOL/L (ref 3.5–5.1)
POTASSIUM SERPL-SCNC: 4 MMOL/L (ref 3.5–5.1)
PROT SERPL-MCNC: 5.1 G/DL (ref 6.4–8.2)
RBC # BLD AUTO: 2.85 M/UL (ref 4.2–5.9)
SODIUM SERPL-SCNC: 133 MMOL/L (ref 136–145)
WBC # BLD AUTO: 11.8 K/UL (ref 4–11)

## 2025-04-22 PROCEDURE — 80053 COMPREHEN METABOLIC PANEL: CPT

## 2025-04-22 PROCEDURE — 6370000000 HC RX 637 (ALT 250 FOR IP): Performed by: INTERNAL MEDICINE

## 2025-04-22 PROCEDURE — APPSS15 APP SPLIT SHARED TIME 0-15 MINUTES: Performed by: NURSE PRACTITIONER

## 2025-04-22 PROCEDURE — 1200000000 HC SEMI PRIVATE

## 2025-04-22 PROCEDURE — 84132 ASSAY OF SERUM POTASSIUM: CPT

## 2025-04-22 PROCEDURE — 90935 HEMODIALYSIS ONE EVALUATION: CPT

## 2025-04-22 PROCEDURE — APPNB30 APP NON BILLABLE TIME 0-30 MINS: Performed by: NURSE PRACTITIONER

## 2025-04-22 PROCEDURE — 6360000002 HC RX W HCPCS: Performed by: INTERNAL MEDICINE

## 2025-04-22 PROCEDURE — 2500000003 HC RX 250 WO HCPCS: Performed by: INTERNAL MEDICINE

## 2025-04-22 PROCEDURE — 84100 ASSAY OF PHOSPHORUS: CPT

## 2025-04-22 PROCEDURE — 97530 THERAPEUTIC ACTIVITIES: CPT

## 2025-04-22 PROCEDURE — 85025 COMPLETE CBC W/AUTO DIFF WBC: CPT

## 2025-04-22 PROCEDURE — 94761 N-INVAS EAR/PLS OXIMETRY MLT: CPT

## 2025-04-22 PROCEDURE — 2700000000 HC OXYGEN THERAPY PER DAY

## 2025-04-22 PROCEDURE — 83735 ASSAY OF MAGNESIUM: CPT

## 2025-04-22 PROCEDURE — 6370000000 HC RX 637 (ALT 250 FOR IP): Performed by: HOSPITALIST

## 2025-04-22 PROCEDURE — 6360000002 HC RX W HCPCS: Performed by: HOSPITALIST

## 2025-04-22 PROCEDURE — 99232 SBSQ HOSP IP/OBS MODERATE 35: CPT | Performed by: SURGERY

## 2025-04-22 PROCEDURE — 2580000003 HC RX 258: Performed by: INTERNAL MEDICINE

## 2025-04-22 PROCEDURE — 2500000003 HC RX 250 WO HCPCS: Performed by: HOSPITALIST

## 2025-04-22 PROCEDURE — 94640 AIRWAY INHALATION TREATMENT: CPT

## 2025-04-22 RX ORDER — MIDODRINE HYDROCHLORIDE 5 MG/1
10 TABLET ORAL
Status: DISCONTINUED | OUTPATIENT
Start: 2025-04-22 | End: 2025-04-29

## 2025-04-22 RX ORDER — POTASSIUM CHLORIDE 7.45 MG/ML
10 INJECTION INTRAVENOUS
Status: COMPLETED | OUTPATIENT
Start: 2025-04-22 | End: 2025-04-22

## 2025-04-22 RX ADMIN — HEPARIN SODIUM 5000 UNITS: 5000 INJECTION INTRAVENOUS; SUBCUTANEOUS at 06:42

## 2025-04-22 RX ADMIN — THIAMINE HYDROCHLORIDE 100 MG: 100 INJECTION, SOLUTION INTRAMUSCULAR; INTRAVENOUS at 08:16

## 2025-04-22 RX ADMIN — GABAPENTIN 100 MG: 100 CAPSULE ORAL at 22:31

## 2025-04-22 RX ADMIN — PANTOPRAZOLE SODIUM 40 MG: 40 INJECTION, POWDER, FOR SOLUTION INTRAVENOUS at 08:16

## 2025-04-22 RX ADMIN — Medication 10 ML: at 08:17

## 2025-04-22 RX ADMIN — DEXTROSE AND SODIUM CHLORIDE: 5; .45 INJECTION, SOLUTION INTRAVENOUS at 19:49

## 2025-04-22 RX ADMIN — Medication 10 ML: at 20:12

## 2025-04-22 RX ADMIN — CALCIUM ACETATE 1334 MG: 667 CAPSULE ORAL at 14:42

## 2025-04-22 RX ADMIN — LEUCINE, PHENYLALANINE, LYSINE, METHIONINE, ISOLEUCINE, VALINE, HISTIDINE, THREONINE, TRYPTOPHAN, ALANINE, GLYCINE, ARGININE, PROLINE, SERINE, TYROSINE, DEXTROSE: 365; 280; 290; 200; 300; 290; 240; 210; 90; 1035; 515; 575; 340; 250; 20; 20 INJECTION INTRAVENOUS at 20:10

## 2025-04-22 RX ADMIN — CALCIUM ACETATE 1334 MG: 667 CAPSULE ORAL at 06:42

## 2025-04-22 RX ADMIN — LEVOTHYROXINE SODIUM 50 MCG: 0.03 TABLET ORAL at 06:42

## 2025-04-22 RX ADMIN — ROSUVASTATIN CALCIUM 20 MG: 10 TABLET, FILM COATED ORAL at 20:10

## 2025-04-22 RX ADMIN — GABAPENTIN 100 MG: 100 CAPSULE ORAL at 14:42

## 2025-04-22 RX ADMIN — Medication 2 PUFF: at 08:50

## 2025-04-22 RX ADMIN — MIDODRINE HYDROCHLORIDE 10 MG: 5 TABLET ORAL at 08:16

## 2025-04-22 RX ADMIN — PREDNISONE 40 MG: 20 TABLET ORAL at 08:16

## 2025-04-22 RX ADMIN — PANTOPRAZOLE SODIUM 40 MG: 40 INJECTION, POWDER, FOR SOLUTION INTRAVENOUS at 20:10

## 2025-04-22 RX ADMIN — POTASSIUM CHLORIDE 10 MEQ: 7.46 INJECTION, SOLUTION INTRAVENOUS at 13:27

## 2025-04-22 RX ADMIN — POTASSIUM CHLORIDE 10 MEQ: 7.46 INJECTION, SOLUTION INTRAVENOUS at 11:57

## 2025-04-22 RX ADMIN — ALLOPURINOL 100 MG: 100 TABLET ORAL at 08:16

## 2025-04-22 RX ADMIN — GABAPENTIN 100 MG: 100 CAPSULE ORAL at 06:42

## 2025-04-22 ASSESSMENT — PAIN SCALES - GENERAL
PAINLEVEL_OUTOF10: 7
PAINLEVEL_OUTOF10: 0
PAINLEVEL_OUTOF10: 0

## 2025-04-23 ENCOUNTER — ANESTHESIA EVENT (OUTPATIENT)
Dept: OPERATING ROOM | Age: 85
End: 2025-04-23
Payer: MEDICARE

## 2025-04-23 ENCOUNTER — ANESTHESIA (OUTPATIENT)
Dept: OPERATING ROOM | Age: 85
End: 2025-04-23
Payer: MEDICARE

## 2025-04-23 LAB
ALBUMIN SERPL-MCNC: 3.3 G/DL (ref 3.4–5)
ALBUMIN/GLOB SERPL: 1.6 {RATIO} (ref 1.1–2.2)
ALP SERPL-CCNC: 51 U/L (ref 40–129)
ALT SERPL-CCNC: 14 U/L (ref 10–40)
ANION GAP SERPL CALCULATED.3IONS-SCNC: 16 MMOL/L (ref 3–16)
ANISOCYTOSIS BLD QL SMEAR: ABNORMAL
AST SERPL-CCNC: 17 U/L (ref 15–37)
BASOPHILS # BLD: 0 K/UL (ref 0–0.2)
BASOPHILS NFR BLD: 0 %
BILIRUB SERPL-MCNC: 0.7 MG/DL (ref 0–1)
BUN SERPL-MCNC: 31 MG/DL (ref 7–20)
CALCIUM SERPL-MCNC: 8.2 MG/DL (ref 8.3–10.6)
CHLORIDE SERPL-SCNC: 94 MMOL/L (ref 99–110)
CO2 SERPL-SCNC: 22 MMOL/L (ref 21–32)
CREAT SERPL-MCNC: 5.9 MG/DL (ref 0.8–1.3)
DEPRECATED RDW RBC AUTO: 16.4 % (ref 12.4–15.4)
EOSINOPHIL # BLD: 0 K/UL (ref 0–0.6)
EOSINOPHIL NFR BLD: 0 %
GFR SERPLBLD CREATININE-BSD FMLA CKD-EPI: 9 ML/MIN/{1.73_M2}
GLUCOSE BLD-MCNC: 113 MG/DL (ref 70–99)
GLUCOSE BLD-MCNC: 161 MG/DL (ref 70–99)
GLUCOSE BLD-MCNC: 193 MG/DL (ref 70–99)
GLUCOSE SERPL-MCNC: 113 MG/DL (ref 70–99)
HCT VFR BLD AUTO: 32.8 % (ref 40.5–52.5)
HGB BLD-MCNC: 10.7 G/DL (ref 13.5–17.5)
LYMPHOCYTES # BLD: 0.5 K/UL (ref 1–5.1)
LYMPHOCYTES NFR BLD: 4 %
MACROCYTES BLD QL SMEAR: ABNORMAL
MAGNESIUM SERPL-MCNC: 1.99 MG/DL (ref 1.8–2.4)
MCH RBC QN AUTO: 33.9 PG (ref 26–34)
MCHC RBC AUTO-ENTMCNC: 32.6 G/DL (ref 31–36)
MCV RBC AUTO: 104.1 FL (ref 80–100)
MONOCYTES # BLD: 1.4 K/UL (ref 0–1.3)
MONOCYTES NFR BLD: 10 %
NEUTROPHILS # BLD: 11.6 K/UL (ref 1.7–7.7)
NEUTROPHILS NFR BLD: 86 %
PERFORMED ON: ABNORMAL
PHOSPHATE SERPL-MCNC: 4.6 MG/DL (ref 2.5–4.9)
PLATELET # BLD AUTO: 88 K/UL (ref 135–450)
PLATELET BLD QL SMEAR: ABNORMAL
PMV BLD AUTO: 8 FL (ref 5–10.5)
POTASSIUM SERPL-SCNC: 3.5 MMOL/L (ref 3.5–5.1)
PROT SERPL-MCNC: 5.4 G/DL (ref 6.4–8.2)
RBC # BLD AUTO: 3.16 M/UL (ref 4.2–5.9)
SLIDE REVIEW: ABNORMAL
SODIUM SERPL-SCNC: 132 MMOL/L (ref 136–145)
WBC # BLD AUTO: 13.5 K/UL (ref 4–11)

## 2025-04-23 PROCEDURE — 2500000003 HC RX 250 WO HCPCS: Performed by: NURSE ANESTHETIST, CERTIFIED REGISTERED

## 2025-04-23 PROCEDURE — 6360000002 HC RX W HCPCS: Performed by: SURGERY

## 2025-04-23 PROCEDURE — 2500000003 HC RX 250 WO HCPCS: Performed by: SURGERY

## 2025-04-23 PROCEDURE — 2700000000 HC OXYGEN THERAPY PER DAY

## 2025-04-23 PROCEDURE — 83735 ASSAY OF MAGNESIUM: CPT

## 2025-04-23 PROCEDURE — 3600000005 HC SURGERY LEVEL 5 BASE: Performed by: SURGERY

## 2025-04-23 PROCEDURE — 84100 ASSAY OF PHOSPHORUS: CPT

## 2025-04-23 PROCEDURE — C1889 IMPLANT/INSERT DEVICE, NOC: HCPCS | Performed by: SURGERY

## 2025-04-23 PROCEDURE — 43282 LAP PARAESOPH HER RPR W/MESH: CPT | Performed by: SURGERY

## 2025-04-23 PROCEDURE — 7100000000 HC PACU RECOVERY - FIRST 15 MIN: Performed by: SURGERY

## 2025-04-23 PROCEDURE — 6360000002 HC RX W HCPCS: Performed by: NURSE ANESTHETIST, CERTIFIED REGISTERED

## 2025-04-23 PROCEDURE — 36415 COLL VENOUS BLD VENIPUNCTURE: CPT

## 2025-04-23 PROCEDURE — 3600000015 HC SURGERY LEVEL 5 ADDTL 15MIN: Performed by: SURGERY

## 2025-04-23 PROCEDURE — 85025 COMPLETE CBC W/AUTO DIFF WBC: CPT

## 2025-04-23 PROCEDURE — 0BUT4JZ SUPPLEMENT DIAPHRAGM WITH SYNTHETIC SUBSTITUTE, PERCUTANEOUS ENDOSCOPIC APPROACH: ICD-10-PCS | Performed by: SURGERY

## 2025-04-23 PROCEDURE — 94640 AIRWAY INHALATION TREATMENT: CPT

## 2025-04-23 PROCEDURE — 2580000003 HC RX 258: Performed by: NURSE ANESTHETIST, CERTIFIED REGISTERED

## 2025-04-23 PROCEDURE — C1781 MESH (IMPLANTABLE): HCPCS | Performed by: SURGERY

## 2025-04-23 PROCEDURE — 2500000003 HC RX 250 WO HCPCS

## 2025-04-23 PROCEDURE — 88302 TISSUE EXAM BY PATHOLOGIST: CPT

## 2025-04-23 PROCEDURE — P9045 ALBUMIN (HUMAN), 5%, 250 ML: HCPCS | Performed by: NURSE ANESTHETIST, CERTIFIED REGISTERED

## 2025-04-23 PROCEDURE — 2709999900 HC NON-CHARGEABLE SUPPLY: Performed by: SURGERY

## 2025-04-23 PROCEDURE — 1200000000 HC SEMI PRIVATE

## 2025-04-23 PROCEDURE — 3700000000 HC ANESTHESIA ATTENDED CARE: Performed by: SURGERY

## 2025-04-23 PROCEDURE — 6370000000 HC RX 637 (ALT 250 FOR IP): Performed by: SURGERY

## 2025-04-23 PROCEDURE — 80053 COMPREHEN METABOLIC PANEL: CPT

## 2025-04-23 PROCEDURE — 3700000001 HC ADD 15 MINUTES (ANESTHESIA): Performed by: SURGERY

## 2025-04-23 PROCEDURE — 2720000010 HC SURG SUPPLY STERILE: Performed by: SURGERY

## 2025-04-23 PROCEDURE — 7100000001 HC PACU RECOVERY - ADDTL 15 MIN: Performed by: SURGERY

## 2025-04-23 DEVICE — MESH HERN W7XL10CM SYN TISS REINF BIOABSRB DISP BIO-A: Type: IMPLANTABLE DEVICE | Site: STOMACH | Status: FUNCTIONAL

## 2025-04-23 RX ORDER — DEXAMETHASONE SODIUM PHOSPHATE 4 MG/ML
INJECTION, SOLUTION INTRA-ARTICULAR; INTRALESIONAL; INTRAMUSCULAR; INTRAVENOUS; SOFT TISSUE
Status: DISCONTINUED | OUTPATIENT
Start: 2025-04-23 | End: 2025-04-23 | Stop reason: SDUPTHER

## 2025-04-23 RX ORDER — ONDANSETRON 2 MG/ML
4 INJECTION INTRAMUSCULAR; INTRAVENOUS
Status: DISCONTINUED | OUTPATIENT
Start: 2025-04-23 | End: 2025-04-23 | Stop reason: HOSPADM

## 2025-04-23 RX ORDER — PROCHLORPERAZINE EDISYLATE 5 MG/ML
5 INJECTION INTRAMUSCULAR; INTRAVENOUS
Status: DISCONTINUED | OUTPATIENT
Start: 2025-04-23 | End: 2025-04-23 | Stop reason: HOSPADM

## 2025-04-23 RX ORDER — SODIUM CHLORIDE 9 MG/ML
INJECTION, SOLUTION INTRAVENOUS
Status: DISCONTINUED | OUTPATIENT
Start: 2025-04-23 | End: 2025-04-23 | Stop reason: SDUPTHER

## 2025-04-23 RX ORDER — PROPOFOL 10 MG/ML
INJECTION, EMULSION INTRAVENOUS
Status: DISCONTINUED | OUTPATIENT
Start: 2025-04-23 | End: 2025-04-23 | Stop reason: SDUPTHER

## 2025-04-23 RX ORDER — MORPHINE SULFATE 2 MG/ML
2 INJECTION, SOLUTION INTRAMUSCULAR; INTRAVENOUS
Status: DISCONTINUED | OUTPATIENT
Start: 2025-04-23 | End: 2025-04-29

## 2025-04-23 RX ORDER — ROCURONIUM BROMIDE 10 MG/ML
INJECTION, SOLUTION INTRAVENOUS
Status: DISCONTINUED | OUTPATIENT
Start: 2025-04-23 | End: 2025-04-23 | Stop reason: SDUPTHER

## 2025-04-23 RX ORDER — INSULIN LISPRO 100 [IU]/ML
0-4 INJECTION, SOLUTION INTRAVENOUS; SUBCUTANEOUS EVERY 6 HOURS
Status: DISCONTINUED | OUTPATIENT
Start: 2025-04-23 | End: 2025-04-30

## 2025-04-23 RX ORDER — SODIUM CHLORIDE 0.9 % (FLUSH) 0.9 %
5-40 SYRINGE (ML) INJECTION EVERY 12 HOURS SCHEDULED
Status: DISCONTINUED | OUTPATIENT
Start: 2025-04-23 | End: 2025-04-23 | Stop reason: HOSPADM

## 2025-04-23 RX ORDER — HYDROMORPHONE HYDROCHLORIDE 2 MG/ML
0.5 INJECTION, SOLUTION INTRAMUSCULAR; INTRAVENOUS; SUBCUTANEOUS EVERY 5 MIN PRN
Status: DISCONTINUED | OUTPATIENT
Start: 2025-04-23 | End: 2025-04-23 | Stop reason: HOSPADM

## 2025-04-23 RX ORDER — SODIUM CHLORIDE 0.9 % (FLUSH) 0.9 %
5-40 SYRINGE (ML) INJECTION PRN
Status: DISCONTINUED | OUTPATIENT
Start: 2025-04-23 | End: 2025-04-23 | Stop reason: HOSPADM

## 2025-04-23 RX ORDER — HYDRALAZINE HYDROCHLORIDE 20 MG/ML
10 INJECTION INTRAMUSCULAR; INTRAVENOUS
Status: DISCONTINUED | OUTPATIENT
Start: 2025-04-23 | End: 2025-04-23 | Stop reason: HOSPADM

## 2025-04-23 RX ORDER — ACETAMINOPHEN 500 MG
1000 TABLET ORAL EVERY 6 HOURS PRN
Status: DISCONTINUED | OUTPATIENT
Start: 2025-04-23 | End: 2025-04-25

## 2025-04-23 RX ORDER — METOCLOPRAMIDE HYDROCHLORIDE 5 MG/ML
10 INJECTION INTRAMUSCULAR; INTRAVENOUS EVERY 6 HOURS
Status: DISPENSED | OUTPATIENT
Start: 2025-04-23 | End: 2025-04-24

## 2025-04-23 RX ORDER — MAGNESIUM HYDROXIDE 1200 MG/15ML
LIQUID ORAL CONTINUOUS PRN
Status: COMPLETED | OUTPATIENT
Start: 2025-04-23 | End: 2025-04-23

## 2025-04-23 RX ORDER — ONDANSETRON 2 MG/ML
INJECTION INTRAMUSCULAR; INTRAVENOUS
Status: DISCONTINUED | OUTPATIENT
Start: 2025-04-23 | End: 2025-04-23 | Stop reason: SDUPTHER

## 2025-04-23 RX ORDER — NALOXONE HYDROCHLORIDE 0.4 MG/ML
INJECTION, SOLUTION INTRAMUSCULAR; INTRAVENOUS; SUBCUTANEOUS PRN
Status: DISCONTINUED | OUTPATIENT
Start: 2025-04-23 | End: 2025-04-23 | Stop reason: HOSPADM

## 2025-04-23 RX ORDER — LABETALOL HYDROCHLORIDE 5 MG/ML
10 INJECTION, SOLUTION INTRAVENOUS
Status: DISCONTINUED | OUTPATIENT
Start: 2025-04-23 | End: 2025-04-23 | Stop reason: HOSPADM

## 2025-04-23 RX ORDER — EPHEDRINE SULFATE 50 MG/ML
INJECTION INTRAVENOUS
Status: DISCONTINUED | OUTPATIENT
Start: 2025-04-23 | End: 2025-04-23 | Stop reason: SDUPTHER

## 2025-04-23 RX ORDER — LIDOCAINE HYDROCHLORIDE 20 MG/ML
INJECTION, SOLUTION INFILTRATION; PERINEURAL
Status: DISCONTINUED | OUTPATIENT
Start: 2025-04-23 | End: 2025-04-23 | Stop reason: SDUPTHER

## 2025-04-23 RX ORDER — SODIUM CHLORIDE 9 MG/ML
INJECTION, SOLUTION INTRAVENOUS PRN
Status: DISCONTINUED | OUTPATIENT
Start: 2025-04-23 | End: 2025-04-23 | Stop reason: HOSPADM

## 2025-04-23 RX ORDER — ALBUMIN HUMAN 50 G/1000ML
SOLUTION INTRAVENOUS
Status: DISCONTINUED | OUTPATIENT
Start: 2025-04-23 | End: 2025-04-23 | Stop reason: SDUPTHER

## 2025-04-23 RX ORDER — BUPIVACAINE HYDROCHLORIDE 5 MG/ML
INJECTION, SOLUTION EPIDURAL; INTRACAUDAL; PERINEURAL
Status: DISCONTINUED | OUTPATIENT
Start: 2025-04-23 | End: 2025-04-23 | Stop reason: ALTCHOICE

## 2025-04-23 RX ORDER — OXYCODONE AND ACETAMINOPHEN 5; 325 MG/1; MG/1
1 TABLET ORAL EVERY 4 HOURS PRN
Status: DISCONTINUED | OUTPATIENT
Start: 2025-04-23 | End: 2025-04-25

## 2025-04-23 RX ORDER — FENTANYL CITRATE 50 UG/ML
INJECTION, SOLUTION INTRAMUSCULAR; INTRAVENOUS
Status: DISCONTINUED | OUTPATIENT
Start: 2025-04-23 | End: 2025-04-23 | Stop reason: SDUPTHER

## 2025-04-23 RX ADMIN — ONDANSETRON 4 MG: 2 INJECTION INTRAMUSCULAR; INTRAVENOUS at 13:26

## 2025-04-23 RX ADMIN — FENTANYL CITRATE 50 MCG: 50 INJECTION, SOLUTION INTRAMUSCULAR; INTRAVENOUS at 12:20

## 2025-04-23 RX ADMIN — ASCORBIC ACID, VITAMIN A PALMITATE, CHOLECALCIFEROL, THIAMINE HYDROCHLORIDE, RIBOFLAVIN-5 PHOSPHATE SODIUM, PYRIDOXINE HYDROCHLORIDE, NIACINAMIDE, DEXPANTHENOL, ALPHA-TOCOPHEROL ACETATE, VITAMIN K1, FOLIC ACID, BIOTIN, CYANOCOBALAMIN: 200; 3300; 200; 6; 3.6; 6; 40; 15; 10; 150; 600; 60; 5 INJECTION, SOLUTION INTRAVENOUS at 18:47

## 2025-04-23 RX ADMIN — HEPARIN SODIUM 5000 UNITS: 5000 INJECTION INTRAVENOUS; SUBCUTANEOUS at 22:56

## 2025-04-23 RX ADMIN — MORPHINE SULFATE 2 MG: 2 INJECTION, SOLUTION INTRAMUSCULAR; INTRAVENOUS at 17:00

## 2025-04-23 RX ADMIN — SODIUM CHLORIDE: 9 INJECTION, SOLUTION INTRAVENOUS at 12:10

## 2025-04-23 RX ADMIN — DEXAMETHASONE SODIUM PHOSPHATE 8 MG: 4 INJECTION, SOLUTION INTRAMUSCULAR; INTRAVENOUS at 13:26

## 2025-04-23 RX ADMIN — FENTANYL CITRATE 50 MCG: 50 INJECTION, SOLUTION INTRAMUSCULAR; INTRAVENOUS at 13:01

## 2025-04-23 RX ADMIN — EPHEDRINE SULFATE 5 MG: 50 INJECTION, SOLUTION INTRAVENOUS at 12:56

## 2025-04-23 RX ADMIN — METOCLOPRAMIDE 10 MG: 5 INJECTION, SOLUTION INTRAMUSCULAR; INTRAVENOUS at 22:54

## 2025-04-23 RX ADMIN — EPHEDRINE SULFATE 10 MG: 50 INJECTION, SOLUTION INTRAVENOUS at 12:36

## 2025-04-23 RX ADMIN — Medication 2 PUFF: at 21:06

## 2025-04-23 RX ADMIN — EPHEDRINE SULFATE 5 MG: 50 INJECTION, SOLUTION INTRAVENOUS at 12:48

## 2025-04-23 RX ADMIN — SUGAMMADEX 200 MG: 100 INJECTION, SOLUTION INTRAVENOUS at 14:47

## 2025-04-23 RX ADMIN — ROCURONIUM BROMIDE 10 MG: 10 INJECTION, SOLUTION INTRAVENOUS at 13:35

## 2025-04-23 RX ADMIN — PHENYLEPHRINE HYDROCHLORIDE 200 MCG: 10 INJECTION INTRAVENOUS at 12:56

## 2025-04-23 RX ADMIN — PROPOFOL 25 MG: 10 INJECTION, EMULSION INTRAVENOUS at 12:23

## 2025-04-23 RX ADMIN — PHENYLEPHRINE HYDROCHLORIDE 100 MCG: 10 INJECTION INTRAVENOUS at 14:17

## 2025-04-23 RX ADMIN — ROCURONIUM BROMIDE 50 MG: 10 INJECTION, SOLUTION INTRAVENOUS at 12:23

## 2025-04-23 RX ADMIN — EPHEDRINE SULFATE 5 MG: 50 INJECTION, SOLUTION INTRAVENOUS at 14:34

## 2025-04-23 RX ADMIN — ALBUMIN (HUMAN) 250 ML: 12.5 INJECTION, SOLUTION INTRAVENOUS at 13:37

## 2025-04-23 RX ADMIN — PHENYLEPHRINE HYDROCHLORIDE 100 MCG: 10 INJECTION INTRAVENOUS at 13:43

## 2025-04-23 RX ADMIN — LIDOCAINE HYDROCHLORIDE 100 MG: 20 INJECTION, SOLUTION INFILTRATION; PERINEURAL at 12:21

## 2025-04-23 RX ADMIN — PHENYLEPHRINE HYDROCHLORIDE 100 MCG: 10 INJECTION INTRAVENOUS at 13:10

## 2025-04-23 RX ADMIN — ROCURONIUM BROMIDE 10 MG: 10 INJECTION, SOLUTION INTRAVENOUS at 14:34

## 2025-04-23 RX ADMIN — Medication 10 ML: at 22:58

## 2025-04-23 RX ADMIN — PHENYLEPHRINE HYDROCHLORIDE 100 MCG: 10 INJECTION INTRAVENOUS at 12:24

## 2025-04-23 RX ADMIN — PHENYLEPHRINE HYDROCHLORIDE 200 MCG: 10 INJECTION INTRAVENOUS at 13:12

## 2025-04-23 RX ADMIN — PHENYLEPHRINE HYDROCHLORIDE 100 MCG: 10 INJECTION INTRAVENOUS at 12:36

## 2025-04-23 RX ADMIN — INSULIN LISPRO 1 UNITS: 100 INJECTION, SOLUTION INTRAVENOUS; SUBCUTANEOUS at 22:57

## 2025-04-23 RX ADMIN — PROPOFOL 25 MG: 10 INJECTION, EMULSION INTRAVENOUS at 12:22

## 2025-04-23 RX ADMIN — METOCLOPRAMIDE 10 MG: 5 INJECTION, SOLUTION INTRAMUSCULAR; INTRAVENOUS at 17:00

## 2025-04-23 RX ADMIN — ROCURONIUM BROMIDE 20 MG: 10 INJECTION, SOLUTION INTRAVENOUS at 13:07

## 2025-04-23 RX ADMIN — ALBUMIN (HUMAN) 250 ML: 12.5 INJECTION, SOLUTION INTRAVENOUS at 12:58

## 2025-04-23 RX ADMIN — PHENYLEPHRINE HYDROCHLORIDE 30 MCG/MIN: 10 INJECTION INTRAVENOUS at 13:16

## 2025-04-23 RX ADMIN — EPHEDRINE SULFATE 5 MG: 50 INJECTION, SOLUTION INTRAVENOUS at 12:43

## 2025-04-23 ASSESSMENT — PAIN DESCRIPTION - ORIENTATION
ORIENTATION: LEFT
ORIENTATION: LEFT

## 2025-04-23 ASSESSMENT — PAIN DESCRIPTION - LOCATION
LOCATION: LEG
LOCATION: CHEST;SHOULDER

## 2025-04-23 ASSESSMENT — PAIN - FUNCTIONAL ASSESSMENT: PAIN_FUNCTIONAL_ASSESSMENT: 0-10

## 2025-04-23 ASSESSMENT — PAIN SCALES - GENERAL: PAINLEVEL_OUTOF10: 10

## 2025-04-23 ASSESSMENT — PAIN DESCRIPTION - DESCRIPTORS: DESCRIPTORS: ACHING

## 2025-04-23 NOTE — BRIEF OP NOTE
Brief Postoperative Note      Patient: Donato Obrien  YOB: 1940  MRN: [id number]    Date of Procedure: 4/23/2025    Pre-Op Diagnosis Codes:      * Hiatal hernia [K44.9]    Post-Op Diagnosis: Same       Procedure(s):  LAPAROSCOPIC PARAESOPHAGEAL HIATAL HERNIA REPAIR WITH MESH    Surgeon(s):  Karthik Borjas MD    Assistant:  Surgical Assistant: Kemal Bundy Jason    Anesthesia: General    Estimated Blood Loss (mL): Minimal    Complications: None    Specimens:   ID Type Source Tests Collected by Time Destination   A : HERNIA SAC Tissue Tissue SURGICAL PATHOLOGY Karthik Borjas MD 4/23/2025 1440        Implants:  Implant Name Type Inv. Item Serial No.  Lot No. LRB No. Used Action   MESH SMILEY [id number] SYN TISS REINF BIOABSRB DISP BIO-A - [id number]  MESH SMILEY [id number] SYN TISS REINF BIOABSRB DISP BIO-A [id number]  [employer] [facility]-  N/A 1 Implanted         Drains:   NG/OG/NJ/NE Tube Nasogastric 18 fr Right nostril (Active)   Surrounding Skin Clean, dry & intact 04/23/25 0947   Securement device Other (comment) 04/23/25 0947   Status Suction-low intermittent 04/23/25 0947   Placement Verified External Catheter Length 04/23/25 0947   NG/OG/NJ/NE External Measurement (cm) 80 cm 04/23/25 0947   Drainage Appearance Brown;Green 04/23/25 0947   Free Water/Flush (mL) 60 mL 04/20/25 1446   Output (mL) 500 ml 04/23/25 0500       Findings:  Infection Present At Time Of Surgery (PATOS) (choose all levels that have infection present):  No infection present  Other Findings: Large complex paraesophageal hernia present repaired. Colon and stomach reduced.    Electronically signed by Karthik Borjas MD on 4/23/2025 at 3:05 PM

## 2025-04-23 NOTE — ANESTHESIA PRE PROCEDURE
Department of Anesthesiology  Preprocedure Note       Name:  Donato Obrien   Age:  84 y.o.  :  1940                                          MRN:  [id number]         Date:  2025      Surgeon: Surgeon(s):  Karthik Borjas MD    Procedure: Procedure(s):  LAPAROSCOPIC PARAESOPHAGEAL HIATAL HERNIA REPAIR    Medications prior to admission:   Prior to Admission medications    Medication Sig Start Date End Date Taking? Authorizing Provider   albuterol (PROVENTIL) (2.5 MG/3ML) 0.083% nebulizer solution Take 3 mLs by nebulization every 6 hours as needed for Wheezing 4/10/25   Lavelle Moore MD   Dextromethorphan-guaiFENesin (MUCINEX DM MAXIMUM STRENGTH)  MG TB12 Take 1 tablet by mouth daily    Evon Galvan MD   budesonide-formoterol (SYMBICORT) 160-4.5 MCG/ACT AERO Inhale 2 puffs into the lungs in the morning and 2 puffs in the evening. 25   Filipe Hebert MD   isosorbide mononitrate (IMDUR) 30 MG extended release tablet Take 1 tablet by mouth daily 23   Filipe Hebert MD   rosuvastatin (CRESTOR) 20 MG tablet Take 1 tablet by mouth nightly  Patient taking differently: Take 1 tablet by mouth daily 23   Filipe Hebert MD   clopidogrel (PLAVIX) 75 MG tablet Take 1 tablet by mouth daily 23   Filipe Hebert MD   levothyroxine (SYNTHROID) 50 MCG tablet Take 1 tablet by mouth Daily    Evon Galvan MD   calcium acetate (PHOSLO) 667 MG CAPS capsule Take 1 capsule by mouth See Admin Instructions Take 2 tablets three times daily with meals and one tablet with a snack.    Evon Galvan MD   Emollient (EUCERIN ADVANCED REPAIR) CREA Apply topically 2 times daily as needed    Evon Galvan MD   aspirin-caffeine (ANACIN) 400-32 MG TABS Take 1 tablet by mouth every 6 hours as needed for Headaches or Pain    Evon Galvan MD   allopurinol (ZYLOPRIM) 100 MG tablet Take 1 tablet by mouth daily    Evon Galvan MD   amLODIPine
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
## 2025-04-23 NOTE — ANESTHESIA POSTPROCEDURE EVALUATION
Department of Anesthesiology  Postprocedure Note    Patient: Donato Obrien  MRN: [id number]  YOB: 1940  Date of evaluation: 4/23/2025    Procedure Summary       Date: 04/23/25 Room / Location: [address]    Anesthesia Start: 1210 Anesthesia Stop: 1515    Procedure: LAPAROSCOPIC PARAESOPHAGEAL HIATAL HERNIA REPAIR WITH MESH (Abdomen) Diagnosis:       Hiatal hernia      (Hiatal hernia [K44.9])    Surgeons: Karthik Borjas MD Responsible Provider: Mike Villela MD    Anesthesia Type: general ASA Status: 4            Anesthesia Type: No value filed.    Andrzej Phase I: Andrzej Score: 9    Andrzej Phase II:      Anesthesia Post Evaluation    Patient location during evaluation: PACU  Patient participation: complete - patient participated  Level of consciousness: awake and alert  Airway patency: patent  Nausea & Vomiting: no nausea and no vomiting  Cardiovascular status: hemodynamically stable  Respiratory status: acceptable  Hydration status: euvolemic  Multimodal analgesia pain management approach  Pain management: satisfactory to patient    No notable events documented.

## 2025-04-23 NOTE — OP NOTE
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    POSTOPERATIVE DIAGNOSIS:  Paraesophageal hiatal hernia, complex type 4 hernia with stomach obstruction and transverse colon in hernia.    PROCEDURES:  Laparoscopic paraesophageal hiatal hernia repair, reinforcement with [place] [employer] mesh.    ANESTHESIA:  General plus local.    ESTIMATED BLOOD LOSS:  Minimal.    COMPLICATIONS:  None.    FINDINGS:  The patient had a large amount of omental fat, transverse colon, lesser sac fat, and stomach stuck up in the hernia.  This all dissected, reduced.  Adhesions, scar tissue, and the attachments in the chest were all mobilized and taken down along with the hernia sac.  This was excised and sent to Pathology.  The colon and stomach sat nicely in the abdomen at the closure of procedure, and the repair was completed and reinforced with [place] [employer] mesh.    DETAILS OF SURGERY:  The patient was brought to the operating room and placed on the operating table in supine position.  General anesthetic was administered.  Lithotomy position was established.  The abdomen was prepped and draped sterilely.  Time-out was done.  A supraumbilical 5 mm direct view port was placed, and the abdominal cavity was insufflated to 15 mmHg pressure.  Right lateral abdominal 5 mm port, right paramedian 5 mm port, right paramedian 11 mm port, and left lateral abdominal 5 mm port were all placed under direct vision.  The upper

## 2025-04-24 LAB
ALBUMIN SERPL-MCNC: 3.2 G/DL (ref 3.4–5)
ALBUMIN/GLOB SERPL: 1.8 {RATIO} (ref 1.1–2.2)
ALP SERPL-CCNC: 43 U/L (ref 40–129)
ALT SERPL-CCNC: 10 U/L (ref 10–40)
ANION GAP SERPL CALCULATED.3IONS-SCNC: 16 MMOL/L (ref 3–16)
AST SERPL-CCNC: 15 U/L (ref 15–37)
BASOPHILS # BLD: 0 K/UL (ref 0–0.2)
BASOPHILS NFR BLD: 0.1 %
BILIRUB SERPL-MCNC: 0.4 MG/DL (ref 0–1)
BUN SERPL-MCNC: 50 MG/DL (ref 7–20)
CALCIUM SERPL-MCNC: 8 MG/DL (ref 8.3–10.6)
CHLORIDE SERPL-SCNC: 95 MMOL/L (ref 99–110)
CO2 SERPL-SCNC: 20 MMOL/L (ref 21–32)
CREAT SERPL-MCNC: 7 MG/DL (ref 0.8–1.3)
DEPRECATED RDW RBC AUTO: 16.4 % (ref 12.4–15.4)
EKG DIAGNOSIS: NORMAL
EKG Q-T INTERVAL: 430 MS
EKG QRS DURATION: 116 MS
EKG QTC CALCULATION (BAZETT): 430 MS
EKG R AXIS: -81 DEGREES
EKG T AXIS: 79 DEGREES
EKG VENTRICULAR RATE: 60 BPM
EOSINOPHIL # BLD: 0 K/UL (ref 0–0.6)
EOSINOPHIL NFR BLD: 0 %
GFR SERPLBLD CREATININE-BSD FMLA CKD-EPI: 7 ML/MIN/{1.73_M2}
GLUCOSE BLD-MCNC: 151 MG/DL (ref 70–99)
GLUCOSE BLD-MCNC: 162 MG/DL (ref 70–99)
GLUCOSE BLD-MCNC: 164 MG/DL (ref 70–99)
GLUCOSE SERPL-MCNC: 187 MG/DL (ref 70–99)
HCT VFR BLD AUTO: 27.4 % (ref 40.5–52.5)
HGB BLD-MCNC: 8.9 G/DL (ref 13.5–17.5)
LYMPHOCYTES # BLD: 0.6 K/UL (ref 1–5.1)
LYMPHOCYTES NFR BLD: 4.8 %
MAGNESIUM SERPL-MCNC: 2.01 MG/DL (ref 1.8–2.4)
MCH RBC QN AUTO: 33.4 PG (ref 26–34)
MCHC RBC AUTO-ENTMCNC: 32.6 G/DL (ref 31–36)
MCV RBC AUTO: 102.3 FL (ref 80–100)
MONOCYTES # BLD: 1.3 K/UL (ref 0–1.3)
MONOCYTES NFR BLD: 10.6 %
NEUTROPHILS # BLD: 10.6 K/UL (ref 1.7–7.7)
NEUTROPHILS NFR BLD: 84.5 %
PERFORMED ON: ABNORMAL
PHOSPHATE SERPL-MCNC: 6.4 MG/DL (ref 2.5–4.9)
PLATELET # BLD AUTO: 78 K/UL (ref 135–450)
PMV BLD AUTO: 8.3 FL (ref 5–10.5)
POTASSIUM SERPL-SCNC: 3.6 MMOL/L (ref 3.5–5.1)
PROT SERPL-MCNC: 5 G/DL (ref 6.4–8.2)
RBC # BLD AUTO: 2.67 M/UL (ref 4.2–5.9)
SODIUM SERPL-SCNC: 131 MMOL/L (ref 136–145)
TROPONIN, HIGH SENSITIVITY: 233 NG/L (ref 0–22)
TROPONIN, HIGH SENSITIVITY: 245 NG/L (ref 0–22)
TROPONIN, HIGH SENSITIVITY: 257 NG/L (ref 0–22)
WBC # BLD AUTO: 12.6 K/UL (ref 4–11)

## 2025-04-24 PROCEDURE — 80053 COMPREHEN METABOLIC PANEL: CPT

## 2025-04-24 PROCEDURE — 6360000002 HC RX W HCPCS: Performed by: SURGERY

## 2025-04-24 PROCEDURE — 84484 ASSAY OF TROPONIN QUANT: CPT

## 2025-04-24 PROCEDURE — 93005 ELECTROCARDIOGRAM TRACING: CPT | Performed by: HOSPITALIST

## 2025-04-24 PROCEDURE — 97530 THERAPEUTIC ACTIVITIES: CPT

## 2025-04-24 PROCEDURE — 90935 HEMODIALYSIS ONE EVALUATION: CPT

## 2025-04-24 PROCEDURE — 36415 COLL VENOUS BLD VENIPUNCTURE: CPT

## 2025-04-24 PROCEDURE — 99232 SBSQ HOSP IP/OBS MODERATE 35: CPT | Performed by: INTERNAL MEDICINE

## 2025-04-24 PROCEDURE — 83735 ASSAY OF MAGNESIUM: CPT

## 2025-04-24 PROCEDURE — 2580000003 HC RX 258: Performed by: SURGERY

## 2025-04-24 PROCEDURE — 85025 COMPLETE CBC W/AUTO DIFF WBC: CPT

## 2025-04-24 PROCEDURE — 93010 ELECTROCARDIOGRAM REPORT: CPT | Performed by: INTERNAL MEDICINE

## 2025-04-24 PROCEDURE — 84100 ASSAY OF PHOSPHORUS: CPT

## 2025-04-24 PROCEDURE — 97535 SELF CARE MNGMENT TRAINING: CPT

## 2025-04-24 PROCEDURE — 99024 POSTOP FOLLOW-UP VISIT: CPT | Performed by: SURGERY

## 2025-04-24 PROCEDURE — 1200000000 HC SEMI PRIVATE

## 2025-04-24 PROCEDURE — 2500000003 HC RX 250 WO HCPCS: Performed by: SURGERY

## 2025-04-24 PROCEDURE — 94640 AIRWAY INHALATION TREATMENT: CPT

## 2025-04-24 RX ADMIN — VASOPRESSIN: 20 INJECTION, SOLUTION INTRAVENOUS at 18:35

## 2025-04-24 RX ADMIN — Medication 2 PUFF: at 21:59

## 2025-04-24 RX ADMIN — PANTOPRAZOLE SODIUM 40 MG: 40 INJECTION, POWDER, FOR SOLUTION INTRAVENOUS at 22:20

## 2025-04-24 RX ADMIN — METOCLOPRAMIDE 10 MG: 5 INJECTION, SOLUTION INTRAMUSCULAR; INTRAVENOUS at 06:55

## 2025-04-24 RX ADMIN — HEPARIN SODIUM 5000 UNITS: 5000 INJECTION INTRAVENOUS; SUBCUTANEOUS at 06:57

## 2025-04-24 RX ADMIN — HEPARIN SODIUM 5000 UNITS: 5000 INJECTION INTRAVENOUS; SUBCUTANEOUS at 22:19

## 2025-04-24 RX ADMIN — MORPHINE SULFATE 2 MG: 2 INJECTION, SOLUTION INTRAMUSCULAR; INTRAVENOUS at 13:35

## 2025-04-24 RX ADMIN — Medication 10 ML: at 21:30

## 2025-04-24 RX ADMIN — SOYBEAN OIL 250 ML: 20 INJECTION, SOLUTION INTRAVENOUS at 18:35

## 2025-04-24 ASSESSMENT — PAIN DESCRIPTION - LOCATION
LOCATION: CHEST
LOCATION: ABDOMEN

## 2025-04-24 ASSESSMENT — PAIN DESCRIPTION - ORIENTATION
ORIENTATION: MID
ORIENTATION: MID

## 2025-04-24 ASSESSMENT — PAIN SCALES - GENERAL: PAINLEVEL_OUTOF10: 8

## 2025-04-24 ASSESSMENT — PAIN DESCRIPTION - DESCRIPTORS: DESCRIPTORS: ACHING

## 2025-04-24 NOTE — DISCHARGE INSTR - COC
BMI 25.94 kg/m²     Last documented pain score (0-10 scale): Pain Level: 10  Last Weight:   Wt Readings from Last 1 Encounters:   04/24/25 82 kg (180 lb 12.4 oz)     Mental Status:  {IP PT MENTAL STATUS:20030}    IV Access:  { TAYLOR IV ACCESS:790235066}    Nursing Mobility/ADLs:  Walking   {CHP DME ADLs:383519972}  Transfer  {CHP DME ADLs:060808870}  Bathing  {CHP DME ADLs:953054177}  Dressing  {CHP DME ADLs:165026081}  Toileting  {CHP DME ADLs:947323145}  Feeding  {CHP DME ADLs:094184656}  Med Admin  {CHP DME ADLs:589221253}  Med Delivery   { TAYLOR MED Delivery:806096213}    Wound Care Documentation and Therapy:  Incision 04/23/25 Abdomen Medial;Upper (Active)   Dressing Status Clean;Dry;Intact 04/24/25 0757   Dressing/Treatment Surgical glue 04/24/25 0757   Closure Surgical glue 04/24/25 0757   Incision Assessment Dry 04/24/25 0757   Drainage Amount None (dry) 04/24/25 0757   Number of days: 1        Elimination:  Continence:   Bowel: {YES / NO:19727}  Bladder: {YES / NO:19727}  Urinary Catheter: {Urinary Catheter:312278899}   Colostomy/Ileostomy/Ileal Conduit: {YES / NO:19727}       Date of Last BM: ***    Intake/Output Summary (Last 24 hours) at 4/24/2025 1453  Last data filed at 4/24/2025 1328  Gross per 24 hour   Intake 800 ml   Output 120 ml   Net 680 ml     I/O last 3 completed shifts:  In: 800 [I.V.:800]  Out: 520 [Emesis/NG output:500; Blood:20]    Safety Concerns:     { TAYLOR Safety Concerns:091348696}    Impairments/Disabilities:      { TAYLOR Impairments/Disabilities:322012542}    Nutrition Therapy:  Current Nutrition Therapy:   { TAYLOR Diet List:326670286}    Routes of Feeding: {CHP DME Other Feedings:926191117}  Liquids: {Slp liquid thickness:65415}  Daily Fluid Restriction: {CHP DME Yes amt example:365710650}  Last Modified Barium Swallow with Video (Video Swallowing Test): {Done Not Done Date:304088012}    Treatments at the Time of Hospital Discharge:   Respiratory Treatments: ***  Oxygen Therapy:

## 2025-04-25 DIAGNOSIS — J15.9 BACTERIAL PNEUMONIA: Primary | ICD-10-CM

## 2025-04-25 LAB
ALBUMIN SERPL-MCNC: 3 G/DL (ref 3.4–5)
ALBUMIN/GLOB SERPL: 1.4 {RATIO} (ref 1.1–2.2)
ALP SERPL-CCNC: 62 U/L (ref 40–129)
ALT SERPL-CCNC: 7 U/L (ref 10–40)
ANION GAP SERPL CALCULATED.3IONS-SCNC: 13 MMOL/L (ref 3–16)
AST SERPL-CCNC: 17 U/L (ref 15–37)
BASOPHILS # BLD: 0.1 K/UL (ref 0–0.2)
BASOPHILS NFR BLD: 0.4 %
BILIRUB SERPL-MCNC: 0.4 MG/DL (ref 0–1)
BUN SERPL-MCNC: 48 MG/DL (ref 7–20)
CALCIUM SERPL-MCNC: 8.4 MG/DL (ref 8.3–10.6)
CHLORIDE SERPL-SCNC: 101 MMOL/L (ref 99–110)
CO2 SERPL-SCNC: 23 MMOL/L (ref 21–32)
CREAT SERPL-MCNC: 5 MG/DL (ref 0.8–1.3)
DEPRECATED RDW RBC AUTO: 16.3 % (ref 12.4–15.4)
EOSINOPHIL # BLD: 0.1 K/UL (ref 0–0.6)
EOSINOPHIL NFR BLD: 0.6 %
GFR SERPLBLD CREATININE-BSD FMLA CKD-EPI: 11 ML/MIN/{1.73_M2}
GLUCOSE BLD-MCNC: 125 MG/DL (ref 70–99)
GLUCOSE BLD-MCNC: 168 MG/DL (ref 70–99)
GLUCOSE BLD-MCNC: 189 MG/DL (ref 70–99)
GLUCOSE BLD-MCNC: 196 MG/DL (ref 70–99)
GLUCOSE SERPL-MCNC: 157 MG/DL (ref 70–99)
HCT VFR BLD AUTO: 28.8 % (ref 40.5–52.5)
HGB BLD-MCNC: 9.3 G/DL (ref 13.5–17.5)
LYMPHOCYTES # BLD: 0.5 K/UL (ref 1–5.1)
LYMPHOCYTES NFR BLD: 3.7 %
MAGNESIUM SERPL-MCNC: 1.94 MG/DL (ref 1.8–2.4)
MCH RBC QN AUTO: 33.4 PG (ref 26–34)
MCHC RBC AUTO-ENTMCNC: 32.2 G/DL (ref 31–36)
MCV RBC AUTO: 103.8 FL (ref 80–100)
MONOCYTES # BLD: 1.4 K/UL (ref 0–1.3)
MONOCYTES NFR BLD: 10.8 %
NEUTROPHILS # BLD: 11.2 K/UL (ref 1.7–7.7)
NEUTROPHILS NFR BLD: 84.5 %
PERFORMED ON: ABNORMAL
PHOSPHATE SERPL-MCNC: 3.5 MG/DL (ref 2.5–4.9)
PLATELET # BLD AUTO: 83 K/UL (ref 135–450)
PMV BLD AUTO: 8.8 FL (ref 5–10.5)
POTASSIUM SERPL-SCNC: 3 MMOL/L (ref 3.5–5.1)
PROT SERPL-MCNC: 5.2 G/DL (ref 6.4–8.2)
RBC # BLD AUTO: 2.77 M/UL (ref 4.2–5.9)
SODIUM SERPL-SCNC: 137 MMOL/L (ref 136–145)
TROPONIN, HIGH SENSITIVITY: 180 NG/L (ref 0–22)
TROPONIN, HIGH SENSITIVITY: 233 NG/L (ref 0–22)
TROPONIN, HIGH SENSITIVITY: 236 NG/L (ref 0–22)
WBC # BLD AUTO: 13.3 K/UL (ref 4–11)

## 2025-04-25 PROCEDURE — 84100 ASSAY OF PHOSPHORUS: CPT

## 2025-04-25 PROCEDURE — 2700000000 HC OXYGEN THERAPY PER DAY

## 2025-04-25 PROCEDURE — 99233 SBSQ HOSP IP/OBS HIGH 50: CPT | Performed by: NURSE PRACTITIONER

## 2025-04-25 PROCEDURE — 80053 COMPREHEN METABOLIC PANEL: CPT

## 2025-04-25 PROCEDURE — 36592 COLLECT BLOOD FROM PICC: CPT

## 2025-04-25 PROCEDURE — 36415 COLL VENOUS BLD VENIPUNCTURE: CPT

## 2025-04-25 PROCEDURE — 84484 ASSAY OF TROPONIN QUANT: CPT

## 2025-04-25 PROCEDURE — 1200000000 HC SEMI PRIVATE

## 2025-04-25 PROCEDURE — 2500000003 HC RX 250 WO HCPCS: Performed by: SURGERY

## 2025-04-25 PROCEDURE — 6370000000 HC RX 637 (ALT 250 FOR IP): Performed by: SURGERY

## 2025-04-25 PROCEDURE — 99024 POSTOP FOLLOW-UP VISIT: CPT | Performed by: SURGERY

## 2025-04-25 PROCEDURE — APPSS15 APP SPLIT SHARED TIME 0-15 MINUTES: Performed by: NURSE PRACTITIONER

## 2025-04-25 PROCEDURE — 6360000002 HC RX W HCPCS: Performed by: HOSPITALIST

## 2025-04-25 PROCEDURE — 94640 AIRWAY INHALATION TREATMENT: CPT

## 2025-04-25 PROCEDURE — 6360000002 HC RX W HCPCS: Performed by: SURGERY

## 2025-04-25 PROCEDURE — APPNB30 APP NON BILLABLE TIME 0-30 MINS: Performed by: NURSE PRACTITIONER

## 2025-04-25 PROCEDURE — 85025 COMPLETE CBC W/AUTO DIFF WBC: CPT

## 2025-04-25 PROCEDURE — 92610 EVALUATE SWALLOWING FUNCTION: CPT

## 2025-04-25 PROCEDURE — 94761 N-INVAS EAR/PLS OXIMETRY MLT: CPT

## 2025-04-25 PROCEDURE — 2580000003 HC RX 258: Performed by: SURGERY

## 2025-04-25 PROCEDURE — 83735 ASSAY OF MAGNESIUM: CPT

## 2025-04-25 RX ORDER — ASPIRIN 81 MG/1
81 TABLET, CHEWABLE ORAL ONCE
Status: COMPLETED | OUTPATIENT
Start: 2025-04-25 | End: 2025-04-25

## 2025-04-25 RX ORDER — ACETAMINOPHEN 325 MG/1
650 TABLET ORAL EVERY 6 HOURS SCHEDULED
Status: DISCONTINUED | OUTPATIENT
Start: 2025-04-25 | End: 2025-05-01 | Stop reason: HOSPADM

## 2025-04-25 RX ORDER — POTASSIUM CHLORIDE 29.8 MG/ML
20 INJECTION INTRAVENOUS
Status: COMPLETED | OUTPATIENT
Start: 2025-04-25 | End: 2025-04-25

## 2025-04-25 RX ORDER — OXYCODONE AND ACETAMINOPHEN 5; 325 MG/1; MG/1
1 TABLET ORAL EVERY 4 HOURS PRN
Refills: 0 | Status: DISCONTINUED | OUTPATIENT
Start: 2025-04-25 | End: 2025-04-30

## 2025-04-25 RX ORDER — CLOPIDOGREL BISULFATE 75 MG/1
75 TABLET ORAL DAILY
Status: DISCONTINUED | OUTPATIENT
Start: 2025-04-25 | End: 2025-05-01 | Stop reason: HOSPADM

## 2025-04-25 RX ADMIN — HEPARIN SODIUM 5000 UNITS: 5000 INJECTION INTRAVENOUS; SUBCUTANEOUS at 14:04

## 2025-04-25 RX ADMIN — MORPHINE SULFATE 2 MG: 2 INJECTION, SOLUTION INTRAMUSCULAR; INTRAVENOUS at 22:35

## 2025-04-25 RX ADMIN — ALTEPLASE 2 MG: 2.2 INJECTION, POWDER, LYOPHILIZED, FOR SOLUTION INTRAVENOUS at 15:39

## 2025-04-25 RX ADMIN — PANTOPRAZOLE SODIUM 40 MG: 40 INJECTION, POWDER, FOR SOLUTION INTRAVENOUS at 22:36

## 2025-04-25 RX ADMIN — CLOPIDOGREL BISULFATE 75 MG: 75 TABLET, FILM COATED ORAL at 13:50

## 2025-04-25 RX ADMIN — HEPARIN SODIUM 5000 UNITS: 5000 INJECTION INTRAVENOUS; SUBCUTANEOUS at 06:06

## 2025-04-25 RX ADMIN — POTASSIUM CHLORIDE: 2 INJECTION, SOLUTION, CONCENTRATE INTRAVENOUS at 17:36

## 2025-04-25 RX ADMIN — POTASSIUM CHLORIDE 20 MEQ: 29.8 INJECTION INTRAVENOUS at 10:38

## 2025-04-25 RX ADMIN — MORPHINE SULFATE 2 MG: 2 INJECTION, SOLUTION INTRAMUSCULAR; INTRAVENOUS at 10:34

## 2025-04-25 RX ADMIN — MORPHINE SULFATE 2 MG: 2 INJECTION, SOLUTION INTRAMUSCULAR; INTRAVENOUS at 06:05

## 2025-04-25 RX ADMIN — MORPHINE SULFATE 2 MG: 2 INJECTION, SOLUTION INTRAMUSCULAR; INTRAVENOUS at 13:06

## 2025-04-25 RX ADMIN — ASPIRIN 81 MG: 81 TABLET, CHEWABLE ORAL at 13:50

## 2025-04-25 RX ADMIN — HEPARIN SODIUM 5000 UNITS: 5000 INJECTION INTRAVENOUS; SUBCUTANEOUS at 21:00

## 2025-04-25 RX ADMIN — Medication 2 PUFF: at 19:57

## 2025-04-25 RX ADMIN — MORPHINE SULFATE 2 MG: 2 INJECTION, SOLUTION INTRAMUSCULAR; INTRAVENOUS at 08:10

## 2025-04-25 RX ADMIN — PANTOPRAZOLE SODIUM 40 MG: 40 INJECTION, POWDER, FOR SOLUTION INTRAVENOUS at 08:11

## 2025-04-25 RX ADMIN — INSULIN LISPRO 1 UNITS: 100 INJECTION, SOLUTION INTRAVENOUS; SUBCUTANEOUS at 08:11

## 2025-04-25 RX ADMIN — POTASSIUM CHLORIDE 20 MEQ: 29.8 INJECTION INTRAVENOUS at 13:10

## 2025-04-25 RX ADMIN — Medication 2 PUFF: at 07:58

## 2025-04-25 ASSESSMENT — PAIN SCALES - WONG BAKER: WONGBAKER_NUMERICALRESPONSE: NO HURT

## 2025-04-25 ASSESSMENT — PAIN DESCRIPTION - LOCATION
LOCATION: CHEST
LOCATION: CHEST
LOCATION: ABDOMEN

## 2025-04-25 ASSESSMENT — PAIN DESCRIPTION - ORIENTATION
ORIENTATION: LEFT

## 2025-04-25 ASSESSMENT — PAIN SCALES - GENERAL
PAINLEVEL_OUTOF10: 4
PAINLEVEL_OUTOF10: 4
PAINLEVEL_OUTOF10: 2
PAINLEVEL_OUTOF10: 6
PAINLEVEL_OUTOF10: 8

## 2025-04-25 ASSESSMENT — PAIN DESCRIPTION - DESCRIPTORS: DESCRIPTORS: ACHING

## 2025-04-26 LAB
ALBUMIN SERPL-MCNC: 2.8 G/DL (ref 3.4–5)
ALBUMIN/GLOB SERPL: 1.3 {RATIO} (ref 1.1–2.2)
ALP SERPL-CCNC: 99 U/L (ref 40–129)
ALT SERPL-CCNC: 7 U/L (ref 10–40)
ANION GAP SERPL CALCULATED.3IONS-SCNC: 11 MMOL/L (ref 3–16)
AST SERPL-CCNC: 18 U/L (ref 15–37)
BASOPHILS # BLD: 0.1 K/UL (ref 0–0.2)
BASOPHILS NFR BLD: 0.7 %
BILIRUB SERPL-MCNC: 0.5 MG/DL (ref 0–1)
BUN SERPL-MCNC: 76 MG/DL (ref 7–20)
CALCIUM SERPL-MCNC: 8.4 MG/DL (ref 8.3–10.6)
CHLORIDE SERPL-SCNC: 100 MMOL/L (ref 99–110)
CO2 SERPL-SCNC: 23 MMOL/L (ref 21–32)
CREAT SERPL-MCNC: 6.2 MG/DL (ref 0.8–1.3)
DEPRECATED RDW RBC AUTO: 15.7 % (ref 12.4–15.4)
EOSINOPHIL # BLD: 0.1 K/UL (ref 0–0.6)
EOSINOPHIL NFR BLD: 0.6 %
GFR SERPLBLD CREATININE-BSD FMLA CKD-EPI: 8 ML/MIN/{1.73_M2}
GLUCOSE BLD-MCNC: 151 MG/DL (ref 70–99)
GLUCOSE BLD-MCNC: 171 MG/DL (ref 70–99)
GLUCOSE BLD-MCNC: 194 MG/DL (ref 70–99)
GLUCOSE SERPL-MCNC: 206 MG/DL (ref 70–99)
HCT VFR BLD AUTO: 26 % (ref 40.5–52.5)
HGB BLD-MCNC: 8.4 G/DL (ref 13.5–17.5)
LYMPHOCYTES # BLD: 0.6 K/UL (ref 1–5.1)
LYMPHOCYTES NFR BLD: 5.2 %
MAGNESIUM SERPL-MCNC: 1.88 MG/DL (ref 1.8–2.4)
MCH RBC QN AUTO: 33.5 PG (ref 26–34)
MCHC RBC AUTO-ENTMCNC: 32.3 G/DL (ref 31–36)
MCV RBC AUTO: 103.8 FL (ref 80–100)
MONOCYTES # BLD: 1.5 K/UL (ref 0–1.3)
MONOCYTES NFR BLD: 13.8 %
NEUTROPHILS # BLD: 8.8 K/UL (ref 1.7–7.7)
NEUTROPHILS NFR BLD: 79.7 %
PERFORMED ON: ABNORMAL
PHOSPHATE SERPL-MCNC: 2.9 MG/DL (ref 2.5–4.9)
PLATELET # BLD AUTO: 71 K/UL (ref 135–450)
PMV BLD AUTO: 8.3 FL (ref 5–10.5)
POTASSIUM SERPL-SCNC: 3.3 MMOL/L (ref 3.5–5.1)
PROT SERPL-MCNC: 4.9 G/DL (ref 6.4–8.2)
RBC # BLD AUTO: 2.5 M/UL (ref 4.2–5.9)
SODIUM SERPL-SCNC: 134 MMOL/L (ref 136–145)
WBC # BLD AUTO: 11 K/UL (ref 4–11)

## 2025-04-26 PROCEDURE — 80053 COMPREHEN METABOLIC PANEL: CPT

## 2025-04-26 PROCEDURE — 94640 AIRWAY INHALATION TREATMENT: CPT

## 2025-04-26 PROCEDURE — 85025 COMPLETE CBC W/AUTO DIFF WBC: CPT

## 2025-04-26 PROCEDURE — 83735 ASSAY OF MAGNESIUM: CPT

## 2025-04-26 PROCEDURE — 94761 N-INVAS EAR/PLS OXIMETRY MLT: CPT

## 2025-04-26 PROCEDURE — 2500000003 HC RX 250 WO HCPCS: Performed by: SURGERY

## 2025-04-26 PROCEDURE — 2580000003 HC RX 258: Performed by: HOSPITALIST

## 2025-04-26 PROCEDURE — 6360000002 HC RX W HCPCS: Performed by: SURGERY

## 2025-04-26 PROCEDURE — 90935 HEMODIALYSIS ONE EVALUATION: CPT

## 2025-04-26 PROCEDURE — 2500000003 HC RX 250 WO HCPCS: Performed by: HOSPITALIST

## 2025-04-26 PROCEDURE — 84100 ASSAY OF PHOSPHORUS: CPT

## 2025-04-26 PROCEDURE — 2700000000 HC OXYGEN THERAPY PER DAY

## 2025-04-26 PROCEDURE — 6370000000 HC RX 637 (ALT 250 FOR IP): Performed by: SURGERY

## 2025-04-26 PROCEDURE — 6360000002 HC RX W HCPCS: Performed by: HOSPITALIST

## 2025-04-26 PROCEDURE — 99024 POSTOP FOLLOW-UP VISIT: CPT | Performed by: SURGERY

## 2025-04-26 PROCEDURE — 1200000000 HC SEMI PRIVATE

## 2025-04-26 RX ORDER — POTASSIUM CHLORIDE 7.45 MG/ML
10 INJECTION INTRAVENOUS
Status: ACTIVE | OUTPATIENT
Start: 2025-04-26 | End: 2025-04-26

## 2025-04-26 RX ORDER — POTASSIUM CHLORIDE 7.45 MG/ML
10 INJECTION INTRAVENOUS
Status: COMPLETED | OUTPATIENT
Start: 2025-04-26 | End: 2025-04-26

## 2025-04-26 RX ADMIN — MIDODRINE HYDROCHLORIDE 10 MG: 5 TABLET ORAL at 07:57

## 2025-04-26 RX ADMIN — Medication 10 ML: at 21:46

## 2025-04-26 RX ADMIN — INSULIN LISPRO 1 UNITS: 100 INJECTION, SOLUTION INTRAVENOUS; SUBCUTANEOUS at 17:16

## 2025-04-26 RX ADMIN — PANTOPRAZOLE SODIUM 40 MG: 40 INJECTION, POWDER, FOR SOLUTION INTRAVENOUS at 07:57

## 2025-04-26 RX ADMIN — Medication 2 PUFF: at 09:07

## 2025-04-26 RX ADMIN — POTASSIUM CHLORIDE 10 MEQ: 7.46 INJECTION, SOLUTION INTRAVENOUS at 17:31

## 2025-04-26 RX ADMIN — POTASSIUM CHLORIDE 10 MEQ: 7.46 INJECTION, SOLUTION INTRAVENOUS at 18:24

## 2025-04-26 RX ADMIN — Medication 10 ML: at 07:58

## 2025-04-26 RX ADMIN — POTASSIUM CHLORIDE: 2 INJECTION, SOLUTION, CONCENTRATE INTRAVENOUS at 17:28

## 2025-04-26 RX ADMIN — PANTOPRAZOLE SODIUM 40 MG: 40 INJECTION, POWDER, FOR SOLUTION INTRAVENOUS at 21:46

## 2025-04-26 RX ADMIN — ALLOPURINOL 100 MG: 100 TABLET ORAL at 07:58

## 2025-04-26 RX ADMIN — OXYCODONE HYDROCHLORIDE AND ACETAMINOPHEN 1 TABLET: 5; 325 TABLET ORAL at 17:15

## 2025-04-26 RX ADMIN — SODIUM CHLORIDE, PRESERVATIVE FREE 10 ML: 5 INJECTION INTRAVENOUS at 21:47

## 2025-04-26 RX ADMIN — CLOPIDOGREL BISULFATE 75 MG: 75 TABLET, FILM COATED ORAL at 07:57

## 2025-04-26 RX ADMIN — HEPARIN SODIUM 3600 UNITS: 1000 INJECTION INTRAVENOUS; SUBCUTANEOUS at 15:40

## 2025-04-26 RX ADMIN — HEPARIN SODIUM 5000 UNITS: 5000 INJECTION INTRAVENOUS; SUBCUTANEOUS at 06:54

## 2025-04-26 ASSESSMENT — PAIN SCALES - GENERAL
PAINLEVEL_OUTOF10: 0
PAINLEVEL_OUTOF10: 7
PAINLEVEL_OUTOF10: 7
PAINLEVEL_OUTOF10: 5

## 2025-04-26 ASSESSMENT — PAIN DESCRIPTION - ORIENTATION
ORIENTATION: LEFT
ORIENTATION: LEFT

## 2025-04-26 ASSESSMENT — PAIN DESCRIPTION - LOCATION: LOCATION: CHEST

## 2025-04-26 ASSESSMENT — PAIN DESCRIPTION - DESCRIPTORS: DESCRIPTORS: ACHING

## 2025-04-27 LAB
ALBUMIN SERPL-MCNC: 3 G/DL (ref 3.4–5)
ALBUMIN/GLOB SERPL: 1.4 {RATIO} (ref 1.1–2.2)
ALP SERPL-CCNC: 55 U/L (ref 40–129)
ALT SERPL-CCNC: <5 U/L (ref 10–40)
ANION GAP SERPL CALCULATED.3IONS-SCNC: 10 MMOL/L (ref 3–16)
AST SERPL-CCNC: 18 U/L (ref 15–37)
BASOPHILS # BLD: 0.1 K/UL (ref 0–0.2)
BASOPHILS NFR BLD: 0.6 %
BILIRUB SERPL-MCNC: 0.6 MG/DL (ref 0–1)
BUN SERPL-MCNC: 49 MG/DL (ref 7–20)
CALCIUM SERPL-MCNC: 8.4 MG/DL (ref 8.3–10.6)
CHLORIDE SERPL-SCNC: 102 MMOL/L (ref 99–110)
CO2 SERPL-SCNC: 22 MMOL/L (ref 21–32)
CREAT SERPL-MCNC: 4.8 MG/DL (ref 0.8–1.3)
DEPRECATED RDW RBC AUTO: 16 % (ref 12.4–15.4)
EOSINOPHIL # BLD: 0 K/UL (ref 0–0.6)
EOSINOPHIL NFR BLD: 0.4 %
GFR SERPLBLD CREATININE-BSD FMLA CKD-EPI: 11 ML/MIN/{1.73_M2}
GLUCOSE BLD-MCNC: 142 MG/DL (ref 70–99)
GLUCOSE BLD-MCNC: 159 MG/DL (ref 70–99)
GLUCOSE BLD-MCNC: 168 MG/DL (ref 70–99)
GLUCOSE BLD-MCNC: 179 MG/DL (ref 70–99)
GLUCOSE BLD-MCNC: 187 MG/DL (ref 70–99)
GLUCOSE SERPL-MCNC: 169 MG/DL (ref 70–99)
HCT VFR BLD AUTO: 26 % (ref 40.5–52.5)
HGB BLD-MCNC: 8.6 G/DL (ref 13.5–17.5)
LYMPHOCYTES # BLD: 0.7 K/UL (ref 1–5.1)
LYMPHOCYTES NFR BLD: 6.5 %
MAGNESIUM SERPL-MCNC: 1.74 MG/DL (ref 1.8–2.4)
MCH RBC QN AUTO: 33.6 PG (ref 26–34)
MCHC RBC AUTO-ENTMCNC: 33 G/DL (ref 31–36)
MCV RBC AUTO: 101.9 FL (ref 80–100)
MONOCYTES # BLD: 1.7 K/UL (ref 0–1.3)
MONOCYTES NFR BLD: 16.5 %
NEUTROPHILS # BLD: 7.6 K/UL (ref 1.7–7.7)
NEUTROPHILS NFR BLD: 76 %
PERFORMED ON: ABNORMAL
PHOSPHATE SERPL-MCNC: 1 MG/DL (ref 2.5–4.9)
PLATELET # BLD AUTO: 78 K/UL (ref 135–450)
PMV BLD AUTO: 8.3 FL (ref 5–10.5)
POTASSIUM SERPL-SCNC: 4.1 MMOL/L (ref 3.5–5.1)
PROT SERPL-MCNC: 5.1 G/DL (ref 6.4–8.2)
RBC # BLD AUTO: 2.55 M/UL (ref 4.2–5.9)
SODIUM SERPL-SCNC: 134 MMOL/L (ref 136–145)
WBC # BLD AUTO: 10 K/UL (ref 4–11)

## 2025-04-27 PROCEDURE — 6360000002 HC RX W HCPCS

## 2025-04-27 PROCEDURE — 84100 ASSAY OF PHOSPHORUS: CPT

## 2025-04-27 PROCEDURE — 1200000000 HC SEMI PRIVATE

## 2025-04-27 PROCEDURE — 2580000003 HC RX 258: Performed by: HOSPITALIST

## 2025-04-27 PROCEDURE — 2500000003 HC RX 250 WO HCPCS: Performed by: HOSPITALIST

## 2025-04-27 PROCEDURE — 80053 COMPREHEN METABOLIC PANEL: CPT

## 2025-04-27 PROCEDURE — 6360000002 HC RX W HCPCS: Performed by: SURGERY

## 2025-04-27 PROCEDURE — 36415 COLL VENOUS BLD VENIPUNCTURE: CPT

## 2025-04-27 PROCEDURE — 6370000000 HC RX 637 (ALT 250 FOR IP): Performed by: SURGERY

## 2025-04-27 PROCEDURE — 6360000002 HC RX W HCPCS: Performed by: HOSPITALIST

## 2025-04-27 PROCEDURE — 99024 POSTOP FOLLOW-UP VISIT: CPT | Performed by: SURGERY

## 2025-04-27 PROCEDURE — 94761 N-INVAS EAR/PLS OXIMETRY MLT: CPT

## 2025-04-27 PROCEDURE — 85025 COMPLETE CBC W/AUTO DIFF WBC: CPT

## 2025-04-27 PROCEDURE — 2500000003 HC RX 250 WO HCPCS: Performed by: SURGERY

## 2025-04-27 PROCEDURE — 92526 ORAL FUNCTION THERAPY: CPT

## 2025-04-27 PROCEDURE — 94640 AIRWAY INHALATION TREATMENT: CPT

## 2025-04-27 PROCEDURE — 83735 ASSAY OF MAGNESIUM: CPT

## 2025-04-27 RX ORDER — MAGNESIUM SULFATE 1 G/100ML
1000 INJECTION INTRAVENOUS ONCE
Status: COMPLETED | OUTPATIENT
Start: 2025-04-27 | End: 2025-04-27

## 2025-04-27 RX ADMIN — INSULIN LISPRO 1 UNITS: 100 INJECTION, SOLUTION INTRAVENOUS; SUBCUTANEOUS at 19:47

## 2025-04-27 RX ADMIN — GABAPENTIN 100 MG: 100 CAPSULE ORAL at 09:21

## 2025-04-27 RX ADMIN — Medication 10 ML: at 09:22

## 2025-04-27 RX ADMIN — PANTOPRAZOLE SODIUM 40 MG: 40 INJECTION, POWDER, FOR SOLUTION INTRAVENOUS at 09:25

## 2025-04-27 RX ADMIN — LEVOTHYROXINE SODIUM 50 MCG: 0.03 TABLET ORAL at 09:21

## 2025-04-27 RX ADMIN — SODIUM CHLORIDE, PRESERVATIVE FREE 10 ML: 5 INJECTION INTRAVENOUS at 06:25

## 2025-04-27 RX ADMIN — HEPARIN SODIUM 5000 UNITS: 5000 INJECTION INTRAVENOUS; SUBCUTANEOUS at 21:43

## 2025-04-27 RX ADMIN — MORPHINE SULFATE 2 MG: 2 INJECTION, SOLUTION INTRAMUSCULAR; INTRAVENOUS at 06:25

## 2025-04-27 RX ADMIN — Medication 10 ML: at 21:44

## 2025-04-27 RX ADMIN — ROSUVASTATIN CALCIUM 20 MG: 10 TABLET, FILM COATED ORAL at 21:43

## 2025-04-27 RX ADMIN — GABAPENTIN 100 MG: 100 CAPSULE ORAL at 21:43

## 2025-04-27 RX ADMIN — Medication 2 PUFF: at 19:30

## 2025-04-27 RX ADMIN — MAGNESIUM SULFATE HEPTAHYDRATE 1000 MG: 1 INJECTION, SOLUTION INTRAVENOUS at 21:50

## 2025-04-27 RX ADMIN — POTASSIUM CHLORIDE: 2 INJECTION, SOLUTION, CONCENTRATE INTRAVENOUS at 19:46

## 2025-04-27 RX ADMIN — CLOPIDOGREL BISULFATE 75 MG: 75 TABLET, FILM COATED ORAL at 09:22

## 2025-04-27 RX ADMIN — MIDODRINE HYDROCHLORIDE 5 MG: 5 TABLET ORAL at 09:22

## 2025-04-27 RX ADMIN — PANTOPRAZOLE SODIUM 40 MG: 40 INJECTION, POWDER, FOR SOLUTION INTRAVENOUS at 21:43

## 2025-04-27 ASSESSMENT — PAIN SCALES - GENERAL
PAINLEVEL_OUTOF10: 9
PAINLEVEL_OUTOF10: 6

## 2025-04-27 ASSESSMENT — PAIN DESCRIPTION - DESCRIPTORS: DESCRIPTORS: ACHING

## 2025-04-27 ASSESSMENT — PAIN DESCRIPTION - LOCATION: LOCATION: LEG

## 2025-04-27 ASSESSMENT — PAIN DESCRIPTION - ORIENTATION: ORIENTATION: RIGHT;LEFT

## 2025-04-27 ASSESSMENT — PAIN SCALES - WONG BAKER: WONGBAKER_NUMERICALRESPONSE: HURTS A LITTLE BIT

## 2025-04-28 ENCOUNTER — APPOINTMENT (OUTPATIENT)
Dept: GENERAL RADIOLOGY | Age: 85
DRG: 326 | End: 2025-04-28
Payer: MEDICARE

## 2025-04-28 LAB
ALBUMIN SERPL-MCNC: 2.8 G/DL (ref 3.4–5)
ALBUMIN/GLOB SERPL: 1.1 {RATIO} (ref 1.1–2.2)
ALP SERPL-CCNC: 71 U/L (ref 40–129)
ALT SERPL-CCNC: <5 U/L (ref 10–40)
ANION GAP SERPL CALCULATED.3IONS-SCNC: 13 MMOL/L (ref 3–16)
AST SERPL-CCNC: 21 U/L (ref 15–37)
BASOPHILS # BLD: 0 K/UL (ref 0–0.2)
BASOPHILS NFR BLD: 0.4 %
BILIRUB SERPL-MCNC: 0.7 MG/DL (ref 0–1)
BUN SERPL-MCNC: 77 MG/DL (ref 7–20)
CALCIUM SERPL-MCNC: 8.9 MG/DL (ref 8.3–10.6)
CHLORIDE SERPL-SCNC: 100 MMOL/L (ref 99–110)
CO2 SERPL-SCNC: 19 MMOL/L (ref 21–32)
CREAT SERPL-MCNC: 6.2 MG/DL (ref 0.8–1.3)
DEPRECATED RDW RBC AUTO: 16.4 % (ref 12.4–15.4)
EOSINOPHIL # BLD: 0 K/UL (ref 0–0.6)
EOSINOPHIL NFR BLD: 0.3 %
GFR SERPLBLD CREATININE-BSD FMLA CKD-EPI: 8 ML/MIN/{1.73_M2}
GLUCOSE BLD-MCNC: 166 MG/DL (ref 70–99)
GLUCOSE BLD-MCNC: 170 MG/DL (ref 70–99)
GLUCOSE BLD-MCNC: 181 MG/DL (ref 70–99)
GLUCOSE BLD-MCNC: 201 MG/DL (ref 70–99)
GLUCOSE BLD-MCNC: 212 MG/DL (ref 70–99)
GLUCOSE SERPL-MCNC: 146 MG/DL (ref 70–99)
HCT VFR BLD AUTO: 28.1 % (ref 40.5–52.5)
HGB BLD-MCNC: 9.2 G/DL (ref 13.5–17.5)
LYMPHOCYTES # BLD: 0.9 K/UL (ref 1–5.1)
LYMPHOCYTES NFR BLD: 8.7 %
MAGNESIUM SERPL-MCNC: 2.04 MG/DL (ref 1.8–2.4)
MCH RBC QN AUTO: 33.5 PG (ref 26–34)
MCHC RBC AUTO-ENTMCNC: 32.6 G/DL (ref 31–36)
MCV RBC AUTO: 102.7 FL (ref 80–100)
MONOCYTES # BLD: 1.3 K/UL (ref 0–1.3)
MONOCYTES NFR BLD: 13 %
NEUTROPHILS # BLD: 7.8 K/UL (ref 1.7–7.7)
NEUTROPHILS NFR BLD: 77.6 %
PERFORMED ON: ABNORMAL
PHOSPHATE SERPL-MCNC: 1.3 MG/DL (ref 2.5–4.9)
PLATELET # BLD AUTO: 72 K/UL (ref 135–450)
PMV BLD AUTO: 8.7 FL (ref 5–10.5)
POTASSIUM SERPL-SCNC: 4.4 MMOL/L (ref 3.5–5.1)
PROT SERPL-MCNC: 5.3 G/DL (ref 6.4–8.2)
RBC # BLD AUTO: 2.74 M/UL (ref 4.2–5.9)
SODIUM SERPL-SCNC: 132 MMOL/L (ref 136–145)
WBC # BLD AUTO: 10.1 K/UL (ref 4–11)

## 2025-04-28 PROCEDURE — C1751 CATH, INF, PER/CENT/MIDLINE: HCPCS

## 2025-04-28 PROCEDURE — 94640 AIRWAY INHALATION TREATMENT: CPT

## 2025-04-28 PROCEDURE — 97535 SELF CARE MNGMENT TRAINING: CPT

## 2025-04-28 PROCEDURE — 2500000003 HC RX 250 WO HCPCS: Performed by: SURGERY

## 2025-04-28 PROCEDURE — 6370000000 HC RX 637 (ALT 250 FOR IP): Performed by: SURGERY

## 2025-04-28 PROCEDURE — 02HV33Z INSERTION OF INFUSION DEVICE INTO SUPERIOR VENA CAVA, PERCUTANEOUS APPROACH: ICD-10-PCS | Performed by: INTERNAL MEDICINE

## 2025-04-28 PROCEDURE — 80053 COMPREHEN METABOLIC PANEL: CPT

## 2025-04-28 PROCEDURE — 94150 VITAL CAPACITY TEST: CPT

## 2025-04-28 PROCEDURE — 97530 THERAPEUTIC ACTIVITIES: CPT

## 2025-04-28 PROCEDURE — 85025 COMPLETE CBC W/AUTO DIFF WBC: CPT

## 2025-04-28 PROCEDURE — 6360000002 HC RX W HCPCS: Performed by: SURGERY

## 2025-04-28 PROCEDURE — 92526 ORAL FUNCTION THERAPY: CPT

## 2025-04-28 PROCEDURE — 83735 ASSAY OF MAGNESIUM: CPT

## 2025-04-28 PROCEDURE — APPSS15 APP SPLIT SHARED TIME 0-15 MINUTES: Performed by: NURSE PRACTITIONER

## 2025-04-28 PROCEDURE — 84100 ASSAY OF PHOSPHORUS: CPT

## 2025-04-28 PROCEDURE — 74230 X-RAY XM SWLNG FUNCJ C+: CPT

## 2025-04-28 PROCEDURE — 6360000002 HC RX W HCPCS: Performed by: HOSPITALIST

## 2025-04-28 PROCEDURE — 6370000000 HC RX 637 (ALT 250 FOR IP): Performed by: HOSPITALIST

## 2025-04-28 PROCEDURE — 36568 INSJ PICC <5 YR W/O IMAGING: CPT

## 2025-04-28 PROCEDURE — 94761 N-INVAS EAR/PLS OXIMETRY MLT: CPT

## 2025-04-28 PROCEDURE — 99024 POSTOP FOLLOW-UP VISIT: CPT | Performed by: SURGERY

## 2025-04-28 PROCEDURE — APPNB30 APP NON BILLABLE TIME 0-30 MINS: Performed by: NURSE PRACTITIONER

## 2025-04-28 PROCEDURE — 1200000000 HC SEMI PRIVATE

## 2025-04-28 PROCEDURE — 71045 X-RAY EXAM CHEST 1 VIEW: CPT

## 2025-04-28 PROCEDURE — 2580000003 HC RX 258: Performed by: SURGERY

## 2025-04-28 PROCEDURE — 92611 MOTION FLUOROSCOPY/SWALLOW: CPT

## 2025-04-28 RX ORDER — ONDANSETRON 2 MG/ML
4 INJECTION INTRAMUSCULAR; INTRAVENOUS EVERY 6 HOURS PRN
Status: DISCONTINUED | OUTPATIENT
Start: 2025-04-28 | End: 2025-05-01 | Stop reason: HOSPADM

## 2025-04-28 RX ORDER — GUAIFENESIN 600 MG/1
600 TABLET, EXTENDED RELEASE ORAL 2 TIMES DAILY
Status: DISCONTINUED | OUTPATIENT
Start: 2025-04-28 | End: 2025-05-01 | Stop reason: HOSPADM

## 2025-04-28 RX ADMIN — Medication 10 ML: at 21:21

## 2025-04-28 RX ADMIN — INSULIN LISPRO 1 UNITS: 100 INJECTION, SOLUTION INTRAVENOUS; SUBCUTANEOUS at 09:01

## 2025-04-28 RX ADMIN — PANTOPRAZOLE SODIUM 40 MG: 40 INJECTION, POWDER, FOR SOLUTION INTRAVENOUS at 21:20

## 2025-04-28 RX ADMIN — Medication 10 ML: at 17:23

## 2025-04-28 RX ADMIN — Medication 2 PUFF: at 19:21

## 2025-04-28 RX ADMIN — INSULIN LISPRO 1 UNITS: 100 INJECTION, SOLUTION INTRAVENOUS; SUBCUTANEOUS at 17:39

## 2025-04-28 RX ADMIN — SODIUM PHOSPHATE, MONOBASIC, MONOHYDRATE AND SODIUM PHOSPHATE, DIBASIC, ANHYDROUS 30 MMOL: 142; 276 INJECTION, SOLUTION INTRAVENOUS at 08:44

## 2025-04-28 RX ADMIN — SOYBEAN OIL 250 ML: 20 INJECTION, SOLUTION INTRAVENOUS at 21:32

## 2025-04-28 RX ADMIN — ONDANSETRON 4 MG: 2 INJECTION, SOLUTION INTRAMUSCULAR; INTRAVENOUS at 17:21

## 2025-04-28 RX ADMIN — HEPARIN SODIUM 5000 UNITS: 5000 INJECTION INTRAVENOUS; SUBCUTANEOUS at 17:22

## 2025-04-28 RX ADMIN — HEPARIN SODIUM 5000 UNITS: 5000 INJECTION INTRAVENOUS; SUBCUTANEOUS at 05:54

## 2025-04-28 RX ADMIN — ASCORBIC ACID, VITAMIN A PALMITATE, CHOLECALCIFEROL, THIAMINE HYDROCHLORIDE, RIBOFLAVIN-5 PHOSPHATE SODIUM, PYRIDOXINE HYDROCHLORIDE, NIACINAMIDE, DEXPANTHENOL, ALPHA-TOCOPHEROL ACETATE, VITAMIN K1, FOLIC ACID, BIOTIN, CYANOCOBALAMIN: 200; 3300; 200; 6; 3.6; 6; 40; 15; 10; 150; 600; 60; 5 INJECTION, SOLUTION INTRAVENOUS at 21:33

## 2025-04-28 RX ADMIN — GABAPENTIN 100 MG: 100 CAPSULE ORAL at 21:46

## 2025-04-28 RX ADMIN — MORPHINE SULFATE 2 MG: 2 INJECTION, SOLUTION INTRAMUSCULAR; INTRAVENOUS at 21:43

## 2025-04-28 RX ADMIN — ACETAMINOPHEN 650 MG: 325 TABLET ORAL at 00:06

## 2025-04-28 RX ADMIN — SODIUM CHLORIDE, PRESERVATIVE FREE 10 ML: 5 INJECTION INTRAVENOUS at 21:43

## 2025-04-28 RX ADMIN — HEPARIN SODIUM 5000 UNITS: 5000 INJECTION INTRAVENOUS; SUBCUTANEOUS at 05:55

## 2025-04-28 RX ADMIN — PANTOPRAZOLE SODIUM 40 MG: 40 INJECTION, POWDER, FOR SOLUTION INTRAVENOUS at 17:21

## 2025-04-28 RX ADMIN — INSULIN LISPRO 1 UNITS: 100 INJECTION, SOLUTION INTRAVENOUS; SUBCUTANEOUS at 00:12

## 2025-04-28 RX ADMIN — HEPARIN SODIUM 5000 UNITS: 5000 INJECTION INTRAVENOUS; SUBCUTANEOUS at 21:36

## 2025-04-28 RX ADMIN — DICLOFENAC SODIUM 4 G: 10 GEL TOPICAL at 21:44

## 2025-04-28 RX ADMIN — ACETAMINOPHEN 650 MG: 325 TABLET ORAL at 05:55

## 2025-04-28 RX ADMIN — LEVOTHYROXINE SODIUM 50 MCG: 0.03 TABLET ORAL at 05:55

## 2025-04-28 ASSESSMENT — PAIN SCALES - GENERAL
PAINLEVEL_OUTOF10: 0
PAINLEVEL_OUTOF10: 6
PAINLEVEL_OUTOF10: 0
PAINLEVEL_OUTOF10: 8

## 2025-04-28 ASSESSMENT — PAIN DESCRIPTION - LOCATION: LOCATION: LEG

## 2025-04-28 ASSESSMENT — PAIN SCALES - WONG BAKER: WONGBAKER_NUMERICALRESPONSE: HURTS A LITTLE BIT

## 2025-04-28 ASSESSMENT — PAIN DESCRIPTION - ORIENTATION: ORIENTATION: RIGHT;LEFT

## 2025-04-28 ASSESSMENT — PAIN DESCRIPTION - DESCRIPTORS: DESCRIPTORS: ACHING

## 2025-04-28 NOTE — PROCEDURES
Facility/Department: [address] NURSING  MODIFIED BARIUM SWALLOW EVALUATION    Patient: Donato Obrien   : 1940   MRN: [id number]      Evaluation Date: 2025   Admitting Diagnosis: Chest discomfort [R07.89]  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 (HCC) [N18.6, Z99.2]  Acute respiratory failure with hypoxia (HCC) [J96.01]  Acute hypoxemic respiratory failure (HCC) [J96.01]  Treatment Diagnosis: Dysphagia   Pain:  Denies                           Ordering MD: Dr. AMANDA Moore   Radiologist: Dr. Wheat   Date of Evaluation: 2025  Type of Study: Modified Barium Swallowing Study (MBS)  Diet Prior to Study:  NPO      Reason for referral/HPI:  The patient presents for instrumental swallow study to evaluate oropharyngeal swallow function, assess aspiration risk, and determine least restrictive diet/plan of care.        Impression:  Modified Barium Swallow evaluation completed on 2025.Pt only intermittently responding to commands/questions, holding foods/liquids in his mouth for prolonged periods of time and demonstrating significantly reduced bolus manipulation therefore study was limited this date. Patient presents with oropharyngeal dysphagia secondary to prolonged oral holding, limited bolus manipulation, prolonged A-P bolus transit with lingual rocking, pharyngeal pooling with delayed swallow onset, reduced tongue base retraction, diminished pharyngeal stripping wave and decreased laryngeal elevation, complicated by advanced age, cognitive state, prolonged NPO status and deconditioning resulting in observed intermittent laryngeal penetration of thin liquids, impaired oral clearing with oral residue and vallecular residue. With all textures provided pt required increased time and verbal cues to eventually swallow. For example, pt required approx 1 minute and small sip of thin liquid to eventual propel and clear partial puree bolus from oral cavity. Anterior oral holding was assessed with lingual rocking/reduced manipulation.

## 2025-04-29 LAB
ALBUMIN SERPL-MCNC: 2.5 G/DL (ref 3.4–5)
ALBUMIN/GLOB SERPL: 1 {RATIO} (ref 1.1–2.2)
ALP SERPL-CCNC: 80 U/L (ref 40–129)
ALT SERPL-CCNC: 9 U/L (ref 10–40)
ANION GAP SERPL CALCULATED.3IONS-SCNC: 16 MMOL/L (ref 3–16)
AST SERPL-CCNC: 25 U/L (ref 15–37)
BASOPHILS # BLD: 0 K/UL (ref 0–0.2)
BASOPHILS NFR BLD: 0.2 %
BILIRUB SERPL-MCNC: 0.8 MG/DL (ref 0–1)
BUN SERPL-MCNC: 99 MG/DL (ref 7–20)
CALCIUM SERPL-MCNC: 8.4 MG/DL (ref 8.3–10.6)
CHLORIDE SERPL-SCNC: 100 MMOL/L (ref 99–110)
CO2 SERPL-SCNC: 18 MMOL/L (ref 21–32)
CREAT SERPL-MCNC: 7.6 MG/DL (ref 0.8–1.3)
DEPRECATED RDW RBC AUTO: 16.4 % (ref 12.4–15.4)
EOSINOPHIL # BLD: 0 K/UL (ref 0–0.6)
EOSINOPHIL NFR BLD: 0.1 %
GFR SERPLBLD CREATININE-BSD FMLA CKD-EPI: 6 ML/MIN/{1.73_M2}
GLUCOSE BLD-MCNC: 137 MG/DL (ref 70–99)
GLUCOSE BLD-MCNC: 186 MG/DL (ref 70–99)
GLUCOSE BLD-MCNC: 199 MG/DL (ref 70–99)
GLUCOSE BLD-MCNC: 206 MG/DL (ref 70–99)
GLUCOSE SERPL-MCNC: 128 MG/DL (ref 70–99)
HCT VFR BLD AUTO: 25 % (ref 40.5–52.5)
HGB BLD-MCNC: 8 G/DL (ref 13.5–17.5)
LYMPHOCYTES # BLD: 0.7 K/UL (ref 1–5.1)
LYMPHOCYTES NFR BLD: 7.1 %
MAGNESIUM SERPL-MCNC: 2.07 MG/DL (ref 1.8–2.4)
MCH RBC QN AUTO: 33.3 PG (ref 26–34)
MCHC RBC AUTO-ENTMCNC: 32.1 G/DL (ref 31–36)
MCV RBC AUTO: 104 FL (ref 80–100)
MONOCYTES # BLD: 1.2 K/UL (ref 0–1.3)
MONOCYTES NFR BLD: 11.7 %
NEUTROPHILS # BLD: 8.2 K/UL (ref 1.7–7.7)
NEUTROPHILS NFR BLD: 80.9 %
PERFORMED ON: ABNORMAL
PHOSPHATE SERPL-MCNC: 3.5 MG/DL (ref 2.5–4.9)
PLATELET # BLD AUTO: 76 K/UL (ref 135–450)
PMV BLD AUTO: 8.5 FL (ref 5–10.5)
POTASSIUM SERPL-SCNC: 4.3 MMOL/L (ref 3.5–5.1)
PROT SERPL-MCNC: 5.1 G/DL (ref 6.4–8.2)
RBC # BLD AUTO: 2.4 M/UL (ref 4.2–5.9)
SODIUM SERPL-SCNC: 134 MMOL/L (ref 136–145)
WBC # BLD AUTO: 10.1 K/UL (ref 4–11)

## 2025-04-29 PROCEDURE — 80053 COMPREHEN METABOLIC PANEL: CPT

## 2025-04-29 PROCEDURE — 6370000000 HC RX 637 (ALT 250 FOR IP): Performed by: SURGERY

## 2025-04-29 PROCEDURE — 6360000002 HC RX W HCPCS: Performed by: SURGERY

## 2025-04-29 PROCEDURE — APPNB30 APP NON BILLABLE TIME 0-30 MINS: Performed by: NURSE PRACTITIONER

## 2025-04-29 PROCEDURE — 94761 N-INVAS EAR/PLS OXIMETRY MLT: CPT

## 2025-04-29 PROCEDURE — 6370000000 HC RX 637 (ALT 250 FOR IP): Performed by: HOSPITALIST

## 2025-04-29 PROCEDURE — 90935 HEMODIALYSIS ONE EVALUATION: CPT

## 2025-04-29 PROCEDURE — 1200000000 HC SEMI PRIVATE

## 2025-04-29 PROCEDURE — 85025 COMPLETE CBC W/AUTO DIFF WBC: CPT

## 2025-04-29 PROCEDURE — 2500000003 HC RX 250 WO HCPCS: Performed by: SURGERY

## 2025-04-29 PROCEDURE — 36415 COLL VENOUS BLD VENIPUNCTURE: CPT

## 2025-04-29 PROCEDURE — APPSS15 APP SPLIT SHARED TIME 0-15 MINUTES: Performed by: NURSE PRACTITIONER

## 2025-04-29 PROCEDURE — 84100 ASSAY OF PHOSPHORUS: CPT

## 2025-04-29 PROCEDURE — 6370000000 HC RX 637 (ALT 250 FOR IP): Performed by: NURSE PRACTITIONER

## 2025-04-29 PROCEDURE — 99024 POSTOP FOLLOW-UP VISIT: CPT | Performed by: SURGERY

## 2025-04-29 PROCEDURE — 6360000002 HC RX W HCPCS: Performed by: HOSPITALIST

## 2025-04-29 PROCEDURE — 94640 AIRWAY INHALATION TREATMENT: CPT

## 2025-04-29 PROCEDURE — 83735 ASSAY OF MAGNESIUM: CPT

## 2025-04-29 RX ORDER — GUAIFENESIN/DEXTROMETHORPHAN 100-10MG/5
5 SYRUP ORAL EVERY 4 HOURS PRN
Status: DISCONTINUED | OUTPATIENT
Start: 2025-04-29 | End: 2025-05-01 | Stop reason: HOSPADM

## 2025-04-29 RX ADMIN — INSULIN LISPRO 1 UNITS: 100 INJECTION, SOLUTION INTRAVENOUS; SUBCUTANEOUS at 01:43

## 2025-04-29 RX ADMIN — LEVOTHYROXINE SODIUM 50 MCG: 0.03 TABLET ORAL at 06:19

## 2025-04-29 RX ADMIN — GUAIFENESIN 600 MG: 600 TABLET, EXTENDED RELEASE ORAL at 21:13

## 2025-04-29 RX ADMIN — ERYTHROPOIETIN 10000 UNITS: 10000 INJECTION, SOLUTION INTRAVENOUS; SUBCUTANEOUS at 11:42

## 2025-04-29 RX ADMIN — Medication 10 ML: at 21:13

## 2025-04-29 RX ADMIN — ROSUVASTATIN CALCIUM 20 MG: 10 TABLET, FILM COATED ORAL at 21:13

## 2025-04-29 RX ADMIN — PANTOPRAZOLE SODIUM 40 MG: 40 INJECTION, POWDER, FOR SOLUTION INTRAVENOUS at 21:13

## 2025-04-29 RX ADMIN — SODIUM CHLORIDE, PRESERVATIVE FREE 10 ML: 5 INJECTION INTRAVENOUS at 06:19

## 2025-04-29 RX ADMIN — GUAIFENESIN AND DEXTROMETHORPHAN 5 ML: 100; 10 SYRUP ORAL at 01:42

## 2025-04-29 RX ADMIN — MORPHINE SULFATE 2 MG: 2 INJECTION, SOLUTION INTRAMUSCULAR; INTRAVENOUS at 06:18

## 2025-04-29 RX ADMIN — Medication 2 PUFF: at 19:45

## 2025-04-29 RX ADMIN — Medication 2 PUFF: at 09:24

## 2025-04-29 RX ADMIN — GUAIFENESIN AND DEXTROMETHORPHAN 5 ML: 100; 10 SYRUP ORAL at 21:17

## 2025-04-29 RX ADMIN — LEUCINE, PHENYLALANINE, LYSINE, METHIONINE, ISOLEUCINE, VALINE, HISTIDINE, THREONINE, TRYPTOPHAN, ALANINE, GLYCINE, ARGININE, PROLINE, SERINE, TYROSINE, SODIUM ACETATE, DIBASIC POTASSIUM PHOSPHATE, MAGNESIUM CHLORIDE, SODIUM CHLORIDE, CALCIUM CHLORIDE, DEXTROSE
1035; 575; 33; 20; 515; 240; 300; 365; 290; 51; 200; 280; 261; 340; 250; 340; 59; 210; 90; 20; 290 INJECTION INTRAVENOUS at 21:11

## 2025-04-29 RX ADMIN — OXYCODONE HYDROCHLORIDE AND ACETAMINOPHEN 1 TABLET: 5; 325 TABLET ORAL at 21:18

## 2025-04-29 RX ADMIN — OXYCODONE HYDROCHLORIDE AND ACETAMINOPHEN 1 TABLET: 5; 325 TABLET ORAL at 03:25

## 2025-04-29 ASSESSMENT — PAIN DESCRIPTION - LOCATION
LOCATION: CHEST
LOCATION: CHEST
LOCATION: CHEST;LEG

## 2025-04-29 ASSESSMENT — PAIN DESCRIPTION - DESCRIPTORS
DESCRIPTORS: ACHING;DISCOMFORT
DESCRIPTORS: ACHING;DISCOMFORT
DESCRIPTORS: ACHING

## 2025-04-29 ASSESSMENT — PAIN SCALES - GENERAL
PAINLEVEL_OUTOF10: 4
PAINLEVEL_OUTOF10: 5
PAINLEVEL_OUTOF10: 7
PAINLEVEL_OUTOF10: 8
PAINLEVEL_OUTOF10: 5

## 2025-04-29 ASSESSMENT — PAIN SCALES - WONG BAKER
WONGBAKER_NUMERICALRESPONSE: HURTS A LITTLE BIT
WONGBAKER_NUMERICALRESPONSE: NO HURT

## 2025-04-29 NOTE — CARE COORDINATION
Discharge Planning:     (CM) referral sent to [facility] for review.    Electronically signed by Edis Rice on 4/29/25 at 3:05 PM EDT

## 2025-04-29 NOTE — CARE COORDINATION
Discharge Planning:     (CM) patient had a laparoscopic paraesophageal hiatal hernia repair on 4/23/25. Patient ESRD with HD. Currently on TPN.     CM spoke to the patients spouse-Connie to review the plan of care. Therapy does recommend SNF, however the patient and spouse do not want SNF. CM suggested HHC at the time of discharge and the spouse states, \" I will think about it but my  is coming home\".    CM will continue to follow.    Electronically signed by Edis Rice on 4/29/25 at 11:07 AM EDT

## 2025-04-30 ENCOUNTER — APPOINTMENT (OUTPATIENT)
Dept: GENERAL RADIOLOGY | Age: 85
DRG: 326 | End: 2025-04-30
Payer: MEDICARE

## 2025-04-30 LAB
ABO/RH: NORMAL
ALBUMIN SERPL-MCNC: 2.6 G/DL (ref 3.4–5)
ALBUMIN SERPL-MCNC: 2.7 G/DL (ref 3.4–5)
ALBUMIN/GLOB SERPL: 1 {RATIO} (ref 1.1–2.2)
ALBUMIN/GLOB SERPL: 1.1 {RATIO} (ref 1.1–2.2)
ALP SERPL-CCNC: 92 U/L (ref 40–129)
ALP SERPL-CCNC: 94 U/L (ref 40–129)
ALT SERPL-CCNC: 10 U/L (ref 10–40)
ALT SERPL-CCNC: 12 U/L (ref 10–40)
ANION GAP SERPL CALCULATED.3IONS-SCNC: 10 MMOL/L (ref 3–16)
ANION GAP SERPL CALCULATED.3IONS-SCNC: 15 MMOL/L (ref 3–16)
ANTIBODY SCREEN: NORMAL
APTT BLD: 32.2 SEC (ref 22.1–36.4)
AST SERPL-CCNC: 29 U/L (ref 15–37)
AST SERPL-CCNC: 34 U/L (ref 15–37)
BASE EXCESS BLDA CALC-SCNC: -8 MMOL/L (ref -3–3)
BASE EXCESS BLDV CALC-SCNC: -1 MMOL/L (ref -3–3)
BASOPHILS # BLD: 0 K/UL (ref 0–0.2)
BASOPHILS NFR BLD: 0.5 %
BILIRUB SERPL-MCNC: 0.9 MG/DL (ref 0–1)
BILIRUB SERPL-MCNC: 1 MG/DL (ref 0–1)
BLOOD BANK DISPENSE STATUS: NORMAL
BLOOD BANK DISPENSE STATUS: NORMAL
BLOOD BANK PRODUCT CODE: NORMAL
BLOOD BANK PRODUCT CODE: NORMAL
BPU ID: NORMAL
BPU ID: NORMAL
BUN SERPL-MCNC: 61 MG/DL (ref 7–20)
BUN SERPL-MCNC: 75 MG/DL (ref 7–20)
CA-I BLD-SCNC: 1.13 MMOL/L (ref 1.12–1.32)
CALCIUM SERPL-MCNC: 7.9 MG/DL (ref 8.3–10.6)
CALCIUM SERPL-MCNC: 8.5 MG/DL (ref 8.3–10.6)
CHLORIDE SERPL-SCNC: 94 MMOL/L (ref 99–110)
CHLORIDE SERPL-SCNC: 94 MMOL/L (ref 99–110)
CO2 BLDA-SCNC: 23 MMOL/L
CO2 BLDV-SCNC: 26 MMOL/L
CO2 SERPL-SCNC: 23 MMOL/L (ref 21–32)
CO2 SERPL-SCNC: 26 MMOL/L (ref 21–32)
CREAT SERPL-MCNC: 5 MG/DL (ref 0.8–1.3)
CREAT SERPL-MCNC: 5.5 MG/DL (ref 0.8–1.3)
DEPRECATED RDW RBC AUTO: 16.7 % (ref 12.4–15.4)
DEPRECATED RDW RBC AUTO: 16.7 % (ref 12.4–15.4)
DESCRIPTION BLOOD BANK: NORMAL
DESCRIPTION BLOOD BANK: NORMAL
EOSINOPHIL # BLD: 0 K/UL (ref 0–0.6)
EOSINOPHIL NFR BLD: 0.1 %
GFR SERPLBLD CREATININE-BSD FMLA CKD-EPI: 11 ML/MIN/{1.73_M2}
GFR SERPLBLD CREATININE-BSD FMLA CKD-EPI: 9 ML/MIN/{1.73_M2}
GLUCOSE BLD-MCNC: 178 MG/DL (ref 70–99)
GLUCOSE BLD-MCNC: 179 MG/DL (ref 70–99)
GLUCOSE BLD-MCNC: 187 MG/DL (ref 70–99)
GLUCOSE BLD-MCNC: 209 MG/DL (ref 70–99)
GLUCOSE BLD-MCNC: 211 MG/DL (ref 70–99)
GLUCOSE BLD-MCNC: 213 MG/DL (ref 70–99)
GLUCOSE SERPL-MCNC: 159 MG/DL (ref 70–99)
GLUCOSE SERPL-MCNC: 186 MG/DL (ref 70–99)
HCO3 BLDA-SCNC: 21 MMOL/L (ref 21–29)
HCO3 BLDV-SCNC: 24.3 MMOL/L (ref 23–29)
HCT VFR BLD AUTO: 24.3 % (ref 40.5–52.5)
HCT VFR BLD AUTO: 25.8 % (ref 40.5–52.5)
HCT VFR BLD AUTO: 31 % (ref 40.5–52.5)
HGB BLD CALC-MCNC: 10.6 GM/DL (ref 13.5–17.5)
HGB BLD-MCNC: 7.8 G/DL (ref 13.5–17.5)
HGB BLD-MCNC: 8.4 G/DL (ref 13.5–17.5)
HGB BLD-MCNC: 8.4 G/DL (ref 13.5–17.5)
INR PPP: 1.54 (ref 0.85–1.15)
LACTATE BLD-SCNC: 7.94 MMOL/L (ref 0.4–2)
LACTATE BLDV-SCNC: 5.6 MMOL/L (ref 0.4–2)
LYMPHOCYTES # BLD: 1.1 K/UL (ref 1–5.1)
LYMPHOCYTES NFR BLD: 17.8 %
MAGNESIUM SERPL-MCNC: 2.02 MG/DL (ref 1.8–2.4)
MCH RBC QN AUTO: 33.2 PG (ref 26–34)
MCH RBC QN AUTO: 33.6 PG (ref 26–34)
MCHC RBC AUTO-ENTMCNC: 31.9 G/DL (ref 31–36)
MCHC RBC AUTO-ENTMCNC: 32.6 G/DL (ref 31–36)
MCV RBC AUTO: 103.1 FL (ref 80–100)
MCV RBC AUTO: 103.9 FL (ref 80–100)
MONOCYTES # BLD: 0.4 K/UL (ref 0–1.3)
MONOCYTES NFR BLD: 6.1 %
NEUTROPHILS # BLD: 4.7 K/UL (ref 1.7–7.7)
NEUTROPHILS NFR BLD: 75.5 %
PCO2 BLDA: 62.5 MM HG (ref 35–45)
PCO2 BLDV: 41.3 MM HG (ref 40–50)
PERFORMED ON: ABNORMAL
PERFORMED ON: NORMAL
PH BLDA: 7.13 [PH] (ref 7.35–7.45)
PH BLDV: 7.38 [PH] (ref 7.35–7.45)
PHOSPHATE SERPL-MCNC: 3.7 MG/DL (ref 2.5–4.9)
PHOSPHATE SERPL-MCNC: 4.5 MG/DL (ref 2.5–4.9)
PLATELET # BLD AUTO: 98 K/UL (ref 135–450)
PLATELET # BLD AUTO: 99 K/UL (ref 135–450)
PMV BLD AUTO: 8.9 FL (ref 5–10.5)
PMV BLD AUTO: 9 FL (ref 5–10.5)
PO2 BLDA: 79.5 MM HG (ref 75–108)
PO2 BLDV: 34 MM HG
POC SAMPLE TYPE: ABNORMAL
POC SAMPLE TYPE: NORMAL
POTASSIUM BLD-SCNC: 5.1 MMOL/L (ref 3.5–5.1)
POTASSIUM SERPL-SCNC: 4.3 MMOL/L (ref 3.5–5.1)
POTASSIUM SERPL-SCNC: 5.5 MMOL/L (ref 3.5–5.1)
PROT SERPL-MCNC: 5.1 G/DL (ref 6.4–8.2)
PROT SERPL-MCNC: 5.2 G/DL (ref 6.4–8.2)
PROTHROMBIN TIME: 18.6 SEC (ref 11.9–14.9)
RBC # BLD AUTO: 2.34 M/UL (ref 4.2–5.9)
RBC # BLD AUTO: 2.51 M/UL (ref 4.2–5.9)
SAO2 % BLDA: 91 % (ref 93–100)
SAO2 % BLDV: 64 %
SODIUM BLD-SCNC: 132 MMOL/L (ref 136–145)
SODIUM SERPL-SCNC: 130 MMOL/L (ref 136–145)
SODIUM SERPL-SCNC: 132 MMOL/L (ref 136–145)
WBC # BLD AUTO: 6.3 K/UL (ref 4–11)
WBC # BLD AUTO: 9.3 K/UL (ref 4–11)

## 2025-04-30 PROCEDURE — 71045 X-RAY EXAM CHEST 1 VIEW: CPT

## 2025-04-30 PROCEDURE — 2500000003 HC RX 250 WO HCPCS: Performed by: STUDENT IN AN ORGANIZED HEALTH CARE EDUCATION/TRAINING PROGRAM

## 2025-04-30 PROCEDURE — 86901 BLOOD TYPING SEROLOGIC RH(D): CPT

## 2025-04-30 PROCEDURE — 6370000000 HC RX 637 (ALT 250 FOR IP): Performed by: NURSE PRACTITIONER

## 2025-04-30 PROCEDURE — 99024 POSTOP FOLLOW-UP VISIT: CPT | Performed by: SURGERY

## 2025-04-30 PROCEDURE — 84295 ASSAY OF SERUM SODIUM: CPT

## 2025-04-30 PROCEDURE — 0BH17EZ INSERTION OF ENDOTRACHEAL AIRWAY INTO TRACHEA, VIA NATURAL OR ARTIFICIAL OPENING: ICD-10-PCS | Performed by: INTERNAL MEDICINE

## 2025-04-30 PROCEDURE — 84100 ASSAY OF PHOSPHORUS: CPT

## 2025-04-30 PROCEDURE — 36592 COLLECT BLOOD FROM PICC: CPT

## 2025-04-30 PROCEDURE — 3E043XZ INTRODUCTION OF VASOPRESSOR INTO CENTRAL VEIN, PERCUTANEOUS APPROACH: ICD-10-PCS | Performed by: INTERNAL MEDICINE

## 2025-04-30 PROCEDURE — 5A1935Z RESPIRATORY VENTILATION, LESS THAN 24 CONSECUTIVE HOURS: ICD-10-PCS | Performed by: INTERNAL MEDICINE

## 2025-04-30 PROCEDURE — 36415 COLL VENOUS BLD VENIPUNCTURE: CPT

## 2025-04-30 PROCEDURE — APPNB30 APP NON BILLABLE TIME 0-30 MINS: Performed by: NURSE PRACTITIONER

## 2025-04-30 PROCEDURE — 85025 COMPLETE CBC W/AUTO DIFF WBC: CPT

## 2025-04-30 PROCEDURE — 2000000000 HC ICU R&B

## 2025-04-30 PROCEDURE — 85610 PROTHROMBIN TIME: CPT

## 2025-04-30 PROCEDURE — 2500000003 HC RX 250 WO HCPCS: Performed by: SURGERY

## 2025-04-30 PROCEDURE — 2700000000 HC OXYGEN THERAPY PER DAY

## 2025-04-30 PROCEDURE — 6370000000 HC RX 637 (ALT 250 FOR IP): Performed by: SURGERY

## 2025-04-30 PROCEDURE — 94002 VENT MGMT INPAT INIT DAY: CPT

## 2025-04-30 PROCEDURE — 6360000002 HC RX W HCPCS: Performed by: HOSPITALIST

## 2025-04-30 PROCEDURE — 83605 ASSAY OF LACTIC ACID: CPT

## 2025-04-30 PROCEDURE — 6370000000 HC RX 637 (ALT 250 FOR IP): Performed by: INTERNAL MEDICINE

## 2025-04-30 PROCEDURE — 86900 BLOOD TYPING SEROLOGIC ABO: CPT

## 2025-04-30 PROCEDURE — 6360000002 HC RX W HCPCS: Performed by: SURGERY

## 2025-04-30 PROCEDURE — 6360000002 HC RX W HCPCS: Performed by: INTERNAL MEDICINE

## 2025-04-30 PROCEDURE — 82947 ASSAY GLUCOSE BLOOD QUANT: CPT

## 2025-04-30 PROCEDURE — 84132 ASSAY OF SERUM POTASSIUM: CPT

## 2025-04-30 PROCEDURE — 2500000003 HC RX 250 WO HCPCS: Performed by: INTERNAL MEDICINE

## 2025-04-30 PROCEDURE — 82803 BLOOD GASES ANY COMBINATION: CPT

## 2025-04-30 PROCEDURE — 86850 RBC ANTIBODY SCREEN: CPT

## 2025-04-30 PROCEDURE — 74018 RADEX ABDOMEN 1 VIEW: CPT

## 2025-04-30 PROCEDURE — 85018 HEMOGLOBIN: CPT

## 2025-04-30 PROCEDURE — 36430 TRANSFUSION BLD/BLD COMPNT: CPT

## 2025-04-30 PROCEDURE — 82330 ASSAY OF CALCIUM: CPT

## 2025-04-30 PROCEDURE — P9016 RBC LEUKOCYTES REDUCED: HCPCS

## 2025-04-30 PROCEDURE — 30233N1 TRANSFUSION OF NONAUTOLOGOUS RED BLOOD CELLS INTO PERIPHERAL VEIN, PERCUTANEOUS APPROACH: ICD-10-PCS | Performed by: INTERNAL MEDICINE

## 2025-04-30 PROCEDURE — 85730 THROMBOPLASTIN TIME PARTIAL: CPT

## 2025-04-30 PROCEDURE — 85027 COMPLETE CBC AUTOMATED: CPT

## 2025-04-30 PROCEDURE — 80053 COMPREHEN METABOLIC PANEL: CPT

## 2025-04-30 PROCEDURE — 31500 INSERT EMERGENCY AIRWAY: CPT

## 2025-04-30 PROCEDURE — 85014 HEMATOCRIT: CPT

## 2025-04-30 PROCEDURE — 86920 COMPATIBILITY TEST SPIN: CPT

## 2025-04-30 PROCEDURE — 83735 ASSAY OF MAGNESIUM: CPT

## 2025-04-30 PROCEDURE — 94640 AIRWAY INHALATION TREATMENT: CPT

## 2025-04-30 PROCEDURE — 6360000002 HC RX W HCPCS: Performed by: STUDENT IN AN ORGANIZED HEALTH CARE EDUCATION/TRAINING PROGRAM

## 2025-04-30 PROCEDURE — APPSS15 APP SPLIT SHARED TIME 0-15 MINUTES: Performed by: NURSE PRACTITIONER

## 2025-04-30 RX ORDER — MIDAZOLAM HYDROCHLORIDE 1 MG/ML
1-10 INJECTION, SOLUTION INTRAVENOUS CONTINUOUS
Status: DISCONTINUED | OUTPATIENT
Start: 2025-04-30 | End: 2025-05-01 | Stop reason: HOSPADM

## 2025-04-30 RX ORDER — DIAZEPAM 10 MG/2ML
5 INJECTION, SOLUTION INTRAMUSCULAR; INTRAVENOUS
Status: DISCONTINUED | OUTPATIENT
Start: 2025-04-30 | End: 2025-05-01 | Stop reason: HOSPADM

## 2025-04-30 RX ORDER — INDOMETHACIN 25 MG/1
50 CAPSULE ORAL ONCE
Status: COMPLETED | OUTPATIENT
Start: 2025-04-30 | End: 2025-04-30

## 2025-04-30 RX ORDER — AMIODARONE HYDROCHLORIDE 150 MG/3ML
INJECTION, SOLUTION INTRAVENOUS
Status: COMPLETED | OUTPATIENT
Start: 2025-04-30 | End: 2025-04-30

## 2025-04-30 RX ORDER — BISACODYL 10 MG
10 SUPPOSITORY, RECTAL RECTAL ONCE
Status: DISCONTINUED | OUTPATIENT
Start: 2025-04-30 | End: 2025-05-01 | Stop reason: HOSPADM

## 2025-04-30 RX ORDER — BENZONATATE 100 MG/1
100 CAPSULE ORAL 3 TIMES DAILY PRN
Status: DISCONTINUED | OUTPATIENT
Start: 2025-04-30 | End: 2025-05-01 | Stop reason: HOSPADM

## 2025-04-30 RX ORDER — HYDROMORPHONE HYDROCHLORIDE 1 MG/ML
1 INJECTION, SOLUTION INTRAMUSCULAR; INTRAVENOUS; SUBCUTANEOUS
Status: DISCONTINUED | OUTPATIENT
Start: 2025-04-30 | End: 2025-05-01 | Stop reason: HOSPADM

## 2025-04-30 RX ORDER — ATROPINE SULFATE 10 MG/ML
1 SOLUTION/ DROPS OPHTHALMIC EVERY 4 HOURS PRN
Status: DISCONTINUED | OUTPATIENT
Start: 2025-04-30 | End: 2025-05-01 | Stop reason: HOSPADM

## 2025-04-30 RX ORDER — EPINEPHRINE IN SOD CHLOR,ISO 1 MG/10 ML
SYRINGE (ML) INTRAVENOUS
Status: COMPLETED | OUTPATIENT
Start: 2025-04-30 | End: 2025-04-30

## 2025-04-30 RX ORDER — ETOMIDATE 2 MG/ML
20 INJECTION INTRAVENOUS ONCE
Status: COMPLETED | OUTPATIENT
Start: 2025-04-30 | End: 2025-04-30

## 2025-04-30 RX ORDER — NOREPINEPHRINE BITARTRATE 0.06 MG/ML
1-100 INJECTION, SOLUTION INTRAVENOUS CONTINUOUS
Status: DISCONTINUED | OUTPATIENT
Start: 2025-04-30 | End: 2025-05-01 | Stop reason: HOSPADM

## 2025-04-30 RX ORDER — NOREPINEPHRINE BITARTRATE 0.06 MG/ML
1-100 INJECTION, SOLUTION INTRAVENOUS CONTINUOUS
Status: DISCONTINUED | OUTPATIENT
Start: 2025-04-30 | End: 2025-04-30 | Stop reason: SDUPTHER

## 2025-04-30 RX ORDER — METOCLOPRAMIDE HYDROCHLORIDE 5 MG/ML
10 INJECTION INTRAMUSCULAR; INTRAVENOUS EVERY 6 HOURS
Status: DISCONTINUED | OUTPATIENT
Start: 2025-04-30 | End: 2025-05-01 | Stop reason: HOSPADM

## 2025-04-30 RX ORDER — INSULIN LISPRO 100 [IU]/ML
0-8 INJECTION, SOLUTION INTRAVENOUS; SUBCUTANEOUS EVERY 6 HOURS
Status: DISCONTINUED | OUTPATIENT
Start: 2025-04-30 | End: 2025-05-01 | Stop reason: HOSPADM

## 2025-04-30 RX ORDER — SODIUM CHLORIDE 9 MG/ML
INJECTION, SOLUTION INTRAVENOUS PRN
Status: DISCONTINUED | OUTPATIENT
Start: 2025-04-30 | End: 2025-05-01 | Stop reason: HOSPADM

## 2025-04-30 RX ORDER — ROCURONIUM BROMIDE 10 MG/ML
80 INJECTION, SOLUTION INTRAVENOUS ONCE
Status: COMPLETED | OUTPATIENT
Start: 2025-04-30 | End: 2025-04-30

## 2025-04-30 RX ORDER — INDOMETHACIN 25 MG/1
CAPSULE ORAL
Status: COMPLETED | OUTPATIENT
Start: 2025-04-30 | End: 2025-04-30

## 2025-04-30 RX ORDER — SCOPOLAMINE 1 MG/3D
1 PATCH, EXTENDED RELEASE TRANSDERMAL
Status: DISCONTINUED | OUTPATIENT
Start: 2025-04-30 | End: 2025-05-01 | Stop reason: HOSPADM

## 2025-04-30 RX ORDER — OXYCODONE HYDROCHLORIDE 5 MG/1
2.5 TABLET ORAL EVERY 6 HOURS PRN
Refills: 0 | Status: DISCONTINUED | OUTPATIENT
Start: 2025-04-30 | End: 2025-04-30

## 2025-04-30 RX ADMIN — EPINEPHRINE 1 MG: 0.1 INJECTION, SOLUTION ENDOTRACHEAL; INTRACARDIAC; INTRAVENOUS at 18:39

## 2025-04-30 RX ADMIN — DIAZEPAM 5 MG: 5 INJECTION, SOLUTION INTRAMUSCULAR; INTRAVENOUS at 22:20

## 2025-04-30 RX ADMIN — Medication 2 PUFF: at 09:17

## 2025-04-30 RX ADMIN — OXYCODONE HYDROCHLORIDE AND ACETAMINOPHEN 1 TABLET: 5; 325 TABLET ORAL at 03:30

## 2025-04-30 RX ADMIN — INSULIN LISPRO 1 UNITS: 100 INJECTION, SOLUTION INTRAVENOUS; SUBCUTANEOUS at 07:03

## 2025-04-30 RX ADMIN — EPINEPHRINE 1 MG: 0.1 INJECTION, SOLUTION ENDOTRACHEAL; INTRACARDIAC; INTRAVENOUS at 16:59

## 2025-04-30 RX ADMIN — ETOMIDATE 20 MG: 2 INJECTION, SOLUTION INTRAVENOUS at 17:41

## 2025-04-30 RX ADMIN — HYDROMORPHONE HYDROCHLORIDE 1 MG: 1 INJECTION, SOLUTION INTRAMUSCULAR; INTRAVENOUS; SUBCUTANEOUS at 20:39

## 2025-04-30 RX ADMIN — WATER 1000 MG: 1 INJECTION INTRAMUSCULAR; INTRAVENOUS; SUBCUTANEOUS at 15:23

## 2025-04-30 RX ADMIN — SODIUM BICARBONATE 50 MEQ: 84 INJECTION INTRAVENOUS at 18:29

## 2025-04-30 RX ADMIN — AMIODARONE HYDROCHLORIDE 150 MG: 50 INJECTION, SOLUTION INTRAVENOUS at 18:40

## 2025-04-30 RX ADMIN — VASOPRESSIN IN 0.9% SODIUM CHLORIDE 0.04 UNITS/MIN: 20 INJECTION INTRAVENOUS at 23:53

## 2025-04-30 RX ADMIN — SODIUM BICARBONATE 50 MEQ: 84 INJECTION INTRAVENOUS at 18:28

## 2025-04-30 RX ADMIN — ALBUTEROL SULFATE 2.5 MG: 2.5 SOLUTION RESPIRATORY (INHALATION) at 05:50

## 2025-04-30 RX ADMIN — INSULIN LISPRO 1 UNITS: 100 INJECTION, SOLUTION INTRAVENOUS; SUBCUTANEOUS at 00:38

## 2025-04-30 RX ADMIN — Medication 60 MCG/MIN: at 22:27

## 2025-04-30 RX ADMIN — EPINEPHRINE 1 MG: 0.1 INJECTION, SOLUTION ENDOTRACHEAL; INTRACARDIAC; INTRAVENOUS at 16:55

## 2025-04-30 RX ADMIN — SODIUM BICARBONATE 50 MEQ: 84 INJECTION INTRAVENOUS at 16:58

## 2025-04-30 RX ADMIN — DIAZEPAM 5 MG: 5 INJECTION, SOLUTION INTRAMUSCULAR; INTRAVENOUS at 20:39

## 2025-04-30 RX ADMIN — VASOPRESSIN IN 0.9% SODIUM CHLORIDE 0.04 UNITS/MIN: 20 INJECTION INTRAVENOUS at 17:30

## 2025-04-30 RX ADMIN — Medication 10 ML: at 22:20

## 2025-04-30 RX ADMIN — BENZONATATE 100 MG: 100 CAPSULE ORAL at 03:29

## 2025-04-30 RX ADMIN — ROCURONIUM BROMIDE 80 MG: 10 INJECTION INTRAVENOUS at 17:41

## 2025-04-30 RX ADMIN — EPINEPHRINE 1 MG: 0.1 INJECTION, SOLUTION ENDOTRACHEAL; INTRACARDIAC; INTRAVENOUS at 16:50

## 2025-04-30 RX ADMIN — ONDANSETRON 4 MG: 2 INJECTION, SOLUTION INTRAMUSCULAR; INTRAVENOUS at 09:48

## 2025-04-30 RX ADMIN — Medication 5 MCG/MIN: at 16:47

## 2025-04-30 ASSESSMENT — PAIN DESCRIPTION - LOCATION: LOCATION: CHEST;LEG;GENERALIZED

## 2025-04-30 ASSESSMENT — PAIN SCALES - GENERAL
PAINLEVEL_OUTOF10: 8
PAINLEVEL_OUTOF10: 0

## 2025-04-30 ASSESSMENT — PAIN DESCRIPTION - DESCRIPTORS: DESCRIPTORS: ACHING

## 2025-04-30 ASSESSMENT — PULMONARY FUNCTION TESTS
PIF_VALUE: 21
PIF_VALUE: 22

## 2025-04-30 NOTE — ACP (ADVANCE CARE PLANNING)
Advance Care Planning     Palliative Team Advance Care Planning (ACP) Conversation    Date of Conversation: 04/30/25    Individuals present for the conversation: Patient and Legal healthcare agent named below     ACP documents on file prior to discussion:  -None    Previously completed document/s not on file: Patient / participant reports that there are no previously executed ACP documents.    Healthcare Decision Maker:    Primary Decision Maker: Connie Obrien - Spouse - [phone]     Conversation Summary:  I discussed Advance Care Planning with Donato Obrien today which included the importance of making their choices for care and treatment in the case of a health event that adversely affects their decision-making abilities. He has not completed the Advance Care Directives. He has an active health care agent at this time.  Donato Obrien was encouraged to complete the declaration forms and provide a signed copy of his medical records.      1. Goals of medical treatment were reviewed .   2. Patient's stated goal/treatment preference is Full Code.  3. Others present during the discussion wife via phone.   4. The discussion lasted 30 minutes.  5. I will notify physican of change in care plan.    Resuscitation Status:   Code Status: Full Code     Documentation Completed:  -No new documents completed.    I spent 30 minutes with the patient and/or surrogate decision maker discussing the patient's wishes and goals.      Laura Patel RN

## 2025-04-30 NOTE — PLAN OF CARE
Problem: Chronic Conditions and Co-morbidities  Goal: Patient's chronic conditions and co-morbidity symptoms are monitored and maintained or improved  4/17/2025 1110 by Jose Bower RN  Outcome: Progressing     Problem: Discharge Planning  Goal: Discharge to home or other facility with appropriate resources  4/17/2025 1110 by Jose Bower RN  Outcome: Progressing     Problem: Pain  Goal: Verbalizes/displays adequate comfort level or baseline comfort level  Outcome: Progressing     Problem: Safety - Adult  Goal: Free from fall injury  Outcome: Progressing     Problem: Skin/Tissue Integrity  Goal: Skin integrity remains intact  Description: 1.  Monitor for areas of redness and/or skin breakdown2.  Assess vascular access sites hourly3.  Every 4-6 hours minimum:  Change oxygen saturation probe site4.  Every 4-6 hours:  If on nasal continuous positive airway pressure, respiratory therapy assess nares and determine need for appliance change or resting period  Outcome: Progressing     Problem: ABCDS Injury Assessment  Goal: Absence of physical injury  Outcome: Progressing     Problem: Respiratory - Adult  Goal: Achieves optimal ventilation and oxygenation  Outcome: Progressing     Problem: Cardiovascular - Adult  Goal: Maintains optimal cardiac output and hemodynamic stability  Outcome: Progressing  Goal: Absence of cardiac dysrhythmias or at baseline  Outcome: Progressing     Problem: Skin/Tissue Integrity - Adult  Goal: Skin integrity remains intact  Description: 1.  Monitor for areas of redness and/or skin breakdown2.  Assess vascular access sites hourly3.  Every 4-6 hours minimum:  Change oxygen saturation probe site4.  Every 4-6 hours:  If on nasal continuous positive airway pressure, respiratory therapy assess nares and determine need for appliance change or resting period  Outcome: Progressing     Problem: Musculoskeletal - Adult  Goal: Return mobility to safest level of function  Outcome: Progressing   
  Problem: Chronic Conditions and Co-morbidities  Goal: Patient's chronic conditions and co-morbidity symptoms are monitored and maintained or improved  4/18/2025 0015 by Jovi Ennis RN  Outcome: Progressing  4/17/2025 1110 by Jose Bower RN  Outcome: Progressing     Problem: Discharge Planning  Goal: Discharge to home or other facility with appropriate resources  4/18/2025 0015 by Jovi Ennis RN  Outcome: Progressing  4/17/2025 1110 by Jose Bower RN  Outcome: Progressing     Problem: Pain  Goal: Verbalizes/displays adequate comfort level or baseline comfort level  4/18/2025 0015 by Jovi Ennis RN  Outcome: Progressing  4/17/2025 1110 by Jose Bower RN  Outcome: Progressing     Problem: Safety - Adult  Goal: Free from fall injury  4/18/2025 0015 by Jovi Ennis RN  Outcome: Progressing  4/17/2025 1110 by Jose Bower RN  Outcome: Progressing     Problem: Skin/Tissue Integrity  Goal: Skin integrity remains intact  Description: 1.  Monitor for areas of redness and/or skin breakdown2.  Assess vascular access sites hourly3.  Every 4-6 hours minimum:  Change oxygen saturation probe site4.  Every 4-6 hours:  If on nasal continuous positive airway pressure, respiratory therapy assess nares and determine need for appliance change or resting period  4/17/2025 1110 by Jose Bower RN  Outcome: Progressing     
  Problem: Chronic Conditions and Co-morbidities  Goal: Patient's chronic conditions and co-morbidity symptoms are monitored and maintained or improved  4/24/2025 0801 by Uma Zavala RN  Outcome: Progressing     Problem: Discharge Planning  Goal: Discharge to home or other facility with appropriate resources  4/24/2025 0801 by Uma Zavala RN  Outcome: Progressing     Problem: Pain  Goal: Verbalizes/displays adequate comfort level or baseline comfort level  4/24/2025 0801 by Uma Zavala RN  Outcome: Progressing     Problem: Safety - Adult  Goal: Free from fall injury  4/24/2025 0801 by Uma Zavala RN  Outcome: Progressing     Problem: Skin/Tissue Integrity  Goal: Skin integrity remains intact  Description: 1.  Monitor for areas of redness and/or skin breakdown2.  Assess vascular access sites hourly3.  Every 4-6 hours minimum:  Change oxygen saturation probe site4.  Every 4-6 hours:  If on nasal continuous positive airway pressure, respiratory therapy assess nares and determine need for appliance change or resting period  4/24/2025 0801 by Uma Zavala RN  Outcome: Progressing     Problem: ABCDS Injury Assessment  Goal: Absence of physical injury  4/24/2025 0801 by Uma Zavala RN  Outcome: Progressing     Problem: Respiratory - Adult  Goal: Achieves optimal ventilation and oxygenation  4/24/2025 0801 by Uma Zavala RN  Outcome: Progressing     Problem: Cardiovascular - Adult  Goal: Maintains optimal cardiac output and hemodynamic stability  4/24/2025 0801 by Uma Zavala RN  Outcome: Progressing  Goal: Absence of cardiac dysrhythmias or at baseline  4/24/2025 0801 by Uma Zavala RN  Outcome: Progressing     Problem: Skin/Tissue Integrity - Adult  Goal: Skin integrity remains intact  Description: 1.  Monitor for areas of redness and/or skin breakdown2.  Assess vascular access sites hourly3.  Every 4-6 hours minimum:  Change oxygen saturation probe site4.  Every 4-6 
  Problem: Chronic Conditions and Co-morbidities  Goal: Patient's chronic conditions and co-morbidity symptoms are monitored and maintained or improved  4/25/2025 0145 by Uma Zavala RN  Outcome: Progressing  4/25/2025 0145 by Uma Zavala RN  Outcome: Progressing     Problem: Discharge Planning  Goal: Discharge to home or other facility with appropriate resources  4/25/2025 0145 by Uma Zavala RN  Outcome: Progressing  4/25/2025 0145 by Uma Zavala RN  Outcome: Progressing     Problem: Pain  Goal: Verbalizes/displays adequate comfort level or baseline comfort level  4/25/2025 0145 by Uma Zavala RN  Outcome: Progressing  4/25/2025 0145 by Uma Zavala RN  Outcome: Progressing     Problem: Safety - Adult  Goal: Free from fall injury  4/25/2025 0145 by Uma Zavala RN  Outcome: Progressing  4/25/2025 0145 by Uma Zavala RN  Outcome: Progressing     Problem: Skin/Tissue Integrity  Goal: Skin integrity remains intact  Description: 1.  Monitor for areas of redness and/or skin breakdown2.  Assess vascular access sites hourly3.  Every 4-6 hours minimum:  Change oxygen saturation probe site4.  Every 4-6 hours:  If on nasal continuous positive airway pressure, respiratory therapy assess nares and determine need for appliance change or resting period  4/25/2025 0145 by Uma Zavala RN  Outcome: Progressing  4/25/2025 0145 by Uma Zavala RN  Outcome: Progressing  Flowsheets (Taken 4/24/2025 2015)  Skin Integrity Remains Intact: (pt refused) Monitor for areas of redness and/or skin breakdown     Problem: ABCDS Injury Assessment  Goal: Absence of physical injury  4/25/2025 0145 by Uma Zavala RN  Outcome: Progressing  4/25/2025 0145 by Uma Zavala RN  Outcome: Progressing     Problem: Respiratory - Adult  Goal: Achieves optimal ventilation and oxygenation  4/25/2025 0145 by Uma Zavala RN  Outcome: Progressing  4/25/2025 0145 by Uma Zavala 
  Problem: Chronic Conditions and Co-morbidities  Goal: Patient's chronic conditions and co-morbidity symptoms are monitored and maintained or improved  4/25/2025 1134 by Tawny Howard RN  Outcome: Progressing  Flowsheets (Taken 4/25/2025 0810)  Care Plan - Patient's Chronic Conditions and Co-Morbidity Symptoms are Monitored and Maintained or Improved:   Monitor and assess patient's chronic conditions and comorbid symptoms for stability, deterioration, or improvement   Collaborate with multidisciplinary team to address chronic and comorbid conditions and prevent exacerbation or deterioration   Update acute care plan with appropriate goals if chronic or comorbid symptoms are exacerbated and prevent overall improvement and discharge  4/25/2025 0145 by Uma Zavala RN  Outcome: Progressing  4/25/2025 0145 by Uma Zavala RN  Outcome: Progressing     Problem: Discharge Planning  Goal: Discharge to home or other facility with appropriate resources  4/25/2025 1134 by Tawny Howard RN  Outcome: Progressing  Flowsheets (Taken 4/25/2025 0810)  Discharge to home or other facility with appropriate resources: Refer to discharge planning if patient needs post-hospital services based on physician order or complex needs related to functional status, cognitive ability or social support system  4/25/2025 0145 by Uma Zavala RN  Outcome: Progressing  4/25/2025 0145 by Uma Zavala RN  Outcome: Progressing     Problem: Pain  Goal: Verbalizes/displays adequate comfort level or baseline comfort level  4/25/2025 1134 by Tawny Howard RN  Outcome: Progressing  4/25/2025 0145 by Uma Zavala RN  Outcome: Progressing  4/25/2025 0145 by Uma Zavala RN  Outcome: Progressing     Problem: Safety - Adult  Goal: Free from fall injury  4/25/2025 1134 by Tawny Howard RN  Outcome: Progressing  4/25/2025 0145 by Uma Zavala RN  Outcome: Progressing  4/25/2025 0145 by Uma Zavala, DIANA  Outcome: 
  Problem: Chronic Conditions and Co-morbidities  Goal: Patient's chronic conditions and co-morbidity symptoms are monitored and maintained or improved  4/27/2025 2228 by Valerie Khoury RN  Outcome: Progressing  4/27/2025 1848 by Kimberlyn Spring RN  Outcome: Progressing     Problem: Discharge Planning  Goal: Discharge to home or other facility with appropriate resources  4/27/2025 2228 by Valerie Khoury RN  Outcome: Progressing  4/27/2025 1848 by Kimberlyn Spring RN  Outcome: Progressing     Problem: Pain  Goal: Verbalizes/displays adequate comfort level or baseline comfort level  Outcome: Progressing     Problem: Safety - Adult  Goal: Free from fall injury  Outcome: Progressing     Problem: Skin/Tissue Integrity  Goal: Skin integrity remains intact  Description: 1.  Monitor for areas of redness and/or skin breakdown2.  Assess vascular access sites hourly3.  Every 4-6 hours minimum:  Change oxygen saturation probe site4.  Every 4-6 hours:  If on nasal continuous positive airway pressure, respiratory therapy assess nares and determine need for appliance change or resting period  Outcome: Progressing     Problem: ABCDS Injury Assessment  Goal: Absence of physical injury  Outcome: Progressing     Problem: Respiratory - Adult  Goal: Achieves optimal ventilation and oxygenation  Outcome: Progressing     Problem: Cardiovascular - Adult  Goal: Maintains optimal cardiac output and hemodynamic stability  Outcome: Progressing  Goal: Absence of cardiac dysrhythmias or at baseline  Outcome: Progressing     Problem: Skin/Tissue Integrity - Adult  Goal: Skin integrity remains intact  Description: 1.  Monitor for areas of redness and/or skin breakdown2.  Assess vascular access sites hourly3.  Every 4-6 hours minimum:  Change oxygen saturation probe site4.  Every 4-6 hours:  If on nasal continuous positive airway pressure, respiratory therapy assess nares and determine need for appliance change or resting 
  Problem: Chronic Conditions and Co-morbidities  Goal: Patient's chronic conditions and co-morbidity symptoms are monitored and maintained or improved  4/30/2025 0910 by Rakesh Salazar, RN  Outcome: Progressing     Problem: Discharge Planning  Goal: Discharge to home or other facility with appropriate resources  4/30/2025 0910 by Rakesh Salazar, RN  Outcome: Progressing     Problem: Pain  Goal: Verbalizes/displays adequate comfort level or baseline comfort level  4/30/2025 0910 by Rakesh Salazar, RN  Outcome: Progressing     
  Problem: Chronic Conditions and Co-morbidities  Goal: Patient's chronic conditions and co-morbidity symptoms are monitored and maintained or improved  Outcome: Progressing     Problem: Discharge Planning  Goal: Discharge to home or other facility with appropriate resources  Outcome: Progressing     
  Problem: Chronic Conditions and Co-morbidities  Goal: Patient's chronic conditions and co-morbidity symptoms are monitored and maintained or improved  Outcome: Progressing     Problem: Discharge Planning  Goal: Discharge to home or other facility with appropriate resources  Outcome: Progressing     
  Problem: Chronic Conditions and Co-morbidities  Goal: Patient's chronic conditions and co-morbidity symptoms are monitored and maintained or improved  Outcome: Progressing     Problem: Discharge Planning  Goal: Discharge to home or other facility with appropriate resources  Outcome: Progressing     Problem: Pain  Goal: Verbalizes/displays adequate comfort level or baseline comfort level  Outcome: Progressing     Problem: Safety - Adult  Goal: Free from fall injury  Outcome: Progressing      Problem: ABCDS Injury Assessment  Goal: Absence of physical injury  Outcome: Progressing       Problem: Skin/Tissue Integrity - Adult  Goal: Skin integrity remains intact  Description: 1.  Monitor for areas of redness and/or skin breakdown2.  Assess vascular access sites hourly3.  Every 4-6 hours minimum:  Change oxygen saturation probe site4.  Every 4-6 hours:  If on nasal continuous positive airway pressure, respiratory therapy assess nares and determine need for appliance change or resting period  Outcome: Progressing     Problem: Musculoskeletal - Adult  Goal: Return mobility to safest level of function  Outcome: Progressing     Problem: Metabolic/Fluid and Electrolytes - Adult  Goal: Electrolytes maintained within normal limits  Outcome: Progressing  Goal: Hemodynamic stability and optimal renal function maintained  Outcome: Progressing     Problem: Nutrition Deficit:  Goal: Optimize nutritional status  Outcome: Progressing     
  Problem: Chronic Conditions and Co-morbidities  Goal: Patient's chronic conditions and co-morbidity symptoms are monitored and maintained or improved  Outcome: Progressing     Problem: Discharge Planning  Goal: Discharge to home or other facility with appropriate resources  Outcome: Progressing     Problem: Pain  Goal: Verbalizes/displays adequate comfort level or baseline comfort level  Outcome: Progressing     Problem: Safety - Adult  Goal: Free from fall injury  Outcome: Progressing      Problem: ABCDS Injury Assessment  Goal: Absence of physical injury  Outcome: Progressing     Problem: Respiratory - Adult  Goal: Achieves optimal ventilation and oxygenation  Outcome: Progressing     Problem: Cardiovascular - Adult  Goal: Maintains optimal cardiac output and hemodynamic stability  Outcome: Progressing  Goal: Absence of cardiac dysrhythmias or at baseline  Outcome: Progressing     Problem: Skin/Tissue Integrity - Adult  Goal: Skin integrity remains intact  Description: 1.  Monitor for areas of redness and/or skin breakdown2.  Assess vascular access sites hourly3.  Every 4-6 hours minimum:  Change oxygen saturation probe site4.  Every 4-6 hours:  If on nasal continuous positive airway pressure, respiratory therapy assess nares and determine need for appliance change or resting period  Outcome: Progressing     Problem: Musculoskeletal - Adult  Goal: Return mobility to safest level of function  Outcome: Progressing     Problem: Metabolic/Fluid and Electrolytes - Adult  Goal: Electrolytes maintained within normal limits  Outcome: Progressing  Goal: Hemodynamic stability and optimal renal function maintained  Outcome: Progressing     Problem: Nutrition Deficit:  Goal: Optimize nutritional status  Outcome: Progressing     
  Problem: Chronic Conditions and Co-morbidities  Goal: Patient's chronic conditions and co-morbidity symptoms are monitored and maintained or improved  Outcome: Progressing     Problem: Discharge Planning  Goal: Discharge to home or other facility with appropriate resources  Outcome: Progressing     Problem: Pain  Goal: Verbalizes/displays adequate comfort level or baseline comfort level  Outcome: Progressing     Problem: Safety - Adult  Goal: Free from fall injury  Outcome: Progressing     Problem: Skin/Tissue Integrity  Goal: Skin integrity remains intact  Description: 1.  Monitor for areas of redness and/or skin breakdown2.  Assess vascular access sites hourly3.  Every 4-6 hours minimum:  Change oxygen saturation probe site4.  Every 4-6 hours:  If on nasal continuous positive airway pressure, respiratory therapy assess nares and determine need for appliance change or resting period  Outcome: Progressing     
  Problem: Chronic Conditions and Co-morbidities  Goal: Patient's chronic conditions and co-morbidity symptoms are monitored and maintained or improved  Outcome: Progressing     Problem: Discharge Planning  Goal: Discharge to home or other facility with appropriate resources  Outcome: Progressing     Problem: Pain  Goal: Verbalizes/displays adequate comfort level or baseline comfort level  Outcome: Progressing     Problem: Safety - Adult  Goal: Free from fall injury  Outcome: Progressing     Problem: Skin/Tissue Integrity  Goal: Skin integrity remains intact  Description: 1.  Monitor for areas of redness and/or skin breakdown2.  Assess vascular access sites hourly3.  Every 4-6 hours minimum:  Change oxygen saturation probe site4.  Every 4-6 hours:  If on nasal continuous positive airway pressure, respiratory therapy assess nares and determine need for appliance change or resting period  Outcome: Progressing     Problem: ABCDS Injury Assessment  Goal: Absence of physical injury  Outcome: Progressing     Problem: Respiratory - Adult  Goal: Achieves optimal ventilation and oxygenation  Outcome: Progressing     Problem: Cardiovascular - Adult  Goal: Maintains optimal cardiac output and hemodynamic stability  Outcome: Progressing  Goal: Absence of cardiac dysrhythmias or at baseline  Outcome: Progressing     Problem: Skin/Tissue Integrity - Adult  Goal: Skin integrity remains intact  Description: 1.  Monitor for areas of redness and/or skin breakdown2.  Assess vascular access sites hourly3.  Every 4-6 hours minimum:  Change oxygen saturation probe site4.  Every 4-6 hours:  If on nasal continuous positive airway pressure, respiratory therapy assess nares and determine need for appliance change or resting period  Outcome: Progressing     Problem: Musculoskeletal - Adult  Goal: Return mobility to safest level of function  Outcome: Progressing     Problem: Metabolic/Fluid and Electrolytes - Adult  Goal: Electrolytes 
  Problem: Chronic Conditions and Co-morbidities  Goal: Patient's chronic conditions and co-morbidity symptoms are monitored and maintained or improved  Outcome: Progressing  Flowsheets (Taken 4/15/2025 2000)  Care Plan - Patient's Chronic Conditions and Co-Morbidity Symptoms are Monitored and Maintained or Improved:   Monitor and assess patient's chronic conditions and comorbid symptoms for stability, deterioration, or improvement   Collaborate with multidisciplinary team to address chronic and comorbid conditions and prevent exacerbation or deterioration   Update acute care plan with appropriate goals if chronic or comorbid symptoms are exacerbated and prevent overall improvement and discharge     Problem: Discharge Planning  Goal: Discharge to home or other facility with appropriate resources  Outcome: Progressing  Flowsheets (Taken 4/15/2025 2000)  Discharge to home or other facility with appropriate resources:   Identify barriers to discharge with patient and caregiver   Arrange for needed discharge resources and transportation as appropriate   Identify discharge learning needs (meds, wound care, etc)   Refer to discharge planning if patient needs post-hospital services based on physician order or complex needs related to functional status, cognitive ability or social support system     Problem: Pain  Goal: Verbalizes/displays adequate comfort level or baseline comfort level  Outcome: Progressing  Flowsheets  Taken 4/15/2025 2000 by Kaya Ball RN  Verbalizes/displays adequate comfort level or baseline comfort level:   Encourage patient to monitor pain and request assistance   Assess pain using appropriate pain scale   Implement non-pharmacological measures as appropriate and evaluate response   Administer analgesics based on type and severity of pain and evaluate response   Consider cultural and social influences on pain and pain management   Notify Licensed Independent Practitioner if interventions 
  Problem: Discharge Planning  Goal: Discharge to home or other facility with appropriate resources  Outcome: Progressing  Flowsheets (Taken 4/18/2025 0800 by Donna Carbajal, RN)  Discharge to home or other facility with appropriate resources: Identify barriers to discharge with patient and caregiver     Problem: Skin/Tissue Integrity  Goal: Skin integrity remains intact  Description: 1.  Monitor for areas of redness and/or skin breakdown2.  Assess vascular access sites hourly3.  Every 4-6 hours minimum:  Change oxygen saturation probe site4.  Every 4-6 hours:  If on nasal continuous positive airway pressure, respiratory therapy assess nares and determine need for appliance change or resting period  Recent Flowsheet Documentation  Taken 4/18/2025 0800 by Donna Carbajal RN  Skin Integrity Remains Intact: Monitor for areas of redness and/or skin breakdown     Problem: Skin/Tissue Integrity - Adult  Goal: Skin integrity remains intact  Description: 1.  Monitor for areas of redness and/or skin breakdown2.  Assess vascular access sites hourly3.  Every 4-6 hours minimum:  Change oxygen saturation probe site4.  Every 4-6 hours:  If on nasal continuous positive airway pressure, respiratory therapy assess nares and determine need for appliance change or resting period  Recent Flowsheet Documentation  Taken 4/18/2025 0800 by Donna Carbajal, RN  Skin Integrity Remains Intact: Monitor for areas of redness and/or skin breakdown     Problem: Chronic Conditions and Co-morbidities  Goal: Patient's chronic conditions and co-morbidity symptoms are monitored and maintained or improved  Outcome: Progressing  Flowsheets (Taken 4/18/2025 0800 by Donna Carbajal, RN)  Care Plan - Patient's Chronic Conditions and Co-Morbidity Symptoms are Monitored and Maintained or Improved: Monitor and assess patient's chronic conditions and comorbid symptoms for stability, deterioration, or improvement     Problem: Metabolic/Fluid and Electrolytes - 
Rapid response for respiratory distress.    Patient oriented on arrival, x3. On 15L NRB. Difficulty obtaining good sat read, on forehead. Fingers cool to touch, poor pleth. Have recommended blood gas, either VBG or ABG, whichever we can obtain sooner. Initiate breathing treatment, which patient refused earlier, is amenable at this time. Further plan to follow. Patient is stable. Crackles on exam, received HD 4/29, nephrology following. Of note had episode of emesis apparent in large amount. 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 CXR overnight for pleuritic cough and showed cardiomegaly with scattered infiltrate and effusions. Please to observe aspiration precautions, order added. SLP also following for diet recs. NPO for now, receiving TPN.    Electronically signed by Karmen Ramirez DO on 4/30/2025 at 5:50 AM    
renal function maintained:   Monitor labs and assess for signs and symptoms of volume excess or deficit   Monitor intake, output and patient weight     Problem: Nutrition Deficit:  Goal: Optimize nutritional status  Outcome: Progressing
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
## 2025-04-30 NOTE — SIGNIFICANT EVENT
Rapid response called to 3T for AMS, coffee ground emesis and respiratory failure.    Patient immediately transferred to ICU.    Patient emergently intubated with rocuronium and etomidate.  Had marguerite coffee ground emesis in airway.  Had almost 2 L of coffee ground emesis in suction container.    Intubated by RT and breath sounds noted.    Patient then developed PEA.  CPR initiated.  Underwent 2 rounds of Epinephrine and started on Vasopressin gtt.  Patient given 1 amp HCO3.  Started on Levophed gtt and regained pulse.    Patient started on vent at 500/12/5/50%.      Temp 98    RR 12  SBP 50-140s  O2 sat 85% on 5 L NC  Gen Obtunded  Lungs Coarse BS BL.  R vascatch  Heart  Tachycardia  Ab  Distended, soft  Ext No edema.  RUE PICC    CXR on my review with ETT above susie and NGT below diaphragm.    STAT Lactic acid is 5.5    Hg is currently 7.4  I have emergently requested 2 U PRBC for hemorrhagic shock.      Started on Versed and Fentanyl gtt for sedation for vent.    Assessment and Plan    Hypovolemic shock  Transfuse 2 U PRBC uncrossed STAT as this is emergent  Protonix IV bid  Repeat Hg after 2nd U PRBC  Keep Hg > 8  Levophed gtt  UGIB  NGT placed  Serial H/H  Protonix IV bid  Will ask GI to consider emergent EGD  Acute respiratory failure with hypoxia  I have ordered for vent at 500/12/5/50%  Repeat ABG  CCM consult to manage vent further  Fentanyl and Versed gtt for analgesia and sedation  Acute blood loss anemia  Serial H/H  Transfuse 2 U PRBC uncrossed emergently    I have discussed care with Dr Borjas (Surgery).  He suspects UGIB.    Discussed with Dr Mccall (GI).  Recommends to immediately volume resuscitate patient and if patient survives CPR, to consider EGD vs CTA.    I have discussed care with wife at bedside.  She watched CPR and requests for full code status.  \"God will decide when to stop\".    Critical care time with patient was 80 minutes excluding separately billable procedures.    ADDENDUM

## 2025-04-30 NOTE — INTERDISCIPLINARY ROUNDS
Spiritual Health History and Assessment/Progress Note  [facility]    Crisis, Code Blue,  ,      Name: Donato Obrien MRN: [id number]    Age: 84 y.o.     Sex: male   Language: English   Worship: Unknown   Acute respiratory failure with hypoxia (HCC)     Date: 4/30/2025            Total Time Calculated: 58 min              Spiritual Assessment began in [facility] ICU        Referral/Consult From: Physician   Encounter Overview/Reason: Crisis  Service Provided For: Patient and family together    Laura, Belief, Meaning:   Patient identifies as spiritual and is connected with a laura tradition or spiritual practice  Family/Friends identify as spiritual and are connected with a laura tradition or spiritual practice      Importance and Influence:  Patient has spiritual/personal beliefs that influence decisions regarding their health  Family/Friends have spiritual/personal beliefs that influence decisions regarding the patient's health    Community:  Patient Other: Not able to respond  Family/Friends feel well-supported. Support system includes: Spouse/Partner and Laura Community    Assessment and Plan of Care:     Patient Interventions include: Facilitated expression of thoughts and feelings and Facilitated life review and/ or legacy  Family/Friends Interventions include: Facilitated expression of thoughts and feelings, Explored spiritual coping/struggle/distress, Engaged in theological reflection, and Affirmed coping skills/support systems    Patient Plan of Care: Spiritual Care available upon further referral  Family/Friends Plan of Care: Spiritual Care available upon further referral    Electronically signed by Chaplain Radhika on 4/30/2025 at 5:59 PM

## 2025-04-30 NOTE — SIGNIFICANT EVENT
Code blue called for VT.    Patient underwent immediate defibrillation at 200 J for VT.    He was given epinephrine and CPR initiated.    Patient ordered for Amiodarone 150 mg IV X 1.    Patient on max Levophed and Vasopressin.    He regained a pulse.    Wife requested that we \"do not keep doing this tonight\".    Changed to DNR-CC.    I have written for Dilaudid and Ativan IV PRN comfort.    Will not escalate any further care.    His prognosis is terminal and imminent.  Wife is aware.    Ian Moore MD

## 2025-04-30 NOTE — CARE COORDINATION
Discharge Planning:     (HEIDI) received a call from Loyda/Pili who reports the facility can acceptif the family is agreeable. Cm returned the call and ask that [facility] continues to follow the patient for possible placement.    Electronically signed by Edis Rice on 4/30/25 at 2:31 PM EDT

## 2025-04-30 NOTE — CARE COORDINATION
Patient has consult for ruptured blister to right buttock.  Patient refused to turn with [facility] nurse and also primary nurse Rakesh.  Chart was reviewed. Appropriate treat/dressng is being used.  Per nurse patient is refusing care. Palliative care consulted. Also per nurse family will be in later today. ANGELA PÉREZN, RN, CWOCN  Inpatient  Wound/Ostomy Care  [phone]

## 2025-05-01 VITALS
WEIGHT: 188.93 LBS | BODY MASS INDEX: 27.05 KG/M2 | DIASTOLIC BLOOD PRESSURE: 35 MMHG | HEIGHT: 70 IN | OXYGEN SATURATION: 81 % | TEMPERATURE: 97.2 F | SYSTOLIC BLOOD PRESSURE: 47 MMHG

## 2025-05-01 PROCEDURE — 2500000003 HC RX 250 WO HCPCS: Performed by: INTERNAL MEDICINE

## 2025-05-01 PROCEDURE — 6360000002 HC RX W HCPCS: Performed by: INTERNAL MEDICINE

## 2025-05-01 RX ADMIN — DIAZEPAM 5 MG: 5 INJECTION, SOLUTION INTRAMUSCULAR; INTRAVENOUS at 00:26

## 2025-05-01 RX ADMIN — HYDROMORPHONE HYDROCHLORIDE 1 MG: 1 INJECTION, SOLUTION INTRAMUSCULAR; INTRAVENOUS; SUBCUTANEOUS at 00:06

## 2025-05-01 ASSESSMENT — PAIN SCALES - GENERAL: PAINLEVEL_OUTOF10: 0

## 2025-05-01 ASSESSMENT — PULMONARY FUNCTION TESTS: PIF_VALUE: 24

## 2025-05-01 NOTE — DEATH NOTES
Death Pronouncement Note  Patient's Name: Donato Obrien   Patient's YOB: 1940  MRN Number: [id number]    Admitting Provider: Chantel Lima MD  Attending Provider: No att. providers found    Patient was examined and the following were absent: Pulses, Blood Pressure, and Respiratory effort    I declared the patient dead on 5/1/25 at 0042    Preliminary Cause of Death:   Dyspnea  Aspiration  Failure to thrive  Acute hypoxic respiratory failure  Paraesophageal hernia    Electronically signed by Karmen Ramirez DO on 5/1/25 at 7:14 AM EDT

## 2025-05-01 NOTE — PROGRESS NOTES
[facility]    4/19/2025 10:06 AM        Name: Donato Obrien .              Admitted: 4/15/2025  Primary Care Provider: Rohit Pathak III, MD (Tel: [phone])      Subjective:    Shortness of breath improving no nausea vomiting chest pain    Reviewed interval ancillary notes    Current Medications  dextrose 5 % and 0.45 % sodium chloride infusion, Continuous  gabapentin (NEURONTIN) capsule 100 mg, Q8H  heparin (porcine) injection 3,600 Units, PRN  phenol 1.4 % mouth spray 1 spray, Q2H PRN  benzocaine-menthol (CEPACOL SORE THROAT) lozenge 1 lozenge, PRN  pantoprazole (PROTONIX) injection 40 mg, BID  aluminum & magnesium hydroxide-simethicone (MAALOX PLUS) 30 mL, lidocaine viscous hcl (XYLOCAINE) 5 mL (GI COCKTAIL), 4x Daily PRN  albuterol (PROVENTIL) (2.5 MG/3ML) 0.083% nebulizer solution 2.5 mg, Q6H PRN  allopurinol (ZYLOPRIM) tablet 100 mg, Daily  amLODIPine (NORVASC) tablet 5 mg, Daily  aspirin-acetaminophen-caffeine (EXCEDRIN MIGRAINE) per tablet 1 tablet, Q6H PRN  budesonide-formoterol (SYMBICORT) 160-4.5 MCG/ACT inhaler 2 puff, BID RT  [Held by provider] clopidogrel (PLAVIX) tablet 75 mg, Daily  calcium acetate (PHOSLO) capsule 1,334 mg, TID AC  b complex-C-folic acid (NEPHROCAPS) capsule 1 mg, Daily  isosorbide mononitrate (IMDUR) extended release tablet 30 mg, Daily  levothyroxine (SYNTHROID) tablet 50 mcg, Daily  rosuvastatin (CRESTOR) tablet 20 mg, Nightly  predniSONE (DELTASONE) tablet 40 mg, Daily  sodium chloride flush 0.9 % injection 5-40 mL, 2 times per day  sodium chloride flush 0.9 % injection 5-40 mL, PRN  0.9 % sodium chloride infusion, PRN  heparin (porcine) injection 5,000 Units, 3 times per day  polyethylene glycol (GLYCOLAX) packet 17 g, Daily PRN  acetaminophen (TYLENOL) tablet 650 mg, Q6H PRN   Or  acetaminophen (TYLENOL) suppository 650 mg, Q6H PRN  midodrine (PROAMATINE) tablet 5 mg, Once 
                                                                  [facility]    4/18/2025 10:19 AM        Name: Donato Obrien .              Admitted: 4/15/2025  Primary Care Provider: Rohit Pathak III, MD (Tel: [phone])      Subjective:    Sob improvd no cehst pian fever or chills    Reviewed interval ancillary notes    Current Medications  iohexol (OMNIPAQUE 350) 350 MG/ML solution,   heparin (porcine) injection 3,600 Units, PRN  phenol 1.4 % mouth spray 1 spray, Q2H PRN  benzocaine-menthol (CEPACOL SORE THROAT) lozenge 1 lozenge, PRN  pantoprazole (PROTONIX) injection 40 mg, BID  aluminum & magnesium hydroxide-simethicone (MAALOX PLUS) 30 mL, lidocaine viscous hcl (XYLOCAINE) 5 mL (GI COCKTAIL), 4x Daily PRN  albuterol (PROVENTIL) (2.5 MG/3ML) 0.083% nebulizer solution 2.5 mg, Q6H PRN  allopurinol (ZYLOPRIM) tablet 100 mg, Daily  amLODIPine (NORVASC) tablet 5 mg, Daily  aspirin-acetaminophen-caffeine (EXCEDRIN MIGRAINE) per tablet 1 tablet, Q6H PRN  budesonide-formoterol (SYMBICORT) 160-4.5 MCG/ACT inhaler 2 puff, BID RT  [Held by provider] clopidogrel (PLAVIX) tablet 75 mg, Daily  calcium acetate (PHOSLO) capsule 1,334 mg, TID AC  b complex-C-folic acid (NEPHROCAPS) capsule 1 mg, Daily  isosorbide mononitrate (IMDUR) extended release tablet 30 mg, Daily  levothyroxine (SYNTHROID) tablet 50 mcg, Daily  rosuvastatin (CRESTOR) tablet 20 mg, Nightly  predniSONE (DELTASONE) tablet 40 mg, Daily  sodium chloride flush 0.9 % injection 5-40 mL, 2 times per day  sodium chloride flush 0.9 % injection 5-40 mL, PRN  0.9 % sodium chloride infusion, PRN  heparin (porcine) injection 5,000 Units, 3 times per day  polyethylene glycol (GLYCOLAX) packet 17 g, Daily PRN  acetaminophen (TYLENOL) tablet 650 mg, Q6H PRN   Or  acetaminophen (TYLENOL) suppository 650 mg, Q6H PRN  midodrine (PROAMATINE) tablet 5 mg, Once per day on Sunday Monday Wednesday Friday   And  midodrine (PROAMATINE) 
                                                                  [facility]    4/17/2025 10:26 AM        Name: Donato Obrien .              Admitted: 4/15/2025  Primary Care Provider: Rohit Pathak III, MD (Tel: [phone])      Subjective:    Patient lying in bed shortness of breath is improved no nausea vomiting chest pain complaining of pain with swallowing feels like food gets stuck in her throat    Reviewed interval ancillary notes    Current Medications  albumin human 25% IV solution 25 g, Once  pantoprazole (PROTONIX) injection 40 mg, BID  aluminum & magnesium hydroxide-simethicone (MAALOX PLUS) 30 mL, lidocaine viscous hcl (XYLOCAINE) 5 mL (GI COCKTAIL), 4x Daily PRN  albuterol (PROVENTIL) (2.5 MG/3ML) 0.083% nebulizer solution 2.5 mg, Q6H PRN  allopurinol (ZYLOPRIM) tablet 100 mg, Daily  amLODIPine (NORVASC) tablet 5 mg, Daily  aspirin-acetaminophen-caffeine (EXCEDRIN MIGRAINE) per tablet 1 tablet, Q6H PRN  budesonide-formoterol (SYMBICORT) 160-4.5 MCG/ACT inhaler 2 puff, BID RT  clopidogrel (PLAVIX) tablet 75 mg, Daily  calcium acetate (PHOSLO) capsule 1,334 mg, TID AC  b complex-C-folic acid (NEPHROCAPS) capsule 1 mg, Daily  isosorbide mononitrate (IMDUR) extended release tablet 30 mg, Daily  levothyroxine (SYNTHROID) tablet 50 mcg, Daily  rosuvastatin (CRESTOR) tablet 20 mg, Nightly  predniSONE (DELTASONE) tablet 40 mg, Daily  sodium chloride flush 0.9 % injection 5-40 mL, 2 times per day  sodium chloride flush 0.9 % injection 5-40 mL, PRN  0.9 % sodium chloride infusion, PRN  [Held by provider] heparin (porcine) injection 5,000 Units, 3 times per day  polyethylene glycol (GLYCOLAX) packet 17 g, Daily PRN  acetaminophen (TYLENOL) tablet 650 mg, Q6H PRN   Or  acetaminophen (TYLENOL) suppository 650 mg, Q6H PRN  midodrine (PROAMATINE) tablet 5 mg, Once per day on Sunday Monday Wednesday Friday   And  midodrine (PROAMATINE) tablet 10 mg, Once per day on Tuesday 
                                                                  [facility]    4/16/2025 10:36 AM        Name: Donato Obrien .              Admitted: 4/15/2025  Primary Care Provider: Rohit Pathak III, MD (Tel: [phone])      Subjective:    Patient lying in bed shortness of breath is improved no nausea vomiting chest pain complaining of pain with swallowing feels like food gets stuck in her throat    Reviewed interval ancillary notes    Current Medications  albuterol (PROVENTIL) (2.5 MG/3ML) 0.083% nebulizer solution 2.5 mg, Q6H PRN  allopurinol (ZYLOPRIM) tablet 100 mg, Daily  amLODIPine (NORVASC) tablet 5 mg, Daily  aspirin-acetaminophen-caffeine (EXCEDRIN MIGRAINE) per tablet 1 tablet, Q6H PRN  budesonide-formoterol (SYMBICORT) 160-4.5 MCG/ACT inhaler 2 puff, BID RT  clopidogrel (PLAVIX) tablet 75 mg, Daily  calcium acetate (PHOSLO) capsule 1,334 mg, TID AC  b complex-C-folic acid (NEPHROCAPS) capsule 1 mg, Daily  isosorbide mononitrate (IMDUR) extended release tablet 30 mg, Daily  levothyroxine (SYNTHROID) tablet 50 mcg, Daily  rosuvastatin (CRESTOR) tablet 20 mg, Nightly  predniSONE (DELTASONE) tablet 40 mg, Daily  sodium chloride flush 0.9 % injection 5-40 mL, 2 times per day  sodium chloride flush 0.9 % injection 5-40 mL, PRN  0.9 % sodium chloride infusion, PRN  heparin (porcine) injection 5,000 Units, 3 times per day  polyethylene glycol (GLYCOLAX) packet 17 g, Daily PRN  acetaminophen (TYLENOL) tablet 650 mg, Q6H PRN   Or  acetaminophen (TYLENOL) suppository 650 mg, Q6H PRN  midodrine (PROAMATINE) tablet 5 mg, Once per day on Sunday Monday Wednesday Friday   And  [START ON 4/17/2025] midodrine (PROAMATINE) tablet 10 mg, Once per day on Tuesday Thursday Saturday        Objective:  BP (!) 89/55   Pulse 63   Temp (!) 96.7 °F (35.9 °C) (Temporal)   Resp 17   Ht 1.778 m (5' 10\")   Wt 84.7 kg (186 lb 11.7 oz)   SpO2 90%   BMI 26.79 kg/m²   No intake or output data 
               [facility]        Progress Note    Patient Name: Donato Obrien  MRN: [id number]  YOB: 1940  Date of Evaluation: 2025    Chief Complaint: Abdominal pain    Subjective:  No acute events overnight  No abdominal pain  No nausea or vomiting, NGT in place  No flatus or stool  Resting in bed at this time, more alert today      Vital Signs:  Patient Vitals for the past 24 hrs:   BP Temp Temp src Pulse Resp SpO2 Height Weight   25 1143 -- -- -- -- -- -- 1.778 m (5' 10\") --   25 0850 -- -- -- 51 15 100 % -- --   25 0800 -- 97.9 °F (36.6 °C) Temporal -- -- -- -- --   25 0600 90/66 -- -- 55 23 (!) 89 % -- 83.9 kg (184 lb 15.5 oz)   25 0500 (!) 83/63 -- -- 51 21 95 % -- --   25 0400 103/64 97.2 °F (36.2 °C) Temporal 50 22 100 % -- --   25 0300 108/64 -- -- 55 (!) 9 93 % -- --   25 0200 95/61 -- -- 58 18 96 % -- --   25 0100 100/65 -- -- 53 20 96 % -- --   25 0000 98/68 97.6 °F (36.4 °C) Temporal (!) 47 11 99 % -- --   25 2300 (!) 96/55 -- -- 51 12 96 % -- --   25 2200 (!) 85/64 -- -- (!) 48 19 99 % -- --   25 (!) 97/57 -- -- 56 16 99 % -- --   25 -- -- -- (!) 49 19 100 % -- --   25 99/62 97.1 °F (36.2 °C) Temporal 54 22 99 % -- --   25 1900 103/76 -- -- 53 26 99 % -- --   25 1800 97/77 -- -- 53 18 100 % -- --   25 1700 (!) 80/70 -- -- 62 15 -- -- --   25 1605 92/74 97 °F (36.1 °C) Temporal 56 21 98 % -- --   25 1500 124/77 -- -- 58 24 96 % -- --   25 1400 (!) 109/90 -- -- 59 21 100 % -- --   25 1302 -- -- -- -- -- 95 % -- --   25 1300 99/66 -- -- 51 18 100 % -- --   25 1200 96/71 96.9 °F (36.1 °C) Temporal (!) 49 16 100 % -- --      TEMPERATURE HISTORY 24H: Temp (24hrs), Av.3 °F (36.3 °C), Min:96.9 °F (36.1 °C), Max:97.9 °F (36.6 °C)    BLOOD PRESSURE HISTORY: Systolic (36hrs), Av , Min:80 , Max:132 
               [facility]        Progress Note    Patient Name: Donato Obrien  MRN: [id number]  YOB: 1940  Date of Evaluation: 4/20/2025    Chief Complaint: Abdominal pain    Subjective:  No acute events overnight  No abdominal pain, only discomfort from NGT and in feet/heels/toes  No nausea or vomiting, NGT in place  Denies flatus, stool, or constipation  Resting in bed at this time      Vital Signs:  Patient Vitals for the past 24 hrs:   BP Temp Temp src Pulse Resp SpO2 Height Weight   04/20/25 0800 117/62 97.4 °F (36.3 °C) Temporal 58 10 90 % -- --   04/20/25 0700 116/71 -- -- 54 12 100 % -- --   04/20/25 0600 97/62 -- -- 53 25 100 % -- --   04/20/25 0500 118/61 -- -- 57 20 (!) 89 % -- --   04/20/25 0400 118/68 97.5 °F (36.4 °C) Temporal 55 12 99 % -- 80.5 kg (177 lb 7.5 oz)   04/20/25 0300 (!) 122/57 -- -- 52 14 100 % -- --   04/20/25 0200 109/63 -- -- 69 17 (!) 87 % -- --   04/20/25 0100 107/64 -- -- 58 17 96 % -- --   04/20/25 0000 (!) 122/59 98 °F (36.7 °C) Temporal 58 21 98 % -- --   04/19/25 2320 -- -- -- 57 13 99 % -- --   04/19/25 2319 -- -- -- 59 (!) 9 (!) 86 % -- --   04/19/25 2318 -- -- -- 63 10 (!) 75 % -- --   04/19/25 2312 -- -- -- 62 -- 98 % -- --   04/19/25 2311 -- -- -- 58 10 (!) 89 % -- --   04/19/25 2310 -- -- -- 60 (!) 9 (!) 73 % -- --   04/19/25 2300 (!) 118/58 -- -- 61 10 94 % -- --   04/19/25 2200 116/69 -- -- 60 12 90 % -- --   04/19/25 2100 (!) 101/56 -- -- 65 18 93 % -- --   04/19/25 2033 -- -- -- 66 15 95 % -- --   04/19/25 2000 122/60 98.7 °F (37.1 °C) Temporal 55 11 93 % -- --   04/19/25 1900 138/75 -- -- 62 22 96 % -- --   04/19/25 1800 117/64 -- -- 64 23 95 % -- --   04/19/25 1700 114/74 -- -- 58 19 98 % -- --   04/19/25 1645 -- -- -- 65 23 100 % -- --   04/19/25 1630 -- -- -- 65 16 93 % -- --   04/19/25 1615 -- -- -- 63 16 94 % -- --   04/19/25 1600 123/70 97.5 °F (36.4 °C) Temporal 67 18 95 % -- --   04/19/25 1545 107/64 -- -- 60 18 99 % 
               [facility]        Progress Note    Patient Name: Donato Obrien  MRN: [id number]  YOB: 1940  Date of Evaluation: 2025    Chief Complaint: Abdominal pain    Subjective:  No acute events overnight, called back to evaluate patient for vomiting this morning, described as \"dark\" colored  Abdominal pain controlled  Poor PO intake  No stool documented  Resting in bed at this time, drowsy, minimally interactive    Postoperative Day #7      Vital Signs:  Patient Vitals for the past 24 hrs:   BP Temp Temp src Pulse Resp SpO2 Height Weight   25 0946 119/73 98.1 °F (36.7 °C) -- (!) 120 20 92 % -- --   25 0906 128/74 98.2 °F (36.8 °C) Oral 78 18 97 % -- --   25 0644 112/60 -- -- 95 16 -- -- --   25 0550 -- -- -- -- -- 97 % -- --   25 0545 -- -- -- -- -- 98 % -- --   25 0539 109/75 -- -- -- -- -- -- --   25 0528 104/68 -- -- 72 -- -- -- --   25 0400 -- -- -- -- 16 -- -- --   25 0315 -- -- -- -- -- -- 1.778 m (5' 10\") 85.7 kg (188 lb 15 oz)   25 0132 104/65 -- -- 63 18 97 % -- --   25 2148 -- -- -- -- 16 -- -- --   25 2107 101/66 98.5 °F (36.9 °C) Oral 66 18 93 % -- --   25 1945 -- -- -- 66 16 94 % -- --   25 1516 -- -- -- -- 18 92 % -- --      TEMPERATURE HISTORY 24H: Temp (24hrs), Av.3 °F (36.8 °C), Min:98.1 °F (36.7 °C), Max:98.5 °F (36.9 °C)    BLOOD PRESSURE HISTORY: Systolic (36hrs), Av , Min:101 , Max:132    Diastolic (36hrs), Av, Min:60, Max:80      Intake/Output:  I/O last 3 completed shifts:  In: 4226.9 [P.O.:90; I.V.:93.8; IV Piggyback:256.7]  Out: 0   No intake/output data recorded.  Drain/tube Output:       Physical Exam:  General: awake, alert, no acute distress  Cardiovascular: irregularly irregular rhythm and no murmur noted  Lungs: clear to auscultation  Abdomen: soft, mildly distended, incisional tenderness only, bowel sounds present  Skin/Wound: healing 
               [facility]        Progress Note    Patient Name: Donato Obrien  MRN: [id number]  YOB: 1940  Date of Evaluation: 2025    Chief Complaint: Abdominal pain    Subjective:  No acute events overnight  Abdominal pain controlled, continues to complain of chest pain--unchanged from yesterday  No nausea or vomiting, NGT in place  No flatus or stool  Resting in bed at this time    Postoperative Day #2      Vital Signs:  Patient Vitals for the past 24 hrs:   BP Temp Temp src Pulse Resp SpO2 Height Weight   25 1336 -- -- -- -- 16 -- -- --   25 1309 -- -- -- -- -- -- 1.778 m (5' 10\") --   25 1306 -- -- -- -- 17 -- -- --   25 1104 -- -- -- -- 17 -- -- --   25 1034 -- -- -- -- 18 -- -- --   25 0840 -- -- -- -- 14 -- -- --   25 0758 -- -- -- -- 16 94 % -- --   25 0743 119/69 97.2 °F (36.2 °C) Oral 74 18 95 % -- --   25 0600 -- -- -- -- -- -- -- 85 kg (187 lb 6.3 oz)   25 133/80 97.5 °F (36.4 °C) Axillary 59 17 92 % -- --   25 2201 -- -- -- 66 16 97 % -- --   25 1821 121/70 -- -- 60 16 94 % -- --      TEMPERATURE HISTORY 24H: Temp (24hrs), Av.4 °F (36.3 °C), Min:97.2 °F (36.2 °C), Max:97.5 °F (36.4 °C)    BLOOD PRESSURE HISTORY: Systolic (36hrs), Av , Min:113 , Max:133    Diastolic (36hrs), Av, Min:55, Max:80      Intake/Output:  I/O last 3 completed shifts:  In: -   Out: 450 [Emesis/NG output:450]  No intake/output data recorded.  Drain/tube Output:       Physical Exam:  General: awake, alert, no acute distress  Cardiovascular:  irregularly irregular rhythm and no murmur noted  Lungs: clear to auscultation  Abdomen: soft, non-distended, incisional tenderness only, bowel sounds normal  Skin/Wound: healing well, no drainage, no erythema, well approximated    Labs:  CBC:    Recent Labs     25  0917 25  0544 25  0423   WBC 13.5* 12.6* 13.3*   HGB 10.7* 8.9* 9.3*   HCT 
               [facility]        Progress Note    Patient Name: Donato Obrien  MRN: [id number]  YOB: 1940  Date of Evaluation: 2025    Chief Complaint: Abdominal pain    Subjective:  No acute events overnight  Abdominal pain improved, chest pain improved  No nausea or vomiting, jac NG out  No flatus or stool  Resting in bed at this time    Postoperative Day # 3      Vital Signs:  Patient Vitals for the past 24 hrs:   BP Temp Temp src Pulse Resp SpO2 Height Weight   25 0911 -- -- -- -- -- 100 % -- --   25 0742 112/60 98 °F (36.7 °C) Oral 61 -- 100 % -- --   25 0600 -- -- -- -- -- -- -- 86.4 kg (190 lb 7.6 oz)   25 0322 -- -- -- 72 18 93 % -- --   25 2305 -- -- -- -- 18 -- -- --   25 2215 113/70 97.1 °F (36.2 °C) Oral 78 18 94 % -- --   25 1957 -- -- -- 77 18 96 % -- --   25 1336 -- -- -- -- 16 -- -- --   25 1309 -- -- -- -- -- -- 1.778 m (5' 10\") --   25 1306 -- -- -- -- 17 -- -- --      TEMPERATURE HISTORY 24H: Temp (24hrs), Av.6 °F (36.4 °C), Min:97.1 °F (36.2 °C), Max:98 °F (36.7 °C)    BLOOD PRESSURE HISTORY: Systolic (36hrs), Av , Min:112 , Max:119    Diastolic (36hrs), Av, Min:60, Max:70      Intake/Output:  I/O last 3 completed shifts:  In: -   Out: 50 [Emesis/NG output:50]  No intake/output data recorded.  Drain/tube Output:       Physical Exam:  General: awake, alert, no acute distress  Cardiovascular:  irregularly irregular rhythm and no murmur noted  Lungs: clear to auscultation  Abdomen: soft, non-distended, mild incisional tenderness only, bowel sounds normal  Skin/Wound: healing well, no drainage, no erythema, well approximated    Labs:  CBC:    Recent Labs     25  0544 25  0423 25  0632   WBC 12.6* 13.3* 11.0   HGB 8.9* 9.3* 8.4*   HCT 27.4* 28.8* 26.0*   PLT 78* 83* 71*     BMP:    Recent Labs     25  0544 25  0632 25  0632   * 137 134*   K 
               [facility]        Progress Note    Patient Name: Donato Obrien  MRN: [id number]  YOB: 1940  Date of Evaluation: 2025    Chief Complaint: Abdominal pain    Subjective:  No acute events overnight  Abdominal pain controlled  No nausea or vomiting, recently returned from barium swallow, has cough but otherwise no acute distress  Resting in bed at this time    Postoperative Day #5      Vital Signs:  Patient Vitals for the past 24 hrs:   BP Temp Temp src Pulse Resp SpO2 Height Weight   25 1031 -- -- -- -- -- -- 1.778 m (5' 10\") --   25 0844 (!) 142/81 98.7 °F (37.1 °C) Axillary 83 15 98 % -- --   25 0700 -- -- -- -- -- -- -- 86.5 kg (190 lb 11.2 oz)   25 2130 128/71 98.6 °F (37 °C) Oral 74 18 95 % -- --   25 1933 -- -- -- 75 18 95 % -- --      TEMPERATURE HISTORY 24H: Temp (24hrs), Av.7 °F (37.1 °C), Min:98.6 °F (37 °C), Max:98.7 °F (37.1 °C)    BLOOD PRESSURE HISTORY: Systolic (36hrs), Av , Min:119 , Max:142    Diastolic (36hrs), Av, Min:65, Max:81      Intake/Output:  I/O last 3 completed shifts:  In: 1044 [IV Piggyback:197.7]  Out: -   No intake/output data recorded.  Drain/tube Output:       Physical Exam:  General: awake, alert, no acute distress  Cardiovascular:  irregularly irregular rhythm and no murmur noted  Lungs: clear to auscultation  Abdomen: soft, non-distended, incisional tenderness only, bowel sounds normal  Skin/Wound: healing well, no drainage, no erythema, well approximated    Labs:  CBC:    Recent Labs     25  0632 25  0652 25  0638   WBC 11.0 10.0 10.1   HGB 8.4* 8.6* 9.2*   HCT 26.0* 26.0* 28.1*   PLT 71* 78* 72*     BMP:    Recent Labs     25  0632 25  0652 25  0638   * 134* 132*   K 3.3* 4.1 4.4    102 100   CO2 23 22 19*   BUN 76* 49* 77*   CREATININE 6.2* 4.8* 6.2*   GLUCOSE 206* 169* 146*     Hepatic:    Recent Labs     25  0632 
               [facility]        Progress Note    Patient Name: Donato Obrien  MRN: [id number]  YOB: 1940  Date of Evaluation: 2025    Chief Complaint: Abdominal pain    Subjective:  No acute events overnight  No abdominal pain  No nausea or vomiting, NGT in place  No flatus or stool  Resting in bed at this time, drowsy      Vital Signs:  Patient Vitals for the past 24 hrs:   BP Temp Temp src Pulse Resp SpO2 Weight   25 0800 97/67 97.4 °F (36.3 °C) Temporal 55 19 100 % --   25 0700 125/75 -- -- 59 18 100 % --   25 0600 110/69 -- -- 57 18 -- --   25 0500 106/69 -- -- 53 20 100 % 83.2 kg (183 lb 6.8 oz)   25 0444 -- 97.1 °F (36.2 °C) Temporal -- -- -- --   25 0400 112/70 -- -- 55 13 -- --   25 0300 94/63 -- -- 61 16 -- --   25 0200 132/73 -- -- 61 18 -- --   25 0100 98/62 -- -- 56 16 -- --   25 0000 118/86 97.5 °F (36.4 °C) Temporal 63 24 100 % --   25 2300 107/71 -- -- 58 19 -- --   25 2200 96/70 -- -- 53 21 -- --   25 2100 98/65 -- -- 55 21 -- --   25 -- 98.3 °F (36.8 °C) Temporal 54 18 -- --   25 204 102/81 -- -- 54 22 100 % --   25 2000 107/63 -- -- 53 20 -- --   25 1800 114/66 -- -- 58 16 100 % --   25 1700 123/83 -- -- 57 18 100 % --   25 1600 112/65 -- -- 54 18 100 % --   25 1500 116/70 -- -- 54 20 100 % --   25 1400 108/66 -- -- 62 21 98 % --   25 1300 116/67 -- -- 60 27 99 % --   25 1200 119/76 97.4 °F (36.3 °C) Temporal 52 15 100 % --   25 1100 106/85 -- -- 57 19 100 % --   25 1000 (!) 91/53 -- -- 54 18 98 % --      TEMPERATURE HISTORY 24H: Temp (24hrs), Av.5 °F (36.4 °C), Min:97.1 °F (36.2 °C), Max:98.3 °F (36.8 °C)    BLOOD PRESSURE HISTORY: Systolic (36hrs), Av , Min:91 , Max:132    Diastolic (36hrs), Av, Min:53, Max:86      Intake/Output:  I/O last 3 completed shifts:  In: 2348.3 [P.O.:240; 
            Gastroenterology Progress Note      Donato Obrien is a 84 y.o. male patient.  1. Acute respiratory failure with hypoxia    2. 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 (HCC)    3. Chest discomfort    4. Odynophagia        SUBJECTIVE:  no abdominal pain.         Physical    VITALS:  BP 94/62   Pulse 63   Temp 97.7 °F (36.5 °C) (Temporal)   Resp 20   Ht 1.778 m (5' 10\")   Wt 82 kg (180 lb 12.4 oz)   SpO2 99%   BMI 25.94 kg/m²   TEMPERATURE:  Current - Temp: 97.7 °F (36.5 °C); Max - Temp  Av.7 °F (36.5 °C)  Min: 97 °F (36.1 °C)  Max: 98.7 °F (37.1 °C)    NAD  RRR  Abdomen soft, ND, NT, no HSM, Bowel sounds normal  Anicteric, no jaundice    Data    Data Review:    Recent Labs     25  0433 25  04425   WBC 18.4* 15.1* 14.4*   HGB 11.7* 11.0* 11.0*   HCT 36.4* 32.8* 34.1*   .9* 102.7* 104.5*    165 144     Recent Labs     253 25  0442 25  0441   * 130* 132*   K 4.4 4.5 3.8   CL 92* 91* 93*   CO2 21 19* 21   BUN 60* 83* 34*   CREATININE 6.0* 7.8* 4.5*     No results for input(s): \"AST\", \"ALT\", \"BILIDIR\", \"BILITOT\", \"ALKPHOS\" in the last 72 hours.    Invalid input(s): \"ALB\"  No results for input(s): \"LIPASE\", \"AMYLASE\" in the last 72 hours.  No results for input(s): \"PROTIME\", \"INR\" in the last 72 hours.  Invalid input(s): \"PTT\"           ASSESSMENT / PLAN      Esophagitis, GOO - EGD 25 with severe esophagitis with hematin, complicated hiatal hernia with paraesophageal component, and dilated stomach full of liquid and food. NG tube was placed. He feels better. Surgery following with plans for upper GI and later surgery (off plavix).   - PPI BID  - NG tube decompression  - f/u upper GI     The above assessment and plan was developed in collaboration with Dr. Michael Chavez, PA-C  [facility]    I have personally performed a face to face diagnostic evaluation on this patient. I have spent more than 50% of the total clinical encounter in 
      Admit Date: 4/15/2025  Diet: Diet NPO  PN-Adult Premix 5/20 - [facility]    CC: SOB    Interval history:   Overnight, there were no acute events.  S/E at bedside.  Reports feeling better overall.  States occ pain in left side of chest with deep breathing  Denies SOB, HA, N/V/D.    Assessment/Plan:   Donato Obrien is a 84 y.o. male with PMH of ESRD on HD, hypothyroidism, hiatal hernia, HFrEF who was admitted with acute hypoxic respiratory failure    Odynophagia d/t complex Paraesophageal hiatal hernia   S/p Laparoscopic HH repair  Noted surgery team, trial of po liquids, advance as tolerated.  Currently on TPN till able to take po, anticipated short term TPN  Continue  post op care and wound care per Surgery team.    Chest pain, left sided, unclear etio, suspected pleuritic  Multivessel CAD with 90% pLAD, 70%mLCx,  RCA   EKG -4/24/25 - Afib, nonspecific ST-T changes  Troponin down trending   Noted cardiology team.    ESRD  Patient is on HD TTS at [place]  - Nephrology consulted    History of COPD exacerbation.  Continue intensive spirometry.  Completed prednisone    Chronic diastolic CHF    Chronic atrial fibrillation  Currently in A-fib, rate controlled    Hypothyroidism  At home, patient is on levothyroxine    HLD  At home, patient is on rosuvastatin      Code Status: Full Code   FEN: Diet NPO  PN-Adult Premix 5/20 - Central   DVT PPX: []Lovenox, [x]Heparin, []Coumadin, []Eliquis, []Xarelto, []SCD  DISPO Pending: TPN, Hernia repair on Wednesday    Lavelle Moore MD  4/26/2025,  9:52 AM    Objective:   Vitals:   T-max:  Patient Vitals for the past 8 hrs:   BP Temp Temp src Pulse Resp SpO2 Weight   04/26/25 0911 -- -- -- -- -- 100 % --   04/26/25 0742 112/60 98 °F (36.7 °C) Oral 61 -- 100 % --   04/26/25 0600 -- -- -- -- -- -- 86.4 kg (190 lb 7.6 oz)   04/26/25 0322 -- -- -- 72 18 93 % --     No intake or output data in the 24 hours ending 04/26/25 0952      Physical Exam  General: Alert, Awake, oriented 
      Admit Date: 4/15/2025  Diet: Diet NPO  PN-Adult Premix 5/20 - Central    CC: SOB    Interval history:   Overnight, there were no acute events.  S/E at bedside.  Reports mild pain in throat.  Denies any CP/Cough/SOB/Abd pain/ N/V/Diarrhea. No HA/Dizziness or any tingling/numbness.     Plan:   - Cont TNP.  Monitor electrolytes.  - NGT in place for decompression per G.Surgery team. Cont perioperative abx and preop optimization for paraesophageal hiatal hernia repair on Wednesday per surgery team  -Continue HD session inpt    Assessment:   Donato Obrien is a 84 y.o. male with PMH of ESRD on HD, hypothyroidism, hiatal hernia, HFrEF who was admitted with acute hypoxic respiratory failure    Odynophagia d/t complex Paraesophageal hiatal hernia   On admission, patient underwent a FL UGI which showed Small sized type II paraesophageal hernia, better evaluated on prior CT due to technical limitations of this in patient upper GI.  - General Surgery consulted  - Cardiology team consult for preop clerance    SOB,On admission, patient was lethargic and required BiPAP.  He was unable to answer questions appropriately.    Recent history of COPD exacerbation.  Continue intensive spirometry.  DC po prednisone    Chronic diastolic CHF  Cardiology team consulted    Chronic atrial fibrillation  Currently in A-fib, rate controlled    ESRD  Patient is on HD TTS at [place]  - Nephrology consulted    Hypothyroidism  At home, patient is on levothyroxine    HLD  At home, patient is on rosuvastatin    Code Status: Full Code   FEN: Diet NPO  PN-Adult Premix 5/20 - Central   DVT PPX: []Lovenox, [x]Heparin, []Coumadin, []Eliquis, []Xarelto, []SCD  DISPO Pending: TPN, Hernia repair on Wednesday    Lavelle Moore MD  4/22/2025,  7:52 AM    Objective:   Vitals:   T-max:  Patient Vitals for the past 8 hrs:   BP Temp Temp src Pulse Resp SpO2 Weight   04/22/25 0600 90/66 -- -- 55 23 (!) 89 % 83.9 kg (184 lb 15.5 oz)   04/22/25 0500 (!) 
      Admit Date: 4/15/2025  Diet: Diet NPO  PN-Adult Premix 5/20 - Central    CC: SOB    Interval history:   Overnight, there were no acute events. Patient's vitals remained stable    Patient was seen this morning. I discussed the plan of the day with him in detail. All questions were addressed and answered to patient's satisfaction. Pt endorses he is doing well  Patient denies fevers, chills, nausea, vomiting, chest pain, diarrhea, constipation, dysuria, urinary frequency or urgency.     Plan:     -Per general surgery, start TPN, CVC in place  -Plan for paraesophageal hiatal hernia repair on Wednesday  -Continue HD session inpt    Assessment:   Donato Obrien is a 84 y.o. male with PMH of ESRD on HD, hypothyroidism, hiatal hernia, HFrEF who was admitted with acute hypoxic respiratory failure    SOB  On admission, patient was lethargic and required BiPAP.  He was unable to answer questions appropriately.  -Critical care consulted    Paraesophageal hiatal hernia   On admission, patient underwent a FL UGI which showed Small sized type II paraesophageal hernia, better evaluated on prior CT due to technical limitations of this in patient upper GI.  - General Surgery consulted    ESRD  Patient is on HD TTS at [place]  - Nephrology consulted    Hypothyroidism  At home, patient is on levothyroxine    HLD  At home, patient is on rosuvastatin    Code Status: Full Code   FEN: Diet NPO  PN-Adult Premix 5/20 - Central   DVT PPX: []Lovenox, [x]Heparin, []Coumadin, []Eliquis, []Xarelto, []SCD  DISPO Pending: TPN, Hernia repair on Wednesday    Mark Austin MD  4/21/2025,  1:42 PM    This note was likely completed using voice recognition technology and may contain unintended errors.     Objective:   Vitals:   T-max:  Patient Vitals for the past 8 hrs:   BP Temp Temp src Pulse Resp SpO2   04/21/25 1302 -- -- -- -- -- 95 %   04/21/25 1300 99/66 -- -- 51 18 100 %   04/21/25 1200 96/71 96.9 °F (36.1 °C) Temporal (!) 49 16 100 % 
      Admit Date: 4/15/2025  Diet: Diet NPO  PN-Adult Premix 5/20 - Central  PN-Adult Premix 5/20 - Central    CC: SOB    Interval history:   Overnight, there were no acute events.  S/E at bedside.  Reports continued pain in left side of chest.  Reports mild abdominal pain.  Denies SOB, HA, N/V/D.    Assessment/Plan:   Donato Obrien is a 84 y.o. male with PMH of ESRD on HD, hypothyroidism, hiatal hernia, HFrEF who was admitted with acute hypoxic respiratory failure    Odynophagia d/t complex Paraesophageal hiatal hernia   S/p Laparoscopic HH repair  Noted surgery team, planning to dc NGT today  Currently on TPN via femoral line, f/u surgery team about duration of TPN need  Continue  post op care and monitoring, perioperative antibiotics, wound care per Surgery team.    Chest pain, left sided, unclear etio, persistent  Multivessel CAD with 90% pLAD, 70%mLCx,  RCA   EKG -4/24/25 - Afib, nonspecific ST-T changes  Troponins trending down but chest pain persistent  F/u cardiology team.    ESRD  Patient is on HD TTS at [place]  - Nephrology consulted    History of COPD exacerbation.  Continue intensive spirometry.  Completed prednisone    Chronic diastolic CHF    Chronic atrial fibrillation  Currently in A-fib, rate controlled    Hypothyroidism  At home, patient is on levothyroxine    HLD  At home, patient is on rosuvastatin      Code Status: Full Code   FEN: Diet NPO  PN-Adult Premix 5/20 - Central  PN-Adult Premix 5/20 - Central   DVT PPX: []Lovenox, [x]Heparin, []Coumadin, []Eliquis, []Xarelto, []SCD  DISPO Pending: TPN, Hernia repair on Wednesday    Lavelle Moore MD  4/25/2025,  9:08 AM    Objective:   Vitals:   T-max:  Patient Vitals for the past 8 hrs:   BP Temp Temp src Pulse Resp SpO2 Weight   04/25/25 0840 -- -- -- -- 14 -- --   04/25/25 0758 -- -- -- -- 16 94 % --   04/25/25 0743 119/69 97.2 °F (36.2 °C) Oral 74 18 95 % --   04/25/25 0600 -- -- -- -- -- -- 85 kg (187 lb 6.3 oz)       Intake/Output 
      Admit Date: 4/15/2025  Diet: Diet NPO  PN-Adult Premix 5/20 - Standard Electrolytes - Central Line    CC: SOB    Interval history:   Overnight, there were no acute events.  S/E at bedside.  Reports mild pain in left side of chest with deep breathing.  Reports mild cough. No SOB, HA, N/V/D.  Denies HA/Dizziness or any tingling/numbness.     Assessment/Plan:   Donato Obrien is a 84 y.o. male with PMH of ESRD on HD, hypothyroidism, hiatal hernia, HFrEF who was admitted with acute hypoxic respiratory failure    Odynophagia d/t complex Paraesophageal hiatal hernia   S/p Laparoscopic HH repair  Continue diet, post op care and monitoring, perioperative antibiotics,  wound care per Surgery team.    Chest pain, left sided, unclear etio  Multivessel CAD with 90% pLAD, 70%mLCx,  RCA   EKG -4/24/25 - Afib, nonspecific ST-T changes  Obtain cardiology team consult - requested    ESRD  Patient is on HD TTS at [place]  - Nephrology consulted    History of COPD exacerbation.  Continue intensive spirometry.  Completed prednisone    Chronic diastolic CHF    Chronic atrial fibrillation  Currently in A-fib, rate controlled    Hypothyroidism  At home, patient is on levothyroxine    HLD  At home, patient is on rosuvastatin      Code Status: Full Code   FEN: Diet NPO  PN-Adult Premix 5/20 - Standard Electrolytes - Central Line   DVT PPX: []Lovenox, [x]Heparin, []Coumadin, []Eliquis, []Xarelto, []SCD  DISPO Pending: TPN, Hernia repair on Wednesday    Lavelle Moore MD  4/24/2025,  8:20 AM    Objective:   Vitals:   T-max:  Patient Vitals for the past 8 hrs:   BP Temp Temp src Pulse Resp SpO2 Weight   04/24/25 0757 (!) 116/55 97.8 °F (36.6 °C) Oral 55 18 92 % --   04/24/25 0600 -- -- -- -- -- -- 83.9 kg (185 lb)       Intake/Output Summary (Last 24 hours) at 4/24/2025 0820  Last data filed at 4/23/2025 1503  Gross per 24 hour   Intake 800 ml   Output 20 ml   Net 780 ml       Physical Exam  General: Alert, Awake, oriented x 3, 
      Admit Date: 4/15/2025  Diet: PN-Adult Premix 5/20 - Standard Electrolytes - Central Line  Diet NPO  PN-Adult Premix 5/20 - Standard Electrolytes - Central Line    CC: SOB    Interval history:   Pt had episodes of vomiting this morning as well as dyspnea this am .appears sleepy  Assessment/Plan:   Donato Obrien is a 84 y.o. male with PMH of ESRD on HD, hypothyroidism, hiatal hernia, HFrEF who was admitted with acute hypoxic respiratory failure    Odynophagia d/t complex Paraesophageal hiatal hernia   S/p Laparoscopic HH repair  NPO   DC Femoral line if okay with midline per nephrology team, cont TPN till able to take po per surgery team.  Continue  post op care and wound care per Surgery team  As per goals of care, will get GI on board for  peg tube when more stable from resp standpoint    Acute hypoxic respiratory failure  On 4 L 02. Xr chest show scattered opacities, suspect aspiration.  Start rocephin and flagyl iv    Vomiting  D/w gen sx . Obtain KUB. Minimize opioids  . Iv zofran  . May need  ng tube       Chest pain, left sided, unclear etio, suspected pleuritic  Multivessel CAD with 90% pLAD, 70%mLCx,  RCA   EKG -4/24/25 - Afib, nonspecific ST-T changes  Troponin down trending   Noted cardiology team, no intervention    ESRD  Patient is on HD TTS at [place]  - Nephrology consulted    History of COPD exacerbation.  Continue intensive spirometry.  Completed prednisone    Chronic diastolic CHF  Not in vol overload    Chronic atrial fibrillation  Currently in A-fib, rate controlled    Hypothyroidism  At home, patient is on levothyroxine    HLD  At home, patient is on rosuvastatin    Code Status: Full Code   FEN: PN-Adult Premix 5/20 - Standard Electrolytes - Central Line  Diet NPO  PN-Adult Premix 5/20 - Standard Electrolytes - Central Line   DVT PPX: []Lovenox, [x]Heparin, []Coumadin, []Eliquis, []Xarelto, []SCD  DISPO Pending: TPN, Hernia repair on Wednesday    Miguel Campo MD  4/30/2025,  2:05 
     [facility]        PATIENT NAME: Donato Obrien     TODAY'S DATE: 4/18/2025    CC: Abd pain  SUBJECTIVE:    Pt denies p[ain in the abd today  NG in place, no assoc emesis, denies CP or SOB.  UGI ordered.  Plavix held     Current Medications:   Current Facility-Administered Medications: iohexol (OMNIPAQUE 350) 350 MG/ML solution, , ,   dextrose 5 % and 0.45 % sodium chloride infusion, , IntraVENous, Continuous  gabapentin (NEURONTIN) capsule 100 mg, 100 mg, Oral, Q8H  heparin (porcine) injection 3,600 Units, 3,600 Units, IntraCATHeter, PRN  phenol 1.4 % mouth spray 1 spray, 1 spray, Mouth/Throat, Q2H PRN  benzocaine-menthol (CEPACOL SORE THROAT) lozenge 1 lozenge, 1 lozenge, Buccal, PRN  pantoprazole (PROTONIX) injection 40 mg, 40 mg, IntraVENous, BID  aluminum & magnesium hydroxide-simethicone (MAALOX PLUS) 30 mL, lidocaine viscous hcl (XYLOCAINE) 5 mL (GI COCKTAIL), , Oral, 4x Daily PRN  albuterol (PROVENTIL) (2.5 MG/3ML) 0.083% nebulizer solution 2.5 mg, 2.5 mg, Nebulization, Q6H PRN  allopurinol (ZYLOPRIM) tablet 100 mg, 100 mg, Oral, Daily  amLODIPine (NORVASC) tablet 5 mg, 5 mg, Oral, Daily  aspirin-acetaminophen-caffeine (EXCEDRIN MIGRAINE) per tablet 1 tablet, 1 tablet, Oral, Q6H PRN  budesonide-formoterol (SYMBICORT) 160-4.5 MCG/ACT inhaler 2 puff, 2 puff, Inhalation, BID RT  [Held by provider] clopidogrel (PLAVIX) tablet 75 mg, 75 mg, Oral, Daily  calcium acetate (PHOSLO) capsule 1,334 mg, 2 capsule, Oral, TID AC  b complex-C-folic acid (NEPHROCAPS) capsule 1 mg, 1 capsule, Oral, Daily  isosorbide mononitrate (IMDUR) extended release tablet 30 mg, 30 mg, Oral, Daily  levothyroxine (SYNTHROID) tablet 50 mcg, 50 mcg, Oral, Daily  rosuvastatin (CRESTOR) tablet 20 mg, 20 mg, Oral, Nightly  predniSONE (DELTASONE) tablet 40 mg, 40 mg, Oral, Daily  sodium chloride flush 0.9 % injection 5-40 mL, 5-40 mL, IntraVENous, 2 times per day  sodium chloride flush 0.9 % injection 5-40 
   04/18/25 2019   NIV Type   NIV Started/Stopped Off  (pt. refused bipap for hs)       
  Clinical Pharmacy Note     Pharmacy consulted to manage TPN     Current TPN rate: 25 mL/hr  Goal TPN rate: 83ml/hr     Access: L femoral CVC  Indication: complex paraesophageal hiatal hernia, anticipated prolonged NPO status    Labs:  General Labs:  BMP:    Lab Results   Component Value Date/Time     04/26/2025 06:32 AM    K 3.3 04/26/2025 06:32 AM    K 3.7 04/20/2025 04:57 AM     04/26/2025 06:32 AM    CO2 23 04/26/2025 06:32 AM    BUN 76 04/26/2025 06:32 AM    CREATININE 6.2 04/26/2025 06:32 AM    CALCIUM 8.4 04/26/2025 06:32 AM    GFRAA 34 10/14/2013 08:07 PM    GFRAA 43 08/05/2010 03:36 PM    LABGLOM 8 04/26/2025 06:32 AM    LABGLOM 6 11/30/2023 04:28 AM    GLUCOSE 206 04/26/2025 06:32 AM     Magnesium:    Lab Results   Component Value Date/Time    MG 1.88 04/26/2025 06:32 AM     Phosphorus:    Lab Results   Component Value Date/Time    PHOS 2.9 04/26/2025 06:32 AM       Electrolyte replacement as follows:   K 3.3, replaced with potassium chloride 10 mEq IVPB x2 doses   TPN is ordered without standard electrolytes given patient is a dialysis patient. Will plan to replete outside the TPN as needed.     Blood glucose over past 24 hours: 125-206    Blood glucose management:  Plan to Continue Humalog q4 hour sliding scale at low dose.    Plan to continue TPN at 83 ml/hr.    Thank you for allowing pharmacy to participate in the care of this patient.    Yaquelin Gonzales, PharmD  PGY-1 Pharmacy Resident      
  Clinical Pharmacy Note     Pharmacy consulted to manage TPN     Current TPN rate: 83 mL/hr  Goal TPN rate: 83 ml/hr     Access: L femoral CVC  Indication: complex paraesophageal hiatal hernia, anticipated prolonged NPO status    Labs:  General Labs:  BMP:    Lab Results   Component Value Date/Time     04/27/2025 06:52 AM    K 4.1 04/27/2025 06:52 AM    K 3.7 04/20/2025 04:57 AM     04/27/2025 06:52 AM    CO2 22 04/27/2025 06:52 AM    BUN 49 04/27/2025 06:52 AM    CREATININE 4.8 04/27/2025 06:52 AM    CALCIUM 8.4 04/27/2025 06:52 AM    GFRAA 34 10/14/2013 08:07 PM    GFRAA 43 08/05/2010 03:36 PM    LABGLOM 11 04/27/2025 06:52 AM    LABGLOM 6 11/30/2023 04:28 AM    GLUCOSE 169 04/27/2025 06:52 AM     Magnesium:    Lab Results   Component Value Date/Time    MG 1.74 04/27/2025 06:52 AM     Phosphorus:    Lab Results   Component Value Date/Time    PHOS 1.0 04/27/2025 06:52 AM       Electrolyte replacement as follows:   TPN is ordered without standard electrolytes given patient is a dialysis patient, electrolyte replacement added to the bag as needed.   None    Blood glucose over past 24 hours: 142-169    Blood glucose management:  Plan to Continue Humalog q4 hour sliding scale at low dose.    Plan to continue TPN at 83 ml/hr.    Thank you for allowing pharmacy to participate in the care of this patient.    Yaquelin Gonzales, PharmD  PGY-1 Pharmacy Resident      
  Clinical Pharmacy Note     Pharmacy consulted to manage TPN     Current TPN rate: 83 mL/hr  Goal TPN rate: 83 ml/hr     Access: L femoral CVC  Indication: complex paraesophageal hiatal hernia, anticipated prolonged NPO status    Labs:  General Labs:  BMP:    Lab Results   Component Value Date/Time     04/28/2025 06:38 AM    K 4.4 04/28/2025 06:38 AM    K 3.7 04/20/2025 04:57 AM     04/28/2025 06:38 AM    CO2 19 04/28/2025 06:38 AM    BUN 77 04/28/2025 06:38 AM    CREATININE 6.2 04/28/2025 06:38 AM    CALCIUM 8.9 04/28/2025 06:38 AM    GFRAA 34 10/14/2013 08:07 PM    GFRAA 43 08/05/2010 03:36 PM    LABGLOM 8 04/28/2025 06:38 AM    LABGLOM 6 11/30/2023 04:28 AM    GLUCOSE 146 04/28/2025 06:38 AM     Magnesium:    Lab Results   Component Value Date/Time    MG 2.04 04/28/2025 06:38 AM     Phosphorus:    Lab Results   Component Value Date/Time    PHOS 1.3 04/28/2025 06:38 AM       Electrolyte replacement as follows:   Give NaPhos 30 mmol x1   Will go back to standard electrolytes in TPN  Continue to hold Phoslo    Blood glucose over past 24 hours: 146-187    Blood glucose management:  Plan to Continue Humalog q6 hour sliding scale at low dose.    Plan to continue TPN at 83 ml/hr.    Thank you for allowing pharmacy to participate in the care of this patient.    Evelia Villar, PharmD, BCPS  [id number]  4/28/2025 8:33 AM           
  Clinical Pharmacy Note     Pharmacy consulted to manage TPN     Current TPN rate: 83 mL/hr  Goal TPN rate: 83 ml/hr     Access: L femoral CVC  Indication: complex paraesophageal hiatal hernia, anticipated prolonged NPO status    Labs:  General Labs:  BMP:    Lab Results   Component Value Date/Time     04/29/2025 05:40 AM    K 4.3 04/29/2025 05:40 AM    K 3.7 04/20/2025 04:57 AM     04/29/2025 05:40 AM    CO2 18 04/29/2025 05:40 AM    BUN 99 04/29/2025 05:40 AM    CREATININE 7.6 04/29/2025 05:40 AM    CALCIUM 8.4 04/29/2025 05:40 AM    GFRAA 34 10/14/2013 08:07 PM    GFRAA 43 08/05/2010 03:36 PM    LABGLOM 6 04/29/2025 05:40 AM    LABGLOM 6 11/30/2023 04:28 AM    GLUCOSE 128 04/29/2025 05:40 AM     Magnesium:    Lab Results   Component Value Date/Time    MG 2.07 04/29/2025 05:40 AM     Phosphorus:    Lab Results   Component Value Date/Time    PHOS 3.5 04/29/2025 05:40 AM       Electrolyte replacement as follows:   None needed    Blood glucose over past 24 hours: 128-212    Blood glucose management:  Plan to Continue Humalog q6 hour sliding scale at low dose.    Plan to continue TPN at 83 ml/hr.    Thank you for allowing pharmacy to participate in the care of this patient.    Evelia Villar, PharmD, [facility]  [id number]  4/29/2025 10:21 AM           
  Clinical Pharmacy Note     Pharmacy consulted to manage TPN     Current TPN rate: 83 mL/hr  Goal TPN rate: 83 ml/hr     Access: L femoral CVC  Indication: complex paraesophageal hiatal hernia, anticipated prolonged NPO status    Labs:  General Labs:  BMP:    Lab Results   Component Value Date/Time     04/30/2025 07:05 AM    K 4.3 04/30/2025 07:05 AM    K 3.7 04/20/2025 04:57 AM    CL 94 04/30/2025 07:05 AM    CO2 26 04/30/2025 07:05 AM    BUN 61 04/30/2025 07:05 AM    CREATININE 5.0 04/30/2025 07:05 AM    CALCIUM 7.9 04/30/2025 07:05 AM    GFRAA 34 10/14/2013 08:07 PM    GFRAA 43 08/05/2010 03:36 PM    LABGLOM 11 04/30/2025 07:05 AM    LABGLOM 6 11/30/2023 04:28 AM    GLUCOSE 186 04/30/2025 07:05 AM     Magnesium:    Lab Results   Component Value Date/Time    MG 2.02 04/30/2025 07:05 AM     Phosphorus:    Lab Results   Component Value Date/Time    PHOS 3.7 04/30/2025 07:05 AM       Electrolyte replacement as follows:   None needed    Blood glucose over past 24 hours: 186-213    Blood glucose management:  Plan to Increase Humalog q6 hour sliding scale at medium dose    Plan to continue TPN at 83 ml/hr.    Thank you for allowing pharmacy to participate in the care of this patient.    Evelia Villar, PharmD, [facility]  [id number]  4/30/2025 9:18 AM           
  Clinical Pharmacy Note    Pharmacy consulted by Dr. Lima to manage TPN    Current TPN rate: 0 ml/hr - new start  Goal TPN rate: TBD pending dietitian recs    Access: none yet, central line to be placed today  Indication: complex paraesophageal hiatal hernia, anticipated prolonged NPO status    Labs:  General Labs:  BMP:    Lab Results   Component Value Date/Time     04/19/2025 04:32 AM    K 3.7 04/19/2025 04:32 AM    CL 93 04/19/2025 04:32 AM    CO2 23 04/19/2025 04:32 AM    BUN 47 04/19/2025 04:32 AM    CREATININE 6.8 04/19/2025 04:32 AM    CALCIUM 8.6 04/19/2025 04:32 AM    GFRAA 34 10/14/2013 08:07 PM    GFRAA 43 08/05/2010 03:36 PM    LABGLOM 7 04/19/2025 04:32 AM    LABGLOM 6 11/30/2023 04:28 AM    GLUCOSE 127 04/19/2025 04:32 AM     Magnesium:    Lab Results   Component Value Date/Time    MG 2.80 03/29/2025 05:52 AM     Phosphorus:    Lab Results   Component Value Date/Time    PHOS 6.4 04/10/2025 04:57 AM       Electrolyte replacement as follows:   No replacement required today  Patient did receive HD today  TPN is ordered without standard electrolytes given patient is a dialysis patient. Will plan to replete outside the TPN as needed.    Blood glucose over past 24 hours: n/a    Blood glucose management:  Will add low-dose sliding scale insulin with TPN initiation.    Plan to initiate TPN at 25 ml/hr.    Thank you for allowing pharmacy to participate in the care of this patient.    Hortencia Devine [facility], PharmD 4/19/2025  
  Clinical Pharmacy Note    Pharmacy consulted to manage TPN    Current TPN rate: 0 mlhr - new start (patient refused central line past 2 days)  Goal TPN rate: 83ml/hr    Access: L femoral CVC  Indication: complex paraesophageal hiatal hernia, anticipated prolonged NPO status    Labs:  General Labs:  BMP:    Lab Results   Component Value Date/Time     04/21/2025 04:22 AM    K 3.8 04/21/2025 04:22 AM    K 3.7 04/20/2025 04:57 AM    CL 95 04/21/2025 04:22 AM    CO2 23 04/21/2025 04:22 AM    BUN 33 04/21/2025 04:22 AM    CREATININE 6.0 04/21/2025 04:22 AM    CALCIUM 8.4 04/21/2025 04:22 AM    GFRAA 34 10/14/2013 08:07 PM    GFRAA 43 08/05/2010 03:36 PM    LABGLOM 9 04/21/2025 04:22 AM    LABGLOM 6 11/30/2023 04:28 AM    GLUCOSE 136 04/21/2025 04:22 AM     Magnesium:    Lab Results   Component Value Date/Time    MG 2.33 04/21/2025 04:22 AM     Phosphorus:    Lab Results   Component Value Date/Time    PHOS 6.3 04/21/2025 04:22 AM       Electrolyte replacement as follows:   No replacement today  TPN is ordered without standard electrolytes given patient is a dialysis patient. Will plan to replete outside the TPN as needed.    Blood glucose over past 24 hours: 111-136    Blood glucose management:  Plan to Continue Humalog q4 hour sliding scale at low dose.    Plan to start TPN at 25ml/hr today    Thank you for allowing pharmacy to participate in the care of this patient.    Wesley Arias [facility], PharmD 4/21/2025    
  Clinical Pharmacy Note    Pharmacy consulted to manage TPN    Current TPN rate: 0 mlhr - new start (patient refused central line yesterday)  Goal TPN rate: 83ml/hr    Access: none yet, patient now agreeable to central line  Indication: complex paraesophageal hiatal hernia, anticipated prolonged NPO status    Labs:  General Labs:  BMP:    Lab Results   Component Value Date/Time     04/20/2025 04:57 AM    K 3.7 04/20/2025 04:57 AM    CL 96 04/20/2025 04:57 AM    CO2 23 04/20/2025 04:57 AM    BUN 24 04/20/2025 04:57 AM    CREATININE 4.3 04/20/2025 04:57 AM    CALCIUM 8.6 04/20/2025 04:57 AM    GFRAA 34 10/14/2013 08:07 PM    GFRAA 43 08/05/2010 03:36 PM    LABGLOM 13 04/20/2025 04:57 AM    LABGLOM 6 11/30/2023 04:28 AM    GLUCOSE 126 04/20/2025 04:57 AM     Magnesium:    Lab Results   Component Value Date/Time    MG 2.80 03/29/2025 05:52 AM     Phosphorus:    Lab Results   Component Value Date/Time    PHOS 4.7 04/20/2025 04:57 AM       Electrolyte replacement as follows:   No replacement today  TPN is ordered without standard electrolytes given patient is a dialysis patient. Will plan to replete outside the TPN as needed.    Blood glucose over past 24 hours: 109-134    Blood glucose management:  Plan to Continue Humalog q4 hour sliding scale at low dose.    Plan to start TPN at 25ml/hr today once central line is placed.    Thank you for allowing pharmacy to participate in the care of this patient.    Hortencia Devine [facility], PharmD 4/20/2025  
  Clinical Pharmacy Note    Pharmacy consulted to manage TPN    Current TPN rate: 25 mL/hr  Goal TPN rate: 83ml/hr    Access: L femoral CVC  Indication: complex paraesophageal hiatal hernia, anticipated prolonged NPO status    Labs:  General Labs:  BMP:    Lab Results   Component Value Date/Time     04/22/2025 05:00 AM    K 3.4 04/22/2025 05:00 AM    K 3.7 04/20/2025 04:57 AM    CL 94 04/22/2025 05:00 AM    CO2 24 04/22/2025 05:00 AM    BUN 42 04/22/2025 05:00 AM    CREATININE 7.7 04/22/2025 05:00 AM    CALCIUM 8.0 04/22/2025 05:00 AM    GFRAA 34 10/14/2013 08:07 PM    GFRAA 43 08/05/2010 03:36 PM    LABGLOM 6 04/22/2025 05:00 AM    LABGLOM 6 11/30/2023 04:28 AM    GLUCOSE 146 04/22/2025 05:00 AM     Magnesium:    Lab Results   Component Value Date/Time    MG 2.23 04/22/2025 05:00 AM     Phosphorus:    Lab Results   Component Value Date/Time    PHOS 6.7 04/22/2025 05:00 AM     Electrolyte replacement as follows:   K 3.4, replaced with potassium chloride 10 mEq IVPB x2 doses  TPN is ordered without standard electrolytes given patient is a dialysis patient. Will plan to replete outside the TPN as needed.    Blood glucose over past 24 hours: 135-162    Blood glucose management:  Plan to Continue Humalog q4 hour sliding scale at low dose.    Plan to increase TPN to 40 mL/hr today    Thank you for allowing pharmacy to participate in the care of this patient.    Wesley Arias, [facility], PharmD 4/22/2025  
  Clinical Pharmacy Note    Pharmacy consulted to manage TPN    Current TPN rate: 40 mL/hr  Goal TPN rate: 83 ml/hr    Access: L femoral CVC  Indication: complex paraesophageal hiatal hernia, anticipated prolonged NPO status    Labs:  General Labs:  BMP:    Lab Results   Component Value Date/Time     04/23/2025 09:17 AM    K 3.5 04/23/2025 09:17 AM    K 3.7 04/20/2025 04:57 AM    CL 94 04/23/2025 09:17 AM    CO2 22 04/23/2025 09:17 AM    BUN 31 04/23/2025 09:17 AM    CREATININE 5.9 04/23/2025 09:17 AM    CALCIUM 8.2 04/23/2025 09:17 AM    GFRAA 34 10/14/2013 08:07 PM    GFRAA 43 08/05/2010 03:36 PM    LABGLOM 9 04/23/2025 09:17 AM    LABGLOM 6 11/30/2023 04:28 AM    GLUCOSE 113 04/23/2025 09:17 AM     Magnesium:    Lab Results   Component Value Date/Time    MG 1.99 04/23/2025 09:17 AM     Phosphorus:    Lab Results   Component Value Date/Time    PHOS 4.6 04/23/2025 09:17 AM     Electrolyte replacement as follows:   None needed  Phos 6.7 -> 4.6 (did get dialysis yesterday), TPN formulation changed to contain standard electrolytes  Will see how patient handles this, K on low side and phos trended down    Blood glucose over past 24 hours: 102-170    Blood glucose management:  Insulin lispro decreased to q6h frequency at low dose sliding scale    Plan to increase TPN to 83 mL/hr today    Thank you for allowing pharmacy to participate in the care of this patient.    Evelia Villar, PharmD, BCPS  [id number]  4/23/2025 10:29 AM     
  Clinical Pharmacy Note    Pharmacy consulted to manage TPN    Current TPN rate: 83 mL/hr  Goal TPN rate: 83 ml/hr    Access: L femoral CVC  Indication: complex paraesophageal hiatal hernia, anticipated prolonged NPO status    Labs:  General Labs:  BMP:    Lab Results   Component Value Date/Time     04/24/2025 05:44 AM    K 3.6 04/24/2025 05:44 AM    K 3.7 04/20/2025 04:57 AM    CL 95 04/24/2025 05:44 AM    CO2 20 04/24/2025 05:44 AM    BUN 50 04/24/2025 05:44 AM    CREATININE 7.0 04/24/2025 05:44 AM    CALCIUM 8.0 04/24/2025 05:44 AM    GFRAA 34 10/14/2013 08:07 PM    GFRAA 43 08/05/2010 03:36 PM    LABGLOM 7 04/24/2025 05:44 AM    LABGLOM 6 11/30/2023 04:28 AM    GLUCOSE 187 04/24/2025 05:44 AM     Magnesium:    Lab Results   Component Value Date/Time    MG 2.01 04/24/2025 05:44 AM     Phosphorus:    Lab Results   Component Value Date/Time    PHOS 6.4 04/24/2025 05:44 AM     Electrolyte replacement as follows:   None needed    Blood glucose over past 24 hours: 151-193    Blood glucose management:  Continue insulin lispro at q6h frequency at low dose sliding scale    Plan to continue TPN to 83 mL/hr today    Thank you for allowing pharmacy to participate in the care of this patient.    Evelia Villar, PharmD, BCPS  [id number]  4/24/2025 12:53 PM     
  Clinical Pharmacy Note    Pharmacy consulted to manage TPN    Current TPN rate: 83 mL/hr  Goal TPN rate: 83 ml/hr    Access: L femoral CVC  Indication: complex paraesophageal hiatal hernia, anticipated prolonged NPO status    Labs:  General Labs:  BMP:    Lab Results   Component Value Date/Time     04/25/2025 06:32 AM    K 3.0 04/25/2025 06:32 AM    K 3.7 04/20/2025 04:57 AM     04/25/2025 06:32 AM    CO2 23 04/25/2025 06:32 AM    BUN 48 04/25/2025 06:32 AM    CREATININE 5.0 04/25/2025 06:32 AM    CALCIUM 8.4 04/25/2025 06:32 AM    GFRAA 34 10/14/2013 08:07 PM    GFRAA 43 08/05/2010 03:36 PM    LABGLOM 11 04/25/2025 06:32 AM    LABGLOM 6 11/30/2023 04:28 AM    GLUCOSE 157 04/25/2025 06:32 AM     Magnesium:    Lab Results   Component Value Date/Time    MG 1.94 04/25/2025 06:32 AM     Phosphorus:    Lab Results   Component Value Date/Time    PHOS 3.5 04/25/2025 06:32 AM     Electrolyte replacement as follows:   Give 20 mEq KCl IV x2 for total 40 mEq  Added 40 mEq KCl to TPN today - can add more if needed, starting slow d/t ESRD  On custom TPN d/t ESRD and phosphorous levels fluctuating   Normally on phos binder PO    Blood glucose over past 24 hours: 162-196    Blood glucose management:  Continue insulin lispro at q6h frequency at low dose sliding scale    Plan to continue TPN to 83 mL/hr today    Thank you for allowing pharmacy to participate in the care of this patient.    Evelia Villar, PharmD, BCPS  [id number]  4/25/2025 8:32 AM     
  [facility] - Inpatient Rehabilitation Department   Phone: [phone]    Occupational Therapy    [] Initial Evaluation            [x] Daily Treatment Note         [] Discharge Summary      Patient: Donato Obrien   : 1940   MRN: [id number]   Date of Service:  2025    Admitting Diagnosis:  Acute respiratory failure with hypoxia  Current Admission Summary: Donato Obrien is a 84 y.o. male with lethargic on BiPAP and ICM will squeeze hands to command but unable to answer many questions patient was recommended to hospital by  of past for dyspnea secondary to volume overload and possible pneumonia and COPD treated with antibiotics and prednisone. In the ED patient was short of breath with increased work of breathing and placed on BiPAP x-ray did not show any acute pathology does have a history of ESRD on HD   Past Medical History:  has a past medical history of A-fib (HCC), Arthritis, Blood clot, Fracture of sternum, Fracture rib, Hemodialysis patient, Hyperlipidemia, Hypertension, Laceration of skin of lower leg, left, initial encounter, Laceration of skin of lower leg, right, initial encounter, NSTEMI (non-ST elevated myocardial infarction) ([facility]), Prostate cancer ([facility]), and Type II or unspecified type diabetes mellitus without mention of complication, not stated as uncontrolled.  Past Surgical History:  has a past surgical history that includes TURP (); sigmoidoscopy (N/A, 3/9/2023); Upper gastrointestinal endoscopy (N/A, 2025); and Upper gastrointestinal endoscopy (2025).    Discharge Recommendations: Donato Obrien scored a 15/24 on the AM-PAC ADL Inpatient form. Current research shows that an AM-PAC score of 17 or less is typically not associated with a discharge to the patient's home setting. Based on the patient's AM-PAC score and their current ADL deficits, it is recommended that the patient have 3-5 sessions per week of Occupational Therapy at d/c to increase the 
  [facility] - Inpatient Rehabilitation Department   Phone: [phone]    Physical Therapy    [] Initial Evaluation            [x] Daily Treatment Note         [] Discharge Summary      Patient: Donato Obrien   : 1940   MRN: [id number]   Date of Service:  2025  Admitting Diagnosis: Acute respiratory failure with hypoxia  Current Admission Summary:  Donato Obrien is a 84 y.o. male with lethargic on BiPAP and ICM will squeeze hands to command but unable to answer many questions patient was recommended to hospital by  of past for dyspnea secondary to volume overload and possible pneumonia and COPD treated with antibiotics and prednisone.  In the ED patient was short of breath with increased work of breathing and placed on BiPAP x-ray did not show any acute pathology does have a history of ESRD on HD   Past Medical History:  has a past medical history of A-fib (HCC), Arthritis, Blood clot, Fracture of sternum, Fracture rib, Hemodialysis patient, Hyperlipidemia, Hypertension, Laceration of skin of lower leg, left, initial encounter, Laceration of skin of lower leg, right, initial encounter, NSTEMI (non-ST elevated myocardial infarction) ([facility]), Prostate cancer ([facility]), and Type II or unspecified type diabetes mellitus without mention of complication, not stated as uncontrolled.  Past Surgical History:  has a past surgical history that includes TURP (); sigmoidoscopy (N/A, 3/9/2023); Upper gastrointestinal endoscopy (N/A, 2025); and Upper gastrointestinal endoscopy (2025).  Discharge Recommendations: Donato Obrien scored a  on the AM-PAC short mobility form. Current research shows that an AM-PAC score of 17 or less is typically not associated with a discharge to the patient's home setting. Based on the patient's AM-PAC score and their current functional mobility deficits, it is recommended that the patient have 3-5 sessions per week of Physical Therapy at d/c to 
  [facility] - Inpatient Rehabilitation Department   Phone: [phone]    Occupational Therapy    [] Initial Evaluation            [x] Daily Treatment Note         [] Discharge Summary      Patient: Donato Obrien   : 1940   MRN: [id number]   Date of Service:  2025    Admitting Diagnosis:  Acute respiratory failure with hypoxia (HCC)    Current Admission Summary: Donato Obrien is a 84 y.o. male with lethargic on BiPAP and ICM will squeeze hands to command but unable to answer many questions patient was recommended to hospital by  of past for dyspnea secondary to volume overload and possible pneumonia and COPD treated with antibiotics and prednisone. In the ED patient was short of breath with increased work of breathing and placed on BiPAP x-ray did not show any acute pathology does have a history of ESRD on HD    DATE OF PROCEDURE:  2025   SURGEON:  Karthik Borjas MD   PREOPERATIVE DIAGNOSIS:  Paraesophageal hiatal hernia, complex type 4 hernia with stomach obstruction and transverse colon in hernia.   POSTOPERATIVE DIAGNOSIS:  Paraesophageal hiatal hernia, complex type 4 hernia with stomach obstruction and transverse colon in hernia.   PROCEDURES:  Laparoscopic paraesophageal hiatal hernia repair, reinforcement with [employer] mesh.         Past Medical History:  has a past medical history of A-fib (HCC), Arthritis, Blood clot, Fracture of sternum, Fracture rib, Hemodialysis patient, Hyperlipidemia, Hypertension, Laceration of skin of lower leg, left, initial encounter, Laceration of skin of lower leg, right, initial encounter, NSTEMI (non-ST elevated myocardial infarction) (HCC), Prostate cancer (HCC), and Type II or unspecified type diabetes mellitus without mention of complication, not stated as uncontrolled.  Past Surgical History:  has a past surgical history that includes TURP (); sigmoidoscopy (N/A, 3/9/2023); Upper gastrointestinal endoscopy (N/A, 
  Nephrology Progress Note  [facility]  [phone]   [employer]    Patient:  Donato Obrien   : 1940    CC:  ESRD       Subjective/interval history  Patient seen and examined  Blood pressure is maintained  Has been afebrile  On minimal oxygen  Hypokalemic this AM  C/p throat discomfort    Meds:  Scheduled Meds:   [START ON 2025] fat emulsion  250 mL IntraVENous Once per day on     allopurinol  100 mg Oral Once per day on     thiamine  100 mg IntraVENous Daily    insulin lispro  0-4 Units SubCUTAneous Q4H    gabapentin  100 mg Oral Q8H    pantoprazole  40 mg IntraVENous BID    budesonide-formoterol  2 puff Inhalation BID RT    [Held by provider] clopidogrel  75 mg Oral Daily    calcium acetate  2 capsule Oral TID AC    b complex-C-folic acid  1 capsule Oral Daily    isosorbide mononitrate  30 mg Oral Daily    levothyroxine  50 mcg Oral Daily    rosuvastatin  20 mg Oral Nightly    predniSONE  40 mg Oral Daily    sodium chloride flush  5-40 mL IntraVENous 2 times per day    heparin (porcine)  5,000 Units SubCUTAneous 3 times per day    midodrine  5 mg Oral Once per day on     And    midodrine  10 mg Oral Once per day on      Continuous Infusions:   PN-Adult Premix 5/20 - Central 25 mL/hr at 25 182    dextrose      dextrose 5 % and 0.45 % NaCl 50 mL/hr at 25    sodium chloride       PRN Meds:.dextrose bolus **OR** dextrose bolus, glucagon (rDNA), dextrose, heparin (porcine), phenol, benzocaine-menthol, aluminum & magnesium hydroxide-simethicone (MAALOX PLUS) 30 mL, lidocaine viscous hcl (XYLOCAINE) 5 mL (GI COCKTAIL), albuterol, aspirin-acetaminophen-caffeine, sodium chloride flush, sodium chloride, polyethylene glycol, acetaminophen **OR** acetaminophen    Vitals:  BP 90/66   Pulse 55   Temp 97.9 °F (36.6 °C) (Temporal)   Resp 23   Ht 1.778 m (5' 10\")   Wt 83.9 
  Nephrology Progress Note  [facility]  [phone]   [employer]    Patient:  Donato Obrien   : 1940      Brief HPI  Mr. Sood is an 83-year-old male with history of ESRD on HD, atrial fibrillation, hypertension, hyperlipidemia, prostate cancer, DM2, BPH s/p TURP in  chronic respiratory failure presented with shortness of breath on 4/15  He was found to be hypoxic by EMS.  Chest x-ray on admission with no acute findings  We were consulted for ESRD management  He goes to dialysis as outpatient in [place] on a TTS schedule      Subjective/interval history  Patient seen and examined   Had dialysis earlier today   BPs controlled  Has been afebrile  On room air    Meds:  Scheduled Meds:   guaiFENesin  600 mg Oral BID    acetaminophen  650 mg Oral 4 times per day    clopidogrel  75 mg Oral Daily    insulin lispro  0-4 Units SubCUTAneous Q6H    fat emulsion  250 mL IntraVENous Once per day on     allopurinol  100 mg Oral Once per day on     gabapentin  100 mg Oral Q8H    pantoprazole  40 mg IntraVENous BID    budesonide-formoterol  2 puff Inhalation BID RT    [Held by provider] calcium acetate  2 capsule Oral TID AC    [Held by provider] b complex-C-folic acid  1 capsule Oral Daily    [Held by provider] isosorbide mononitrate  30 mg Oral Daily    levothyroxine  50 mcg Oral Daily    rosuvastatin  20 mg Oral Nightly    sodium chloride flush  5-40 mL IntraVENous 2 times per day    heparin (porcine)  5,000 Units SubCUTAneous 3 times per day    midodrine  5 mg Oral Once per day on     And    midodrine  10 mg Oral Once per day on      Continuous Infusions:   PN-Adult Premix 5/20 - Standard Electrolytes - Central Line      PN-Adult Premix 5/20 - Standard Electrolytes - Central Line 83 mL/hr at 25 0628    dextrose      sodium chloride       PRN Meds:.guaiFENesin-dextromethorphan, 
  Nephrology Progress Note  [facility]  [phone]   [employer]    Patient:  Donato Obrien   : 1940    CC:  ESRD       Subjective/interval history  Patient seen and examined  Blood pressure is maintained  Has been afebrile  On minimal oxygen    Meds:  Scheduled Meds:   [START ON 2025] allopurinol  100 mg Oral Once per day on     thiamine  100 mg IntraVENous Daily    fat emulsion  250 mL IntraVENous Once per day on     insulin lispro  0-4 Units SubCUTAneous Q4H    gabapentin  100 mg Oral Q8H    pantoprazole  40 mg IntraVENous BID    budesonide-formoterol  2 puff Inhalation BID RT    [Held by provider] clopidogrel  75 mg Oral Daily    calcium acetate  2 capsule Oral TID AC    b complex-C-folic acid  1 capsule Oral Daily    isosorbide mononitrate  30 mg Oral Daily    levothyroxine  50 mcg Oral Daily    rosuvastatin  20 mg Oral Nightly    predniSONE  40 mg Oral Daily    sodium chloride flush  5-40 mL IntraVENous 2 times per day    heparin (porcine)  5,000 Units SubCUTAneous 3 times per day    midodrine  5 mg Oral Once per day on     And    midodrine  10 mg Oral Once per day on      Continuous Infusions:   PN-Adult Premix 5/20 - Central      dextrose      dextrose 5 % and 0.45 % NaCl 50 mL/hr at 25 0812    sodium chloride       PRN Meds:.dextrose bolus **OR** dextrose bolus, glucagon (rDNA), dextrose, heparin (porcine), phenol, benzocaine-menthol, aluminum & magnesium hydroxide-simethicone (MAALOX PLUS) 30 mL, lidocaine viscous hcl (XYLOCAINE) 5 mL (GI COCKTAIL), albuterol, aspirin-acetaminophen-caffeine, sodium chloride flush, sodium chloride, polyethylene glycol, acetaminophen **OR** acetaminophen    Vitals:  BP 96/71   Pulse (!) 49   Temp 96.9 °F (36.1 °C) (Temporal)   Resp 16   Ht 1.778 m (5' 10\")   Wt 83.2 kg (183 lb 6.8 oz)   SpO2 100%   BMI 26.32 kg/m²     Physical 
  Nephrology Progress Note  [facility]  [phone]   [employer]    Patient:  Donato Obrien   : 1940      Brief HPI  Mr. Sood is an 83-year-old male with history of ESRD on HD, atrial fibrillation, hypertension, hyperlipidemia, prostate cancer, DM2, BPH s/p TURP in  chronic respiratory failure presented with shortness of breath on 4/15  He was found to be hypoxic by EMS.  Chest x-ray on admission with no acute findings  We were consulted for ESRD management  He goes to dialysis as outpatient in [place] on a TTS schedule      Subjective/interval history  Patient seen and examined   Had dialysis yesterday  Remains on TPN  Blood pressure is controlled  Has been afebrile  Oxygen requirement stable    Meds:  Scheduled Meds:   potassium chloride  20 mEq IntraVENous Q2H    ALTEplase  2 mg IntraCATHeter Once    acetaminophen  650 mg Oral 4 times per day    epoetin  10,000 Units IntraVENous Once    insulin lispro  0-4 Units SubCUTAneous Q6H    fat emulsion  250 mL IntraVENous Once per day on     allopurinol  100 mg Oral Once per day on     gabapentin  100 mg Oral Q8H    pantoprazole  40 mg IntraVENous BID    budesonide-formoterol  2 puff Inhalation BID RT    [Held by provider] clopidogrel  75 mg Oral Daily    [Held by provider] calcium acetate  2 capsule Oral TID AC    [Held by provider] b complex-C-folic acid  1 capsule Oral Daily    [Held by provider] isosorbide mononitrate  30 mg Oral Daily    levothyroxine  50 mcg Oral Daily    rosuvastatin  20 mg Oral Nightly    sodium chloride flush  5-40 mL IntraVENous 2 times per day    heparin (porcine)  5,000 Units SubCUTAneous 3 times per day    midodrine  5 mg Oral Once per day on     And    midodrine  10 mg Oral Once per day on      Continuous Infusions:   PN-Adult Premix 5/20 - Central      PN-Adult Premix 5/20 - Central 83 mL/hr at 
  Nephrology Progress Note  [facility]  [phone]   [employer]    Patient:  Donato Obrien   : 1940    CC:  ESRD     Subjective:  On NC . Has NGT ,   Had HD y day   EGD 25  severe esophagitis with hematin, complicated hiatal hernia with paraesophageal component, and dilated stomach full of liquid and food. Surgery following     ROS:   No complaints . SOB better     SHx:  No visitors     Meds:  Scheduled Meds:   iohexol        pantoprazole  40 mg IntraVENous BID    allopurinol  100 mg Oral Daily    amLODIPine  5 mg Oral Daily    budesonide-formoterol  2 puff Inhalation BID RT    [Held by provider] clopidogrel  75 mg Oral Daily    calcium acetate  2 capsule Oral TID AC    b complex-C-folic acid  1 capsule Oral Daily    isosorbide mononitrate  30 mg Oral Daily    levothyroxine  50 mcg Oral Daily    rosuvastatin  20 mg Oral Nightly    predniSONE  40 mg Oral Daily    sodium chloride flush  5-40 mL IntraVENous 2 times per day    heparin (porcine)  5,000 Units SubCUTAneous 3 times per day    midodrine  5 mg Oral Once per day on     And    midodrine  10 mg Oral Once per day on      Continuous Infusions:   dextrose 5 % and 0.45 % NaCl 50 mL/hr at 25 1208    sodium chloride       PRN Meds:.iohexol, heparin (porcine), phenol, benzocaine-menthol, aluminum & magnesium hydroxide-simethicone (MAALOX PLUS) 30 mL, lidocaine viscous hcl (XYLOCAINE) 5 mL (GI COCKTAIL), albuterol, aspirin-acetaminophen-caffeine, sodium chloride flush, sodium chloride, polyethylene glycol, acetaminophen **OR** acetaminophen    Vitals:  BP 94/62   Pulse 63   Temp 97.7 °F (36.5 °C) (Temporal)   Resp 20   Ht 1.778 m (5' 10\")   Wt 82 kg (180 lb 12.4 oz)   SpO2 99%   BMI 25.94 kg/m²     Physical Exam:  Gen: Resting in bed, NAD.  NGT   HEENT: MMM, OP clear.  CV: RRR, no S3.  Lungs: good respiratory effort and clear air entry   Abd: S/NT +BS  Ext: No 
  Nephrology Progress Note  [facility]  [phone]   [employer]    Patient:  Donato Obrien   : 1940      Brief HPI  Mr. Sood is an 83-year-old male with history of ESRD on HD, atrial fibrillation, hypertension, hyperlipidemia, prostate cancer, DM2, BPH s/p TURP in  chronic respiratory failure presented with shortness of breath on 4/15  He was found to be hypoxic by EMS.  Chest x-ray on admission with no acute findings  We were consulted for ESRD management  He goes to dialysis as outpatient in [place] on a TTS schedule      Subjective/interval history  Patient seen and examined   Noted speech evaluation    Meds:  Scheduled Meds:   acetaminophen  650 mg Oral 4 times per day    clopidogrel  75 mg Oral Daily    epoetin  10,000 Units IntraVENous Once    insulin lispro  0-4 Units SubCUTAneous Q6H    fat emulsion  250 mL IntraVENous Once per day on     allopurinol  100 mg Oral Once per day on     gabapentin  100 mg Oral Q8H    pantoprazole  40 mg IntraVENous BID    budesonide-formoterol  2 puff Inhalation BID RT    [Held by provider] calcium acetate  2 capsule Oral TID AC    [Held by provider] b complex-C-folic acid  1 capsule Oral Daily    [Held by provider] isosorbide mononitrate  30 mg Oral Daily    levothyroxine  50 mcg Oral Daily    rosuvastatin  20 mg Oral Nightly    sodium chloride flush  5-40 mL IntraVENous 2 times per day    heparin (porcine)  5,000 Units SubCUTAneous 3 times per day    midodrine  5 mg Oral Once per day on     And    midodrine  10 mg Oral Once per day on      Continuous Infusions:   PN-Adult Premix 5/20 - Central      PN-Adult Premix 5/20 - Central 83 mL/hr at 25 0340    dextrose      sodium chloride       PRN Meds:.oxyCODONE-acetaminophen, diclofenac sodium, morphine, midodrine, dextrose bolus **OR** dextrose bolus, glucagon (rDNA), dextrose, heparin 
  Nephrology Progress Note  [facility]  [phone]   [employer]    Patient:  Donato Obrien   : 1940      Brief HPI  Mr. Sood is an 83-year-old male with history of ESRD on HD, atrial fibrillation, hypertension, hyperlipidemia, prostate cancer, DM2, BPH s/p TURP in  chronic respiratory failure presented with shortness of breath on 4/15  He was found to be hypoxic by EMS.  Chest x-ray on admission with no acute findings  We were consulted for ESRD management  He goes to dialysis as outpatient in [place] on a TTS schedule      Subjective/interval history  Patient seen and examined   Noted events  RRT called this AM  Nausea and emesis this AM  Refusing care and meds  Had dialysis yesterday  Palliative care consulted    Meds:  Scheduled Meds:   insulin lispro  0-8 Units SubCUTAneous Q6H    metoclopramide  10 mg IntraVENous Q6H    bisacodyl  10 mg Rectal Once    cefTRIAXone (ROCEPHIN) IV  1,000 mg IntraVENous Q24H    guaiFENesin  600 mg Oral BID    acetaminophen  650 mg Oral 4 times per day    [Held by provider] clopidogrel  75 mg Oral Daily    fat emulsion  250 mL IntraVENous Once per day on     allopurinol  100 mg Oral Once per day on     gabapentin  100 mg Oral Q8H    pantoprazole  40 mg IntraVENous BID    budesonide-formoterol  2 puff Inhalation BID RT    [Held by provider] calcium acetate  2 capsule Oral TID AC    [Held by provider] b complex-C-folic acid  1 capsule Oral Daily    [Held by provider] isosorbide mononitrate  30 mg Oral Daily    levothyroxine  50 mcg Oral Daily    rosuvastatin  20 mg Oral Nightly    sodium chloride flush  5-40 mL IntraVENous 2 times per day    [Held by provider] heparin (porcine)  5,000 Units SubCUTAneous 3 times per day    midodrine  5 mg Oral Once per day on     And    midodrine  10 mg Oral Once per day on      Continuous Infusions:   PN-Adult 
  Nephrology Progress Note  [facility]  [phone]   [employer]    Patient:  Donato Obrien   : 1940    CC:  ESRD     Subjective:  On NC . Seen and examined on HD . 1.5 L UF ,. Limited by drop in BP.    ROS:   No complaints . SOB better     SHx:  No visitors     Meds:  Scheduled Meds:   pantoprazole  40 mg IntraVENous BID    allopurinol  100 mg Oral Daily    amLODIPine  5 mg Oral Daily    budesonide-formoterol  2 puff Inhalation BID RT    clopidogrel  75 mg Oral Daily    calcium acetate  2 capsule Oral TID AC    b complex-C-folic acid  1 capsule Oral Daily    isosorbide mononitrate  30 mg Oral Daily    levothyroxine  50 mcg Oral Daily    rosuvastatin  20 mg Oral Nightly    predniSONE  40 mg Oral Daily    sodium chloride flush  5-40 mL IntraVENous 2 times per day    [Held by provider] heparin (porcine)  5,000 Units SubCUTAneous 3 times per day    midodrine  5 mg Oral Once per day on     And    midodrine  10 mg Oral Once per day on      Continuous Infusions:   sodium chloride       PRN Meds:.heparin (porcine), aluminum & magnesium hydroxide-simethicone (MAALOX PLUS) 30 mL, lidocaine viscous hcl (XYLOCAINE) 5 mL (GI COCKTAIL), albuterol, aspirin-acetaminophen-caffeine, sodium chloride flush, sodium chloride, polyethylene glycol, acetaminophen **OR** acetaminophen    Vitals:  /67   Pulse 58   Temp 97.5 °F (36.4 °C)   Resp 17   Ht 1.778 m (5' 10\")   Wt 82 kg (180 lb 12.4 oz)   SpO2 96%   BMI 25.94 kg/m²     Physical Exam:  Gen: Resting in bed, NAD.    HEENT: MMM, OP clear.  CV: RRR, no S3.  Lungs: good respiratory effort and clear air entry   Abd: S/NT +BS  Ext: No edema, no cyanosis  Skin: Warm.  No rashes appreciated.    Labs:  CBC:   Lab Results   Component Value Date/Time    WBC 15.1 2025 04:41 AM    RBC 3.20 2025 04:41 AM    HGB 11.0 2025 04:41 AM    HCT 32.8 2025 04:41 AM    .7 
  Nephrology Progress Note  [facility]  [phone]   [employer]    Patient:  Donato Obrien   : 1940      Brief HPI  Mr. Sood is an 83-year-old male with history of ESRD on HD, atrial fibrillation, hypertension, hyperlipidemia, prostate cancer, DM2, BPH s/p TURP in  chronic respiratory failure presented with shortness of breath on 4/15  He was found to be hypoxic by EMS.  Chest x-ray on admission with no acute findings  We were consulted for ESRD management  He goes to dialysis as outpatient in [place] on a TTS schedule      Subjective/interval history  Patient seen and examined   HD done today     Meds:  Scheduled Meds:   acetaminophen  650 mg Oral 4 times per day    clopidogrel  75 mg Oral Daily    epoetin  10,000 Units IntraVENous Once    insulin lispro  0-4 Units SubCUTAneous Q6H    fat emulsion  250 mL IntraVENous Once per day on     allopurinol  100 mg Oral Once per day on     gabapentin  100 mg Oral Q8H    pantoprazole  40 mg IntraVENous BID    budesonide-formoterol  2 puff Inhalation BID RT    [Held by provider] calcium acetate  2 capsule Oral TID AC    [Held by provider] b complex-C-folic acid  1 capsule Oral Daily    [Held by provider] isosorbide mononitrate  30 mg Oral Daily    levothyroxine  50 mcg Oral Daily    rosuvastatin  20 mg Oral Nightly    sodium chloride flush  5-40 mL IntraVENous 2 times per day    heparin (porcine)  5,000 Units SubCUTAneous 3 times per day    midodrine  5 mg Oral Once per day on     And    midodrine  10 mg Oral Once per day on      Continuous Infusions:   PN-Adult Premix 5/20 - Central      PN-Adult Premix 5/20 - Central 83 mL/hr at 25 1736    dextrose      sodium chloride       PRN Meds:.oxyCODONE-acetaminophen, diclofenac sodium, morphine, midodrine, dextrose bolus **OR** dextrose bolus, glucagon (rDNA), dextrose, heparin 
  Nephrology Progress Note  [facility]  [phone]   [facility]    Patient:  Donato Obrien   : 1940    CC:  ESRD     Subjective:  On NC . Has NGT ,   Plan for CVC + TPN initiation today  EGD 25  severe esophagitis with hematin, complicated hiatal hernia with paraesophageal component, and dilated stomach full of liquid and food. Surgery following - surgery Wednesday      ROS:   No complaints . SOB better     SHx:  No visitors     Meds:  Scheduled Meds:   [START ON 2025] allopurinol  100 mg Oral Once per day on     thiamine  100 mg IntraVENous Daily    [START ON 2025] fat emulsion  250 mL IntraVENous Once per day on     insulin lispro  0-4 Units SubCUTAneous Q4H    gabapentin  100 mg Oral Q8H    pantoprazole  40 mg IntraVENous BID    budesonide-formoterol  2 puff Inhalation BID RT    [Held by provider] clopidogrel  75 mg Oral Daily    calcium acetate  2 capsule Oral TID AC    b complex-C-folic acid  1 capsule Oral Daily    isosorbide mononitrate  30 mg Oral Daily    levothyroxine  50 mcg Oral Daily    rosuvastatin  20 mg Oral Nightly    predniSONE  40 mg Oral Daily    sodium chloride flush  5-40 mL IntraVENous 2 times per day    heparin (porcine)  5,000 Units SubCUTAneous 3 times per day    midodrine  5 mg Oral Once per day on     And    midodrine  10 mg Oral Once per day on      Continuous Infusions:   dextrose      dextrose 5 % and 0.45 % NaCl 50 mL/hr at 25 0644    sodium chloride       PRN Meds:.dextrose bolus **OR** dextrose bolus, glucagon (rDNA), dextrose, heparin (porcine), phenol, benzocaine-menthol, aluminum & magnesium hydroxide-simethicone (MAALOX PLUS) 30 mL, lidocaine viscous hcl (XYLOCAINE) 5 mL (GI COCKTAIL), albuterol, aspirin-acetaminophen-caffeine, sodium chloride flush, sodium chloride, polyethylene glycol, acetaminophen **OR** 
  Nephrology Progress Note  [facility]  [phone]   [facility]    Patient:  Donato Obrien   : 1940    CC:  ESRD     Subjective:  On NC . Has NGT ,   EGD 25  severe esophagitis with hematin, complicated hiatal hernia with paraesophageal component, and dilated stomach full of liquid and food. Surgery following   Seen on HD    ROS:   No complaints . SOB better     SHx:  No visitors     Meds:  Scheduled Meds:   gabapentin  100 mg Oral Q8H    pantoprazole  40 mg IntraVENous BID    allopurinol  100 mg Oral Daily    amLODIPine  5 mg Oral Daily    budesonide-formoterol  2 puff Inhalation BID RT    [Held by provider] clopidogrel  75 mg Oral Daily    calcium acetate  2 capsule Oral TID AC    b complex-C-folic acid  1 capsule Oral Daily    isosorbide mononitrate  30 mg Oral Daily    levothyroxine  50 mcg Oral Daily    rosuvastatin  20 mg Oral Nightly    predniSONE  40 mg Oral Daily    sodium chloride flush  5-40 mL IntraVENous 2 times per day    heparin (porcine)  5,000 Units SubCUTAneous 3 times per day    midodrine  5 mg Oral Once per day on     And    midodrine  10 mg Oral Once per day on      Continuous Infusions:   dextrose 5 % and 0.45 % NaCl 50 mL/hr at 25 1448    sodium chloride       PRN Meds:.heparin (porcine), phenol, benzocaine-menthol, aluminum & magnesium hydroxide-simethicone (MAALOX PLUS) 30 mL, lidocaine viscous hcl (XYLOCAINE) 5 mL (GI COCKTAIL), albuterol, aspirin-acetaminophen-caffeine, sodium chloride flush, sodium chloride, polyethylene glycol, acetaminophen **OR** acetaminophen    Vitals:  BP (!) 83/56   Pulse 55   Temp 97.6 °F (36.4 °C) (Temporal)   Resp 22   Ht 1.778 m (5' 10\")   Wt 83.3 kg (183 lb 10.3 oz)   SpO2 98%   BMI 26.35 kg/m²     Physical Exam:  Gen: Resting in bed, NAD.  NGT   HEENT: MMM, OP clear.  CV: RRR, no S3.  Lungs: good respiratory effort and clear air entry   Abd: S/NT 
  Nephrology Progress Note  [facility]  [phone]   [facility]    Patient:  Donato Obrien   : 1940      Brief HPI  Mr. Sood is an 83-year-old male with history of ESRD on HD, atrial fibrillation, hypertension, hyperlipidemia, prostate cancer, DM2, BPH s/p TURP in  chronic respiratory failure presented with shortness of breath on 4/15  He was found to be hypoxic by EMS.  Chest x-ray on admission with no acute findings  We were consulted for ESRD management  He goes to dialysis as outpatient in [place] on a TTS schedule      Subjective/interval history  Patient seen and examined   BPs controlled  Has been afebrile  On room air    Meds:  Scheduled Meds:   guaiFENesin  600 mg Oral BID    acetaminophen  650 mg Oral 4 times per day    clopidogrel  75 mg Oral Daily    epoetin  10,000 Units IntraVENous Once    insulin lispro  0-4 Units SubCUTAneous Q6H    fat emulsion  250 mL IntraVENous Once per day on     allopurinol  100 mg Oral Once per day on     gabapentin  100 mg Oral Q8H    pantoprazole  40 mg IntraVENous BID    budesonide-formoterol  2 puff Inhalation BID RT    [Held by provider] calcium acetate  2 capsule Oral TID AC    [Held by provider] b complex-C-folic acid  1 capsule Oral Daily    [Held by provider] isosorbide mononitrate  30 mg Oral Daily    levothyroxine  50 mcg Oral Daily    rosuvastatin  20 mg Oral Nightly    sodium chloride flush  5-40 mL IntraVENous 2 times per day    heparin (porcine)  5,000 Units SubCUTAneous 3 times per day    midodrine  5 mg Oral Once per day on     And    midodrine  10 mg Oral Once per day on      Continuous Infusions:   PN-Adult Premix 5/20 - Standard Electrolytes - Central Line      PN-Adult Premix 5/20 - Central 83 mL/hr at 25    dextrose      sodium chloride       PRN Meds:.oxyCODONE-acetaminophen, diclofenac sodium, morphine, 
  Nephrology Progress Note  [facility]  [phone]   [employer]    Patient:  Donato Obrien   : 1940      Brief HPI  Mr. Sood is an 83-year-old male with history of ESRD on HD, atrial fibrillation, hypertension, hyperlipidemia, prostate cancer, DM2, BPH s/p TURP in  chronic respiratory failure presented with shortness of breath on 4/15  He was found to be hypoxic by EMS.  Chest x-ray on admission with no acute findings  We were consulted for ESRD management  He goes to dialysis as outpatient in [place] on a TTS schedule      Subjective/interval history  Patient seen and examined on dialysis  Has been hypotensive, you have reduced  Had surgery yesterday  Complains of left-sided chest pain below the clavicle with deep breaths  Denies shortness of breath  Has been afebrile    Meds:  Scheduled Meds:   insulin lispro  0-4 Units SubCUTAneous Q6H    metoclopramide  10 mg IntraVENous Q6H    fat emulsion  250 mL IntraVENous Once per day on     allopurinol  100 mg Oral Once per day on     thiamine  100 mg IntraVENous Daily    gabapentin  100 mg Oral Q8H    pantoprazole  40 mg IntraVENous BID    budesonide-formoterol  2 puff Inhalation BID RT    [Held by provider] clopidogrel  75 mg Oral Daily    [Held by provider] calcium acetate  2 capsule Oral TID AC    [Held by provider] b complex-C-folic acid  1 capsule Oral Daily    [Held by provider] isosorbide mononitrate  30 mg Oral Daily    levothyroxine  50 mcg Oral Daily    rosuvastatin  20 mg Oral Nightly    sodium chloride flush  5-40 mL IntraVENous 2 times per day    heparin (porcine)  5,000 Units SubCUTAneous 3 times per day    midodrine  5 mg Oral Once per day on     And    midodrine  10 mg Oral Once per day on      Continuous Infusions:   PN-Adult Premix 5/20 - Standard Electrolytes - Central Line 83 mL/hr at 25 8484    dextrose   
  Nephrology Progress Note  [facility]  [phone]   [employer]    Patient:  Donato Obrien   : 1940    CC:  ESRD     Subjective:  On NC . Had HD y day. BP stable     ROS:   No complaints . SOB better     SHx:  No visitors     Meds:  Scheduled Meds:   pantoprazole  40 mg IntraVENous BID    allopurinol  100 mg Oral Daily    amLODIPine  5 mg Oral Daily    budesonide-formoterol  2 puff Inhalation BID RT    clopidogrel  75 mg Oral Daily    calcium acetate  2 capsule Oral TID AC    b complex-C-folic acid  1 capsule Oral Daily    isosorbide mononitrate  30 mg Oral Daily    levothyroxine  50 mcg Oral Daily    rosuvastatin  20 mg Oral Nightly    predniSONE  40 mg Oral Daily    sodium chloride flush  5-40 mL IntraVENous 2 times per day    heparin (porcine)  5,000 Units SubCUTAneous 3 times per day    midodrine  5 mg Oral Once per day on     And    [START ON 2025] midodrine  10 mg Oral Once per day on      Continuous Infusions:   sodium chloride       PRN Meds:.aluminum & magnesium hydroxide-simethicone (MAALOX PLUS) 30 mL, lidocaine viscous hcl (XYLOCAINE) 5 mL (GI COCKTAIL), albuterol, aspirin-acetaminophen-caffeine, sodium chloride flush, sodium chloride, polyethylene glycol, acetaminophen **OR** acetaminophen    Vitals:  /63   Pulse 54   Temp (!) 96.7 °F (35.9 °C) (Temporal)   Resp 20   Ht 1.778 m (5' 10\")   Wt 84.7 kg (186 lb 11.7 oz)   SpO2 95%   BMI 26.79 kg/m²     Physical Exam:  Gen: Resting in bed, NAD.    HEENT: MMM, OP clear.  CV: RRR, no S3.  Lungs: good respiratory effort and clear air entry   Abd: S/NT +BS  Ext: No edema, no cyanosis  Skin: Warm.  No rashes appreciated.    Labs:  CBC:   Lab Results   Component Value Date/Time    WBC 18.4 2025 04:33 AM    RBC 3.47 2025 04:33 AM    HGB 11.7 2025 04:33 AM    HCT 36.4 2025 04:33 AM    .9 2025 04:33 AM    MCH 33.6 
  [facility]    Surgery Progress Note           POD # 1    PATIENT NAME: Donato Obrien     TODAY'S DATE: 4/24/2025    SUBJECTIVE:    Pt on HD, more talkative and alert today  States has some CP - Medical team evaluating.     OBJECTIVE:   VITALS:  /78   Pulse 55   Temp 97.8 °F (36.6 °C) (Oral)   Resp 18   Ht 1.778 m (5' 10\")   Wt 83.9 kg (185 lb)   SpO2 93%   BMI 26.54 kg/m²     INTAKE/OUTPUT:    I/O last 3 completed shifts:  In: 800 [I.V.:800]  Out: 520 [Emesis/NG output:500; Blood:20]  No intake/output data recorded.              CONSTITUTIONAL:  awake and alert  LUNGS:  clear to auscultation  ABDOMEN:   normal bowel sounds, soft, non-distended, non-tender   INCISION: clean, dry, no drainage    Data:  CBC:   Recent Labs     04/22/25  0500 04/23/25  0917 04/24/25  0544   WBC 11.8* 13.5* 12.6*   HGB 9.6* 10.7* 8.9*   HCT 29.4* 32.8* 27.4*   * 88* 78*     BMP:    Recent Labs     04/22/25  0500 04/22/25  1619 04/23/25  0917 04/24/25  0544   *  --  132* 131*   K 3.4* 4.0 3.5 3.6   CL 94*  --  94* 95*   CO2 24  --  22 20*   BUN 42*  --  31* 50*   CREATININE 7.7*  --  5.9* 7.0*   GLUCOSE 146*  --  113* 187*     Hepatic:   Recent Labs     04/22/25  0500 04/23/25  0917 04/24/25  0544   AST 17 17 15   ALT 16 14 10   BILITOT 0.5 0.7 0.4   ALKPHOS 46 51 43     Mag:      Recent Labs     04/22/25  0500 04/23/25  0917 04/24/25  0544   MG 2.23 1.99 2.01      Phos:     Recent Labs     04/22/25  0500 04/23/25  0917 04/24/25  0544   PHOS 6.7* 4.6 6.4*      INR: No results for input(s): \"INR\" in the last 72 hours.    Radiology Review:  None    ASSESSMENT AND PLAN:  84 y.o. male status post laparoscopic complex HH repair  Colon and stomach reduced, hernia repaired  Continue NG today - stomach was obstructed for a long time  Tone will return gradually  Will likely plan to remove NG tomorrow - keep to drainage today  HD today   Fluids as needed medically  Hb ok, no post op 
  [facility] - Inpatient Rehabilitation Department   Phone: [phone]    Occupational Therapy    [x] Initial Evaluation            [] Daily Treatment Note         [] Discharge Summary      Patient: Donato Obrien   : 1940   MRN: [id number]   Date of Service:  2025    Admitting Diagnosis:  Acute hypoxemic respiratory failure  Current Admission Summary: Donato Obrien is a 84 y.o. male with lethargic on BiPAP and ICM will squeeze hands to command but unable to answer many questions patient was recommended to hospital by  of past for dyspnea secondary to volume overload and possible pneumonia and COPD treated with antibiotics and prednisone. In the ED patient was short of breath with increased work of breathing and placed on BiPAP x-ray did not show any acute pathology does have a history of ESRD on HD   Past Medical History:  has a past medical history of A-fib (HCC), Arthritis, Blood clot, Fracture of sternum, Fracture rib, Hemodialysis patient, Hyperlipidemia, Hypertension, Laceration of skin of lower leg, left, initial encounter, Laceration of skin of lower leg, right, initial encounter, NSTEMI (non-ST elevated myocardial infarction) ([facility]), Prostate cancer ([facility]), and Type II or unspecified type diabetes mellitus without mention of complication, not stated as uncontrolled.  Past Surgical History:  has a past surgical history that includes TURP () and sigmoidoscopy (N/A, 3/9/2023).    Discharge Recommendations: Donato Obrien scored a 15/24 on the AM-PAC ADL Inpatient form. Current research shows that an AM-PAC score of 17 or less is typically not associated with a discharge to the patient's home setting. Based on the patient's AM-PAC score and their current ADL deficits, it is recommended that the patient have 3-5 sessions per week of Occupational Therapy at d/c to increase the patient's independence.  Please see assessment section for further patient specific details.    If 
  [facility] - Inpatient Rehabilitation Department   Phone: [phone]    Physical Therapy    [x] Initial Evaluation            [] Daily Treatment Note         [] Discharge Summary      Patient: Donato Obrien   : 1940   MRN: [id number]   Date of Service:  2025  Admitting Diagnosis: Acute hypoxemic respiratory failure  Current Admission Summary:  Donato Obrien is a 84 y.o. male with lethargic on BiPAP and ICM will squeeze hands to command but unable to answer many questions patient was recommended to hospital by  of past for dyspnea secondary to volume overload and possible pneumonia and COPD treated with antibiotics and prednisone.  In the ED patient was short of breath with increased work of breathing and placed on BiPAP x-ray did not show any acute pathology does have a history of ESRD on HD   Past Medical History:  has a past medical history of A-fib (HCC), Arthritis, Blood clot, Fracture of sternum, Fracture rib, Hemodialysis patient, Hyperlipidemia, Hypertension, Laceration of skin of lower leg, left, initial encounter, Laceration of skin of lower leg, right, initial encounter, NSTEMI (non-ST elevated myocardial infarction) ([facility]), Prostate cancer ([facility]), and Type II or unspecified type diabetes mellitus without mention of complication, not stated as uncontrolled.  Past Surgical History:  has a past surgical history that includes TURP () and sigmoidoscopy (N/A, 3/9/2023).  Discharge Recommendations: Donato Obrien scored a 10/24 on the AM-PAC short mobility form. Current research shows that an AM-PAC score of 17 or less is typically not associated with a discharge to the patient's home setting. Based on the patient's AM-PAC score and their current functional mobility deficits, it is recommended that the patient have 3-5 sessions per week of Physical Therapy at d/c to increase the patient's independence.  Please see assessment section for further patient specific details. If 
  [facility] - Inpatient Rehabilitation Department   Phone: [phone]    Physical Therapy    [] Initial Evaluation            [x] Daily Treatment Note         [] Discharge Summary      Patient: Donato Obrien   : 1940   MRN: [id number]   Date of Service:  2025  Admitting Diagnosis: Acute respiratory failure with hypoxia (HCC)  Current Admission Summary:  Donato Obrien is a 84 y.o. male with lethargic on BiPAP and ICM will squeeze hands to command but unable to answer many questions patient was recommended to hospital by  of past for dyspnea secondary to volume overload and possible pneumonia and COPD treated with antibiotics and prednisone.  In the ED patient was short of breath with increased work of breathing and placed on BiPAP x-ray did not show any acute pathology does have a history of ESRD on HD   OR Date 2025, laparoscopic paraesophageal hiatal hernia repair, reinforcement with [place] Bio-A mesh for paraesophageal hiatal hernia, complex type 4 hernia with stomach obstruction and transverse colon in hernia   Past Medical History:  has a past medical history of A-fib (HCC), Arthritis, Blood clot, Fracture of sternum, Fracture rib, Hemodialysis patient, Hyperlipidemia, Hypertension, Laceration of skin of lower leg, left, initial encounter, Laceration of skin of lower leg, right, initial encounter, NSTEMI (non-ST elevated myocardial infarction) ([facility]), Prostate cancer ([facility]), and Type II or unspecified type diabetes mellitus without mention of complication, not stated as uncontrolled.  Past Surgical History:  has a past surgical history that includes TURP (); sigmoidoscopy (N/A, 3/9/2023); Upper gastrointestinal endoscopy (N/A, 2025); Upper gastrointestinal endoscopy (2025); and hiatal hernia repair (N/A, 2025).    Discharge Recommendations: Donato Obrien scored a 6/24 on the AM-PAC short mobility form. Current research shows that an AM-PAC score of 17 or 
  [facility] - Inpatient Rehabilitation Department   Phone: [phone]    Occupational Therapy    [] Initial Evaluation            [x] Daily Treatment Note         [] Discharge Summary      Patient: Donato Obrien   : 1940   MRN: [id number]   Date of Service:  2025    Admitting Diagnosis:  Acute respiratory failure with hypoxia (HCC)    Current Admission Summary: Donato Obrien is a 84 y.o. male with lethargic on BiPAP and ICM will squeeze hands to command but unable to answer many questions patient was recommended to hospital by  of past for dyspnea secondary to volume overload and possible pneumonia and COPD treated with antibiotics and prednisone. In the ED patient was short of breath with increased work of breathing and placed on BiPAP x-ray did not show any acute pathology does have a history of ESRD on HD    DATE OF PROCEDURE:  2025   SURGEON:  Karthik Borjas MD   PREOPERATIVE DIAGNOSIS:  Paraesophageal hiatal hernia, complex type 4 hernia with stomach obstruction and transverse colon in hernia.   POSTOPERATIVE DIAGNOSIS:  Paraesophageal hiatal hernia, complex type 4 hernia with stomach obstruction and transverse colon in hernia.   PROCEDURES:  Laparoscopic paraesophageal hiatal hernia repair, reinforcement with [place] Bio-A mesh.         Past Medical History:  has a past medical history of A-fib (HCC), Arthritis, Blood clot, Fracture of sternum, Fracture rib, Hemodialysis patient, Hyperlipidemia, Hypertension, Laceration of skin of lower leg, left, initial encounter, Laceration of skin of lower leg, right, initial encounter, NSTEMI (non-ST elevated myocardial infarction) (HCC), Prostate cancer (HCC), and Type II or unspecified type diabetes mellitus without mention of complication, not stated as uncontrolled.  Past Surgical History:  has a past surgical history that includes TURP (); sigmoidoscopy (N/A, 3/9/2023); Upper gastrointestinal endoscopy (N/A, 
  Speech Language Pathology  [facility] - Inpatient Rehabilitation Services  [phone]  SLP Clinical Swallow Evaluation       Patient: Donato Obrien   : 1940   MRN: [id number]      Evaluation Date: 2025      Admitting Dx: Chest discomfort [R07.89]  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 (HCC) [N18.6, Z99.2]  Acute respiratory failure with hypoxia [J96.01]  Acute hypoxemic respiratory failure [J96.01]  Treatment Diagnosis: Oropharyngeal Dysphagia   Pain: Reported pain in mid chest during swallow, RN notified                                  Recommendations      Recommended Diet and Intervention 2025:  Diet Solids Recommendation:  Regular texture diet  Liquid Consistency Recommendation:  Thin liquids  Recommended form of Meds: Meds whole with water      Based on today's assessment recommend consideration of referral to Gastroenterology (GI) to further evaluate pt report of sticking and pain in mid chest during swallow    Compensatory strategies: Aspiration Precautions , Alternate solids/liquids , Eat or Feed Slowly, Reflux Precautions , Remain Upright 30-45 min , Small Bites and Sips , Upright as possible with all PO intake     Discharge Recommendations:  Do not anticipate need for further speech/dysphagia therapy upon discharge from hospital     History/Course of Treatment     H&P: 4/15/2025  \"Donato Obrien is a 84 y.o. male with lethargic on BiPAP and ICM will squeeze hands to command but unable to answer many questions patient was recommended to hospital by  of past for dyspnea secondary to volume overload and possible pneumonia and COPD treated with antibiotics and prednisone.  In the ED patient was short of breath with increased work of breathing and placed on BiPAP x-ray did not show any acute pathology does have a history of ESRD on HD\"    Imaging:  Chest X-ray: 4/15/2025  IMPRESSION:  1. No acute findings.  2. Stable central line, cardiomegaly, and mild bibasilar atelectasis.  3. Hiatal 
  Speech Language Pathology  [facility]  [phone]  SLP Clinical Swallow Evaluation       Patient: Donato Obrien   : 1940   MRN: [id number]      Evaluation Date: 2025      Admitting Dx: Chest discomfort [R07.89]  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 (HCC) [N18.6, Z99.2]  Acute respiratory failure with hypoxia (HCC) [J96.01]  Acute hypoxemic respiratory failure (HCC) [J96.01]  Treatment Diagnosis: Oropharyngeal Dysphagia   Pain: Denies                                  Recommendations      Recommended Diet and Intervention 2025:  Diet Solids Recommendation:  NPO with ongoing assessment of swallow function, allow limited ice chips with RN  Liquid Consistency Recommendation:  NPO Recommended form of Meds: Meds crushed as able in puree          Compensatory strategies: Aspiration Precautions , Oral Care , Reflux Precautions     Discharge Recommendations:  Recommend ongoing SLP for dysphagia therapy upon discharge from hospital     History/Course of Treatment     H&P: Donato Obrien is a 84 y.o. male with PMH of ESRD on HD, hypothyroidism, hiatal hernia, HFrEF who was admitted with acute hypoxic respiratory failure   Pt s/p paraesophageal hiatal hernia repair .     Imaging:  Chest X-ray: 4/15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 caseload. Pt currently HERMELINDA this morning. PT/OT will follow-up with pt as schedule allows.    Thank you,  Cheo Mar, PT, DPT, [id number]  Nas Costa, MOT, OTR/L [id number]      
Pt A&Ox 4 on 3L NC. AM assessment and vitals completed and put into flowsheets. Pt with no questions or concerns voiced to RN at this time. Fall precautions in place and call light within reach.     Pt continues to complain of chest pain. Provider aware. PRN morphine given as ordered.     In efforts to discontinue fem line for infection prevention, RN reached out to nephrology to see if PICC was an option. Nephrology recommending against PICC. Stating in her opinion, it would be best to change central line sites or use midline if able. Spoke with pharmacy and dietician, who agree that it be best to try to avoid TPN through midline if possible due to pt being unable to receive enough nutrients via midline. RN to discuss with providers during rounds as able.     NO obvious signs of infection at central line site. Dressing changed using sterile technique, pt tolerated well.   
Pt VSS. Pt seen by anesthesia. Surgical site to abdomen CDI, soft to palpation, color and temp appropriate, ice applied. Phase I criteria met, transferring to 3a.   
Pt arrived from OR to PACU with VSS, O2 98% on nasal cannula 4L. Pt awakens to voice. Surgical site to abdomen x5 closed with glue, CDI, soft to palpation, color and temp appropriate, ice applied. Wiill cont to monitor.   
Pt arrived to unit as transfer from . He was noted to have audible respirations and very lethargic with emesis x1. Emesis was dark brown in color. MD notified via PerfectServe and call. He did give order for stat cxr and resp now at bedside. Pt continued to decline. Rapid initiated.   
Pt is currently strictly NPO with NG tube used for decompression. Notified Dr. Marc for clarification on administration of PO meds - new order to hold PO meds until further notice.   
Pt refused all medications at this time. Call light in reach will continue to monitor.  
Pt returned to room after HD. VSS on 1 LO2 NC, 1L removed. Pt resting  
Pt unable to do IS, ref NIV    
Pt's wife called, provided with update.   
Pt. Turned, repositioned. Linen changed. Dressing to left femoral central linen changed. Unable to collect labs via C-line d/t no blood return. Lab on floor and informed draw labs. CHG bath complete.   
RN asked and attempted to get patient to bathe multiple times throughout the night. Patient refused each time. Patient is A&OX4 and has capacity to make own decisions. RN was able to wash patient hair and do brittany care. Patient refused to take shirt off for bath and refused linen change. Care ongoing.   
Rapid Response Quick Summary    Room: 3A6902/9642-01    Assessment of concern / patient:  pt has had cough all evening for which RN had been in contact with provider janelle. Pt now had a large coffee ground emesis and having difficulty obtaining O2 sat.     Physician involved:  Dr Ramirez    Interventions:  pt was placed on non-rebreather mask. Nebulizer tx given per RT. VBG obtained and resulted on epoch. Portable pulse ox attached to forehead with noted O2 sat 100%. O2 changed to 4 L NC. Message sent to MD with VBG result. No further orders noted at this time. Assisted RN to clean up pt and change linens and gown    Disposition:  pt to remain at current level of care  
Rapid Response Quick Summary    Room: [facility]-3911/3911-01    Assessment of concern / patient:  CODE BLUE     Physician involved:  Dr. DEVONTE Moore    Interventions:  sustained VTACH noted on monitor- pads remain in place from previous code- shock performed @ 200 J. 1 epi, 150 AMIO push- ROSC obtained after 4 minutes. Wife at bedside and witnessed 2nd code blue, requesting no more coding. Dr. DEVONTE Moore updated.     Disposition:  sustained VTACH noted on monitor- pads remain in place from previous code- shock performed @ 200 J. 1 epi, 150 AMIO push- ROSC obtained after 4 minutes. Wife at bedside and witnessed 2nd code blue, requesting no more coding. Dr. DEVONTE Moore updated.     HANSA SILVA, RN, BSN, CCRN.     
Rapid Response Quick Summary    Room: [facility]-3911/3911-01    Assessment of concern / patient:  rapid response turned code blue     Physician involved:  Dr. DEVONTE Moore     Interventions:  Responded to rapid response on 3 T for respiratory distress. Pt appears to be nearing cardiac/respiratory arrest. Vomiting coffee ground emesis, unable to get saturation, 100% NRB ON. HD chronic with dual lumen PICC and tunneled vas cath. Appears very chronically ill. Previous hernia repair per report. Dr. DEVONTE Moore at bedside. Pt emergently moved to ICU 11 for intubation. Elijah RT intubated patient, 20 mg Etomodate and 50 mg KAYLA given under Dr. DEVONTE Moore's authority. During intubation, copious amounts of coffee ground and stool smelling emesis noted, probable aspiration. Elijah RT performing suctioning. #7.5 ET tube placed. Pt hypotensive, LEVOphed infusion started through PICC line. This RN placed OG tube and 1.5 L dark coffee ground contents obtained immediately. 2 units uncrossmatched blood ordered. Pt now even more unstable and CODE BLUE STARTED- see documentation. 3 EPI, 1 Bicarb, 2nd pressor added (vasopressin @ 0.04/ LEVO @ 50 mcg). ROSC obtained after 10 minutes. Afib RVR. 2 pressors on. Emergently had to access BLUE port of vas cath due to limited access (10 cc HEParin aspirated prior) for PRBC infusion. Dr. DEVONTE Moore contacting GI and surgery for updates and plans of care. Remains in critical condition in ICU.     Disposition:  Responded to rapid response on 3 T for respiratory distress. Pt appears to be nearing cardiac/respiratory arrest. Vomiting coffee ground emesis, unable to get saturation, 100% NRB ON. HD chronic with dual lumen PICC and tunneled vas cath. Appears very chronically ill. Previous hernia repair per report. Dr. DEVONTE Moore at bedside. Pt emergently moved to ICU 11 for intubation. Elijah RT intubated patient, 20 mg Etomodate and 50 mg KAYLA given under Dr. DEVONTE Moore's authority. During intubation, copious amounts of coffee 
SLP Attempt  Donato Obrien  1940    Attempted to see Pt for SLP/dysphagia follow up treatment. Pt currently remains NPO with NG to suction and with pending surgery for paraesophageal hiatal hernia repair in 2 days. Will discontinue Dysphagia Treatment intervention due to prolonged NPO status with request to reconsult as indicated following surgery and when Pt is no longer held NPO.    Danyelle Valles [facility]-SLP#[id number]    
Speech Language Pathology   Attempt Note     Donato Obrien  1940    Attempt # 1: Attempted to see pt for dysphagia follow up therapy, pt currently susanna. Will follow up at a later time as schedule allows.    Attempt # 2: Attempted to see pt for dysphagia follow up therapy as pt was back on floor from dialysis and RN requested pt be seen. Pt found with face mask on and would not allow this SLP to remove to provide po trials, pt declined all offered po trials. Pt with congested non productive coughing in the absence of po, pt did not allow for SLP to re-position pt. Per RN pt with prolonged oral holding of meds in puree previous date with pt not accepting anything by mouth this date due to being off of floor for dialysis. Suspect pt is not meeting his nutritional/ hydration needs at this time, recommend palliative care consult to discuss goals of care as pt is a full code. Recommend to hold pt strictly NPO if increased difficulty is noted with po and/ or respiratory status declines.     Thanks,  Jessica Ramirez M.A. CCC-SLP #[id number] 4/29/2025 9:58 AM  Speech-Language Pathologist    Second Attempt: 1913     
Speech Language Pathology  ATTEMPT    Donato Obrien  1940    Attempted to see pt for dysphagia therapy. Per discussion with RN, Pt out of room undergoing dialysis. Will attempt to follow-up as schedule allows.     Thank you,   Tres Sim M.S. CF-SLP [id number]  Speech-Language Pathologist   4/26/2025             
Speech Language Pathology  Attempt    Donato CAMILA Obrien  1940    Attempted to see pt for dysphagia therapy. RN cleared SLP for entry. Pt asleep but easily awakened upon SLP arrival. Pt declined dysphagia intervention and PO trials despite education and rationale for treatment. Pt reported being aware of NPO status and stated that he is not hungry at this time. Will hold treatment and attempt to follow-up as schedule allows.     Thank you,     Richie Knutson M.A., CCC-SLP   Speech-Language Pathologist  [id number]      
Speech Language Pathology  HOLD    Donato CAMILA Obrien  1940     Attempted to see pt for dysphagia therapy follow-up. Pt NPO for EGD and off the floor for procedure at this time. Will hold and re-attempt to follow-up as therapy schedule allows.    Thanks,  Ingris Washington MA CCC-SLP #[id number]  Speech Language Pathologist    
Spoke with Dr. DEVONTE Moore after he spoke [facility]. Plan for trending h/h q6h, call if dropping in hemoglobin or actively bleeding, possible EGD vs CTA. Do to current status and continued increase in titration of vasopressors, pt too unstable to leave the unit. HANSA SILVA, RN, BSN, CCRN.     
Spoke with patient's spouse over the telephone. Discussed POC, all questions answered.  
Teaching / education initiated regarding perioperative experience, expectations, and pain management during stay. Patient verbalized understanding.    Patient's rhythm is atrial flutter, anesthesia notified.     Electronically signed by Yaquelin Mar RN on 4/17/2025 at 1:41 PM    
The nephrologist Kristel Lang MD gave the order over the phone to have the PICC nurse place the PICC line in either arm.   
Treatment time: 3 hours  Net UF: 1500 ml     Pre weight: 86 kg  Post weight:84.5 kg    Access used: RCVC    Access function: well with  ml/min     Summary of response to treatment: Patient tolerated treatment well and without any complications. Patient remained stable throughout entire treatment.    Report given to Kaya Ball RN and copy of dialysis treatment record placed in chart, to be scanned into EMR.  
Treatment time: 3.5 hours  Net UF: 1300 ml     Pre weight: 83.3 kg  Post weight:82 kg  EDW: TBD    Access used: R TDC    Access function: Well tolerated with  ml/min     Medications or blood products given: Albumin 25 g and heparin dwells     Regular outpatient schedule: TTS     Summary of response to treatment: Patient's BP soft from the start. UF ordered not reached even with albumin and midodrine support for BP. Patient remained asymptomatic throughout entire treatment and upon the dialysis RN exiting the ICU suite.     Copy of dialysis treatment record placed in chart, to be scanned into EMR.  
Treatment time: 3.5 hrs    Net UF:  500 ml     Pre weight: 86.9 kg  Post weight: 86 kg     Access used: Rtdc  Access function:  tolerated well,  BFR 300ml/min     Medications or blood products given: heparin dwells, epogen 10kiu     Regular outpatient schedule: TTS     Summary of response to treatment: Pt tolerated well. Pt remained stable throughout entire treatment and upon exiting the hemodialysis suite.      Copy of dialysis treatment record placed in chart, to be scanned into EMR.      
Treatment time: 3.5 hrs    Net UF: 1000 ml     Pre weight: 86.4 kg  Post weight: 85.4 kg     Access used: Rtdc  Access function:  tolerated well,  BFR 400ml/min     Medications or blood products given: heparin dwells     Regular outpatient schedule: TTS     Summary of response to treatment: Pt tolerated well. Pt remained stable throughout entire treatment and upon exiting the hemodialysis suite.      Copy of dialysis treatment record placed in chart, to be scanned into EMR.      
Treatment time: 3.5 hrs    Net UF: 1900 ml     Pre weight: 83.9 kg  Post weight: 82 kg     Access used: Rtdc  Access function:  tolerated well,  BFR 400ml/min     Medications or blood products given: heparin dwells    Regular outpatient schedule: TTS     Summary of response to treatment: Pt tolerated well. Pt remained stable throughout entire treatment and upon exiting the hemodialysis suite.      Copy of dialysis treatment record placed in chart, to be scanned into EMR.      
Treatment time: 4 hrs    Net UF: 1000 ml    Pre weight: 83 kg  Post weight: 82 kg  EDW: TBD    Access used: Rt CW TDC  Access function: Well tolerated, 400 BFR    Medications or blood products given: Midodrine 10 mg, Albumin 25g, Heparin dwells    Regular outpatient schedule: TTS    Summary of response to treatment: BP very soft in beginning of treatment. Pt responded well to Albumin and Midodrine for BP support. Pt remained stable throughout entire treatment and upon exiting the hemodialysis suite. Report given to Jose Bower RN      
Unable to draw labs from patient femoral line after multiple flushes. Hosp notified. OK to run to continue TPN infusion. Lab notified. Patient initially refused his morning labs but later agreed after second attempt  
-- -- 86.5 kg (190 lb 11.2 oz)     No intake or output data in the 24 hours ending 04/28/25 1229        Physical Exam  General: Alert, Awake, oriented x 3, cooperative. No distress. Thin, lean.  HEENT: Oral mucosa moist.  Respiratory: Breath sounds clear bilaterally.  No rales. No wheezes.   Cardiovascular: S1 S2, RRR  Gastrointestinal: BS+. Soft, no distention, no diffuse tenderness, no rebound or guarding.  Surgical wound intact, no overt bleeding or discharge.  No  rigidity. L groin is femoral line site w/o signs of infection  Musculoskeletal/ Extermities: No edema bilaterally.  Neurologic: Face symmetrical,   Generalized physical deconditioning.    Review of Systems  - Negative except Interval History    LABS:    CBC:   Recent Labs     04/26/25  0632 04/27/25  0652 04/28/25  0638   WBC 11.0 10.0 10.1   HGB 8.4* 8.6* 9.2*   HCT 26.0* 26.0* 28.1*   PLT 71* 78* 72*   .8* 101.9* 102.7*     Renal:    Recent Labs     04/26/25  0632 04/27/25  0652 04/28/25  0638   * 134* 132*   K 3.3* 4.1 4.4    102 100   CO2 23 22 19*   BUN 76* 49* 77*   CREATININE 6.2* 4.8* 6.2*   GLUCOSE 206* 169* 146*   CALCIUM 8.4 8.4 8.9   MG 1.88 1.74* 2.04   PHOS 2.9 1.0* 1.3*   ANIONGAP 11 10 13     Hepatic:   Recent Labs     04/26/25  0632 04/27/25  0652 04/28/25  0638   AST 18 18 21   ALT 7* <5* <5*   BILITOT 0.5 0.6 0.7   ALKPHOS 99 55 71     Troponin: No results for input(s): \"TROPONINI\" in the last 72 hours.  BNP: No results for input(s): \"BNP\" in the last 72 hours.  Lipids: No results for input(s): \"CHOL\", \"HDL\" in the last 72 hours.    Invalid input(s): \"LDLCALCU\", \"TRIGLYCERIDE\"  ABGs:  No results for input(s): \"PHART\", \"HVX5QOS\", \"PO2ART\", \"IIT7YCG\", \"BEART\", \"THGBART\", \"Z4PSNXEQ\", \"LJU6IMP\" in the last 72 hours.    INR: No results for input(s): \"INR\" in the last 72 hours.  Lactate: No results for input(s): \"LACTATE\" in the last 72 
grams       Fluid (mL/day):  6787-2062 mL    EDUCATION  Education/Counseling not indicated     The patient will be monitored per nutrition standards of care. Consult dietitian if additional nutrition interventions are needed prior to RD reassessment.     Leesa Joshua MS, RD, LD    Contact: [phone]   
Full   gastric filling and emptying could not be obtained during the course of this   study, again due to technical limitations of exam.   3. A larger diaphragmatic hernia containing peritoneal contents including   colon cannot be evaluated by upper GI.         XR CHEST PORTABLE   Final Result   1. No acute findings.   2. Stable central line, cardiomegaly, and mild bibasilar atelectasis.   3. Hiatal hernia.                Medications:     Scheduled Meds:   [START ON 4/22/2025] allopurinol  100 mg Oral Once per day on Tuesday Thursday Saturday    thiamine  100 mg IntraVENous Daily    [START ON 4/21/2025] fat emulsion  250 mL IntraVENous Once per day on Monday Thursday    insulin lispro  0-4 Units SubCUTAneous Q4H    gabapentin  100 mg Oral Q8H    pantoprazole  40 mg IntraVENous BID    budesonide-formoterol  2 puff Inhalation BID RT    [Held by provider] clopidogrel  75 mg Oral Daily    calcium acetate  2 capsule Oral TID AC    b complex-C-folic acid  1 capsule Oral Daily    isosorbide mononitrate  30 mg Oral Daily    levothyroxine  50 mcg Oral Daily    rosuvastatin  20 mg Oral Nightly    predniSONE  40 mg Oral Daily    sodium chloride flush  5-40 mL IntraVENous 2 times per day    heparin (porcine)  5,000 Units SubCUTAneous 3 times per day    midodrine  5 mg Oral Once per day on Sunday Monday Wednesday Friday    And    midodrine  10 mg Oral Once per day on Tuesday Thursday Saturday     Continuous Infusions:   PN-Adult Premix 5/20 - Central      dextrose      dextrose 5 % and 0.45 % NaCl 50 mL/hr at 04/20/25 0644    sodium chloride       PRN Meds:dextrose bolus **OR** dextrose bolus, glucagon (rDNA), dextrose, heparin (porcine), phenol, benzocaine-menthol, aluminum & magnesium hydroxide-simethicone (MAALOX PLUS) 30 mL, lidocaine viscous hcl (XYLOCAINE) 5 mL (GI COCKTAIL), albuterol, aspirin-acetaminophen-caffeine, sodium chloride flush, sodium chloride, polyethylene glycol, acetaminophen **OR** acetaminophen    
patient upper GI.   2. Limited evaluation of the stomach demonstrating gastric distension.  Full   gastric filling and emptying could not be obtained during the course of this   study, again due to technical limitations of exam.   3. A larger diaphragmatic hernia containing peritoneal contents including   colon cannot be evaluated by upper GI.         XR CHEST PORTABLE   Final Result   1. No acute findings.   2. Stable central line, cardiomegaly, and mild bibasilar atelectasis.   3. Hiatal hernia.                Medications:     Scheduled Meds:   acetaminophen  650 mg Oral 4 times per day    clopidogrel  75 mg Oral Daily    epoetin  10,000 Units IntraVENous Once    insulin lispro  0-4 Units SubCUTAneous Q6H    fat emulsion  250 mL IntraVENous Once per day on Monday Thursday    allopurinol  100 mg Oral Once per day on Tuesday Thursday Saturday    gabapentin  100 mg Oral Q8H    pantoprazole  40 mg IntraVENous BID    budesonide-formoterol  2 puff Inhalation BID RT    [Held by provider] calcium acetate  2 capsule Oral TID AC    [Held by provider] b complex-C-folic acid  1 capsule Oral Daily    [Held by provider] isosorbide mononitrate  30 mg Oral Daily    levothyroxine  50 mcg Oral Daily    rosuvastatin  20 mg Oral Nightly    sodium chloride flush  5-40 mL IntraVENous 2 times per day    heparin (porcine)  5,000 Units SubCUTAneous 3 times per day    midodrine  5 mg Oral Once per day on Sunday Monday Wednesday Friday    And    midodrine  10 mg Oral Once per day on Tuesday Thursday Saturday     Continuous Infusions:   PN-Adult Premix 5/20 - Central 83 mL/hr at 04/27/25 0340    dextrose      sodium chloride       PRN Meds:oxyCODONE-acetaminophen, diclofenac sodium, morphine, midodrine, dextrose bolus **OR** dextrose bolus, glucagon (rDNA), dextrose, heparin (porcine), phenol, benzocaine-menthol, aluminum & magnesium hydroxide-simethicone (MAALOX PLUS) 30 mL, lidocaine viscous hcl (XYLOCAINE) 5 mL (GI COCKTAIL), albuterol, 
requires assistance with all homemaking tasks  Active :        [] Yes                 [x] No  Hand Dominance: [] Left                 [x] Right  Current Employment: unemployed  Hobbies: Reading, watching tv  Recent Falls: Pt denies recent falls in last 6 mo.     Available Assistance at Discharge: 24 hr physical assistance available from wife. Wife is mobile with use of RW.     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   Observation:   General Observation:  3L O2, telemetry, femoral line, HD port, NG on intermittent low suction   Posture:   Forward head, round shoulders, flexed posture.   ROM:   Flexion contractures at hip and knee B, R- 80 degrees at knee and 30 degrees at the hip, L- 45 degree at knee and 20 degree at hip approximately.  Ankle WFL       Subjective  General: Patient supine in bed upon arrival. Initially declining therapy but agreeable with mod encouragement. The patient is resistive to pericare after sitting EOB also, again agreeable with encouragement. Pt reports feeling better at end of session and thanks the therapist.   Pain: 9/10.  Location: chest with deep breaths, does not mention abdominal pain at rest but reports it with sitting up (doesn't rate)  Pain Interventions: RN delivered pain medication within therapy session per patient request, repositioned , and therapy activities modified       Functional Mobility  Bed Mobility:  Supine to Sit: 2 person assistance with Max A   Sit to Supine: 2 person assistance with Max A   Rolling Left: maximum assistance  Rolling Right: minimal assistance  Scootin person assistance with Max A in sitting and supine    Comments: Pt assisted to sit EOB with increased time and use of rail. Bed flat for sit>supine with poor trunk positioning. Rolling completed (B) at end of session for pericare.   Transfers:  Lateral seated scoot transfer: 2 person assistance with Max A 
Thursday Saturday    gabapentin  100 mg Oral Q8H    pantoprazole  40 mg IntraVENous BID    budesonide-formoterol  2 puff Inhalation BID RT    [Held by provider] calcium acetate  2 capsule Oral TID AC    [Held by provider] b complex-C-folic acid  1 capsule Oral Daily    [Held by provider] isosorbide mononitrate  30 mg Oral Daily    levothyroxine  50 mcg Oral Daily    rosuvastatin  20 mg Oral Nightly    sodium chloride flush  5-40 mL IntraVENous 2 times per day    heparin (porcine)  5,000 Units SubCUTAneous 3 times per day    midodrine  5 mg Oral Once per day on Sunday Monday Wednesday Friday    And    midodrine  10 mg Oral Once per day on Tuesday Thursday Saturday     Continuous Infusions:   PN-Adult Premix 5/20 - Standard Electrolytes - Central Line      PN-Adult Premix 5/20 - Standard Electrolytes - Central Line 83 mL/hr at 04/29/25 0628    dextrose      sodium chloride       PRN Meds:guaiFENesin-dextromethorphan, ondansetron, oxyCODONE-acetaminophen, diclofenac sodium, morphine, dextrose bolus **OR** dextrose bolus, glucagon (rDNA), dextrose, heparin (porcine), phenol, benzocaine-menthol, aluminum & magnesium hydroxide-simethicone (MAALOX PLUS) 30 mL, lidocaine viscous hcl (XYLOCAINE) 5 mL (GI COCKTAIL), albuterol, aspirin-acetaminophen-caffeine, sodium chloride flush, sodium chloride, polyethylene glycol, acetaminophen **OR** acetaminophen    
hemodialysis (HCC)    3. Chest discomfort    4. Odynophagia    5. Paraesophageal hiatal hernia    6. Hiatal hernia        OR Date 4/23/2025, laparoscopic paraesophageal hiatal hernia repair, reinforcement with [employer] [employer] mesh for paraesophageal hiatal hernia, complex type 4 hernia with stomach obstruction and transverse colon in hernia  Acute respiratory failure   COPD  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  Medical coagulapathy, on Plavix held  Chest pain, elevated troponin      Plan:    Pt stable overall  On Plavix - no bleeding  Diet per speech therapy reccs  Will need to be up and OOB, sitting up for attempts of meals    Karthik Borjas MD    
acetaminophen      Assessment:  84 y.o. male admitted with   1. Acute respiratory failure with hypoxia (HCC)    2. 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 (HCC)    3. Chest discomfort    4. Odynophagia    5. Paraesophageal hiatal hernia    6. Hiatal hernia        OR Date 4/23/2025, laparoscopic paraesophageal hiatal hernia repair, reinforcement with [place] [employer] mesh for paraesophageal hiatal hernia, complex type 4 hernia with stomach obstruction and transverse colon in hernia  Acute respiratory failure   COPD  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  Medical coagulapathy, on Plavix held  Chest pain, elevated troponin      Plan:  1. Abdominal pain controlled, incisional tenderness only on exam, incisions healing well, no nausea or vomiting, vitals/labs stable; continued supportive care  2. Dysphagia/pureed diet as tolerated per SLP recommendations; monitor bowel function  3. Continue TPN until PO intake is adequate, if unable to take PO may need to consider feeding tube; monitor and correct electrolytes  4. Activity as tolerated, ambulate TID, up to chair for meals--PT/OT following  5. Pulmonary toilet, incentive spirometry  6. PRN analgesics and antiemetics--minimizing narcotics as tolerated  7. Management of medical comorbid etiologies per primary team and consulting services  8. Disposition: Discharge planning    EDUCATION:  Educated patient on plan of care and disease process--all questions answered.    Plans discussed with patient and nursing.  Reviewed and discussed with Dr. Borjas.      Signed:  SHAHRZAD See - CNP  4/29/2025 9:10 AM    Surgery Staff:     I have personally performed a face to face diagnostic evaluation on this patient. I have interviewed and examined the patient and I agree with the assessment above. Time was spent reviewing patient chart (including but not limited to notes, labs, imaging and other testing), interviewing and counseling patient and present family members, performing physical exam, documentation of my 
copyright [employer]. All Rights Reserved.       The CPT codes, CCI edits and ICD codes generated are intended as       suggestions and were generated based on input data.  These codes are       preliminary and upon  review may be revised to meet current       compliance and payer requirements.  The provider is responsible for       the final determination of appropriate codes, and modifiers. Diagnosis Code(s): Eugene Rodriguez This document has been electronically signed. Note Initiated:4/17/2025 Note Completed:4/17/2025 2:20 PM    Scheduled Meds:   gabapentin  100 mg Oral Q8H    pantoprazole  40 mg IntraVENous BID    allopurinol  100 mg Oral Daily    amLODIPine  5 mg Oral Daily    budesonide-formoterol  2 puff Inhalation BID RT    [Held by provider] clopidogrel  75 mg Oral Daily    calcium acetate  2 capsule Oral TID AC    b complex-C-folic acid  1 capsule Oral Daily    isosorbide mononitrate  30 mg Oral Daily    levothyroxine  50 mcg Oral Daily    rosuvastatin  20 mg Oral Nightly    predniSONE  40 mg Oral Daily    sodium chloride flush  5-40 mL IntraVENous 2 times per day    heparin (porcine)  5,000 Units SubCUTAneous 3 times per day    midodrine  5 mg Oral Once per day on Sunday Monday Wednesday Friday    And    midodrine  10 mg Oral Once per day on Tuesday Thursday Saturday     Continuous Infusions:   dextrose 5 % and 0.45 % NaCl 50 mL/hr at 04/18/25 1448    sodium chloride       PRN Meds:.heparin (porcine), phenol, benzocaine-menthol, aluminum & magnesium hydroxide-simethicone (MAALOX PLUS) 30 mL, lidocaine viscous hcl (XYLOCAINE) 5 mL (GI COCKTAIL), albuterol, aspirin-acetaminophen-caffeine, sodium chloride flush, sodium chloride, polyethylene glycol, acetaminophen **OR** acetaminophen      Assessment:  84 y.o. male admitted with   1. Acute respiratory failure with hypoxia    2. 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 (HCC)    3. Chest discomfort    4. Odynophagia        Complex paraesophageal hiatal

## 2025-05-07 NOTE — DISCHARGE SUMMARY
84 M who was chronically ill  Admitted for  respiratory failure/encephalopathy /pneumonia, underwent paraseophageal hernia repair  without significant overall improvement.   Remained on TPN  but had been started on  pureed diet . Had extensive discussion of GOC and pt remained full code .Dev  n/v and resp failure. Suspect  aspiration pna .  Had cardiac arrerst  with ROSC , remained full code but had another event and passed away that night .

## 2025-05-12 LAB — EMERGENCY ISSUE BLOOD PRODUCTS SIGNED FORM: NORMAL

## (undated) DEVICE — COVER LT HNDL BLU PLAS

## (undated) DEVICE — SYRINGE MED 10ML TRNSLUC BRL PLUNG BLK MRK POLYPR CTRL

## (undated) DEVICE — GLOVE SURG SZ 6.5 L11.2IN FNGR THK9.8MIL STRW LTX POLYMER

## (undated) DEVICE — SUTURE VICRYL + SZ 0 L27IN ABSRB VLT L26MM UR-6 5/8 CIR VCP603H

## (undated) DEVICE — AIR/WATER CLEANING ADAPTER FOR OLYMPUS® GI ENDOSCOPE: Brand: BULLDOG®

## (undated) DEVICE — DEVICE SUT 0 L48IN GRN POLY BRAID LD UNIT DISP SURGDAC

## (undated) DEVICE — VALVE SUCTION AIR H2O SET ORCA POD + DISP

## (undated) DEVICE — APPLICATOR MEDICATED 26 CC SOLUTION HI LT ORNG CHLORAPREP

## (undated) DEVICE — BW-412T DISP COMBO CLEANING BRUSH: Brand: SINGLE USE COMBINATION CLEANING BRUSH

## (undated) DEVICE — LIQUIBAND RAPID ADHESIVE 36/CS 0.8ML: Brand: MEDLINE

## (undated) DEVICE — SOLUTION IRRIG 500ML STRL H2O NONPYROGENIC

## (undated) DEVICE — Device

## (undated) DEVICE — LAPAROSCOPIC TROCAR SLEEVE/SINGLE USE: Brand: KII® LOW PROFILE OPTICAL ACCESS SYSTEM

## (undated) DEVICE — TK® TI-KNOT® DEVICE: Brand: TK® TI-KNOT®

## (undated) DEVICE — MOUTHPIECE ENDOSCP L CTRL OPN AND SIDE PORTS DISP

## (undated) DEVICE — 30978 SEE SHARP - ENHANCED INTRAOPERATIVE LAPAROSCOPE CLEANING & DEFOGGING: Brand: 30978 SEE SHARP - ENHANCED INTRAOPERATIVE LAPAROSCOPE CLEANING & DEFOGGING

## (undated) DEVICE — SOLUTION IRRIG 1000ML 09% SOD CHL USP PIC PLAS CONTAINER

## (undated) DEVICE — SOLUTION IV IRRIG WATER 500ML POUR BRL ST 2F7113

## (undated) DEVICE — TROCAR: Brand: KII FIOS FIRST ENTRY

## (undated) DEVICE — ENDOSCOPIC KIT 6X3/16 FT COLON W/ 1.1 OZ 2 GWN W/O BRSH

## (undated) DEVICE — DEVICE SUT SHFT L34CM DIA 10MM 2 JAW LD UNIT ENDOSTCH

## (undated) DEVICE — GENERAL LAPAROSCOPY: Brand: MEDLINE INDUSTRIES, INC.

## (undated) DEVICE — TUBING IRRIG COMPATIBLE W ERBE MEDIVATOR PMP HYDR

## (undated) DEVICE — TROCAR SLEEVE: Brand: KII ® LOW PROFILE SLEEVE

## (undated) DEVICE — TK® QUICK LOAD® UNIT: Brand: TK® QUICK LOAD®

## (undated) DEVICE — SHEAR HARMONIC VES SEAL 360 DEG SHFT L 45 MM DIA 5 MM JAW L

## (undated) DEVICE — GOWN SIRUS NONREIN LG W/TWL: Brand: MEDLINE INDUSTRIES, INC.

## (undated) DEVICE — FORCEPS BX L240CM WRK CHN 2.8MM STD CAP W/ NDL MIC MESH

## (undated) DEVICE — GOWN AURORA NONREINF LG: Brand: MEDLINE INDUSTRIES, INC.

## (undated) DEVICE — CAP WATER BTL AIR TBNG L 16 IN CO2 TBNG L 48 IN ENDOSCP

## (undated) DEVICE — [HIGH FLOW INSUFFLATOR,  DO NOT USE IF PACKAGE IS DAMAGED,  KEEP DRY,  KEEP AWAY FROM SUNLIGHT,  PROTECT FROM HEAT AND RADIOACTIVE SOURCES.]: Brand: PNEUMOSURE

## (undated) DEVICE — PAD TABLE RAMPED 1 IN TO 2 IN TRENDELENBURG

## (undated) DEVICE — SUTURE MONOCRYL + SZ 4-0 L18IN ABSRB UD L19MM PS-2 3/8 CIR MCP496G

## (undated) DEVICE — SINGLE USE AIR/WATER, SUCTION AND BIOPSY VALVES SET: Brand: ORCAPOD™

## (undated) DEVICE — NDLCTR: FOAM/MAG 40CT 64/CS: Brand: MEDICAL ACTION INDUSTRIES

## (undated) DEVICE — BLANKET WRM W29.9XL79.1IN UP BODY FORC AIR MISTRAL-AIR

## (undated) DEVICE — BLADE CLIPPER GEN PURP NS

## (undated) DEVICE — LEGGINGS, PAIR, 31X48, STERILE: Brand: MEDLINE